# Patient Record
Sex: MALE | Race: WHITE | NOT HISPANIC OR LATINO | Employment: OTHER | URBAN - METROPOLITAN AREA
[De-identification: names, ages, dates, MRNs, and addresses within clinical notes are randomized per-mention and may not be internally consistent; named-entity substitution may affect disease eponyms.]

---

## 2018-01-04 ENCOUNTER — OFFICE VISIT CONVERTED (OUTPATIENT)
Dept: INTERNAL MEDICINE | Facility: CLINIC | Age: 83
End: 2018-01-04
Attending: INTERNAL MEDICINE

## 2018-01-04 ENCOUNTER — CONVERSION ENCOUNTER (OUTPATIENT)
Dept: INTERNAL MEDICINE | Facility: CLINIC | Age: 83
End: 2018-01-04

## 2018-01-25 ENCOUNTER — OFFICE VISIT CONVERTED (OUTPATIENT)
Dept: SURGERY | Facility: CLINIC | Age: 83
End: 2018-01-25
Attending: SURGERY

## 2018-01-26 ENCOUNTER — OFFICE VISIT CONVERTED (OUTPATIENT)
Dept: INTERNAL MEDICINE | Facility: CLINIC | Age: 83
End: 2018-01-26
Attending: PHYSICIAN ASSISTANT

## 2018-01-26 ENCOUNTER — CONVERSION ENCOUNTER (OUTPATIENT)
Dept: INTERNAL MEDICINE | Facility: CLINIC | Age: 83
End: 2018-01-26

## 2018-03-01 ENCOUNTER — CONVERSION ENCOUNTER (OUTPATIENT)
Dept: SURGERY | Facility: CLINIC | Age: 83
End: 2018-03-01

## 2018-03-01 ENCOUNTER — OFFICE VISIT CONVERTED (OUTPATIENT)
Dept: SURGERY | Facility: CLINIC | Age: 83
End: 2018-03-01
Attending: SURGERY

## 2018-03-05 ENCOUNTER — CONVERSION ENCOUNTER (OUTPATIENT)
Dept: INTERNAL MEDICINE | Facility: CLINIC | Age: 83
End: 2018-03-05

## 2018-03-05 ENCOUNTER — OFFICE VISIT CONVERTED (OUTPATIENT)
Dept: INTERNAL MEDICINE | Facility: CLINIC | Age: 83
End: 2018-03-05
Attending: INTERNAL MEDICINE

## 2018-03-22 ENCOUNTER — OFFICE VISIT CONVERTED (OUTPATIENT)
Dept: INTERNAL MEDICINE | Facility: CLINIC | Age: 83
End: 2018-03-22
Attending: INTERNAL MEDICINE

## 2018-03-22 ENCOUNTER — CONVERSION ENCOUNTER (OUTPATIENT)
Dept: INTERNAL MEDICINE | Facility: CLINIC | Age: 83
End: 2018-03-22

## 2018-03-27 ENCOUNTER — PATIENT OUTREACH - CONVERTED (OUTPATIENT)
Dept: INTERNAL MEDICINE | Facility: CLINIC | Age: 83
End: 2018-03-27
Attending: INTERNAL MEDICINE

## 2018-04-05 ENCOUNTER — CONVERSION ENCOUNTER (OUTPATIENT)
Dept: INTERNAL MEDICINE | Facility: CLINIC | Age: 83
End: 2018-04-05

## 2018-04-05 ENCOUNTER — OFFICE VISIT CONVERTED (OUTPATIENT)
Dept: INTERNAL MEDICINE | Facility: CLINIC | Age: 83
End: 2018-04-05
Attending: INTERNAL MEDICINE

## 2018-06-06 ENCOUNTER — OFFICE VISIT CONVERTED (OUTPATIENT)
Dept: INTERNAL MEDICINE | Facility: CLINIC | Age: 83
End: 2018-06-06
Attending: INTERNAL MEDICINE

## 2018-06-06 ENCOUNTER — CONVERSION ENCOUNTER (OUTPATIENT)
Dept: INTERNAL MEDICINE | Facility: CLINIC | Age: 83
End: 2018-06-06

## 2018-07-09 ENCOUNTER — OFFICE VISIT CONVERTED (OUTPATIENT)
Dept: ORTHOPEDIC SURGERY | Facility: CLINIC | Age: 83
End: 2018-07-09
Attending: ORTHOPAEDIC SURGERY

## 2018-08-02 ENCOUNTER — OFFICE VISIT CONVERTED (OUTPATIENT)
Dept: NEUROLOGY | Facility: CLINIC | Age: 83
End: 2018-08-02
Attending: PSYCHIATRY & NEUROLOGY

## 2018-08-02 ENCOUNTER — CONVERSION ENCOUNTER (OUTPATIENT)
Dept: NEUROLOGY | Facility: CLINIC | Age: 83
End: 2018-08-02

## 2018-08-02 ENCOUNTER — OFFICE VISIT CONVERTED (OUTPATIENT)
Dept: INTERNAL MEDICINE | Facility: CLINIC | Age: 83
End: 2018-08-02
Attending: INTERNAL MEDICINE

## 2018-08-27 ENCOUNTER — OFFICE VISIT CONVERTED (OUTPATIENT)
Dept: INTERNAL MEDICINE | Facility: CLINIC | Age: 83
End: 2018-08-27
Attending: INTERNAL MEDICINE

## 2018-11-02 ENCOUNTER — OFFICE VISIT CONVERTED (OUTPATIENT)
Dept: INTERNAL MEDICINE | Facility: CLINIC | Age: 83
End: 2018-11-02
Attending: INTERNAL MEDICINE

## 2018-11-27 ENCOUNTER — PATIENT OUTREACH - CONVERTED (OUTPATIENT)
Dept: INTERNAL MEDICINE | Facility: CLINIC | Age: 83
End: 2018-11-27
Attending: INTERNAL MEDICINE

## 2018-12-28 ENCOUNTER — PATIENT OUTREACH - CONVERTED (OUTPATIENT)
Dept: INTERNAL MEDICINE | Facility: CLINIC | Age: 83
End: 2018-12-28
Attending: INTERNAL MEDICINE

## 2018-12-31 ENCOUNTER — OFFICE VISIT CONVERTED (OUTPATIENT)
Dept: INTERNAL MEDICINE | Facility: CLINIC | Age: 83
End: 2018-12-31
Attending: INTERNAL MEDICINE

## 2019-01-24 ENCOUNTER — HOSPITAL ENCOUNTER (OUTPATIENT)
Dept: URGENT CARE | Facility: CLINIC | Age: 84
Discharge: HOME | End: 2019-01-24
Attending: PHYSICIAN ASSISTANT

## 2019-02-11 ENCOUNTER — OFFICE VISIT CONVERTED (OUTPATIENT)
Dept: NEUROLOGY | Facility: CLINIC | Age: 84
End: 2019-02-11
Attending: PSYCHIATRY & NEUROLOGY

## 2019-03-08 ENCOUNTER — HOSPITAL ENCOUNTER (OUTPATIENT)
Dept: OTHER | Facility: HOSPITAL | Age: 84
Discharge: HOME | End: 2019-03-08
Attending: INTERNAL MEDICINE

## 2019-03-08 LAB
ANION GAP SERPL CALC-SCNC: 17 MMOL/L (ref 8–19)
BUN SERPL-MCNC: 23 MG/DL (ref 5–25)
BUN/CREAT SERPL: 18 {RATIO} (ref 6–20)
CALCIUM SERPL-MCNC: 9.2 MG/DL (ref 8.7–10.4)
CHLORIDE SERPL-SCNC: 107 MMOL/L (ref 99–111)
CONV CO2: 25 MMOL/L (ref 22–32)
CREAT UR-MCNC: 1.3 MG/DL (ref 0.7–1.2)
GFR SERPLBLD BASED ON 1.73 SQ M-ARVRAT: 50 ML/MIN/{1.73_M2}
GLUCOSE SERPL-MCNC: 99 MG/DL (ref 70–99)
OSMOLALITY SERPL CALC.SUM OF ELEC: 302 MOSM/KG (ref 273–304)
POTASSIUM SERPL-SCNC: 5.1 MMOL/L (ref 3.5–5.3)
SODIUM SERPL-SCNC: 144 MMOL/L (ref 135–147)

## 2019-03-30 ENCOUNTER — HOSPITAL ENCOUNTER (OUTPATIENT)
Dept: URGENT CARE | Facility: CLINIC | Age: 84
Discharge: HOME | End: 2019-03-30
Attending: EMERGENCY MEDICINE

## 2019-04-05 ENCOUNTER — OFFICE VISIT CONVERTED (OUTPATIENT)
Dept: INTERNAL MEDICINE | Facility: CLINIC | Age: 84
End: 2019-04-05
Attending: INTERNAL MEDICINE

## 2019-04-30 ENCOUNTER — PATIENT OUTREACH - CONVERTED (OUTPATIENT)
Dept: INTERNAL MEDICINE | Facility: CLINIC | Age: 84
End: 2019-04-30
Attending: INTERNAL MEDICINE

## 2019-05-10 ENCOUNTER — HOSPITAL ENCOUNTER (OUTPATIENT)
Dept: URGENT CARE | Facility: CLINIC | Age: 84
Discharge: HOME | End: 2019-05-10

## 2019-05-30 ENCOUNTER — HOSPITAL ENCOUNTER (OUTPATIENT)
Dept: OTHER | Facility: HOSPITAL | Age: 84
Discharge: HOME | End: 2019-05-30
Attending: INTERNAL MEDICINE

## 2019-05-30 LAB
CREAT UR-MCNC: 1.58 MG/DL (ref 0.7–1.2)
URATE SERPL-MCNC: 6.2 MG/DL (ref 3.5–8.5)

## 2019-06-28 ENCOUNTER — PATIENT OUTREACH - CONVERTED (OUTPATIENT)
Dept: INTERNAL MEDICINE | Facility: CLINIC | Age: 84
End: 2019-06-28
Attending: INTERNAL MEDICINE

## 2019-07-09 ENCOUNTER — CONVERSION ENCOUNTER (OUTPATIENT)
Dept: NEUROLOGY | Facility: CLINIC | Age: 84
End: 2019-07-09

## 2019-07-09 ENCOUNTER — OFFICE VISIT CONVERTED (OUTPATIENT)
Dept: NEUROLOGY | Facility: CLINIC | Age: 84
End: 2019-07-09
Attending: PSYCHIATRY & NEUROLOGY

## 2019-08-25 ENCOUNTER — HOSPITAL ENCOUNTER (OUTPATIENT)
Dept: URGENT CARE | Facility: CLINIC | Age: 84
Discharge: HOME | End: 2019-08-25
Attending: EMERGENCY MEDICINE

## 2019-08-30 ENCOUNTER — PATIENT OUTREACH - CONVERTED (OUTPATIENT)
Dept: INTERNAL MEDICINE | Facility: CLINIC | Age: 84
End: 2019-08-30
Attending: INTERNAL MEDICINE

## 2019-09-21 ENCOUNTER — HOSPITAL ENCOUNTER (OUTPATIENT)
Dept: URGENT CARE | Facility: CLINIC | Age: 84
Discharge: HOME | End: 2019-09-21

## 2019-09-30 ENCOUNTER — PATIENT OUTREACH - CONVERTED (OUTPATIENT)
Dept: INTERNAL MEDICINE | Facility: CLINIC | Age: 84
End: 2019-09-30
Attending: INTERNAL MEDICINE

## 2019-10-02 ENCOUNTER — OFFICE VISIT CONVERTED (OUTPATIENT)
Dept: INTERNAL MEDICINE | Facility: CLINIC | Age: 84
End: 2019-10-02
Attending: INTERNAL MEDICINE

## 2019-10-09 ENCOUNTER — HOSPITAL ENCOUNTER (OUTPATIENT)
Dept: MRI IMAGING | Facility: HOSPITAL | Age: 84
Discharge: HOME | End: 2019-10-09
Attending: INTERNAL MEDICINE

## 2019-10-25 ENCOUNTER — HOSPITAL ENCOUNTER (OUTPATIENT)
Dept: URGENT CARE | Facility: CLINIC | Age: 84
Discharge: HOME | End: 2019-10-25

## 2019-10-31 ENCOUNTER — PATIENT OUTREACH - CONVERTED (OUTPATIENT)
Dept: INTERNAL MEDICINE | Facility: CLINIC | Age: 84
End: 2019-10-31
Attending: INTERNAL MEDICINE

## 2019-11-04 ENCOUNTER — OFFICE VISIT CONVERTED (OUTPATIENT)
Dept: ORTHOPEDIC SURGERY | Facility: CLINIC | Age: 84
End: 2019-11-04
Attending: ORTHOPAEDIC SURGERY

## 2019-11-07 ENCOUNTER — OFFICE VISIT CONVERTED (OUTPATIENT)
Dept: INTERNAL MEDICINE | Facility: CLINIC | Age: 84
End: 2019-11-07
Attending: NURSE PRACTITIONER

## 2019-11-07 ENCOUNTER — HOSPITAL ENCOUNTER (OUTPATIENT)
Dept: OTHER | Facility: HOSPITAL | Age: 84
Discharge: HOME | End: 2019-11-07
Attending: NURSE PRACTITIONER

## 2019-11-07 LAB
ALBUMIN SERPL-MCNC: 4.3 G/DL (ref 3.5–5)
ALBUMIN/GLOB SERPL: 1.7 {RATIO} (ref 1.4–2.6)
ALP SERPL-CCNC: 68 U/L (ref 56–155)
ALT SERPL-CCNC: 19 U/L (ref 10–40)
ANION GAP SERPL CALC-SCNC: 18 MMOL/L (ref 8–19)
AST SERPL-CCNC: 17 U/L (ref 15–50)
BASOPHILS # BLD AUTO: 0.11 10*3/UL (ref 0–0.2)
BASOPHILS NFR BLD AUTO: 1.3 % (ref 0–3)
BILIRUB SERPL-MCNC: 0.3 MG/DL (ref 0.2–1.3)
BUN SERPL-MCNC: 36 MG/DL (ref 5–25)
BUN/CREAT SERPL: 26 {RATIO} (ref 6–20)
CALCIUM SERPL-MCNC: 10 MG/DL (ref 8.7–10.4)
CHLORIDE SERPL-SCNC: 102 MMOL/L (ref 99–111)
CONV ABS IMM GRAN: 0.07 10*3/UL (ref 0–0.2)
CONV CO2: 27 MMOL/L (ref 22–32)
CONV IMMATURE GRAN: 0.8 % (ref 0–1.8)
CONV TOTAL PROTEIN: 6.8 G/DL (ref 6.3–8.2)
CREAT UR-MCNC: 1.4 MG/DL (ref 0.7–1.2)
DEPRECATED RDW RBC AUTO: 49.7 FL (ref 35.1–43.9)
EOSINOPHIL # BLD AUTO: 0.13 10*3/UL (ref 0–0.7)
EOSINOPHIL # BLD AUTO: 1.5 % (ref 0–7)
ERYTHROCYTE [DISTWIDTH] IN BLOOD BY AUTOMATED COUNT: 13.4 % (ref 11.6–14.4)
FOLATE SERPL-MCNC: 17.9 NG/ML (ref 4.8–20)
GFR SERPLBLD BASED ON 1.73 SQ M-ARVRAT: 46 ML/MIN/{1.73_M2}
GLOBULIN UR ELPH-MCNC: 2.5 G/DL (ref 2–3.5)
GLUCOSE SERPL-MCNC: 51 MG/DL (ref 70–99)
HCT VFR BLD AUTO: 48.9 % (ref 42–52)
HGB BLD-MCNC: 15.5 G/DL (ref 14–18)
LYMPHOCYTES # BLD AUTO: 2.22 10*3/UL (ref 1–5)
LYMPHOCYTES NFR BLD AUTO: 25.8 % (ref 20–45)
MCH RBC QN AUTO: 31.8 PG (ref 27–31)
MCHC RBC AUTO-ENTMCNC: 31.7 G/DL (ref 33–37)
MCV RBC AUTO: 100.4 FL (ref 80–96)
MONOCYTES # BLD AUTO: 0.96 10*3/UL (ref 0.2–1.2)
MONOCYTES NFR BLD AUTO: 11.1 % (ref 3–10)
NEUTROPHILS # BLD AUTO: 5.13 10*3/UL (ref 2–8)
NEUTROPHILS NFR BLD AUTO: 59.5 % (ref 30–85)
NRBC CBCN: 0 % (ref 0–0.7)
OSMOLALITY SERPL CALC.SUM OF ELEC: 298 MOSM/KG (ref 273–304)
PLATELET # BLD AUTO: 164 10*3/UL (ref 130–400)
PMV BLD AUTO: 11.9 FL (ref 9.4–12.4)
POTASSIUM SERPL-SCNC: 5.6 MMOL/L (ref 3.5–5.3)
RBC # BLD AUTO: 4.87 10*6/UL (ref 4.7–6.1)
SODIUM SERPL-SCNC: 141 MMOL/L (ref 135–147)
T4 FREE SERPL-MCNC: 1 NG/DL (ref 0.9–1.8)
TSH SERPL-ACNC: 1.62 M[IU]/L (ref 0.27–4.2)
VIT B12 SERPL-MCNC: 600 PG/ML (ref 211–911)
WBC # BLD AUTO: 8.62 10*3/UL (ref 4.8–10.8)

## 2019-11-15 ENCOUNTER — HOSPITAL ENCOUNTER (OUTPATIENT)
Dept: URGENT CARE | Facility: CLINIC | Age: 84
Discharge: HOME | End: 2019-11-15

## 2019-11-21 ENCOUNTER — CONVERSION ENCOUNTER (OUTPATIENT)
Dept: INTERNAL MEDICINE | Facility: CLINIC | Age: 84
End: 2019-11-21

## 2019-11-21 ENCOUNTER — OFFICE VISIT CONVERTED (OUTPATIENT)
Dept: INTERNAL MEDICINE | Facility: CLINIC | Age: 84
End: 2019-11-21
Attending: INTERNAL MEDICINE

## 2019-11-27 ENCOUNTER — PATIENT OUTREACH - CONVERTED (OUTPATIENT)
Dept: INTERNAL MEDICINE | Facility: CLINIC | Age: 84
End: 2019-11-27
Attending: INTERNAL MEDICINE

## 2019-11-29 ENCOUNTER — HOSPITAL ENCOUNTER (OUTPATIENT)
Dept: OTHER | Facility: HOSPITAL | Age: 84
Discharge: HOME | End: 2019-11-29
Attending: INTERNAL MEDICINE

## 2019-11-29 LAB
CREAT UR-MCNC: 1.46 MG/DL (ref 0.7–1.2)
URATE SERPL-MCNC: 5.5 MG/DL (ref 3.5–8.5)

## 2019-12-31 ENCOUNTER — PATIENT OUTREACH - CONVERTED (OUTPATIENT)
Dept: INTERNAL MEDICINE | Facility: CLINIC | Age: 84
End: 2019-12-31
Attending: INTERNAL MEDICINE

## 2020-01-13 ENCOUNTER — OFFICE VISIT CONVERTED (OUTPATIENT)
Dept: UROLOGY | Facility: CLINIC | Age: 85
End: 2020-01-13
Attending: UROLOGY

## 2020-01-13 ENCOUNTER — CONVERSION ENCOUNTER (OUTPATIENT)
Dept: SURGERY | Facility: CLINIC | Age: 85
End: 2020-01-13

## 2020-01-23 ENCOUNTER — HOSPITAL ENCOUNTER (OUTPATIENT)
Dept: CT IMAGING | Facility: HOSPITAL | Age: 85
Discharge: HOME | End: 2020-01-23
Attending: UROLOGY

## 2020-01-30 ENCOUNTER — PATIENT OUTREACH - CONVERTED (OUTPATIENT)
Dept: INTERNAL MEDICINE | Facility: CLINIC | Age: 85
End: 2020-01-30
Attending: INTERNAL MEDICINE

## 2020-02-04 ENCOUNTER — HOSPITAL ENCOUNTER (OUTPATIENT)
Dept: URGENT CARE | Facility: CLINIC | Age: 85
Discharge: HOME | End: 2020-02-04
Attending: EMERGENCY MEDICINE

## 2020-02-06 LAB — BACTERIA SPEC AEROBE CULT: NORMAL

## 2020-02-14 ENCOUNTER — OFFICE VISIT CONVERTED (OUTPATIENT)
Dept: NEUROLOGY | Facility: CLINIC | Age: 85
End: 2020-02-14
Attending: PSYCHIATRY & NEUROLOGY

## 2020-02-17 ENCOUNTER — OFFICE VISIT CONVERTED (OUTPATIENT)
Dept: UROLOGY | Facility: CLINIC | Age: 85
End: 2020-02-17
Attending: UROLOGY

## 2020-02-21 ENCOUNTER — OFFICE VISIT CONVERTED (OUTPATIENT)
Dept: INTERNAL MEDICINE | Facility: CLINIC | Age: 85
End: 2020-02-21
Attending: INTERNAL MEDICINE

## 2020-02-24 ENCOUNTER — OFFICE VISIT CONVERTED (OUTPATIENT)
Dept: ORTHOPEDIC SURGERY | Facility: CLINIC | Age: 85
End: 2020-02-24
Attending: ORTHOPAEDIC SURGERY

## 2020-02-27 ENCOUNTER — PATIENT OUTREACH - CONVERTED (OUTPATIENT)
Dept: INTERNAL MEDICINE | Facility: CLINIC | Age: 85
End: 2020-02-27
Attending: INTERNAL MEDICINE

## 2020-03-06 ENCOUNTER — OFFICE VISIT CONVERTED (OUTPATIENT)
Dept: SURGERY | Facility: CLINIC | Age: 85
End: 2020-03-06
Attending: SURGERY

## 2020-03-26 ENCOUNTER — PATIENT OUTREACH - CONVERTED (OUTPATIENT)
Dept: INTERNAL MEDICINE | Facility: CLINIC | Age: 85
End: 2020-03-26
Attending: INTERNAL MEDICINE

## 2020-04-08 ENCOUNTER — TELEPHONE CONVERTED (OUTPATIENT)
Dept: NEUROLOGY | Facility: CLINIC | Age: 85
End: 2020-04-08
Attending: PSYCHIATRY & NEUROLOGY

## 2020-04-20 ENCOUNTER — OFFICE VISIT CONVERTED (OUTPATIENT)
Dept: ORTHOPEDIC SURGERY | Facility: CLINIC | Age: 85
End: 2020-04-20
Attending: PHYSICIAN ASSISTANT

## 2020-04-20 ENCOUNTER — CONVERSION ENCOUNTER (OUTPATIENT)
Dept: ORTHOPEDIC SURGERY | Facility: CLINIC | Age: 85
End: 2020-04-20

## 2020-05-01 ENCOUNTER — HOSPITAL ENCOUNTER (OUTPATIENT)
Dept: LAB | Facility: HOSPITAL | Age: 85
Discharge: HOME | End: 2020-05-01
Attending: INTERNAL MEDICINE

## 2020-05-01 LAB
CREAT UR-MCNC: 2.09 MG/DL (ref 0.7–1.2)
URATE SERPL-MCNC: 5.9 MG/DL (ref 3.5–8.5)

## 2020-05-22 ENCOUNTER — TELEPHONE CONVERTED (OUTPATIENT)
Dept: INTERNAL MEDICINE | Facility: CLINIC | Age: 85
End: 2020-05-22
Attending: INTERNAL MEDICINE

## 2020-06-30 ENCOUNTER — PATIENT OUTREACH - CONVERTED (OUTPATIENT)
Dept: INTERNAL MEDICINE | Facility: CLINIC | Age: 85
End: 2020-06-30
Attending: INTERNAL MEDICINE

## 2020-07-27 ENCOUNTER — OFFICE VISIT CONVERTED (OUTPATIENT)
Dept: PLASTIC SURGERY | Facility: CLINIC | Age: 85
End: 2020-07-27
Attending: PLASTIC SURGERY

## 2020-07-29 ENCOUNTER — PATIENT OUTREACH - CONVERTED (OUTPATIENT)
Dept: INTERNAL MEDICINE | Facility: CLINIC | Age: 85
End: 2020-07-29
Attending: INTERNAL MEDICINE

## 2020-08-26 ENCOUNTER — PATIENT OUTREACH - CONVERTED (OUTPATIENT)
Dept: INTERNAL MEDICINE | Facility: CLINIC | Age: 85
End: 2020-08-26
Attending: INTERNAL MEDICINE

## 2020-09-09 ENCOUNTER — OFFICE VISIT CONVERTED (OUTPATIENT)
Dept: SURGERY | Facility: CLINIC | Age: 85
End: 2020-09-09
Attending: SURGERY

## 2020-09-11 ENCOUNTER — HOSPITAL ENCOUNTER (OUTPATIENT)
Dept: CT IMAGING | Facility: HOSPITAL | Age: 85
Discharge: HOME | End: 2020-09-11
Attending: SURGERY

## 2020-09-11 LAB
CREAT BLD-MCNC: 1.9 MG/DL (ref 0.6–1.4)
GFR SERPLBLD BASED ON 1.73 SQ M-ARVRAT: 31 ML/MIN/{1.73_M2}

## 2020-09-17 ENCOUNTER — OFFICE VISIT CONVERTED (OUTPATIENT)
Dept: SURGERY | Facility: CLINIC | Age: 85
End: 2020-09-17
Attending: SURGERY

## 2020-09-24 ENCOUNTER — HOSPITAL ENCOUNTER (OUTPATIENT)
Dept: URGENT CARE | Facility: CLINIC | Age: 85
Discharge: HOME | End: 2020-09-24
Attending: FAMILY MEDICINE

## 2020-09-27 LAB — SARS-COV-2 RNA SPEC QL NAA+PROBE: NOT DETECTED

## 2020-09-30 ENCOUNTER — PATIENT OUTREACH - CONVERTED (OUTPATIENT)
Dept: INTERNAL MEDICINE | Facility: CLINIC | Age: 85
End: 2020-09-30
Attending: INTERNAL MEDICINE

## 2020-10-29 ENCOUNTER — PATIENT OUTREACH - CONVERTED (OUTPATIENT)
Dept: INTERNAL MEDICINE | Facility: CLINIC | Age: 85
End: 2020-10-29
Attending: INTERNAL MEDICINE

## 2020-11-16 ENCOUNTER — HOSPITAL ENCOUNTER (OUTPATIENT)
Dept: PREADMISSION TESTING | Facility: HOSPITAL | Age: 85
Discharge: HOME | End: 2020-11-16
Attending: INTERNAL MEDICINE

## 2020-11-19 LAB — SARS-COV-2 RNA SPEC QL NAA+PROBE: NOT DETECTED

## 2020-11-20 ENCOUNTER — HOSPITAL ENCOUNTER (OUTPATIENT)
Dept: GENERAL RADIOLOGY | Facility: HOSPITAL | Age: 85
Discharge: HOME | End: 2020-11-20
Attending: INTERNAL MEDICINE

## 2020-11-30 ENCOUNTER — PATIENT OUTREACH - CONVERTED (OUTPATIENT)
Dept: INTERNAL MEDICINE | Facility: CLINIC | Age: 85
End: 2020-11-30
Attending: INTERNAL MEDICINE

## 2020-12-02 ENCOUNTER — HOSPITAL ENCOUNTER (OUTPATIENT)
Dept: OTHER | Facility: HOSPITAL | Age: 85
Discharge: HOME | End: 2020-12-02
Attending: INTERNAL MEDICINE

## 2020-12-02 ENCOUNTER — OFFICE VISIT CONVERTED (OUTPATIENT)
Dept: INTERNAL MEDICINE | Facility: CLINIC | Age: 85
End: 2020-12-02
Attending: INTERNAL MEDICINE

## 2020-12-02 LAB
BASOPHILS # BLD AUTO: 0.1 10*3/UL (ref 0–0.2)
BASOPHILS NFR BLD AUTO: 1.6 % (ref 0–3)
CONV ABS IMM GRAN: 0.02 10*3/UL (ref 0–0.2)
CONV IMMATURE GRAN: 0.3 % (ref 0–1.8)
DEPRECATED RDW RBC AUTO: 52.7 FL (ref 35.1–43.9)
EOSINOPHIL # BLD AUTO: 0.21 10*3/UL (ref 0–0.7)
EOSINOPHIL # BLD AUTO: 3.3 % (ref 0–7)
ERYTHROCYTE [DISTWIDTH] IN BLOOD BY AUTOMATED COUNT: 14.6 % (ref 11.6–14.4)
HCT VFR BLD AUTO: 43.5 % (ref 42–52)
HGB BLD-MCNC: 13.9 G/DL (ref 14–18)
LYMPHOCYTES # BLD AUTO: 2.05 10*3/UL (ref 1–5)
LYMPHOCYTES NFR BLD AUTO: 32.3 % (ref 20–45)
MCH RBC QN AUTO: 31.2 PG (ref 27–31)
MCHC RBC AUTO-ENTMCNC: 32 G/DL (ref 33–37)
MCV RBC AUTO: 97.8 FL (ref 80–96)
MONOCYTES # BLD AUTO: 0.4 10*3/UL (ref 0.2–1.2)
MONOCYTES NFR BLD AUTO: 6.3 % (ref 3–10)
NEUTROPHILS # BLD AUTO: 3.57 10*3/UL (ref 2–8)
NEUTROPHILS NFR BLD AUTO: 56.2 % (ref 30–85)
NRBC CBCN: 0 % (ref 0–0.7)
PLATELET # BLD AUTO: 153 10*3/UL (ref 130–400)
PMV BLD AUTO: 10.9 FL (ref 9.4–12.4)
PSA SERPL-MCNC: 2.08 NG/ML (ref 0–4)
RBC # BLD AUTO: 4.45 10*6/UL (ref 4.7–6.1)
WBC # BLD AUTO: 6.35 10*3/UL (ref 4.8–10.8)

## 2020-12-03 LAB
ALBUMIN SERPL-MCNC: 3.8 G/DL (ref 3.5–5)
ALBUMIN/GLOB SERPL: 1.7 {RATIO} (ref 1.4–2.6)
ALP SERPL-CCNC: 70 U/L (ref 56–155)
ALT SERPL-CCNC: 10 U/L (ref 10–40)
ANION GAP SERPL CALC-SCNC: 20 MMOL/L (ref 8–19)
AST SERPL-CCNC: 15 U/L (ref 15–50)
BILIRUB SERPL-MCNC: 0.28 MG/DL (ref 0.2–1.3)
BUN SERPL-MCNC: 27 MG/DL (ref 5–25)
BUN/CREAT SERPL: 19 {RATIO} (ref 6–20)
CALCIUM SERPL-MCNC: 9.4 MG/DL (ref 8.7–10.4)
CHLORIDE SERPL-SCNC: 110 MMOL/L (ref 99–111)
CHOLEST SERPL-MCNC: 190 MG/DL (ref 107–200)
CHOLEST/HDLC SERPL: 4 {RATIO} (ref 3–6)
CONV CO2: 21 MMOL/L (ref 22–32)
CONV CREATININE URINE, RANDOM: 49.1 MG/DL (ref 10–300)
CONV MICROALBUM.,U,RANDOM: <12 MG/L (ref 0–20)
CONV TOTAL PROTEIN: 6.1 G/DL (ref 6.3–8.2)
CREAT UR-MCNC: 1.39 MG/DL (ref 0.7–1.2)
EST. AVERAGE GLUCOSE BLD GHB EST-MCNC: 114 MG/DL
GFR SERPLBLD BASED ON 1.73 SQ M-ARVRAT: 46 ML/MIN/{1.73_M2}
GLOBULIN UR ELPH-MCNC: 2.3 G/DL (ref 2–3.5)
GLUCOSE SERPL-MCNC: 91 MG/DL (ref 70–99)
HBA1C MFR BLD: 5.6 % (ref 3.5–5.7)
HDLC SERPL-MCNC: 48 MG/DL (ref 40–60)
LDLC SERPL CALC-MCNC: 127 MG/DL (ref 70–100)
MICROALBUMIN/CREAT UR: 24.4 MG/G{CRE} (ref 0–25)
OSMOLALITY SERPL CALC.SUM OF ELEC: 309 MOSM/KG (ref 273–304)
POTASSIUM SERPL-SCNC: 4.3 MMOL/L (ref 3.5–5.3)
SODIUM SERPL-SCNC: 147 MMOL/L (ref 135–147)
TRIGL SERPL-MCNC: 73 MG/DL (ref 40–150)
VLDLC SERPL-MCNC: 15 MG/DL (ref 5–37)

## 2020-12-29 ENCOUNTER — OFFICE VISIT CONVERTED (OUTPATIENT)
Dept: UROLOGY | Facility: CLINIC | Age: 85
End: 2020-12-29
Attending: UROLOGY

## 2020-12-29 ENCOUNTER — CONVERSION ENCOUNTER (OUTPATIENT)
Dept: SURGERY | Facility: CLINIC | Age: 85
End: 2020-12-29

## 2020-12-30 ENCOUNTER — PATIENT OUTREACH - CONVERTED (OUTPATIENT)
Dept: INTERNAL MEDICINE | Facility: CLINIC | Age: 85
End: 2020-12-30
Attending: INTERNAL MEDICINE

## 2021-01-28 ENCOUNTER — PATIENT OUTREACH - CONVERTED (OUTPATIENT)
Dept: INTERNAL MEDICINE | Facility: CLINIC | Age: 86
End: 2021-01-28
Attending: INTERNAL MEDICINE

## 2021-03-17 ENCOUNTER — CONVERSION ENCOUNTER (OUTPATIENT)
Dept: INTERNAL MEDICINE | Facility: CLINIC | Age: 86
End: 2021-03-17

## 2021-03-17 ENCOUNTER — HOSPITAL ENCOUNTER (OUTPATIENT)
Dept: OTHER | Facility: HOSPITAL | Age: 86
Discharge: HOME | End: 2021-03-17
Attending: PHYSICIAN ASSISTANT

## 2021-03-17 ENCOUNTER — OFFICE VISIT CONVERTED (OUTPATIENT)
Dept: INTERNAL MEDICINE | Facility: CLINIC | Age: 86
End: 2021-03-17
Attending: PHYSICIAN ASSISTANT

## 2021-03-17 LAB
ALBUMIN SERPL-MCNC: 4.1 G/DL (ref 3.5–5)
ALBUMIN/GLOB SERPL: 1.6 {RATIO} (ref 1.4–2.6)
ALP SERPL-CCNC: 65 U/L (ref 56–155)
ALT SERPL-CCNC: 8 U/L (ref 10–40)
ANION GAP SERPL CALC-SCNC: 16 MMOL/L (ref 8–19)
AST SERPL-CCNC: 13 U/L (ref 15–50)
BASOPHILS # BLD AUTO: 0.1 10*3/UL (ref 0–0.2)
BASOPHILS NFR BLD AUTO: 1.4 % (ref 0–3)
BILIRUB SERPL-MCNC: 0.38 MG/DL (ref 0.2–1.3)
BUN SERPL-MCNC: 39 MG/DL (ref 5–25)
BUN/CREAT SERPL: 24 {RATIO} (ref 6–20)
CALCIUM SERPL-MCNC: 9.3 MG/DL (ref 8.7–10.4)
CHLORIDE SERPL-SCNC: 109 MMOL/L (ref 99–111)
CHOLEST SERPL-MCNC: 236 MG/DL (ref 107–200)
CHOLEST/HDLC SERPL: 4.5 {RATIO} (ref 3–6)
CONV ABS IMM GRAN: 0.03 10*3/UL (ref 0–0.2)
CONV CO2: 27 MMOL/L (ref 22–32)
CONV IMMATURE GRAN: 0.4 % (ref 0–1.8)
CONV TOTAL PROTEIN: 6.6 G/DL (ref 6.3–8.2)
CREAT UR-MCNC: 1.6 MG/DL (ref 0.7–1.2)
DEPRECATED RDW RBC AUTO: 49.1 FL (ref 35.1–43.9)
EOSINOPHIL # BLD AUTO: 0.25 10*3/UL (ref 0–0.7)
EOSINOPHIL # BLD AUTO: 3.6 % (ref 0–7)
ERYTHROCYTE [DISTWIDTH] IN BLOOD BY AUTOMATED COUNT: 13.3 % (ref 11.6–14.4)
GFR SERPLBLD BASED ON 1.73 SQ M-ARVRAT: 38 ML/MIN/{1.73_M2}
GLOBULIN UR ELPH-MCNC: 2.5 G/DL (ref 2–3.5)
GLUCOSE SERPL-MCNC: 95 MG/DL (ref 70–99)
HCT VFR BLD AUTO: 48.4 % (ref 42–52)
HDLC SERPL-MCNC: 52 MG/DL (ref 40–60)
HGB BLD-MCNC: 15.1 G/DL (ref 14–18)
LDLC SERPL CALC-MCNC: 170 MG/DL (ref 70–100)
LYMPHOCYTES # BLD AUTO: 2.19 10*3/UL (ref 1–5)
LYMPHOCYTES NFR BLD AUTO: 31.5 % (ref 20–45)
MCH RBC QN AUTO: 31 PG (ref 27–31)
MCHC RBC AUTO-ENTMCNC: 31.2 G/DL (ref 33–37)
MCV RBC AUTO: 99.4 FL (ref 80–96)
MONOCYTES # BLD AUTO: 0.46 10*3/UL (ref 0.2–1.2)
MONOCYTES NFR BLD AUTO: 6.6 % (ref 3–10)
NEUTROPHILS # BLD AUTO: 3.93 10*3/UL (ref 2–8)
NEUTROPHILS NFR BLD AUTO: 56.5 % (ref 30–85)
NRBC CBCN: 0 % (ref 0–0.7)
OSMOLALITY SERPL CALC.SUM OF ELEC: 313 MOSM/KG (ref 273–304)
PLATELET # BLD AUTO: 144 10*3/UL (ref 130–400)
PMV BLD AUTO: 11 FL (ref 9.4–12.4)
POTASSIUM SERPL-SCNC: 5.4 MMOL/L (ref 3.5–5.3)
RBC # BLD AUTO: 4.87 10*6/UL (ref 4.7–6.1)
SODIUM SERPL-SCNC: 147 MMOL/L (ref 135–147)
TRIGL SERPL-MCNC: 70 MG/DL (ref 40–150)
TSH SERPL-ACNC: 2.01 M[IU]/L (ref 0.27–4.2)
VLDLC SERPL-MCNC: 14 MG/DL (ref 5–37)
WBC # BLD AUTO: 6.96 10*3/UL (ref 4.8–10.8)

## 2021-03-18 LAB
MAGNESIUM SERPL-MCNC: 2.08 MG/DL (ref 1.6–2.3)
TROPONIN T SERPL-MCNC: <0.01 NG/ML (ref 0–0.1)

## 2021-03-19 ENCOUNTER — OFFICE VISIT CONVERTED (OUTPATIENT)
Dept: INTERNAL MEDICINE | Facility: CLINIC | Age: 86
End: 2021-03-19
Attending: PHYSICIAN ASSISTANT

## 2021-05-10 NOTE — H&P
"   History and Physical      Patient Name: Reddy Castellano   Patient ID: [id number]   Sex: Male   YOB: 1935    Primary Care Provider: Agustina Gutierrez MD   Referring Provider: Christopher Lozano MD    Visit Date: July 27, 2020    Provider: Yelena Ma MD   Location: [facility]   Location Address: [address] [place] [address]  [id number]   Location Phone: [phone]          Chief Complaint  · here to discuss pannculectomy      History Of Present Illness  Reddy Castellano is a 85 year old /White male who presents to the office today as a consult from Christopher Lozano MD. 2000 had hernia operation and lost testicle. ED and got implant. Then the implant ruptured. Had implant removed and now has a \"1 inch penis\". Feels like penis is buried inward. This makes hygiene hard. Lower abd hangs slightly.       Past Medical History  Allergies; Anxiety; Arthritis; Asthma; BPH (benign prostatic hyperplasia); Carpal tunnel syndrome; Chronic Obstructive Pulmonary Disease; Colon Polyps; Congestive heart failure; COPD (chronic obstructive pulmonary disease); Dementia; Depression; Diabetes; Diabetes mellitus type 2, noninsulin dependent; Diverticulitis; Erectile Dysfunction, Organic; Essential hypertension; Excess skin; Fatigue; GERD (gastroesophageal reflux disease); Gout; Heart attack; Heart Disease; High cholesterol; Hyperlipidemia; Hypertension; Hypertension, Benign Essential; Hypogonadism in male; Insomnia; Leg swelling; Memory change; Myocardial Infarction; Osteoarthritis; Parkinson's Disease; Prostate Disorder; Rectal Bleeding; Renal cyst; Seasonal allergies; Sleep Apnea; Stroke; TIA (transient ischemic attack); Tremor; Vitamin D deficiency         Past Surgical History  Appendectomy; Cardiac Catherization; Carpal Tunnel Release; Cataract surgery; Circumcision; Colonoscopy; Cystoscopy; EGD; Eye Implant; heart surgery; Hernia; Knee replacement, left; Knee replacement, " right; Lap Band Surgery; Metal implants; orchiectomy; Penile implant; Surgical removal of toenail and nail matrix of both feet; Toe Surgery         Medication List  Allergy (chlorpheniramine) 4 mg oral tablet; allopurinol 300 mg oral tablet; amlodipine 10 mg oral tablet; Aspir-81 81 mg oral tablet,delayed release (DR/EC); atorvastatin 20 mg oral tablet; azelastine 137 mcg (0.1 %) nasal aerosol,spray; Blink Tears 0.25 % ophthalmic (eye) drops; brompheniramine-pseudoeph-DM 2-30-10 mg/5 mL oral syrup; Brovana 15 mcg/2 mL inhalation solution for nebulization; Celexa 20 mg oral tablet; cyanocobalamin (vitamin B-12) 1,000 mcg/mL injection solution; EpiPen 0.3 mg/0.3 mL injection auto-injector; Flonase Allergy Relief 50 mcg/actuation nasal spray,suspension; fluticasone 50 mcg/actuation nasal spray,suspension; furosemide 20 mg oral tablet; ipratropium-albuterol 0.5 mg-3 mg(2.5 mg base)/3 mL inhalation solution for nebulization; lisinopril 5 mg oral tablet; melatonin 3 mg oral tablet; metoprolol succinate 25 mg oral tablet extended release 24 hr; montelukast 10 mg oral tablet; Namenda XR 28 mg oral capsule,sprinkle,ER 24hr; pantoprazole 40 mg oral tablet,delayed release (DR/EC); potassium chloride 10 mEq oral capsule, extended release; silver sulfadiazine 1 % topical cream; Singulair 10 mg oral tablet; Spiriva Respimat 1.25 mcg/actuation inhalation mist; theophylline 200 mg oral tablet extended release 12 hr; Ventolin HFA 90 mcg/actuation inhalation HFA aerosol inhaler; Vitamin D2 1,250 mcg (50,000 unit) oral capsule; vitamin E 400 unit oral capsule; Zyrtec 10 mg oral tablet         Allergy List  NO KNOWN DRUG ALLERGIES       Allergies Reconciled  Family Medical History  Anus, Neoplasm; Colon Neoplasm, Malignant; Diabetes, unspecified type; Heart Attack (MI); Prostate cancer; Family history of diabetes mellitus         Social History  Alcohol (Current some day); lives alone; lives with children; Recreational Drug Use  (Never); Retired; Tobacco (Never); ;          Immunizations  Name Date Admin   Influenza    Influenza          Review of Systems  · Cardiovascular  o Denies  o : chest pain, lower extremity edema, Heart Attack, Abnormal EKG  · Respiratory  o Denies  o : shortness of breath, wheezing, cough  · Neurologic  o Denies  o : muscular weakness, incoordination, tingling or numbness, headaches  · Psychiatric  o Denies  o : anxiety, depression, difficulty sleeping      Vitals  Date Time BP Position Site L\R Cuff Size HR RR TEMP (F) WT  HT  BMI kg/m2 BSA m2 O2 Sat HC       07/27/2020 10:58 /68 Sitting    61 - R  97.5     96 %          Physical Examination  · Constitutional  o Appearance  o : well developed, well-nourished, Alert and oriented X3  · Eyes  o Sclerae  o : sclerae white  · Respiratory  o Respiratory Effort  o : breathing unlabored   · Cardiovascular  o Heart  o : regular rate  · Gastrointestinal  o Abdominal Examination  o : mild excessive skin of panniculus and moderate of pubic mons area, penis buried in mons  o Hernias  o : no hernias present  · Skin and Subcutaneous Tissue  o General Inspection  o : no lesions present, no areas of discoloration, skin turgor normal, texture normal  · Psychiatric  o Judgement and Insight  o : judgment and insight intact  o Mood and Affect  o : mood normal, affect appropriate              Assessment  · Penis disorder     607.9/N48.9    Problems Reconciled  Plan  · Orders  o Plastics reconstructive visit ([facility]) - - 07/27/2020  · Medications  o Medications have been Reconciled  o Transition of Care or Provider Policy  · Instructions  o The patient has buried penis and will refer to  for consult for possible reconstruction.   o Electronically Identified Patient Education Materials Provided Electronically            Electronically Signed by: Yelena Ma MD -Author on July 27, 2020 01:37:00 PM

## 2021-05-10 NOTE — OUTREACH NOTE
Quick Note      Patient Name: Reddy Castellano   Patient ID: [id number]   Sex: Male   YOB: 1935    Primary Care Provider: Agustina Gutierrez MD   Referring Provider: Christopher Lozano MD    Visit Date: December 30, 2020    Provider: Agustina Gutierrez MD   Location: [facility]   Location Address: [address] [place] [address]  [id number]   Location Phone: [phone]          History Of Present Illness  CCM Comprehensive Care Plan    This Chronic Medical Management Care Plan for Reddy Castellano, 85 year old /White male, has been monitored and managed for the month of December. A cumulative time of 49 minutes was spent on this patient record, including phone call with patient and chart review.   Regarding the patient's diagnoses Allergies, Anxiety, Arthritis, BPH (benign prostatic hyperplasia), Congestive heart failure, COPD (chronic obstructive pulmonary disease), Depression, Erectile Dysfunction, Organic, Essential hypertension, Excess skin, Fatigue, GERD (gastroesophageal reflux disease), Gout, Hypertension, Hypogonadism in male, Insomnia, Memory change, Osteoarthritis, Seasonal allergies, TIA (transient ischemic attack), Tremor, and Vitamin D deficiency, the following items were adressed: medical records, medications, and referrals to community service providers and any changes can be found within the plan section of the note. A detailed listing of time spent for chronic care management has been scanned into the patient's electronic record. Current medications include: Advair -21 mcg/actuation inhalation HFA aerosol inhaler, allopurinol 300 mg oral tablet, amlodipine 10 mg oral tablet, Aspir-81 81 mg oral tablet,delayed release (DR/EC), atorvastatin 20 mg oral tablet, Blink Tears 0.25 % ophthalmic (eye) drops, brompheniramine-pseudoeph-DM 2-30-10 mg/5 mL oral syrup, Brovana 15 mcg/2 mL inhalation solution for nebulization, cefuroxime axetil 500 mg oral  tablet, Celexa 20 mg oral tablet, cyanocobalamin (vitamin B-12) 1,000 mcg oral tablet, EpiPen 0.3 mg/0.3 mL injection auto-injector, Flonase Allergy Relief 50 mcg/actuation nasal spray,suspension, furosemide 20 mg oral tablet, ipratropium-albuterol 0.5 mg-3 mg(2.5 mg base)/3 mL inhalation solution for nebulization, lisinopril 5 mg oral tablet, melatonin 3 mg oral tablet, metoprolol succinate 25 mg oral tablet extended release 24 hr, Namenda 10 mg oral tablet, pantoprazole 40 mg oral tablet,delayed release (DR/EC), potassium chloride 10 mEq oral capsule, extended release, silver sulfadiazine 1 % topical cream, Singulair 10 mg oral tablet, Spiriva Respimat 1.25 mcg/actuation inhalation mist, theophylline 200 mg oral tablet extended release 12 hr, Ventolin HFA 90 mcg/actuation inhalation HFA aerosol inhaler, Vitamin B-12 500 mcg oral tablet, Vitamin D2 1,250 mcg (50,000 unit) oral capsule, vitamin E 400 unit oral capsule, and Zyrtec 10 mg oral tablet and the patient is reported to be compliant with medication protocol. Medications are reported to be effective. Regarding these diagnoses, referrals were made to the following provider/s Christopher Lozano MD.   The patient was monitored remotely for ER, activity, pain for a period of 49 minutes.   This patient's physical needs include: physical healthcare and physician referral. pt referred back to [facility] after going to ER for hydrocele.   Patient's mental support needs are currently being met.   The patient's cognitive support needs include: continued support. pt forgetful.   The patient's psychosocial support needs include:, continued support. pt has anxiety and depression, on medications, sees counselor.   This patient's functional needs are N/A.   This patient's environmental needs are N/A.           Assessment  · Chronic Care Management (CCM)     V68.89/Z02.89  · Essential hypertension     401.9/I10  · Fatigue     780.79/R53.83  · Memory  change     780.93/R41.3  Currently, the patient is taking Namenda. I will pursue a mocha at his follow-up.  · TIA (transient ischemic attack)     435.9/G45.9  Reportedly, the patient has a history of TIA. This can be discussed further at his follow-up. I have asked that he continue aspirin.  · Tremor     781.0/R25.1  The patient was a 3 month history of a high-frequency, low amplitude tremor of both hands that worsens somewhat with activity. His tremor does not interfere with his activities of daily living. He has no evidence of bradykinesia on exam. Thus, Parkinson's seems unlikely at this point. Potential etiologies would include essential tremor and an exaggerated physiologic tremor. He does note that he started theophylline approximately 3 months ago which coincides with his onset of tremor. I question if this tremor could be related to this medication. For now, I will defer changes. I will check labs. I will request his records be sent for my review.  · Congestive heart failure     428.0/I50.9  · Arthritis     716.90/M19.90  · Erectile Dysfunction, Organic     607.84  · BPH (benign prostatic hyperplasia)     600.00/N40.0  · Gout     274.9/M10.9  · Osteoarthritis     715.90/M19.90  · GERD (gastroesophageal reflux disease)     530.81/K21.9  · Insomnia     780.52/G47.00  · Vitamin D deficiency     268.9/E55.9  · Depression     311/F32.9  · Anxiety     300.02/F41.1  · Excess skin     701.9/L98.7  · COPD (chronic obstructive pulmonary disease)     496/J44.9  · Hydrocele     603.9/N43.3      Plan  · Orders  o CCM:Each additional 20 mins () - V68.89/Z02.89, 603.9/N43.3, 401.9/I10, 780.79/R53.83 - 12/02/2020  · Medications  o Medications have been Reconciled  o Transition of Care or Provider Policy  · Instructions  o Patient's Health Care Goals: no testicle pain  o Provider's Health Care Goals: compliance with meds  o Patient was provided an electronic copy of care plan  o CCM services were explained and offered  and patient has accepted these services.  o Patient has given their written consent to recieve CCM services and understands that this includes the authorization of electronic communication of medical information with other treating providers.  o Patient understands that they may stop CCM services at any time and these changes will be effective at the end of the calendar month and will effectively revocate the agreement of CCM services.  o Patient understands that only one practioner can furnish and be paid for CCM services during one calendar month. Patient also understands that there may be co-payment or deductible fees in association with CCM services.  o Patient will continue with at least monthly follow-up calls with the Nurse Navigator.  · Associate Tasks  o Task ID [id number] CCM: CCM DEC 2020            Electronically Signed by: Roxi Woodson RN -Author on December 30, 2020 02:30:10 PM

## 2021-05-10 NOTE — OUTREACH NOTE
Quick Note      Patient Name: Reddy Castellano   Patient ID: [id number]   Sex: Male   YOB: 1935    Primary Care Provider: Agustina Gutierrez MD   Referring Provider: Christopher Lozano MD    Visit Date: September 30, 2020    Provider: Agustina Gutierrez MD   Location: [facility]   Location Address: [address] [place] [address]  [id number]   Location Phone: [phone]          History Of Present Illness  CCM Comprehensive Care Plan    This Chronic Medical Management Care Plan for Reddy Castellano, 85 year old /White male, has been monitored and managed for the month of September. A cumulative time of 200 minutes was spent on this patient record, including phone call with patient, phone call with other providers, and chart review.   Regarding the patient's diagnoses Allergies, Anxiety, Arthritis, BPH (benign prostatic hyperplasia), Congestive heart failure, COPD (chronic obstructive pulmonary disease), Depression, Erectile Dysfunction, Organic, Essential hypertension, Excess skin, Fatigue, GERD (gastroesophageal reflux disease), Gout, Hypertension, Hypogonadism in male, Insomnia, Memory change, Osteoarthritis, Seasonal allergies, TIA (transient ischemic attack), Tremor, and Vitamin D deficiency, the following items were adressed: medical records, medications, and referrals to community service providers and any changes can be found within the plan section of the note. A detailed listing of time spent for chronic care management has been scanned into the patient's electronic record. Current medications include: Advair -21 mcg/actuation inhalation HFA aerosol inhaler, allopurinol 300 mg oral tablet, amlodipine 10 mg oral tablet, Aspir-81 81 mg oral tablet,delayed release (DR/EC), atorvastatin 20 mg oral tablet, Blink Tears 0.25 % ophthalmic (eye) drops, brompheniramine-pseudoeph-DM 2-30-10 mg/5 mL oral syrup, Brovana 15 mcg/2 mL inhalation solution for  nebulization, Celexa 20 mg oral tablet, cyanocobalamin (vitamin B-12) 1,000 mcg oral tablet, EpiPen 0.3 mg/0.3 mL injection auto-injector, Flonase Allergy Relief 50 mcg/actuation nasal spray,suspension, furosemide 20 mg oral tablet, ipratropium-albuterol 0.5 mg-3 mg(2.5 mg base)/3 mL inhalation solution for nebulization, lisinopril 5 mg oral tablet, melatonin 3 mg oral tablet, metoprolol succinate 25 mg oral tablet extended release 24 hr, Namenda XR 28 mg oral capsule,sprinkle,ER 24hr, pantoprazole 40 mg oral tablet,delayed release (DR/EC), potassium chloride 10 mEq oral capsule, extended release, silver sulfadiazine 1 % topical cream, Singulair 10 mg oral tablet, Spiriva Respimat 1.25 mcg/actuation inhalation mist, theophylline 200 mg oral tablet extended release 12 hr, Ventolin HFA 90 mcg/actuation inhalation HFA aerosol inhaler, Vitamin D2 1,250 mcg (50,000 unit) oral capsule, vitamin E 400 unit oral capsule, and Zyrtec 10 mg oral tablet and the patient is reported to be compliant with medication protocol. Medications are reported to be effective.   The patient was monitored remotely for activity level, sneezing, appts, medication reconciliation for a period of 200 minutes.   This patient's physical needs include: physical healthcare and physician referral. pt referred to urology for possible hernia and testicle in hernia.   Patient's mental support needs are currently being met.   The patient's cognitive support needs include: continued support. pt very forgetful.   The patient's psychosocial support needs are N/A,   This patient's functional needs include: physical healthcare. referred to urology.   This patient's environmental needs are N/A.           Assessment  · Chronic Care Management (CCM)     V68.89/Z02.89  · Essential hypertension     401.9/I10  · Fatigue     780.79/R53.83  · Memory change     780.93/R41.3  Currently, the patient is taking Namenda. I will pursue a mocha at his follow-up.  · TIA  (transient ischemic attack)     435.9/G45.9  Reportedly, the patient has a history of TIA. This can be discussed further at his follow-up. I have asked that he continue aspirin.  · Tremor     781.0/R25.1  The patient was a 3 month history of a high-frequency, low amplitude tremor of both hands that worsens somewhat with activity. His tremor does not interfere with his activities of daily living. He has no evidence of bradykinesia on exam. Thus, Parkinson's seems unlikely at this point. Potential etiologies would include essential tremor and an exaggerated physiologic tremor. He does note that he started theophylline approximately 3 months ago which coincides with his onset of tremor. I question if this tremor could be related to this medication. For now, I will defer changes. I will check labs. I will request his records be sent for my review.  · Congestive heart failure     428.0/I50.9  · Arthritis     716.90/M19.90  · Erectile Dysfunction, Organic     607.84  · Hypertension     401.9/I10  · Seasonal allergies     477.9/J30.2  · BPH (benign prostatic hyperplasia)     600.00/N40.0  · Gout     274.9/M10.9  · Osteoarthritis     715.90/M19.90  · GERD (gastroesophageal reflux disease)     530.81/K21.9  · Insomnia     780.52/G47.00  · Vitamin D deficiency     268.9/E55.9  · Depression     311/F32.9  · Anxiety     300.02/F41.1  · Excess skin     701.9/L98.7  · Allergies     461.8  · COPD (chronic obstructive pulmonary disease)     496/J44.9      Plan  · Orders  o 2 - Complex chronic care management services, with the following req (80306) - V68.89/Z02.89, 780.79/R53.83, 780.93/R41.3, 607.84 - 09/01/2020  o 2 - CCM:Each additional 20 mins () - V68.89/Z02.89, 435.9/G45.9, 477.9/J30.2, 300.02/F41.1 - 09/01/2020  · Medications  o Medications have been Reconciled  o Transition of Care or Provider Policy  · Instructions  o Patient's Health Care Goals: see plasctic surgeon before Nov  o Provider's Health Care Goals:  medication compliance  o Patient was provided an electronic copy of care plan  o CCM services were explained and offered and patient has accepted these services.  o Patient has given their written consent to recieve CCM services and understands that this includes the authorization of electronic communication of medical information with other treating providers.  o Patient understands that they may stop CCM services at any time and these changes will be effective at the end of the calendar month and will effectively revocate the agreement of CCM services.  o Patient understands that only one practioner can furnish and be paid for CCM services during one calendar month. Patient also understands that there may be co-payment or deductible fees in association with CCM services.  o Patient will continue with at least monthly follow-up calls with the Nurse Navigator.  · Associate Tasks  o Task ID [id number] CCM: CCM Sept 2020 NEED NOTE            Electronically Signed by: Roxi Woodson RN -Author on September 30, 2020 11:24:44 AM

## 2021-05-10 NOTE — OUTREACH NOTE
Quick Note      Patient Name: Reddy Castellano   Patient ID: [id number]   Sex: Male   YOB: 1935    Primary Care Provider: Agustina Gutierrez MD   Referring Provider: Christopher Lozano MD    Visit Date: July 29, 2020    Provider: Agustina Gutierrez MD   Location: [facility]   Location Address: [address] [place] [address], [address]  [place], [place]  [id number]   Location Phone: [phone]          History Of Present Illness     [facility]     TaskID: [id number]    Task Subject: [facility] July 2020    Task Comments:    Date: Jul 24 2020  1:34PM     Creator: dinesh  Pt called to tell me he picked up his Celexa, it is 20 mg PO daily. Pt said he takes  Melatonin 3 mg po at night for sleep. On his list, it says he takes 6mg. i asked if he looked over the list i sent him in depth because he did not tell me this yesterday when we discussed his med list. He said he has lost the med list and asked for another one. He knows i worked very hard to write, in detail, next to each med what it was for. I told him he needed to organize his messy desk (he called it [employer]) and look thouroughly for it before i send him another one. he agreed. he said he takes his sleeping med around 0000 and wonders why he is sleepy early in the AM. I explained that most meds for sleep tell you to plan on getting 8+ hours of sleep after taking the med. If not, you might feel lethargic if you dont set aside enough time. He verbalized understanding. He needs to talk to Gonzalez about his CPAP and a sleep study. I gave him the office number & told him to call me once he talked to his office. He agreed. 12 min    Date: Jul 23 2020 11:18AM     Creator: dinesh  pt called back to tell me [facility] provider stopped the Effexor and started him on Celexa, but not sure what dose. When he gets it from pharmacy, he will let me know. 2 min    Date: Jul 23 2020 11:07AM     Creator: dinesh Gutierrez replied to task regarding Lasix. She wants him to continue  "holding Lasix due to elevated creatinine and pt not c/o swelling or SOA. If this changes, he is to let us know. I called pt with this info. He agreed. He said that Ruthie from [facility] changed his dose of EFfexor, but doesn't know what to. He has not picked up this new dose because pharmacy has not notified him that it is ready yet. He los tthe detailed list of meds i sent him. He is looking for it now. He is to call me when he picks up new dose of Effexor so i can update med list. 11 min    Date: Jul 22 2020  2:14PM     Creator: dinesh  pt called and lmtrc. I called back. He wants to know if Matt wants pt to go back on Lasix. He has been off for over 3 weeks. No CP, SOA, leg swelling. BPs 110/69  114/67. He said Ruthie from [facility] might adjust come meds ? He is down 60 lb. He did receive the med list i sent him and we reviewed that extensively. I will call pt back once Matt replies to task i sent regarding Lasix. 22 min    Date: Jul 15 2020 10:30AM     Creator: dinesh  pt called and lmtrc regarding the list of meds i mailed. He wanted to know where it was becuase he had not received it yet. I called back, no answer, but lmtrc. I told him that when we mail things it does not go to the mail directly- it goes to [facility] then the mail. and i sent it Friday afternoon so it might be this Friday before he gets it. 3 min    Date: Jul 10 2020  1:04PM     Creator: dinesh  I printed off med list and wrote out next to each med what it was for. Pt had two Namendas on list, so i deleted one. Also, he is on multiple allergy meds but i remember him and Lauro Gutierrez telling me that it was okay. i mailed list and info to pt. I called pt and told him i had mailed it out. He agreed to call when he got it. 15 min    Date: Jul 10 2020 12:47PM     Creator: dinesh  cont... 100% disability due to pain. He now has a new donut cushion to sit on. He is not on any \"meds for his shaking.\" After call, he called again to remind him of Francesca " "appt. 28 min    Date: Jul 10 2020 12:46PM     Creator: dinesh  Pt called with multiple issues to address. He brought up again his ER visit. He asked if he was supposed to be off his Lasix. I reminded him that since we did not have any labs on him to indicate his kidney function or not, Dr Gutierrez had last ordered him to continue to hold the Lasix. Er note will be scanned in from last month. Pt does not see PCP anytime soon. However, [facility]. He c/o tailbone pain still. No recent back/lumbar imaging done. Last in 2017. He is not on any meds for pain or inflammation. He agreed to discuss this with [facility] provider since that is who is managing that. He sees Dr Ma on 7/27. I reminded him of thios appt date and time. He also asked for contact info and it was provided. He c/o depression, no suicidal thoughts. He agreed to call [facility] for appt after call. He stated he had their contact info. His BP is stable now at 128/72. [facility] and explaining what each are for. I agreed to work on that next week. He wants to get    Date: Jul 2 2020  3:56PM     Creator: dinesh  pt called to update me on BP. He said it was 95/53, then he went outside to talk to his neighbor. During this he got dizzy. He came back inside and called me. I asked if it was hot outside, he said it was extremely hot. I asked if he has since sat down, cooled off, rested, and drank something cold- he said yes. He admitted that his dizziness is gone. I told him there was a chance that the heat could have caused this. He verbalized understanding. He agreed to recheck his BP- it is now 92/56 HR 60. No dizziness. We have not got back the labs from [facility]. i advised him that the top BP number was low but still \"normal\". he agreed to continue monitoring it and go to urgent care if it gets below 90. 12 min    Date: Jul 2 2020 10:24AM     Creator: dinesh  I consulted Dr Gutierrez about bp and Lasix. She " "agreed to continue holding pt's med until we got lab results and/or pt sees swelling in legs or body. If this occurs, he is to resume med. I called and told mt this. He verbalized understanding. 5 min    Date: Jul 2 2020  9:56AM     Creator: dinesh  I called pt back and he stated his BP was 117/72, no SOA, CP, leg swelling. No issues urinating. No dizziness. He has not taken Lasix since 6/28. He is waiting for doctor's orders on this med. Pt is agreeable to get labs redrawn if we need to since we cannot get [facility]hold of [facility]. After call, staff from [facility] called and agreed to get my request \"back to the nurses.\" I explained the urgency of these labs. She agreed to call me if they would not be able to send them today. 12 min    Date: Jul 2 2020  8:50AM     Creator: dinesh  cont... and sent him a donut cusion to sit on. He couldnt \"blow it up\" so they are sending him a new one. i called [facility] and lmtrc with details on situation. 25 min    Date: Jul 2 2020  8:50AM     Creator: dinesh  Pt called and lmtrc yesterday regarding ER and low BP. i called him back today and he stated he went to [facility] ER on Saturday (i never got that notification) for low BP. he said he was dizzy and when he checked it at home his BP was 90s/50s. I looked at ER record and it noted dehydration and acute kidney injury as well. pt was supposed to hold his lasix for two days and repeat BUN and creatinine with PCP on Tuesday. Pt did not see pcp according to notes. He said [facility] and saw Cecilia Sutton MD. She checked labs and noted his kidney function was not where it needed to be. pt cannot recall lab results specifics and did not ask or forgot what he was supposed to do regarding his Lasix. His BP last night was 95/53 HR 70. I agreed to try to reach [facility] and get lab results and guidance on Lasix, or have Dr Gutierrez make a deicision. He agreed to check his BP this AM and let me know what it was. He noted that in the recent past his " "tailbone had been \"on fire.\" A [facility] doctor told him it was arthritis                   Electronically Signed by: Roxi Woodson RN -Author on July 29, 2020 12:49:47 PM  "

## 2021-05-10 NOTE — OUTREACH NOTE
Quick Note      Patient Name: Reddy Castellano   Patient ID: [id number]   Sex: Male   YOB: 1935    Primary Care Provider: Agustina Gutierrez MD   Referring Provider: Christopher Lozano MD    Visit Date: September 30, 2020    Provider: Agustina Gutierrez MD   [facility]   Location Address: [address] [place] [address], [address]  [place], [place]  [id number]   Location Phone: [phone]          History Of Present Illness     [facility]     TaskID: [id number]    Task Subject: [facility] Sept 2020 NEED NOTE    Task Comments:    Date: Sep 30 2020 11:20AM     Creator: dinesh  Form for NAmenda placed in Dr Gutierrez' sign folder for when she returns. 1 min    Date: Sep 28 2020  1:34PM     Creator: dinesh  Pt called and told me that his COVID19 test was neg. I had already looked in chart and seen this. He said he found an rx for Namenda, #90 day supply. I had him verify the date and dose on it, it was correct. I instructed him to take this when his current bottle was empty while i worked on this medical necessity form. PCP is out all week so i will get it to her next week. Pt verified his dose of Lisinopril. I told him he has another refill at [facility] waiting. He verbalized understanding. We reviewed a few other meds. I added the abx he is on from Urgent Care. He said his leg looks okay from where he dropped a tank on it. No complaints. Instructed to take all his abx and call if leg does not heal or it worsens. No other concerns. He agreed to call  Plastics for possible canccelations; however, he has told me this many times and still hasn't called. 20 min    Date: Sep 25 2020  3:19PM     Creator: dinesh  Pt called and stated he was not able to  his Namenda from [facility] when he called to inquire about it. He said he did not understand why. He asked if i can call. I called and spoke to staff who said  now requires a PA, medical necessity form, and [facility] no longer has this dose. I will need to send  "rx else where, wait for PA to be sent and filled, and go online and find the medical necessity form for this med on  website. I was told to gokogle \" online formulary\" and fill out info. I called pt and updated him on this. [facility] [place] Rx sent in. I tried to find the pdf form for the med online, had issues, so called the 1-800 number. Spoke to rep who walked me through finding form. Form prtined off and filled out. Needs PCP signature. I will have Dr Gutierrez finish on Monday. 33 min    Date: Sep 24 2020  2:26PM     Creator: dinesh  cont... Nov 2020. No other concerns voiced. He agreed to call me when COVID results returned. 18 min    Date: Sep 24 2020  1:24PM     Creator: dinesh  Pt called stating he woke up feeling bad, thinks the neida that cut the grass yesterday \"infected\" him. He said he shook the neida's hand yesterday and now this morning pt has been sneezing and \"not feeling well.\" He said the neida that mowed his yard was not sick but was sweating a lot while mowing the grass. For this reason, he thinks the neida got him sick. Thinks he has the flu or COVID19. He went to [facility] in [place] today and got tested for COVID19. Test to return in 3-4 days. I advised him to quarantine at home until he got the results, he agreed. He said the doctor also sent him in an antibiotic (unsure which one), unsure why. After more discussion, pt stated he dropped a gas tank on his left lower leg and it \"raked my leg\" and took some skin off. Maybe doctor gave him an abx for this as a preventative measure. Pt going to get medicine now. He asked for Namenda refill, i sent it in. He said he missed his Dr Lozano appt today because he was ill. He thinks he will not remake it, just see [facility] in    Date: Sep 14 2020  3:44PM     Creator: dinesh  cont... BMs. Task to PCP updated and completed. 18 min    Date: Sep 14 2020  3:44PM     Creator: dinesh Patton called AND lmtrc. I " "called him back. He stated the CT he had showed a hernia. I pulled up report and it did confirm a right fat containing hernia, small in size. HE will see Marco on 9/17 for f/u appt to make plan of care. He is glad he has confirmation of what the issue is. Pt had  aBM Friday/Saturday, lots of diarrhea, it \"Was nasty\". he told me he took double the amount of laxative and he \"over did it.\" I told him that Dr Gutierrez wanted him to take Miralax, one cap full of powder daily until BMs are regular, then 1/2 cap every other day once his bowels are moving more regular. He agreed. HE asked if we talked about his sleepiness in the home. I told him we did. He did not remember this conversation. I repeated what i told him last week, emphasizing hydration because he told me he had not been hydrating well. He said his son is about to have his right shoulder replaced, won't be with him for 2 weeks but if he needs him he will come back ASAP. Pt agreed to call me at the end of the week to update me on    Date: Sep 10 2020  3:42PM     Creator: dinesh  pt called my other office and lmtrc. I called him back. He stated he and his son had found the powder laxative- polyethylene glycol. He has already taken the other one he found before, so i advised him not to take this one in addition to it. He agreed. Denies any abd swelling or distention, just feels \"full.\" 4 min    Date: Sep 10 2020  3:07PM     Creator: dinesh  cont... unable to pull his hx of meds up in [place] due to computer glitch, but told pt that if he was told he could take the laxative and had before, than he could take a dose while i sent a task to Dr Gutierrez to see what she recommended. Task sent to PCP after call. No other concerns. 35 min    Date: Sep 10 2020  3:06PM     Creator: dinesh alfredo called and trc. I called back. He stated his appt with Dr Lara yesterday went ok. Per note, no hernias were found. In order to rule in/out hernia, pt will go for CT " "tomorrow at 1340. He is having right groin, mild abd, and left testicular pain. He has a hydrocele that was mentioned but not plan addressed during his Marco visit. Depending on CT tomorrow, i might arrange Lozano appt. Pt c/o \"having a problem\"- he has been increasingly tired during the day but only \"when in my home.\" I suggested increasing activity, increasing time outside, opening windows/blinds, and changing activity pattern for stimulation. He verbalized understanding. No change in meds that would have contributed. He sleeps 7-8 hours a night, wakes at 0900. He c/o being constipated, no BM in 3 days, usually goes at least once a day. No change in water intake, diet, or activity the last few days. I encouraged wter intake and activity. He said he has bisocodyl 5mg tabs he could take. He asked about a laxative powder. I am    Date: Sep  4 2020 11:31AM     Creator: dinesh  pt called my IMP line and Raul transferred him to me. He wanted to know if his Lozano appt was 9/9. I told him no, it was Dr Lara for his hernia consult. I had to explain the purpose of appt and give details of when and where. He said he was going to [facility] later. Asked, again, if i had sent in the Vit B for him, i said yes. 5 min    Date: Sep  2 2020  3:26PM     Creator: dinesh  STaff from podiatry called and stated to just send whatever chart notes i had and she would try to get it covered by insurance. It might not be possible because his diabetes is controlled without meds or complications. I gathered needed notes and faxed this and order to their office. 9 min    Date: Sep  2 2020  3:12PM     Creator: dinesh  I received order form from [facility] regarding shoe order. I filled out info needed, had Dr Gutierrez sign too, then called pt to review some hx questions. He asked if i sent in his Vit D, i told him we talked about this earlier and i sent it in 2+ weeks ago. He agreed to call [facility] and confirm it was there. In the " "fax from podiatry, they asked for last 6 months of office notes showing where we are treating pt for diabetes. However, after an in depth chart review, no recent notes talk about diabetes. Pt hasn't had an A1C checked by us since 11/2018 and it was 5.6 which shows good control. Pt not on any diabetic meds. I called podiatry back and lmtrc to discuss this. Form scanned into chart and i will fax as soon as podiatry replies to  my message. 18 min    Date: Sep  2 2020 10:37AM     Creator: dinesh  cont... appt. I called and requested diabetic show order form. Staff agreed to fax now. She said they also need last office note that mentions we are treating his diabetes. 16 min    Date: Sep  2 2020 10:36AM     Creator: dinesh  pt went to ER and was dx with right inguinal hernia and chronic hydrocele. No surgical interventions. He was given pain meds and sent home. Note not signed, unable to scan into chart at this time. Pt called this AM with update. He said he has to  his pain meds at [facility] and will do that today. He was told to f/u with Marco in 3 days. I offered to call his office and arrange appt. The only other appt pt is aware of is a phone appt with \"Katrin\" on 9/10 1100. She manages his psych. Matt appt 12/12. I called Dr Lara's office. Every provider is out of office or busy until 9/9 1230. I booked appt. I called pt back with this info. He agreed. He asked about Vit D3 being sent in. I told him i sent it almost two weeks ago to [facility]. He agreed to go get it. He asked about his diabetic shoe order form. I told him i did not have one on file but agreed to request it for Matt to fill out from [facility] in [facility]. Pt was instructed to call me or go to ER if s/s worsened before his [place]    Date: Sep  1 2020  2:43PM     Creator: dinesh  pt called and told me that he coughed really hard and thinks he ripped something and has a hernia. He now thinks his testicle is lodged in his " possible hernia. He had not reached out to urology (Dr Lozano) yet. I gave him his number and instructed him to do so ASAP. He called and then called me back and said he left a message with Urology. He was not told to go to ER or anything. I called again and left message with nurse asking if he needed to go to ER. Nurse called back 10 min later stating pt needed to go to ER because Blake was not in office. I called pt and told him this. He said his general lower abd was herniated as well. 26 min    Date: Sep  1 2020  1:38PM     Creator: dinesh  pt came into office at [facility] asking for me. He wanted to talk about a urological issue. I called x2, no answer, lmtrc. 2 min                   Electronically Signed by: Roxi Woodson RN -Author on September 30, 2020 11:21:45 AM

## 2021-05-10 NOTE — OUTREACH NOTE
Quick Note      Patient Name: Reddy Castellano   Patient ID: [id number]   Sex: Male   YOB: 1935    Primary Care Provider: Agustina Gutierrez MD   Referring Provider: Christopher Lozano MD    Visit Date: November 30, 2020    Provider: Agustina Gutierrez MD   Location: [facility]   Location Address: [address] [place] [address]  [id number]   Location Phone: [phone]          History Of Present Illness  CCM Comprehensive Care Plan    This Chronic Medical Management Care Plan for Reddy Castellano, 85 year old /White male, has been monitored and managed for the month of November. A cumulative time of 74 minutes was spent on this patient record, including phone call with patient, phone call with other providers, and chart review.   Regarding the patient's diagnoses Allergies, Anxiety, Arthritis, BPH (benign prostatic hyperplasia), Congestive heart failure, COPD (chronic obstructive pulmonary disease), Depression, Erectile Dysfunction, Organic, Essential hypertension, Excess skin, Fatigue, GERD (gastroesophageal reflux disease), Gout, Hypertension, Hypogonadism in male, Insomnia, Memory change, Osteoarthritis, Seasonal allergies, TIA (transient ischemic attack), Tremor, and Vitamin D deficiency, the following items were adressed: medical records and medications and any changes can be found within the plan section of the note. A detailed listing of time spent for chronic care management has been scanned into the patient's electronic record. Current medications include: Advair -21 mcg/actuation inhalation HFA aerosol inhaler, allopurinol 300 mg oral tablet, amlodipine 10 mg oral tablet, Aspir-81 81 mg oral tablet,delayed release (DR/EC), atorvastatin 20 mg oral tablet, Blink Tears 0.25 % ophthalmic (eye) drops, brompheniramine-pseudoeph-DM 2-30-10 mg/5 mL oral syrup, Brovana 15 mcg/2 mL inhalation solution for nebulization, cefuroxime axetil 500 mg oral tablet,  Celexa 20 mg oral tablet, cyanocobalamin (vitamin B-12) 1,000 mcg oral tablet, EpiPen 0.3 mg/0.3 mL injection auto-injector, Flonase Allergy Relief 50 mcg/actuation nasal spray,suspension, furosemide 20 mg oral tablet, ipratropium-albuterol 0.5 mg-3 mg(2.5 mg base)/3 mL inhalation solution for nebulization, lisinopril 5 mg oral tablet, melatonin 3 mg oral tablet, metoprolol succinate 25 mg oral tablet extended release 24 hr, Namenda 10 mg oral tablet, pantoprazole 40 mg oral tablet,delayed release (DR/EC), potassium chloride 10 mEq oral capsule, extended release, silver sulfadiazine 1 % topical cream, Singulair 10 mg oral tablet, Spiriva Respimat 1.25 mcg/actuation inhalation mist, theophylline 200 mg oral tablet extended release 12 hr, Ventolin HFA 90 mcg/actuation inhalation HFA aerosol inhaler, Vitamin B-12 500 mcg oral tablet, Vitamin D2 1,250 mcg (50,000 unit) oral capsule, vitamin E 400 unit oral capsule, and Zyrtec 10 mg oral tablet and the patient is reported to be compliant with medication protocol. Medications are reported to be effective.   The patient was monitored remotely for activity level for a period of 74 minutes.   This patient's physical needs include: physical healthcare. pt scheduled for swallow study later this month, having difficulty getting things down.   Patient's mental support needs are currently being met.   The patient's cognitive support needs include: medication and continued support. pt forgetful.   The patient's psychosocial support needs include:, continued support. pt has hx of anxiety and depression, monitored by PCP, sees someone at [facility] for meds.   This patient's functional needs include: physical healthcare. pt being seen by podiatry for dropping gas tank on foot in Sept 2020.   This patient's environmental needs are N/A.           Assessment  · Chronic Care Management (CCM)     V68.89/Z02.89  · Essential hypertension     401.9/I10  · Fatigue     780.79/R53.83  · Memory  change     780.93/R41.3  Currently, the patient is taking Namenda. I will pursue a mocha at his follow-up.  · TIA (transient ischemic attack)     435.9/G45.9  Reportedly, the patient has a history of TIA. This can be discussed further at his follow-up. I have asked that he continue aspirin.  · Tremor     781.0/R25.1  The patient was a 3 month history of a high-frequency, low amplitude tremor of both hands that worsens somewhat with activity. His tremor does not interfere with his activities of daily living. He has no evidence of bradykinesia on exam. Thus, Parkinson's seems unlikely at this point. Potential etiologies would include essential tremor and an exaggerated physiologic tremor. He does note that he started theophylline approximately 3 months ago which coincides with his onset of tremor. I question if this tremor could be related to this medication. For now, I will defer changes. I will check labs. I will request his records be sent for my review.  · Congestive heart failure     428.0/I50.9  · Arthritis     716.90/M19.90  · Erectile Dysfunction, Organic     607.84  · Hypertension     401.9/I10  · Seasonal allergies     477.9/J30.2  · BPH (benign prostatic hyperplasia)     600.00/N40.0  · Gout     274.9/M10.9  · Osteoarthritis     715.90/M19.90  · GERD (gastroesophageal reflux disease)     530.81/K21.9  · Vitamin D deficiency     268.9/E55.9  · Depression     311/F32.9  · Anxiety     300.02/F41.1  · Excess skin     701.9/L98.7  · Allergies     461.8  · COPD (chronic obstructive pulmonary disease)     496/J44.9  · Testicular pain     608.9/N50.819      Plan  · Orders  o 2 - CCM:Each additional 20 mins () - V68.89/Z02.89, 401.9/I10, 780.93/R41.3, 608.9/N50.819 - 11/05/2020  · Medications  o Medications have been Reconciled  o Transition of Care or Provider Policy  · Instructions  o Patient's Health Care Goals: no testicular issues  o Provider's Health Care Goals: med compliance, maintain health  status  o Patient was provided an electronic copy of care plan  o CCM services were explained and offered and patient has accepted these services.  o Patient has given their written consent to recieve CCM services and understands that this includes the authorization of electronic communication of medical information with other treating providers.  o Patient understands that they may stop CCM services at any time and these changes will be effective at the end of the calendar month and will effectively revocate the agreement of CCM services.  o Patient understands that only one practioner can furnish and be paid for CCM services during one calendar month. Patient also understands that there may be co-payment or deductible fees in association with CCM services.  o Patient will continue with at least monthly follow-up calls with the Nurse Navigator.  · Associate Tasks  o Task ID [id number] CCM: CCM NOV 2020            Electronically Signed by: Roxi Woodson RN -Author on November 30, 2020 02:00:50 PM

## 2021-05-10 NOTE — OUTREACH NOTE
"   Quick Note      Patient Name: Reddy Castellano   Patient ID: [id number]   Sex: Male   YOB: 1935    Primary Care Provider: Agustina Gutierrez MD   Referring Provider: Christopher Lozano MD    Visit Date: November 30, 2020    Provider: Agustina Gutierrez MD   Location: [facility]   Location Address: [address] [place] [address], [address]  [place], [place]  [id number]   Location Phone: [phone]          History Of Present Illness     [facility]     TaskID: [id number]    Task Subject: [facility] NOV 2020    Task Comments:    Date: Nov 25 2020 11:08AM     Creator: dinesh  Pt called back and lmtrc. I called him. I gave him swallowing instructions. He verbalized understanding. He needed requested refills on allopurinol, pantoprazole, zyrtec. All sent in to [facility]. He goes on to say while he was looking at a new car this week, h ehad to climb up high into it and \"herniated his testicle.\" I asked for more detail. He Said his testicle hurts and feels like he pulled something. It hurts when he wipes after using the bathroom. I explained that pulling/straining a muscle and a herniated testicle are very different. He noted a nodule on the \"right side.\" I expressed concern for something being stuck in a hernia. I explained that that needed emergent attention via [facility] or ER. He agreed and said he would go after he went to the bank. I told him to go ASAP. He will call me after urgent care/ER visit. 10 min    Date: Nov 25 2020 10:01AM     Creator: dinesh  Pts swallow study came back showing full function, no acute issues, with many more instructions on safe eating and swallowing (see task.) I called pt to go over this, no answer, lmtrc. 3 min    Date: Nov 13 2020  2:22PM     Creator: dinesh  Pt called and stated he had a big problem- his tremors are increasing again and he is spilling his meds and not able to scoop his laxative.He wants neuro appt asap. I called staff who offered appt next week but it was after the COVID " PT denies all s/s  Pt only reports nasal congestion  NO fever at this time  Droplet precautions are maintained  PT is scant and guarded  "test. Pts are recommended to stay home after their tests and until after their procedure. It is possible that pt could do telehealth, but provider might want to see pt in person. STaff asked i send task regarding this. Task typed and sent.13 min    Date: Nov 11 2020  3:36PM     Creator: dinesh  cont... the procedure and he had his card. I urged him to call ASAP if he was concerned. He agreed to do so now and call me back with his appt. 16 min    Date: Nov 11 2020  3:35PM     Creator: dinesh  Pt called and [facility] x2. He said he got his covid test back and it was neg but wanted to know if he still needed one next week for pre-op. I called [facility] scheduling, knowing pt will need another one, and asked what the maximum days out a covid test could be- it is 5 days. I called pt back with this info. He verbalized understanding. I took a few times explaining it to pt, the reason behind it, but he said it made sense. He was not even sure where he was supposed to go for the pre-op COVID test. I gave him the address. He said he had a BM and asked about taking the laxative. Again, i told him that he needed to take it BID until he returned to his normal bowel regimen, then take once a day, then taper off every few days. I explained that a few more times to him and he said he wrote it down. He said he feels like he is a skeleton since losing so much weight. Said he can feel his sternum/collar bone. He mentioned his lap band band being touchable and \"pressing from the inside out.\" He said Dr Guadalupe in [place] did    Date: Nov 11 2020  9:13AM     Creator: dinesh  pt called and trc yesterday evening. i called back today. He was happy to report he had a BM. I told him this was great but to continue the laxative until his normal BM regimen (whether that is daily BMs or every other day) had returned. Then he is to taper off, taking it once a day then every other. He agreed. He did not hear back from [facility] back. He said he " "has tried calling but never can get through. I gave him the phone number i found online for it. He will call now. 4 min    Date: Nov 10 2020  1:18PM     Creator: dinesh  Pt called and [facility] regarding sick s/s and [facility]. I called him back. He stated he had been sneezing, coughing, runny nose, and abd pain for a few days. No fever, no soa, no CP, no diarrhea or vomiting. He had a COVID test done on 11-6, waiting on results. He is supposed to have a COVID test for swallow study next week. He still is not having regular bowel movements. I asked if he was taking the laxative BID like Dr Gutierrez told him, and i told him again last month, and he said no. He said he did not remember that. I told him that he is/was supposed to take it BID until he has regular BMs and then taper off of it. He agreed. I educated him on staying home and away from people until his COVID results were back. He agreed. 7 min    Date: Nov 5 2020  3:45PM     Creator: dinesh  AMEND: pt also said he is seeing Dr Alexander at [facility] tomorrow because he dropped the gas tank on his foot about 2 months ago and it has been \"red\" since.    Date: Nov 5 2020  3:44PM     Creator: dinesh  cont... pt back and explained that since there is no concern the pt has about his testicle, the provider did not see the need for the appt. The pt then changed his mind and agreed. I instructed him to call me or Lozano or seek urgent care if any new issues arised. He agreed. HE has COVID test on 11-16 and swallow study on 11-20 at [facility]. Sees Abhinav (phone appt) this month. 20 min    Date: Nov 5 2020  3:42PM     Creator: dinesh  Pt called and [facility]. I called him back. He stated he missed his [employer] because he could not locate the office. I asked if he called them for help getting there and he said his phone was not working. Since then he has had to go to the phone store and have it \"stripped\" and reset. He missed his plastics appt and did not " "reschedule. He does not wish to since his penile problem has appeared to go away. He said \"I have a penis again\" but wanted to f/u with Marco or Blake. When i asked why, he said it was to look at his left testicle. He said he wanted it looked at after his fall. AFter further discussion, he fell 2+ months ago. did not hit/fall on testicle, there is no painr/redness/swelling/injury/concern with testicle, he simply wants it looked at just to have it looked at. I offered to call Dr Lozano and make appt. When i talked to scheduling at his office, they denied me an appt because there was nothing \"acutely\" wrong or to f/u on. I suggested maybe pt call and talk to her, she agreed. I called                   Electronically Signed by: Roxi Woodson RN -Author on November 30, 2020 01:59:22 PM  "

## 2021-05-10 NOTE — OUTREACH NOTE
Quick Note      Patient Name: Reddy Castellano   Patient ID: [id number]   Sex: Male   YOB: 1935    Primary Care Provider: Agustina Gutierrez MD   Referring Provider: Christopher Lozano MD    Visit Date: January 28, 2021    Provider: Agustina Gutierrez MD   Location: [facility]   Location Address: [address] [place] [address]  [id number]   Location Phone: [phone]          History Of Present Illness  CCM Comprehensive Care Plan    This Chronic Medical Management Care Plan for Reddy Castellano, 85 year old /White male, has been monitored and managed for the month of January. A cumulative time of 71 minutes was spent on this patient record, including phone call with patient and chart review.   Regarding the patient's diagnoses Allergies, Anxiety, Arthritis, BPH (benign prostatic hyperplasia), Congestive heart failure, COPD (chronic obstructive pulmonary disease), Depression, Erectile Dysfunction, Organic, Essential hypertension, Excess skin, Fatigue, GERD (gastroesophageal reflux disease), Gout, Hypertension, Hypogonadism in male, Insomnia, Memory change, Osteoarthritis, Seasonal allergies, TIA (transient ischemic attack), Tremor, and Vitamin D deficiency, the following items were adressed: medical records, medications, and referrals to community service providers and any changes can be found within the plan section of the note. A detailed listing of time spent for chronic care management has been scanned into the patient's electronic record. Current medications include: Advair -21 mcg/actuation inhalation HFA aerosol inhaler, allopurinol 300 mg oral tablet, amlodipine 10 mg oral tablet, Aspir-81 81 mg oral tablet,delayed release (DR/EC), atorvastatin 20 mg oral tablet, Blink Tears 0.25 % ophthalmic (eye) drops, brompheniramine-pseudoeph-DM 2-30-10 mg/5 mL oral syrup, Brovana 15 mcg/2 mL inhalation solution for nebulization, Celexa 20 mg oral tablet,  cyanocobalamin (vitamin B-12) 1,000 mcg oral tablet, EpiPen 0.3 mg/0.3 mL injection auto-injector, Flonase Allergy Relief 50 mcg/actuation nasal spray,suspension, furosemide 20 mg oral tablet, ipratropium-albuterol 0.5 mg-3 mg(2.5 mg base)/3 mL inhalation solution for nebulization, lisinopril 5 mg oral tablet, melatonin 3 mg oral tablet, metoprolol succinate 25 mg oral tablet extended release 24 hr, Miralax 17 gram/dose oral powder, Namenda 10 mg oral tablet, pantoprazole 40 mg oral tablet,delayed release (DR/EC), potassium chloride 10 mEq oral capsule, extended release, silver sulfadiazine 1 % topical cream, Singulair 10 mg oral tablet, Spiriva Respimat 1.25 mcg/actuation inhalation mist, theophylline 200 mg oral tablet extended release 12 hr, Ventolin HFA 90 mcg/actuation inhalation HFA aerosol inhaler, Vitamin D2 1,250 mcg (50,000 unit) oral capsule, vitamin E 400 unit oral capsule, and Zyrtec 10 mg oral tablet and the patient is reported to be compliant with medication protocol. Medications are reported to be effective. Regarding these diagnoses, referrals were made to the following provider/s [facility]   The patient was monitored remotely for activity, dementia, med compliance for a period of 71 minutes.   This patient's physical needs include: medication education, physical healthcare, and physician referral. pt referred to [facility] neuro by Dr Borja for tremors. Pt needs a new list of meds he is on and what they are for.   Patient's mental support needs are currently being met.   The patient's cognitive support needs include: medication and continued support. pt on Namenda.   The patient's psychosocial support needs include:, continued support. pt has history of anxiety and depression, goes to [employer].   This patient's functional needs include: physician referral. referred to [facility].   This patient's environmental needs are N/A.           Assessment  · Chronic Care Management  (CCM)     V68.89/Z02.89  · Essential hypertension     401.9/I10  · Fatigue     780.79/R53.83  · Memory change     780.93/R41.3  Currently, the patient is taking Namenda. I will pursue a mocha at his follow-up.  · TIA (transient ischemic attack)     435.9/G45.9  Reportedly, the patient has a history of TIA. This can be discussed further at his follow-up. I have asked that he continue aspirin.  · Tremor     781.0/R25.1  The patient was a 3 month history of a high-frequency, low amplitude tremor of both hands that worsens somewhat with activity. His tremor does not interfere with his activities of daily living. He has no evidence of bradykinesia on exam. Thus, Parkinson's seems unlikely at this point. Potential etiologies would include essential tremor and an exaggerated physiologic tremor. He does note that he started theophylline approximately 3 months ago which coincides with his onset of tremor. I question if this tremor could be related to this medication. For now, I will defer changes. I will check labs. I will request his records be sent for my review.  · Congestive heart failure     428.0/I50.9  · Arthritis     716.90/M19.90  · Erectile Dysfunction, Organic     607.84  · Hypertension     401.9/I10  · Seasonal allergies     477.9/J30.2  · BPH (benign prostatic hyperplasia)     600.00/N40.0  · Gout     274.9/M10.9  · Osteoarthritis     715.90/M19.90  · GERD (gastroesophageal reflux disease)     530.81/K21.9  · Insomnia     780.52/G47.00  · Vitamin D deficiency     268.9/E55.9  · Depression     311/F32.9  · Anxiety     300.02/F41.1  · Excess skin     701.9/L98.7  · Allergies     461.8  · COPD (chronic obstructive pulmonary disease)     496/J44.9      Plan  · Orders  o 2 - CCM:Each additional 20 mins () - V68.89/Z02.89, 401.9/I10, 780.79/R53.83, 435.9/G45.9 - 01/06/2021  · Medications  o Medications have been Reconciled  o Transition of Care or Provider Policy  · Instructions  o Patient's Health Care Goals:  get new med list with detailed indications, see [facility]  o Provider's Health Care Goals: medication compliance  o Patient was provided an electronic copy of care plan  o CCM services were explained and offered and patient has accepted these services.  o Patient has given their written consent to recieve CCM services and understands that this includes the authorization of electronic communication of medical information with other treating providers.  o Patient understands that they may stop CCM services at any time and these changes will be effective at the end of the calendar month and will effectively revocate the agreement of CCM services.  o Patient understands that only one practioner can furnish and be paid for CCM services during one calendar month. Patient also understands that there may be co-payment or deductible fees in association with CCM services.  o Patient will continue with at least monthly follow-up calls with the Nurse Navigator.  · Associate Tasks  o Task ID [id number] CCM: CCM Jan 2021            Electronically Signed by: Roxi Woodson RN -Author on January 28, 2021 01:57:26 PM

## 2021-05-10 NOTE — H&P
History and Physical      Patient Name: Reddy Castellano   Patient ID: [id number]   Sex: Male   YOB: 1935    Primary Care Provider: Agustina Gutierrez MD   Referring Provider: Christopher Lozano MD    Visit Date: September 9, 2020    Provider: Marco Lara MD   Location: [facility]   Location Address: [address] [place] [address]  [id number]   Location Phone: [phone]          Chief Complaint  · Hernia Consult      History Of Present Illness  Reddy Castellano is a 85 year old /White male who presents today for evaluation of an inguinal hernia. The patient reports he has been having some pain in his groin. He has had multiple urologic procedures, including a right orchiectomy secondary to a complication from a hernia repair. He has had a penile implant placed and removed. He went to the ER with complaints of groin pain. He had an ultrasound in the ER, which showed a large hydrocele and an absent right testicle. There was no evidence of a hernia on the ultrasound but he was referred here for a hernia. The patient reports he has pain in the groin, especially on the right. He doesn't report any obstruction-type symptoms.       Past Medical History  Allergies; Anxiety; Arthritis; Asthma; BPH (benign prostatic hyperplasia); Carpal tunnel syndrome; Chronic Obstructive Pulmonary Disease; Colon Polyps; Congestive heart failure; COPD (chronic obstructive pulmonary disease); Dementia; Depression; Diabetes; Diabetes mellitus type 2, noninsulin dependent; Diverticulitis; Erectile Dysfunction, Organic; Essential hypertension; Excess skin; Fatigue; GERD (gastroesophageal reflux disease); Gout; Heart attack; Heart Disease; High cholesterol; Hyperlipidemia; Hypertension; Hypertension, Benign Essential; Hypogonadism in male; Insomnia; Leg swelling; Memory change; Myocardial Infarction; Osteoarthritis; Parkinson's Disease; Prostate Disorder; Rectal Bleeding; Renal cyst; Seasonal  allergies; Sleep apnea; Stroke; TIA (transient ischemic attack); Tremor; Vitamin D deficiency         Past Surgical History  Appendectomy; Cardiac Catherization; Carpal Tunnel Release; Cataract surgery; Circumcision; Colonoscopy; Cystoscopy; EGD; Eye Implant; heart surgery; Hernia; Knee replacement, left; Knee replacement, right; Lap Band Surgery; Metal implants; orchiectomy; Penile implant; Surgical removal of toenail and nail matrix of both feet; Toe Surgery         Medication List  Advair -21 mcg/actuation inhalation HFA aerosol inhaler; allopurinol 300 mg oral tablet; amlodipine 10 mg oral tablet; Aspir-81 81 mg oral tablet,delayed release (DR/EC); atorvastatin 20 mg oral tablet; Blink Tears 0.25 % ophthalmic (eye) drops; brompheniramine-pseudoeph-DM 2-30-10 mg/5 mL oral syrup; Brovana 15 mcg/2 mL inhalation solution for nebulization; Celexa 20 mg oral tablet; cyanocobalamin (vitamin B-12) 1,000 mcg oral tablet; EpiPen 0.3 mg/0.3 mL injection auto-injector; Flonase Allergy Relief 50 mcg/actuation nasal spray,suspension; furosemide 20 mg oral tablet; ipratropium-albuterol 0.5 mg-3 mg(2.5 mg base)/3 mL inhalation solution for nebulization; lisinopril 5 mg oral tablet; melatonin 3 mg oral tablet; metoprolol succinate 25 mg oral tablet extended release 24 hr; Namenda XR 28 mg oral capsule,sprinkle,ER 24hr; pantoprazole 40 mg oral tablet,delayed release (DR/EC); potassium chloride 10 mEq oral capsule, extended release; silver sulfadiazine 1 % topical cream; Singulair 10 mg oral tablet; Spiriva Respimat 1.25 mcg/actuation inhalation mist; theophylline 200 mg oral tablet extended release 12 hr; Ventolin HFA 90 mcg/actuation inhalation HFA aerosol inhaler; Vitamin D2 1,250 mcg (50,000 unit) oral capsule; vitamin E 400 unit oral capsule; Zyrtec 10 mg oral tablet         Allergy List  NO KNOWN DRUG ALLERGIES         Family Medical History  Anus, Neoplasm; Colon Neoplasm, Malignant; Diabetes, unspecified type; Heart  "Attack (MI); Prostate cancer; Family history of diabetes mellitus         Social History  Alcohol (Current some day); lives alone; lives with children; Recreational Drug Use (Never); Retired; Tobacco (Never); ;          Immunizations  Name Date Admin   Influenza    Influenza          Review of Systems  · Gastrointestinal  o Denies  o : nausea, vomiting, diarrhea, constipation      Vitals  Date Time BP Position Site L\R Cuff Size HR RR TEMP (F) WT  HT  BMI kg/m2 BSA m2 O2 Sat HC       09/09/2020 12:59 PM       16  204lbs 16oz 5'  8\" 31.17 2.11           Physical Examination  · Constitutional  o Appearance  o : well developed, well-nourished, alert and in no acute distress  · Head and Face  o Head  o :   § Inspection  § : no deformities or lesions  · Eyes  o Conjunctivae  o : clear  o Sclerae  o : clear  · Neck  o Inspection/Palpation  o : normal appearance, no masses or tenderness, trachea midline  · Respiratory  o Respiratory Effort  o : breathing unlabored  o Inspection of Chest  o : normal appearance, no retractions  · Cardiovascular  o Heart  o : regular rate and rhythm  · Gastrointestinal  o Abdominal Examination  o : abdomen is soft  · Lymphatic  o Neck  o : no lymphadenopathy present  o Axilla  o : no lymphadenopathy present  o Groin  o : I don't clearly feel a hernia on either the right or leg although it is difficult to evaluate secondary to his body habitus. I did not feel any evidence of a hernia with Valsalva.   · Skin and Subcutaneous Tissue  o General Inspection  o : no rashes present, no lesions present, no areas of discoloration  · Neurologic  o Cranial Nerves  o : grossly intact  o Sensation  o : grossly intact  o Gait and Station  o :   § Gait Screening  § : normal gait, able to stand without diffculty  o Cerebellar Function  o : no obvious abnormalities  · Psychiatric  o Judgement and Insight  o : judgment and insight intact  o Mood and Affect  o : mood normal, affect " appropriate          Assessment  · Groin pain     789.09/R10.30  · Hydrocele     603.9/N43.3       Patient with groin pain     Problems Reconciled  Plan  · Orders  o CT Pelvis with IV Contrast [facility]; suggest Oral Prep (48197) - 789.09/R10.30, 603.9/N43.3 - 09/11/2020   NO ORAL PREP  · Medications  o Medications have been Reconciled  o Transition of Care or Provider Policy  · Instructions  o Electronically Identified Patient Education Materials Provided Electronically     I don't see any physical exam evidence of a hernia. The ultrasound didn't show any evidence of a hernia so I am not sure why he was referred for a hernia. However, to rule a hernia or rule out another source of groin pain, I feel we should proceed with a pelvic CT with contrast. I discussed the above with the patient. He voiced understanding and agreed to proceed with the above plan.             Electronically Signed by: Lisa Huggins-, -Author on September 10, 2020 09:01:26 AM  Electronically Co-signed by: Boby Giang MD -Reviewer on September 10, 2020 01:34:25 PM

## 2021-05-10 NOTE — OUTREACH NOTE
Quick Note      Patient Name: Reddy Castellano   Patient ID: [id number]   Sex: Male   YOB: 1935    Primary Care Provider: Agustina Gutierrez MD   Referring Provider: Christopher Lozano MD    Visit Date: January 28, 2021    Provider: Agustina Gutierrez MD   [facility]   Location Address: [address] [place] [address], [address]  [place], [place]  [id number]   Location Phone: [phone]          History Of Present Illness     [facility]     TaskID: [id number]    Task Subject: [facility] Jan 2021    Task Comments:    Date: Jan 14 2021  1:02PM     Creator: dinesh carson.. with their descriptions in mail. He will sort through his meds and call me once he looks over them. PCP udpated on back and bottom issues. 37 min    Date: Jan 14 2021  1:02PM     Creator: dinesh carson... and i told him he does not based on the s/s he told me. If he should have increasing back pain with ADLs, position changes, or anything other than the dentist chair, he is to call me. He agreed. He c/o pain in bottom when sitting for long periods of time. I told him that i remember talking to Dr Gutierrez about this and he now has a donut cushion/pillow. He was dx with arthritis at last imaging done for this issue. I asked if he spends extended amount of time on bottom, he said yes. I educated on position changes, shifting weight, and being more active on his feet. He agreed to do so. He said he was [facility] but [facility]. Now he wants it. I told him that [facility] was giving them out but is over 2 weeks booked out so he might get in sooner with [facility]. He agreed to call them. He said that he has been having normal BMs with Miralax. He has been taking it BID. I instructed him to cut back to daily then every other day since he is having normal BMs. He agreed. He did get the list of meds    Date: Jan 14 2021 12:58PM     Creator: dinesh  Pt called and lmtrc. I called him back. He stated he thought he had a [facility] neuro appt today  but was told he did not. I told him that he actually made the appt and told me about it over a week ago. He is very confused on appt date and time. I offered to call myself. I called neuro who said pt had an appt but moved it to 1/22 at 3:30, get there by 3 at the [facility]. I called and told pt. He said he knew that. He said he had that appt date and time on his calendar. I gave him the address and told him to get there well before 3 so he can plan to park and make sure he is in the right spot. He agreed. He c/o of back pain only when he is in his dentist's chair, 1-2x a week. He said it feels like someone is shooting his in the spine. I asked if he had this pain in bed at home, in chairs, recliners, couches, in the car, etc. He said no. Only the few times he is in the dentist's chair. I educated him that it is likely from the unsupportive nature of the chair. He asked if he needed a work up for back pain    Date: Jan 6 2021  9:59AM     Creator: dinesh  Med list printed and detailed indications written next to each med. Pt to review list and call me back with any inconsistancies. Will mail out today. 11 min    Date: Jan 6 2021  9:59AM     Creator: dinesh  1/4/2021: Pt called and asked about a referral we had possibly entered for a Dr Forrester. HE is not sure who made this appt or why. He was told it is on 1-13 but he has a dental appt that day and cannot make it. After much research and chart review, and searching online, i found out that this provider is a [facility] Neuro doctor. I called and asked staff why he was being seen there and who referred him. Dr Borja referred on 12/1/2020 for tremors. I asked that it be canceled due to schedule issue and told staff that pt would call back to remake appt. They agreed. I called and explained this to pt. He agreed to call them at [phone]. He said [facility] Pt called neuro and called me back saying appt now on 1/14/2021 at  3pm. Pt also asked for me to redo the list of meds and indications i did a few months ago but he had sicne lost. I told him i would work on it in the next few days and mail it this week. 23 min                   Electronically Signed by: Roxi Woodson RN -Author on January 28, 2021 01:55:44 PM
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
## 2021-05-10 NOTE — OUTREACH NOTE
Quick Note      Patient Name: Reddy Castellano   Patient ID: [id number]   Sex: Male   YOB: 1935    Primary Care Provider: Agustina Gutierrez MD   Referring Provider: Christopher Lozano MD    Visit Date: July 29, 2020    Provider: Agustina Gutierrez MD   Location: [facility]   Location Address: [address] [place] [address]  [id number]   Location Phone: [phone]          History Of Present Illness  CCM Comprehensive Care Plan    This Chronic Medical Management Care Plan for Reddy Castellano, 85 year old /White male, has been monitored and managed for the month of July. A cumulative time of 145 minutes was spent on this patient record, including phone call with patient, phone call with other providers, and chart review.   Regarding the patient's diagnoses Anxiety, Arthritis, BPH (benign prostatic hyperplasia), Congestive heart failure, COPD (chronic obstructive pulmonary disease), Depression, Erectile Dysfunction, Organic, Essential hypertension, Fatigue, GERD (gastroesophageal reflux disease), Gout, Hypertension, Hypogonadism in male, Insomnia, Memory change, Osteoarthritis, Seasonal allergies, TIA (transient ischemic attack), Tremor, and Vitamin D deficiency, the following items were adressed: medical records, medications, changes to medical care, and transitions to medical care and any changes can be found within the plan section of the note. A detailed listing of time spent for chronic care management has been scanned into the patient's electronic record. Current medications include: Allergy (chlorpheniramine) 4 mg oral tablet, allopurinol 300 mg oral tablet, amlodipine 10 mg oral tablet, Aspir-81 81 mg oral tablet,delayed release (DR/EC), atorvastatin 20 mg oral tablet, azelastine 137 mcg (0.1 %) nasal aerosol,spray, brompheniramine-pseudoeph-DM 2-30-10 mg/5 mL oral syrup, Brovana 15 mcg/2 mL inhalation solution for nebulization, cyanocobalamin (vitamin B-12)  1,000 mcg/mL injection solution, EpiPen 0.3 mg/0.3 mL injection auto-injector, Flonase Allergy Relief 50 mcg/actuation nasal spray,suspension, fluticasone 50 mcg/actuation nasal spray,suspension, furosemide 20 mg oral tablet, ipratropium-albuterol 0.5 mg-3 mg(2.5 mg base)/3 mL inhalation solution for nebulization, lisinopril 5 mg oral tablet, melatonin 3 mg oral tablet, memantine 28 mg oral capsule,sprinkle,ER 24hr, metoprolol succinate 25 mg oral tablet extended release 24 hr, montelukast 10 mg oral tablet, Namenda XR 28 mg oral capsule,sprinkle,ER 24hr, pantoprazole 40 mg oral tablet,delayed release (DR/EC), potassium chloride 10 mEq oral capsule, extended release, Refresh Tears 0.5 % ophthalmic (eye) drops, silver sulfadiazine 1 % topical cream, Singulair 10 mg oral tablet, Spiriva Respimat 1.25 mcg/actuation inhalation mist, theophylline 200 mg oral tablet extended release 12 hr, venlafaxine 225 mg oral tablet extended release 24hr, Ventolin HFA 90 mcg/actuation inhalation HFA aerosol inhaler, Vitamin D2 1,250 mcg (50,000 unit) oral capsule, vitamin E 400 unit oral capsule, and Zyrtec 10 mg oral tablet and the patient is reported to be compliant with medication protocol. Medications are reported to be effective.   The patient was monitored remotely for blood pressure and activity level for a period of 145 minutes.   This patient's physical needs include: physical healthcare. pt was in ER last month for low BP, needs med management and adjustment.   Patient's mental support needs are currently being met.   The patient's cognitive support needs include: continued support. pt very forgetful, needs frequent reminders about meds and appts.   The patient's psychosocial support needs are N/A,   This patient's functional needs are N/A.   This patient's environmental needs are N/A.           Assessment  · Chronic Care Management (CCM)     V68.89/Z02.89  · Memory change     780.93/R41.3  Currently, the patient is taking  Namenda. I will pursue a mocha at his follow-up.  · TIA (transient ischemic attack)     435.9/G45.9  Reportedly, the patient has a history of TIA. This can be discussed further at his follow-up. I have asked that he continue aspirin.  · Tremor     781.0/R25.1  The patient was a 3 month history of a high-frequency, low amplitude tremor of both hands that worsens somewhat with activity. His tremor does not interfere with his activities of daily living. He has no evidence of bradykinesia on exam. Thus, Parkinson's seems unlikely at this point. Potential etiologies would include essential tremor and an exaggerated physiologic tremor. He does note that he started theophylline approximately 3 months ago which coincides with his onset of tremor. I question if this tremor could be related to this medication. For now, I will defer changes. I will check labs. I will request his records be sent for my review.  · Congestive heart failure     428.0/I50.9  · Arthritis     716.90/M19.90  · Depression     311/F32.9  · Anxiety     300.02/F41.1  · COPD (chronic obstructive pulmonary disease)     496/J44.9  · Hypotension     458.9/I95.9  · Acute kidney injury     584.9/N17.9  · Dehydration     276.51/E86.0      Plan  · Orders  o Complex chronic care management services, with the following req (02658) - V68.89/Z02.89, 780.93/R41.3, 435.9/G45.9, 428.0/I50.9 - 07/02/2020  o Complex chronic care management services, with the following req (47580) (05415) - V68.89/Z02.89, 780.93/R41.3, 435.9/G45.9, 428.0/I50.9 - 07/02/2020  o CCM:Each additional 20 mins () - V68.89/Z02.89, 435.9/G45.9, 781.0/R25.1, 428.0/I50.9 - 07/02/2020  · Instructions  o Patient's Health Care Goals: BP wnl  o Provider's Health Care Goals: no dizziness  o Patient was provided an electronic copy of care plan  o CCM services were explained and offered and patient has accepted these services.  o Patient has given their written consent to recieve CCM services and  understands that this includes the authorization of electronic communication of medical information with other treating providers.  o Patient understands that they may stop CCM services at any time and these changes will be effective at the end of the calendar month and will effectively revocate the agreement of CCM services.  o Patient understands that only one practioner can furnish and be paid for CCM services during one calendar month. Patient also understands that there may be co-payment or deductible fees in association with CCM services.  o Patient will continue with at least monthly follow-up calls with the Nurse Navigator.  · Associate Tasks  o Task ID [id number] CCM: CCM July 2020            Electronically Signed by: Roxi Woodson RN -Author on July 29, 2020 12:51:52 PM

## 2021-05-10 NOTE — OUTREACH NOTE
Quick Note      Patient Name: Reddy Castellano   Patient ID: [id number]   Sex: Male   YOB: 1935    Primary Care Provider: Agustina Gutierrez MD   Referring Provider: Christopher Lozano MD    Visit Date: June 30, 2020    Provider: Agustina Gutierrez MD   Location: [facility]   Location Address: [address] [place] [address]  [id number]   Location Phone: [phone]          History Of Present Illness  CCM Comprehensive Care Plan    This Chronic Medical Management Care Plan for Reddy Castellano, 85 year old /White male, has been monitored and managed for the month of July. A cumulative time of 60 minutes was spent on this patient record, including phone call with patient, phone call with other providers, electronic communication with other providers, and chart review.   Regarding the patient's diagnoses Anxiety, Arthritis, BPH (benign prostatic hyperplasia), Congestive heart failure, COPD (chronic obstructive pulmonary disease), Depression, Erectile Dysfunction, Organic, Essential hypertension, Fatigue, GERD (gastroesophageal reflux disease), Gout, Hypertension, Hypogonadism in male, Insomnia, Memory change, Osteoarthritis, Seasonal allergies, TIA (transient ischemic attack), Tremor, and Vitamin D deficiency, the following items were adressed: medical records, medications, and referrals to community service providers and any changes can be found within the plan section of the note. A detailed listing of time spent for chronic care management has been scanned into the patient's electronic record. Current medications include: Allergy (chlorpheniramine) 4 mg oral tablet, allopurinol 300 mg oral tablet, amlodipine 10 mg oral tablet, Aspir-81 81 mg oral tablet,delayed release (DR/EC), atorvastatin 20 mg oral tablet, azelastine 137 mcg (0.1 %) nasal aerosol,spray, brompheniramine-pseudoeph-DM 2-30-10 mg/5 mL oral syrup, Brovana 15 mcg/2 mL inhalation solution for  "nebulization, cyanocobalamin (vitamin B-12) 1,000 mcg/mL injection solution, EpiPen 0.3 mg/0.3 mL injection auto-injector, Flonase Allergy Relief 50 mcg/actuation nasal spray,suspension, fluticasone 50 mcg/actuation nasal spray,suspension, furosemide 20 mg oral tablet, ipratropium-albuterol 0.5 mg-3 mg(2.5 mg base)/3 mL inhalation solution for nebulization, lisinopril 5 mg oral tablet, melatonin 3 mg oral tablet, memantine 28 mg oral capsule,sprinkle,ER 24hr, metoprolol succinate 25 mg oral tablet extended release 24 hr, montelukast 10 mg oral tablet, Namenda XR 28 mg oral capsule,sprinkle,ER 24hr, pantoprazole 40 mg oral tablet,delayed release (DR/EC), potassium chloride 10 mEq oral capsule, extended release, Refresh Tears 0.5 % ophthalmic (eye) drops, silver sulfadiazine 1 % topical cream, Singulair 10 mg oral tablet, Spiriva Respimat 1.25 mcg/actuation inhalation mist, theophylline 200 mg oral tablet extended release 12 hr, venlafaxine 225 mg oral tablet extended release 24hr, Ventolin HFA 90 mcg/actuation inhalation HFA aerosol inhaler, Vitamin D2 1,250 mcg (50,000 unit) oral capsule, vitamin E 400 unit oral capsule, and Zyrtec 10 mg oral tablet and the patient is reported to be compliant with medication protocol. Medications are reported to be effective. Regarding these diagnoses, referrals were made to the following provider/s Christopher Lozano MD.   The patient was monitored remotely for weight and activity level for a period of 30 minutes.   This patient's physical needs are currently being met.   Patient's mental support needs are currently being met.   The patient's cognitive support needs are currently being met.   The patient's psychosocial support needs are N/A,   This patient's functional needs include: pt states that he hasa \"one inch penis\" and it is causing functional, physical, and emotional issues. referring to plastic surgery   This patient's environmental needs include: n/a "           Assessment  · Chronic Care Management (CCM)     V68.89/Z02.89  · Essential hypertension     401.9/I10  · TIA (transient ischemic attack)     435.9/G45.9  Reportedly, the patient has a history of TIA. This can be discussed further at his follow-up. I have asked that he continue aspirin.  · Congestive heart failure     428.0/I50.9  · COPD (chronic obstructive pulmonary disease)     496/J44.9      Plan  · Orders  o 2 - CCM:Each additional 20 mins () - V68.89/Z02.89, 401.9/I10, 435.9/G45.9, 428.0/I50.9 - 06/04/2020  · Instructions  o Patient's Health Care Goals: see general surgery  o Provider's Health Care Goals: medication compliance  o Patient was provided an electronic copy of care plan  o CCM services were explained and offered and patient has accepted these services.  o Patient has given their written consent to recieve CCM services and understands that this includes the authorization of electronic communication of medical information with other treating providers.  o Patient understands that they may stop CCM services at any time and these changes will be effective at the end of the calendar month and will effectively revocate the agreement of CCM services.  o Patient understands that only one practioner can furnish and be paid for CCM services during one calendar month. Patient also understands that there may be co-payment or deductible fees in association with CCM services.  o Patient will continue with at least monthly follow-up calls with the Nurse Navigator.  · Associate Tasks  o Task ID [id number] CCM: CCM June 2020            Electronically Signed by: Roxi Woodson RN -Author on June 30, 2020 11:20:43 AM

## 2021-05-10 NOTE — OUTREACH NOTE
"   Quick Note      Patient Name: Reddy Castellano   Patient ID: [id number]   Sex: Male   YOB: 1935    Primary Care Provider: Agustina Gutierrez MD   Referring Provider: Christopher Lozano MD    Visit Date: December 30, 2020    Provider: Agustina Gutierrez MD   Location: [facility]   Location Address: [address] [place] [address], [address]  [place], [place]  [id number]   Location Phone: [phone]          History Of Present Illness     [facility]     TaskID: [id number]    Task Subject: [facility] DEC 2020    Task Comments:    Date: Dec 28 2020  1:51PM     Creator: dinesh  Pt called and lmtrc over holiday weekend. I called him back. He needed refill on Miralax. Matt has ordered it a few months ago but i never filled it because pt denied needing it, as he had it OTC. So i added it and ordered it per Dr Gutierrez's order. He asked about other meds, all which had refills. I advised him to call pharmacy and ask for them to be filled. He c/o dry cough x1 week. No sick contacts, no fever, n/v/d, ST, aches, SOA or CP. He thinks it's the weather. His allergist gives him Bromfed. I suggested he ask him for refill but if cough persists this week or other s.s occur, to call for possible appt. He agreed. He said his left heel hurts a little. He denied any blister or injury or redness or swelling. I advised him to \"float\" his heels on pillows to relieve pressure. If this has not improved in a few days, he agreed to call back. I also suggested pt increase water intake to help with BMs. Pt having BMs almost daily but said it isn't easy. He has a stool softener he might try. 27 min    Date: Dec 10 2020 10:34AM     Creator: dinesh  I called pt to discuss labs. I told them the they were all normal. No concerns. No questions. [facility] [place] [facility] and are [facility] it out on [place] and offered it to him. He wanted to know Dr Gutierrez' opinion. I consulted her, she said it is no new we do not know too much " about it. She is also surprised the [facility] claims to [facility] it so soon. She said from the limited date, it seems safe, but the data is limited. I told this to pt. I told pt that he is elderly and has many chronic issues, so he is high tisk for the virus. But there is such limited data it is up to him. He will likely get it. He will keep me posted. No med refills needed at this time. 12 min    Date: Dec  9 2020  3:29PM     Creator: dinesh  pt had many labs return, i reviewed them, all wnl. I called pt to review, no answer lmtrc. 2 min    Date: Dec  2 2020  9:39AM     Creator: dinesh  Pt called and lmtrc. I called him back. He said he went to the ER on 11-25. I am aware, i am the one who told him to go. While on phone, i pulled up ER report ([facility]). Pt dx with Hydrocele and given Keflex. I will update med list. ER report scanned in chart. Pt will see Matt today, offered telehealth. He declined. ER report stated pt should see Dr Abreu. Pt had not made appt yet. I gave him phone number and he agreed to call and make appt. 8 min                   Electronically Signed by: Roxi Woodson RN -Author on December 30, 2020 02:29:20 PM

## 2021-05-10 NOTE — OUTREACH NOTE
Quick Note      Patient Name: Reddy Castellano   Patient ID: [id number]   Sex: Male   YOB: 1935    Primary Care Provider: Agustina Gutierrez MD   Referring Provider: Christopher Lozano MD    Visit Date: October 29, 2020    Provider: Agustina Gutierrez MD   Location: [facility]   Location Address: [address] [place] [address]  [id number]   Location Phone: [phone]          History Of Present Illness  CCM Comprehensive Care Plan    This Chronic Medical Management Care Plan for Reddy Castellano, 85 year old /White male, has been monitored and managed for the month of October. A cumulative time of 76 minutes was spent on this patient record, including phone call with patient and chart review.   Regarding the patient's diagnoses Allergies, Anxiety, Arthritis, BPH (benign prostatic hyperplasia), Congestive heart failure, COPD (chronic obstructive pulmonary disease), Depression, Erectile Dysfunction, Organic, Essential hypertension, Excess skin, Fatigue, GERD (gastroesophageal reflux disease), Gout, Hypertension, Hypogonadism in male, Insomnia, Memory change, Osteoarthritis, Seasonal allergies, TIA (transient ischemic attack), Tremor, and Vitamin D deficiency, the following items were adressed: medical records and medications and any changes can be found within the plan section of the note. A detailed listing of time spent for chronic care management has been scanned into the patient's electronic record. Current medications include: Advair -21 mcg/actuation inhalation HFA aerosol inhaler, allopurinol 300 mg oral tablet, amlodipine 10 mg oral tablet, Aspir-81 81 mg oral tablet,delayed release (DR/EC), atorvastatin 20 mg oral tablet, Blink Tears 0.25 % ophthalmic (eye) drops, brompheniramine-pseudoeph-DM 2-30-10 mg/5 mL oral syrup, Brovana 15 mcg/2 mL inhalation solution for nebulization, cefuroxime axetil 500 mg oral tablet, Celexa 20 mg oral tablet,  cyanocobalamin (vitamin B-12) 1,000 mcg oral tablet, EpiPen 0.3 mg/0.3 mL injection auto-injector, Flonase Allergy Relief 50 mcg/actuation nasal spray,suspension, furosemide 20 mg oral tablet, ipratropium-albuterol 0.5 mg-3 mg(2.5 mg base)/3 mL inhalation solution for nebulization, lisinopril 5 mg oral tablet, melatonin 3 mg oral tablet, metoprolol succinate 25 mg oral tablet extended release 24 hr, Namenda XR 28 mg oral capsule,sprinkle,ER 24hr, pantoprazole 40 mg oral tablet,delayed release (DR/EC), potassium chloride 10 mEq oral capsule, extended release, silver sulfadiazine 1 % topical cream, Singulair 10 mg oral tablet, Spiriva Respimat 1.25 mcg/actuation inhalation mist, theophylline 200 mg oral tablet extended release 12 hr, Ventolin HFA 90 mcg/actuation inhalation HFA aerosol inhaler, Vitamin B-12 500 mcg oral tablet, Vitamin D2 1,250 mcg (50,000 unit) oral capsule, vitamin E 400 unit oral capsule, and Zyrtec 10 mg oral tablet and the patient is reported to be compliant with medication protocol. Medications are reported to be effective.   The patient was monitored remotely for activity level, CPAP equiptment for a period of 76 minutes.   This patient's physical needs include: physical healthcare and physician referral. pt has a [facility] [employer] referral in Nov for his penis complaint.   Patient's mental support needs are currently being met.   The patient's cognitive support needs include: medication and continued support. pt on Namenda, needs frequent reminders.   The patient's psychosocial support needs include:, continued support. pt has depression and anxiety, sees psych.   This patient's functional needs include: DME supplies. new CPAP supplies.   This patient's environmental needs are N/A.           Assessment  · Chronic Care Management (CCM)     V68.89/Z02.89  · Essential hypertension     401.9/I10  · Fatigue     780.79/R53.83  · Memory change     780.93/R41.3  Currently, the patient is taking Namenda. I  will pursue a mocha at his follow-up.  · TIA (transient ischemic attack)     435.9/G45.9  Reportedly, the patient has a history of TIA. This can be discussed further at his follow-up. I have asked that he continue aspirin.  · Tremor     781.0/R25.1  The patient was a 3 month history of a high-frequency, low amplitude tremor of both hands that worsens somewhat with activity. His tremor does not interfere with his activities of daily living. He has no evidence of bradykinesia on exam. Thus, Parkinson's seems unlikely at this point. Potential etiologies would include essential tremor and an exaggerated physiologic tremor. He does note that he started theophylline approximately 3 months ago which coincides with his onset of tremor. I question if this tremor could be related to this medication. For now, I will defer changes. I will check labs. I will request his records be sent for my review.  · Congestive heart failure     428.0/I50.9  · Arthritis     716.90/M19.90  · Erectile Dysfunction, Organic     607.84  · Hypertension     401.9/I10  · Seasonal allergies     477.9/J30.2  · BPH (benign prostatic hyperplasia)     600.00/N40.0  · Gout     274.9/M10.9  · Osteoarthritis     715.90/M19.90  · GERD (gastroesophageal reflux disease)     530.81/K21.9  · Vitamin D deficiency     268.9/E55.9  · Depression     311/F32.9  · Anxiety     300.02/F41.1  · Excess skin     701.9/L98.7  · COPD (chronic obstructive pulmonary disease)     496/J44.9      Plan  · Orders  o 2 - CCM:Each additional 20 mins () - V68.89/Z02.89, 401.9/I10, 435.9/G45.9, 780.93/R41.3 - 10/01/2020  · Medications  o Medications have been Reconciled  o Transition of Care or Provider Policy  · Instructions  o Patient's Health Care Goals: keep plastics appt in Nov.  o Provider's Health Care Goals: medication compliance  o Patient was provided an electronic copy of care plan  o CCM services were explained and offered and patient has accepted these  services.  o Patient has given their written consent to recieve CCM services and understands that this includes the authorization of electronic communication of medical information with other treating providers.  o Patient understands that they may stop CCM services at any time and these changes will be effective at the end of the calendar month and will effectively revocate the agreement of CCM services.  o Patient understands that only one practioner can furnish and be paid for CCM services during one calendar month. Patient also understands that there may be co-payment or deductible fees in association with CCM services.  o Patient will continue with at least monthly follow-up calls with the Nurse Navigator.  · Associate Tasks  o Task ID [id number] CCM: CCM Oct 2020            Electronically Signed by: Roxi Woodson RN -Author on October 29, 2020 11:09:31 AM

## 2021-05-10 NOTE — OUTREACH NOTE
"   Quick Note      Patient Name: Reddy Castellano   Patient ID: [id number]   Sex: Male   YOB: 1935    Primary Care Provider: Agustina Gutierrez MD   Referring Provider: Christopher Lozano MD    Visit Date: June 30, 2020    Provider: Agustina Gutierrez MD   Location: [facility]   Location Address: [address] [place] [address], [address]  [place], [place]  [id number]   Location Phone: [phone]          History Of Present Illness     [facility]     TaskID: [id number]    Task Subject: [facility] June 2020    Task Comments:    Date: Jun 4 2020  2:23PM     Creator: dinesh  cont... \"on fire.\" he does not recall a single fall or incident that caused the pain. he is [facility] a [facility]. he had an xray of the area done and is reviewing those results with that provider tomorrow. pt's son still living with him. he is enjoying this. home health no longer seeing pt but pt does not feel like he needs it at this time. i called Dr Ma office in [facility] and arranged a consult for 7/27 at 1000. i called pt with this info. he was appreciative. i told him [facility] would also request [facility]. he had no other acute complaints. i will update Dr Gutierrez on pt. i looked up and found [facility] number for [facility] [place] [facility] records. request faxed to [phone]. 60 min    Date: Jun 4 2020  2:19PM     Creator: dinesh  cont... want to seek general surgery. he verbalized understanding and agreed to think about it. he is very anxious, still thinking there is a FB in his penis/body from his penile removal surgery. however, after looking through Dr Lozano and Dr Rob notes from Feb and March 2020, there is no fb in pts body, it was confirmed by CT scan. i told this to pt and he was releived. he said the pannulum/excess weight/fat from his abdomen is very bothersome to him. it affects him physically, mentally, sexually. he wants to think about talking to a plastic surgeon. he worries he " "cannot afford the procedure if he warrants one. i told him he could discuss his options with a surgeon and talk about a payment plan or other options. he was open to that. i will arrange. he is hoping that if he removes this excess fat, that he will have more penis. he c/o having a \"one inch penis\" following [facility] [place]. i will request those operative records. pt also c/o his tailbone being    Date: Jun 4 2020  2:14PM     Creator: dinesh  prior to calling pt for his monthly [facility] call,i did a [facility] chart review.  pt saw Dr Gutierrez last. no major acute concers, will f/u with her in Dec 2020. pt saw ortho in April, he got a left shoulder injection for pain. he is to f/u as needed. no f/u appt made yet. he saw neuro in April too- was stopped on topiramate because it wasnt helping, per pt. the tremors pt are having are livable. i called pt who was glad to hear from me. he agreed that he did stop the  med for his tremors so i removed it from his med list. he notes that he is still having some hernia issues and urological issues. he said he has intermittent discomfort from his two small hernias. per Dr Yin's note in March, because of pt's age and because he was asymptomatic, hernia repair was not a good choice. however, now taht pt is c/o issues, it might be a good idea to talk to general surgery. after much conversation, i told pt it was up to him if we wanted to see [facility] again. if the hernias were not affecting his ADLs then he might not                   Electronically Signed by: Roxi Woodson RN -Author on June 30, 2020 11:19:34 AM  "

## 2021-05-10 NOTE — OUTREACH NOTE
Quick Note      Patient Name: Reddy Castellano   Patient ID: [id number]   Sex: Male   YOB: 1935    Primary Care Provider: Agustina Gutierrez MD   Referring Provider: Christopher Lozano MD    Visit Date: August 26, 2020    Provider: Agustina Gutierrez MD   Location: [facility]   Location Address: [address] [place] [address]  [id number]   Location Phone: [phone]          History Of Present Illness  CCM Comprehensive Care Plan    This Chronic Medical Management Care Plan for Reddy Castellano, 85 year old /White male, has been monitored and managed for the month of August. A cumulative time of 54 minutes was spent on this patient record, including face to face with provider, phone call with patient, electronic communication with primary care provider, and chart review.   Regarding the patient's diagnoses Allergies, Anxiety, Arthritis, BPH (benign prostatic hyperplasia), Congestive heart failure, COPD (chronic obstructive pulmonary disease), Depression, Erectile Dysfunction, Organic, Essential hypertension, Excess skin, Fatigue, GERD (gastroesophageal reflux disease), Gout, Hypertension, Hypogonadism in male, Insomnia, Memory change, Osteoarthritis, Seasonal allergies, TIA (transient ischemic attack), Tremor, and Vitamin D deficiency, the following items were adressed: medical records and medications and any changes can be found within the plan section of the note. A detailed listing of time spent for chronic care management has been scanned into the patient's electronic record. Current medications include: Advair -21 mcg/actuation inhalation HFA aerosol inhaler, allopurinol 300 mg oral tablet, amlodipine 10 mg oral tablet, Aspir-81 81 mg oral tablet,delayed release (DR/EC), atorvastatin 20 mg oral tablet, Blink Tears 0.25 % ophthalmic (eye) drops, brompheniramine-pseudoeph-DM 2-30-10 mg/5 mL oral syrup, Brovana 15 mcg/2 mL inhalation solution for  nebulization, Celexa 20 mg oral tablet, cyanocobalamin (vitamin B-12) 1,000 mcg/mL injection solution, EpiPen 0.3 mg/0.3 mL injection auto-injector, Flonase Allergy Relief 50 mcg/actuation nasal spray,suspension, furosemide 20 mg oral tablet, ipratropium-albuterol 0.5 mg-3 mg(2.5 mg base)/3 mL inhalation solution for nebulization, lisinopril 5 mg oral tablet, melatonin 3 mg oral tablet, metoprolol succinate 25 mg oral tablet extended release 24 hr, Namenda XR 28 mg oral capsule,sprinkle,ER 24hr, pantoprazole 40 mg oral tablet,delayed release (DR/EC), potassium chloride 10 mEq oral capsule, extended release, silver sulfadiazine 1 % topical cream, Singulair 10 mg oral tablet, Spiriva Respimat 1.25 mcg/actuation inhalation mist, theophylline 200 mg oral tablet extended release 12 hr, Ventolin HFA 90 mcg/actuation inhalation HFA aerosol inhaler, Vitamin D2 1,250 mcg (50,000 unit) oral capsule, vitamin E 400 unit oral capsule, and Zyrtec 10 mg oral tablet and the patient is reported to be compliant with medication protocol. Medications are reported to be effective.   The patient was monitored remotely for activity, dementia, medication reconciliation for a period of 54 minutes.   This patient's physical needs include: physical healthcare and physician referral. pt needs frequent reminders due to forgetfulness, referred by Francesca to [facility].   Patient's mental support needs are currently being met.   The patient's cognitive support needs include: continued support. pt on Namenda.   The patient's psychosocial support needs are N/A,   This patient's functional needs include: physician referral. pt referred to [facility].   This patient's environmental needs are N/A.           Assessment  · Chronic Care Management (CCM)     V68.89/Z02.89  · Essential hypertension     401.9/I10  · Fatigue     780.79/R53.83  · Memory change     780.93/R41.3  Currently, the patient is taking Namenda. I will pursue a mocha at his  follow-up.  · TIA (transient ischemic attack)     435.9/G45.9  Reportedly, the patient has a history of TIA. This can be discussed further at his follow-up. I have asked that he continue aspirin.  · Tremor     781.0/R25.1  The patient was a 3 month history of a high-frequency, low amplitude tremor of both hands that worsens somewhat with activity. His tremor does not interfere with his activities of daily living. He has no evidence of bradykinesia on exam. Thus, Parkinson's seems unlikely at this point. Potential etiologies would include essential tremor and an exaggerated physiologic tremor. He does note that he started theophylline approximately 3 months ago which coincides with his onset of tremor. I question if this tremor could be related to this medication. For now, I will defer changes. I will check labs. I will request his records be sent for my review.  · Congestive heart failure     428.0/I50.9  · Arthritis     716.90/M19.90  · Erectile Dysfunction, Organic     607.84  · Seasonal allergies     477.9/J30.2  · BPH (benign prostatic hyperplasia)     600.00/N40.0  · Vitamin D deficiency     268.9/E55.9  · Depression     311/F32.9  · Anxiety     300.02/F41.1  · Excess skin     701.9/L98.7  · COPD (chronic obstructive pulmonary disease)     496/J44.9      Plan  · Orders  o CCM:Each additional 20 mins () - - 08/17/2020  · Medications  o Medications have been Reconciled  o Transition of Care or Provider Policy  · Instructions  o Patient's Health Care Goals: switch to PO B12  o Provider's Health Care Goals: medication compliance  o Patient was provided an electronic copy of care plan  o CCM services were explained and offered and patient has accepted these services.  o Patient has given their written consent to recieve CCM services and understands that this includes the authorization of electronic communication of medical information with other treating providers.  o Patient understands that they may stop CCM  services at any time and these changes will be effective at the end of the calendar month and will effectively revocate the agreement of CCM services.  o Patient understands that only one practioner can furnish and be paid for CCM services during one calendar month. Patient also understands that there may be co-payment or deductible fees in association with CCM services.  o Patient will continue with at least monthly follow-up calls with the Nurse Navigator.  · Associate Tasks  o Task ID [id number] CCM: [facility] August 2020            Electronically Signed by: Roxi Woodson RN -Author on August 26, 2020 12:42:58 PM

## 2021-05-10 NOTE — OUTREACH NOTE
"   Quick Note      Patient Name: Reddy Castellano   Patient ID: [id number]   Sex: Male   YOB: 1935    Primary Care Provider: Agustina Gutierrez MD   Referring Provider: Christopher Lozano MD    Visit Date: August 26, 2020    Provider: Agustina Gutierrez MD   Location: [facility]   Location Address: [address] [place] [address], [address]  [place], [place]  [id number]   Location Phone: [phone]          History Of Present Illness     [facility]     TaskID: [id number]    Task Subject: CCM August 2020    Task Comments:    Date: Aug 17 2020  3:58PM     Creator: dinesh Gutierrez replied to task, saying it was ok to switch pt to 1000mcg PO vit b12 daily. i sent this in to [facility] and called pt with this info. Task completed. 4 min    Date: Aug 17 2020 12:42PM     Creator: dinesh  cont... reconciled med list. He said he only had #6 Namenda left. But, according to med hx, we went him in #90 day supply a month ago. He should not be out. He noted that he does not use a med planner so has no idea where they would be. I agreed to call [facility] pharmacy and ask and make sure they gave him 3 month's worth. I called and spoke to pharmacist who said he was given #90, but in 3 different bottels- 30 in each. I called pt back with this info. By the end of the call, he was able to locate the missing pills. He wants to know if he can switch to B12 PO instead of monthly injections. I reviewed chart, pt has no had injection since 12/2019 and labs since 11/2019. I sent task to Dr Gutierrez regarding this. 50 min    Date: Aug 17 2020 12:40PM     Creator: dinesh  pt called and wanted to discuss his plasic surgery consult. He noted that when he saw Dr Ma, the visit went well, her staff was very hands-on. He made note of a \"interesting situation\" regarding the operative notes from his penile surgery. I was confused, however. Because he went on to say he does not have the records from [place] and Dr House ([place] He asked if i could request the " penile surgery records from [place]. I told him that since i do now know the dates, or details from the surgery, and since our office does not need them, pt just wants them, that he is better off requesting them himself. He agreed to do so. He said he had their number and would call. He got referred to [facility] by Dr Ma. He will see them in Nov 2020. He wants to get in sooner. I advised he call weekly to see if there are any cancelations. He agreed to do so. I updated his med list. He is on [facility] from Dr Roth. He saw him 8/6. I will request those records if not in chart. We reviewed the med list i sent him last month and                   Electronically Signed by: Roxi Woodson RN -Author on August 26, 2020 12:41:42 PM

## 2021-05-12 NOTE — PROGRESS NOTES
Quick Note      Patient Name: Reddy Castellano   Patient ID: [id number]   Sex: Male   YOB: 1935    Primary Care Provider: Agustina Gutierrez MD   Referring Provider: Christopher Lozano MD    Visit Date: April 8, 2020    Provider: Arnav Borja MD   Location: [facility]   Location Address: [address] [place] [address]  [id number]   Location Phone: [phone]          History Of Present Illness  TELEHEALTH VISIT  Chief Complaint: F/u for essential tremor.   Rdedy Castellano is a 84 year old /White male who is presenting for evaluation via telehealth visit. Verbal consent obtained before beginning visit.   Provider spent HOW MIN minutes with the patient during telehealth visit.   The following staff were present during this visit: Dr. Borja   Past Medical History/Overview of Patient Symptoms     84-year-old man telehealth visit for tremor.  He states that he stopped taking the topiramate since had adverse effects and it was not that effective for him.  He thinks that the tremor is livable at this time.  He states that he thinks that he can live with the tremor at this time and is can call us in the future should he want another type of medication.           Assessment  · Essential tremor     333.1/G25.0  He is to call our office in the future should he want medications for his tremor. I might try him another medication such as gabapentin.    5 minutes was spent for this visit      Plan  · Orders  o Physican Telephone evaluation, 5-10 min ([id number]) - 333.1/G25.0 - 04/08/2020  · Medications  o Medications have been Reconciled  o Transition of Care or Provider Policy  · Instructions  o Plan Of Care:             Electronically Signed by: Arnav Borja MD -Author on April 8, 2020 01:20:07 PM

## 2021-05-12 NOTE — PROGRESS NOTES
Progress Note      Patient Name: Reddy Castellano   Patient ID: [id number]   Sex: Male   YOB: 1935    Primary Care Provider: Agustina Gutierrez MD   Referring Provider: Christopher Lozano MD    Visit Date: April 20, 2020    Provider: Patricia Caal PA-C   Location: [facility]   Location Address: [address] [place] [address]  [id number]   Location Phone: [phone]          Chief Complaint  · left Shoulder pain  · Right shoulder pain      History Of Present Illness  Reddy Castellano is a 84 year old /White male who presents today to [facility].      He is here for left shoulder pain. He states injection helped in the past. He also complains of right scapular pain.       Past Medical History  Anxiety; Arthritis; Asthma; BPH (benign prostatic hyperplasia); Carpal tunnel syndrome; Chronic Obstructive Pulmonary Disease; Colon Polyps; Congestive heart failure; COPD (chronic obstructive pulmonary disease); Dementia; Depression; Diabetes; Diabetes mellitus type 2, noninsulin dependent; Diverticulitis; Erectile Dysfunction, Organic; Essential hypertension; Fatigue; GERD (gastroesophageal reflux disease); Gout; Heart attack; Heart Disease; High cholesterol; Hyperlipidemia; Hypertension; Hypertension, Benign Essential; Hypogonadism in male; Insomnia; Leg swelling; Memory change; Myocardial Infarction; Osteoarthritis; Parkinson's Disease; Prostate Disorder; Rectal Bleeding; Renal cyst; Seasonal allergies; Sleep apnea; Stroke; TIA (transient ischemic attack); Tremor; Vitamin D deficiency         Past Surgical History  Appendectomy; Cardiac Catherization; Carpal Tunnel Release; Cataract surgery; Circumcision; Colonoscopy; Cystoscopy; EGD; Eye Implant; heart surgery; Hernia; Knee replacement, left; Knee replacement, right; Lap Band Surgery; Metal implants; orchiectomy; Penile implant; Surgical removal of toenail and nail matrix of both feet; Toe Surgery         Medication List  Allergy  (chlorpheniramine) 4 mg oral tablet; allopurinol 300 mg oral tablet; amlodipine 10 mg oral tablet; Aspir-81 81 mg oral tablet,delayed release (DR/EC); atorvastatin 20 mg oral tablet; azelastine 137 mcg (0.1 %) nasal aerosol,spray; brompheniramine-pseudoeph-DM 2-30-10 mg/5 mL oral syrup; Brovana 15 mcg/2 mL inhalation solution for nebulization; cyanocobalamin (vitamin B-12) 1,000 mcg/mL injection solution; EpiPen 0.3 mg/0.3 mL injection auto-injector; Flonase Allergy Relief 50 mcg/actuation nasal spray,suspension; fluticasone 50 mcg/actuation nasal spray,suspension; furosemide 20 mg oral tablet; ipratropium-albuterol 0.5 mg-3 mg(2.5 mg base)/3 mL inhalation solution for nebulization; lisinopril 5 mg oral tablet; melatonin 3 mg oral tablet; memantine 28 mg oral capsule,sprinkle,ER 24hr; metoprolol succinate 25 mg oral tablet extended release 24 hr; montelukast 10 mg oral tablet; Namenda XR 28 mg oral capsule,sprinkle,ER 24hr; pantoprazole 40 mg oral tablet,delayed release (DR/EC); potassium chloride 10 mEq oral capsule, extended release; Refresh Tears 0.5 % ophthalmic (eye) drops; silver sulfadiazine 1 % topical cream; Singulair 10 mg oral tablet; Spiriva Respimat 1.25 mcg/actuation inhalation mist; theophylline 200 mg oral tablet extended release 12 hr; topiramate 25 mg oral tablet; venlafaxine 225 mg oral tablet extended release 24hr; Ventolin HFA 90 mcg/actuation inhalation HFA aerosol inhaler; Vitamin D2 50,000 unit oral capsule; vitamin E 400 unit oral capsule; Zyrtec 10 mg oral tablet         Allergy List  NO KNOWN DRUG ALLERGIES       Allergies Reconciled  Family Medical History  Anus, Neoplasm; Colon Neoplasm, Malignant; Diabetes, unspecified type; Heart Attack (MI); Prostate cancer; Family history of diabetes mellitus         Social History  Alcohol (Current some day); lives alone; lives with children; Recreational Drug Use (Never); Retired; Tobacco (Never); ;          Review of  "Systems  · Constitutional  o Denies  o : fever, chills, weight loss  · Cardiovascular  o Denies  o : chest pain, shortness of breath  · Gastrointestinal  o Denies  o : liver disease, heartburn, nausea, blood in stools  · Genitourinary  o Denies  o : painful urination, blood in urine  · Integument  o Denies  o : rash, itching  · Neurologic  o Denies  o : headache, weakness, loss of consciousness  · Musculoskeletal  o Admits  o : painful, swollen joints  · Psychiatric  o Denies  o : drug/alcohol addiction, anxiety, depression      Vitals  Date Time BP Position Site L\R Cuff Size HR RR TEMP (F) WT  HT  BMI kg/m2 BSA m2 O2 Sat        04/20/2020 11:00 AM      102 - R   218lbs 2oz 5'  8\" 33.17 2.18 90 %          Physical Examination  · Constitutional  o Appearance  o : well developed, well-nourished, no obvious deformities present  · Head and Face  o Head  o :   § Inspection  § : normocephalic  o Face  o :   § Inspection  § : no facial lesions  · Eyes  o Conjunctivae  o : conjunctivae normal  o Sclerae  o : sclerae white  · Ears, Nose, Mouth and Throat  o Ears  o :   § External Ears  § : appearance within normal limits  § Hearing  § : intact  o Nose  o :   § External Nose  § : appearance normal  · Neck  o Inspection/Palpation  o : normal appearance  o Range of Motion  o : full range of motion  · Respiratory  o Respiratory Effort  o : breathing unlabored  o Inspection of Chest  o : normal appearance  o Auscultation of Lungs  o : no audible wheezing or rales  · Cardiovascular  o Heart  o : regular rate  · Gastrointestinal  o Abdominal Examination  o : soft and non-tender  · Skin and Subcutaneous Tissue  o General Inspection  o : intact, no rashes  · Psychiatric  o General  o : Alert and oriented x3  o Judgement and Insight  o : judgment and insight intact  o Mood and Affect  o : mood normal, affect appropriate  · Right Shoulder  o Inspection  o : No obvious deformity. Tender over inferomedial border of scapula. Near full " ROM. Neurovascularly intact.   · Left Shoulder  o Inspection  o : No skin discoloration. Mild atrophy. Palpable tenderness over the AC joint. Full range of motion. Mild pain with empty can. Neurovascularly and sensation grossly intact.  · Injection Note/Aspiration Note  o Site  o : left shoulder  o Procedure  o : Procedure: After educating the patient, patient gave consent for procedure. After using Chloraprep, the joint space was injected. The patient tolerated the procedure well.  o Medication  o : 80 mg of DepoMedrol with 9cc of 1% Lidocaine          Assessment  · Primary osteoarthritis of left shoulder     715.11/M19.012  · Bilateral shoulder pain, unspecified chronicity       Pain in right shoulder     719.41/M25.511  Pain in left shoulder     719.41/M25.512  · Shoulder impingement syndrome, left     726.2/M75.42  · Trigger point of shoulder region, right     719.41/M25.511      Plan  · Orders  o Depo-Medrol injection 80mg () - 715.11/M19.012 - 04/20/2020   Lot [id number] Exp 05 2021 [employer] Administered by PATRICIA SIMMS PA-C  o Shoulder Intra-articular Injection without US Guidance [facility] (99093) - 715.11/M19.012 - 04/20/2020   Lot [id number] Exp 07 2021 [employer] Administered by PATRICIA SIMMS PA-C  · Medications  o Medications have been Reconciled  o Transition of Care or Provider Policy  · Instructions  o Reviewed the patient's Past Medical, Social, and Family history as well as the ROS at today's visit, no changes.  o Call or return if worsening symptoms.  o Left shoulder injection today. He may follow up in 3 months for injection if needed. He will continue topical pain relief for right trigger point. Follow up as needed.            Electronically Signed by: Patricia Simms PA-C -Author on April 20, 2020 11:26:32 AM  Electronically Co-signed by: Rommel Johnson MD -Reviewer on April 22, 2020 11:54:49 AM

## 2021-05-13 NOTE — PROGRESS NOTES
Progress Note      Patient Name: Reddy Castellano   Patient ID: [id number]   Sex: Male   YOB: 1935    Primary Care Provider: Agustina Gutierrez MD   Referring Provider: Christopher Lozano MD    Visit Date: December 2, 2020    Provider: Agustina Gutierrez MD   Location: [facility] [place] [facility]   Location Address: [address] [place] [address]  [id number]   Location Phone: [phone]          Chief Complaint  · follow up  · HTN  · HLD  · GERD  · DM2      History Of Present Illness  Reddy Castellano is a 85 year old /White male who presents for evaluation and treatment of:      Has been taking Miralax BID, now developed diarrhea.     Made an appointment with Dr. Lozano for hydrocele dx in ED.     Due for labs today               Past Medical History  Disease Name Date Onset Notes   Allergies --  --    Anxiety --  --    Arthritis --  --    Asthma --  --    BPH (benign prostatic hyperplasia) --  --    Carpal tunnel syndrome --  --    Chronic Obstructive Pulmonary Disease --  --    Colon Polyps --  --    Congestive heart failure --  --    COPD (chronic obstructive pulmonary disease) --  --    Dementia --  --    Depression --  --    Diabetes --  --    Diabetes mellitus type 2, noninsulin dependent --  --    Diverticulitis --  --    Erectile Dysfunction, Organic --  --    Essential hypertension 11/09/2019 --    Excess skin --  --    Fatigue 11/09/2019 --    GERD (gastroesophageal reflux disease) --  --    Gout --  --    Heart attack --  --    Heart Disease --  --    High cholesterol --  --    Hyperlipidemia --  --    Hypertension --  --    Hypertension, Benign Essential --  --    Hypogonadism in male --  --    Insomnia --  --    Leg swelling --  --    Memory change 08/02/2018 Currently, the patient is taking Namenda. I will pursue a mocha at his follow-up.   Myocardial Infarction --  --    Osteoarthritis --  --    Parkinson's Disease --  --    Prostate Disorder --  --    Rectal  Bleeding --  --    Renal cyst --  --    Seasonal allergies --  --    Sleep apnea --  --    Stroke --  --    TIA (transient ischemic attack) 08/02/2018 Reportedly, the patient has a history of TIA. This can be discussed further at his follow-up. I have asked that he continue aspirin.   Tremor 08/02/2018 The patient was a 3 month history of a high-frequency, low amplitude tremor of both hands that worsens somewhat with activity. His tremor does not interfere with his activities of daily living. He has no evidence of bradykinesia on exam. Thus, Parkinson's seems unlikely at this point. Potential etiologies would include essential tremor and an exaggerated physiologic tremor. He does note that he started theophylline approximately 3 months ago which coincides with his onset of tremor. I question if this tremor could be related to this medication. For now, I will defer changes. I will check labs. I will request his records be sent for my review.   Vitamin D deficiency --  --          Past Surgical History  Procedure Name Date Notes   Appendectomy 2015 --    Cardiac Catherization --  --    Carpal Tunnel Release --  --    Cataract surgery --  --    Circumcision 2000 --    Colonoscopy 2014 2017 --    Cystoscopy --  --    EGD 2010 2017 --    Eye Implant --  yes   heart surgery 2014 --    Hernia 2000 --    Knee replacement, left 2009 --    Knee replacement, right 2010 --    Lap Band Surgery --  --    Metal implants --  1 stent on left, 3 stents on right   orchiectomy 2010 right   Penile implant --  --    Surgical removal of toenail and nail matrix of both feet --  --    Toe Surgery --  --          Medication List  Name Date Started Instructions   Advair -21 mcg/actuation inhalation HFA aerosol inhaler  inhale 2 puffs by inhalation route 2 times per day in the morning and evening   allopurinol 300 mg oral tablet 11/25/2020 take 1/2 tablet (150 mg) by oral route once daily   amlodipine 10 mg oral tablet 12/02/2020 take 1  tablet (10 mg) by oral route once daily for 90 days   Aspir-81 81 mg oral tablet,delayed release (DR/EC)  take 1 tablet (81 mg) by oral route once daily   atorvastatin 20 mg oral tablet 12/02/2020 take 1 tablet (20 mg) by oral route once daily for 90 days   Blink Tears 0.25 % ophthalmic (eye) drops  instill 1 drop in affected eye As needed   brompheniramine-pseudoeph-DM 2-30-10 mg/5 mL oral syrup  take 10 milliliters by oral route every 6 hours   Brovana 15 mcg/2 mL inhalation solution for nebulization  inhale 2 milliliters (15 mcg) by inhalation route 2 times per day   Celexa 20 mg oral tablet  take 1 tablet (20 mg) by oral route once daily   cyanocobalamin (vitamin B-12) 1,000 mcg oral tablet 08/17/2020 take 1 tablet by oral route daily for 90 days   EpiPen 0.3 mg/0.3 mL injection auto-injector  inject 0.3 milliliter (0.3 mg) by intramuscular route once as needed for anaphylaxis   Flonase Allergy Relief 50 mcg/actuation nasal spray,suspension  spray 1 spray (50 mcg) in each nostril by intranasal route once daily   furosemide 20 mg oral tablet 12/02/2020 take 1 tablet (20 mg) by oral route once daily for 90 days   ipratropium-albuterol 0.5 mg-3 mg(2.5 mg base)/3 mL inhalation solution for nebulization  inhale 3 milliliters by nebulization route 4 times per day   Keflex 500 mg oral capsule  take 1 capsule (500 mg) by oral route 4 times per day for 10 days   lisinopril 5 mg oral tablet 12/02/2020 take 1 tablet (5 mg) by oral route once daily   melatonin 3 mg oral tablet  take 1 tablet by oral route daily   metoprolol succinate 25 mg oral tablet extended release 24 hr 12/02/2020 take 1 tablet (25 mg) by oral route once daily for 90 days   Namenda 10 mg oral tablet 10/29/2020 take 1 tablet (10 mg) by oral route 2 times per day for 90 days   pantoprazole 40 mg oral tablet,delayed release (DR/EC) 11/25/2020 take 1 tablet (40 mg) by oral route once daily for 90 days   potassium chloride 10 mEq oral capsule, extended  release 12/02/2020 take 2 capsules (20 meq) by oral route 2 times per day with food for 90 days   silver sulfadiazine 1 % topical cream  apply topically BID for 14 days   Singulair 10 mg oral tablet 12/02/2020 take 1 tablet (10 mg) by oral route once daily in the evening for 90 days   Spiriva Respimat 1.25 mcg/actuation inhalation mist 03/22/2018 inhale 2 puffs (2.5 mcg) by inhalation route once daily for 90 days   theophylline 200 mg oral tablet extended release 12 hr  take 1 tablet (200 mg) by oral route every day   Ventolin HFA 90 mcg/actuation inhalation HFA aerosol inhaler  inhale 1 - 2 puffs (90 - 180 mcg) by inhalation route every 6 hours as needed   Vitamin B-12 500 mcg oral tablet  take 1 tablet by oral route daily   Vitamin D2 1,250 mcg (50,000 unit) oral capsule 10/20/2020 take 1 capsule by oral route 2 times a week   vitamin E 400 unit oral capsule 07/29/2020 take 1 capsule by oral route daily for 90 days   Zyrtec 10 mg oral tablet 11/25/2020 take 1 tablet (10 mg) by oral route once daily for 90 days         Allergy List  Allergen Name Date Reaction Notes   NO KNOWN DRUG ALLERGIES --  --  --        Allergies Reconciled  Family Medical History  Disease Name Relative/Age Notes   Anus, Neoplasm  Grandfather - rectal and colon   Colon Neoplasm, Malignant  Grandfather - colon and rectal   Diabetes, unspecified type Brother/   Brother  Mother; Brother   Heart Attack (MI) Father/   --    Prostate cancer  --    Family history of diabetes mellitus Brother/  Mother/  Son/   Mother; Brother; Son  Mother; Brother         Social History  Finding Status Start/Stop Quantity Notes   Alcohol Current some day --/-- --  09/17/2020 - 04/08/2020 - 02/14/2020 - rarely drinks, has been drinking for less than 1 year  rarely drinks   lives alone --  --/-- --  --    lives with children --  --/-- --  --    Recreational Drug Use Never --/-- --  02/14/2020 - no   Retired --  --/-- --  --    Tobacco Never --/-- --  04/08/2020 -  "02/14/2020 - never smoker    --  --/-- --  --     --  --/-- --  --          Immunizations  NameDate Admin Mfg Trade Name Lot Number Route Inj VIS Given VIS Publication   [id number]/18/2019 NE Not Entered  NE NE     Comments: [employer]         Review of Systems  · Constitutional  o Denies  o : fever, fatigue, weight loss, weight gain  · Cardiovascular  o Denies  o : lower extremity edema, claudication, chest pressure, palpitations  · Respiratory  o Denies  o : shortness of breath, wheezing, frequent cough, hemoptysis, dyspnea on exertion  · Gastrointestinal  o Admits  o : diarrhea  o Denies  o : nausea, vomiting, constipation, abdominal pain      Vitals  Date Time BP Position Site L\R Cuff Size HR RR TEMP (F) WT  HT  BMI kg/m2 BSA m2 O2 Sat FR L/min FiO2 HC       09/09/2020 12:59 PM       16  204lbs 16oz 5'  8\" 31.17 2.11       09/17/2020 10:28 AM         206lbs 3oz 5'  8\" 31.35 2.12       12/02/2020 01:55 /56 Sitting    58 - R  97.5 197lbs 8oz 5'  8\" 30.03 2.07 98 %            Physical Examination  · Constitutional  o Appearance  o : no acute distress, well-nourished  · Head and Face  o Head  o :   § Inspection  § : atraumatic, normocephalic  · Eyes  o Eyes  o : extraocular movements intact, no scleral icterus, no conjunctival injection  · Ears, Nose, Mouth and Throat  o Ears  o :   § External Ears  § : normal  o Nose  o :   § Intranasal Exam  § : nares patent  o Oral Cavity  o :   § Oral Mucosa  § : moist mucous membranes  · Respiratory  o Respiratory Effort  o : breathing comfortably, symmetric chest rise  o Auscultation of Lungs  o : clear to asculatation bilaterally, no wheezes, rales, or rhonchii  · Cardiovascular  o Heart  o :   § Auscultation of Heart  § : regular rate and rhythm, no murmurs, rubs, or gallops  o Peripheral Vascular System  o :   § Extremities  § : no edema  · Neurologic  o Mental Status Examination  o :   § Orientation  § : grossly oriented to person, place and " time  o Gait and Station  o :   § Gait Screening  § : normal gait  · Psychiatric  o General  o : normal mood and affect          Assessment  · Essential hypertension     401.9/I10  · Insomnia     780.52/G47.00  · Depression     311/F32.9  · Anxiety     300.02/F41.1  · Screening for prostate cancer     V76.44/Z12.5  · Diabetes mellitus, type 2     250.00/E11.9  · Hyperlipidemia     272.4/E78.5    Problems Reconciled  Plan  · Orders  o PSA Ultrasensitive, ANNUAL SCREENING [facility] (18354, ) - V76.44/Z12.5 - 12/02/2020  o Diabetes 2 Panel (Urine Microalbumin, CMP, Lipid, A1c, ) [facility] (70101, 08731, 10575, 87011) - 250.00/E11.9 - 12/02/2020  o CBC with Auto Diff [facility] (65493) - 250.00/E11.9 - 12/02/2020  o ACO - Pt declines to or was not able to provide an Advance Care Plan or name a Surrogate Decision Maker (1124F) - - 12/03/2020  o ACO-39: Current medications updated and reviewed (, 1159F) - - 12/02/2020  · Medications  o amlodipine 10 mg oral tablet   SIG: take 1 tablet (10 mg) by oral route once daily for 90 days   DISP: (90) Tablet with 1 refills  Refilled on 12/02/2020     o atorvastatin 20 mg oral tablet   SIG: take 1 tablet (20 mg) by oral route once daily for 90 days   DISP: (90) Tablet with 1 refills  Refilled on 12/02/2020     o furosemide 20 mg oral tablet   SIG: take 1 tablet (20 mg) by oral route once daily for 90 days   DISP: (90) Tablet with 1 refills  Refilled on 12/02/2020     o lisinopril 5 mg oral tablet   SIG: take 1 tablet (5 mg) by oral route once daily   DISP: (90) Tablet with 1 refills  Refilled on 12/02/2020     o metoprolol succinate 25 mg oral tablet extended release 24 hr   SIG: take 1 tablet (25 mg) by oral route once daily for 90 days   DISP: (90) Tablet with 1 refills  Refilled on 12/02/2020     o potassium chloride 10 mEq oral capsule, extended release   SIG: take 2 capsules (20 meq) by oral route 2 times per day with food for 90 days   DISP: (360) Capsule with 1 refills  Refilled on  12/02/2020     o Singulair 10 mg oral tablet   SIG: take 1 tablet (10 mg) by oral route once daily in the evening for 90 days   DISP: (90) Tablet with 1 refills  Refilled on 12/02/2020     o Medications have been Reconciled  o Transition of Care or Provider Policy  · Instructions  o Advised that cheeses and other sources of dairy fats, animal fats, fast food, and the extras (candy, pastries, pies, doughnuts and cookies) all contain LDL raising nutrients. Advised to increase fruits, vegetables, whole grains, and to monitor portion sizes.   o Take all medications as prescribed/directed.  o Patient was educated/instructed on their diagnosis, treatment and medications prior to discharge from the clinic today.  o Call the office with any concerns or questions.            Electronically Signed by: Agustina Gutierrez MD -Author on December 3, 2020 10:36:30 AM

## 2021-05-13 NOTE — PROGRESS NOTES
"   Quick Note      Patient Name: Reddy Castellano   Patient ID: [id number]   Sex: Male   YOB: 1935    Primary Care Provider: Agustina Gutierrez MD   Referring Provider: Christopher Lozano MD    Visit Date: May 22, 2020    Provider: Agustina Gutierrez MD   Location: [facility]   Location Address: [address] [place] [address], [facility]  [place], [place]  [id number]   Location Phone: [phone]          History Of Present Illness  TELEHEALTH TELEPHONE VISIT  Chief Complaint: Follow up   Reddy Castellano is a 85 year old /White male who is presenting for evaluation via telehealth telephone visit. Verbal consent obtained before beginning visit.   Provider spent 10 minutes with the patient during telehealth visit.   The following staff were present during this visit: Amber Parikh MA; Agustina Gutierrez MD   Past Medical History/Overview of Patient Symptoms  Reddy Castellano is a 85 year old /White male who presents for evaluation and treatment of:      Got evaluated at [facility] and did not have COVID.     No complaints today    Organizing a celebration for his great granddaughter graduating from 6th grade today.       Vitals  Date Time BP Position Site L\R Cuff Size HR RR TEMP (F) WT  HT  BMI kg/m2 BSA m2 O2 Sat HC       05/22/2020 02:51 /78 Sitting    93 - R  97.5 131lbs 8oz 5'  8\" 19.99 1.69 96 %              Assessment  · Essential hypertension     401.9/I10  · Memory change     780.93/R41.3  Currently, the patient is taking Namenda. I will pursue a mocha at his follow-up.  · Depression     311/F32.9  · Anxiety     300.02/F41.1    Problems Reconciled  Plan  · Orders  o Physican Telephone evaluation, 5-10 min (65529) - 401.9/I10, 780.93/R41.3, 311/F32.9, 300.02/F41.1 - 05/22/2020  o ACO-39: Current medications updated and reviewed () - - 05/22/2020  · Medications  o Medications have been Reconciled  o Transition of Care or Provider Policy  · Instructions  o Plan Of Care:   o Chronic " conditions reviewed and taken into consideration for today's treatment plan.  o Patient was educated/instructed on their diagnosis, treatment and medications prior to discharge from the clinic today.  o Call the office with any concerns or questions.  · Disposition  o Follow up in 6 months            Electronically Signed by: Agustina Gutierrez MD -Author on May 22, 2020 05:00:48 PM

## 2021-05-13 NOTE — PROGRESS NOTES
Progress Note      Patient Name: Reddy Castellano   Patient ID: [id number]   Sex: Male   YOB: 1935    Primary Care Provider: Agustina Gutierrez MD   Referring Provider: Christopher Lozano MD    Visit Date: September 17, 2020    Provider: Marco Lara MD   Location: [facility] [place] [facility]   Location Address: [address] [place] [address]  [id number]   Location Phone: [phone]          Chief Complaint  · Follow Up Office Visit      History Of Present Illness  Reddy Castellano is a 85 year old /White male who presents today for a postoperative visit. He follows-up status post CT scan. He had originally presented with complaints of groin pain and feeling of some pressure, especially in his right groin. I did not feel a hernia on exam so we sent him for a CT scan. His CT scan was performed, which showed a periumbilical hernia but did not show any evidence of a groin hernia or evidence of a true hernia down in his groin.       Past Medical History  Allergies; Anxiety; Arthritis; Asthma; BPH (benign prostatic hyperplasia); Carpal tunnel syndrome; Chronic Obstructive Pulmonary Disease; Colon Polyps; Congestive heart failure; COPD (chronic obstructive pulmonary disease); Dementia; Depression; Diabetes; Diabetes mellitus type 2, noninsulin dependent; Diverticulitis; Erectile Dysfunction, Organic; Essential hypertension; Excess skin; Fatigue; GERD (gastroesophageal reflux disease); Gout; Heart attack; Heart Disease; High cholesterol; Hyperlipidemia; Hypertension; Hypertension, Benign Essential; Hypogonadism in male; Insomnia; Leg swelling; Memory change; Myocardial Infarction; Osteoarthritis; Parkinson's Disease; Prostate Disorder; Rectal Bleeding; Renal cyst; Seasonal allergies; Sleep apnea; Stroke; TIA (transient ischemic attack); Tremor; Vitamin D deficiency         Past Surgical History  Appendectomy; Cardiac Catherization; Carpal Tunnel Release; Cataract surgery; Circumcision;  Colonoscopy; Cystoscopy; EGD; Eye Implant; heart surgery; Hernia; Knee replacement, left; Knee replacement, right; Lap Band Surgery; Metal implants; orchiectomy; Penile implant; Surgical removal of toenail and nail matrix of both feet; Toe Surgery         Medication List  Advair -21 mcg/actuation inhalation HFA aerosol inhaler; allopurinol 300 mg oral tablet; amlodipine 10 mg oral tablet; Aspir-81 81 mg oral tablet,delayed release (DR/EC); atorvastatin 20 mg oral tablet; Blink Tears 0.25 % ophthalmic (eye) drops; brompheniramine-pseudoeph-DM 2-30-10 mg/5 mL oral syrup; Brovana 15 mcg/2 mL inhalation solution for nebulization; Celexa 20 mg oral tablet; cyanocobalamin (vitamin B-12) 1,000 mcg oral tablet; EpiPen 0.3 mg/0.3 mL injection auto-injector; Flonase Allergy Relief 50 mcg/actuation nasal spray,suspension; furosemide 20 mg oral tablet; ipratropium-albuterol 0.5 mg-3 mg(2.5 mg base)/3 mL inhalation solution for nebulization; lisinopril 5 mg oral tablet; melatonin 3 mg oral tablet; metoprolol succinate 25 mg oral tablet extended release 24 hr; Namenda XR 28 mg oral capsule,sprinkle,ER 24hr; pantoprazole 40 mg oral tablet,delayed release (DR/EC); potassium chloride 10 mEq oral capsule, extended release; silver sulfadiazine 1 % topical cream; Singulair 10 mg oral tablet; Spiriva Respimat 1.25 mcg/actuation inhalation mist; theophylline 200 mg oral tablet extended release 12 hr; Ventolin HFA 90 mcg/actuation inhalation HFA aerosol inhaler; Vitamin B-12 500 mcg oral tablet; Vitamin D2 1,250 mcg (50,000 unit) oral capsule; vitamin E 400 unit oral capsule; Zyrtec 10 mg oral tablet         Allergy List  NO KNOWN DRUG ALLERGIES       Allergies Reconciled  Family Medical History  Anus, Neoplasm; Colon Neoplasm, Malignant; Diabetes, unspecified type; Heart Attack (MI); Prostate cancer; Family history of diabetes mellitus         Social History  Alcohol (Current some day); lives alone; lives with children;  "Recreational Drug Use (Never); Retired; Tobacco (Never); ;          Immunizations  Name Date Admin   Influenza    Influenza          Review of Systems  · Cardiovascular  o Denies  o : chest pain on exertion, shortness of breath, lower extremity swelling  · Respiratory  o Denies  o : shortness of breath, coughing up blood  · Gastrointestinal  o Denies  o : chronic abdominal pain      Vitals  Date Time BP Position Site L\R Cuff Size HR RR TEMP (F) WT  HT  BMI kg/m2 BSA m2 O2 Sat        09/17/2020 10:28 AM         206lbs 3oz 5'  8\" 31.35 2.12           Physical Examination  · Constitutional  o Appearance  o : well developed, well-nourished, alert and in no acute distress  · Head and Face  o Head  o :   § Inspection  § : no deformities or lesions  · Eyes  o Conjunctivae  o : clear  o Sclerae  o : nonicteric  · Neck  o Inspection/Palpation  o : normal appearance, no masses or tenderness, trachea midline  · Respiratory  o Respiratory Effort  o : breathing unlabored  o Inspection of Chest  o : normal appearance, no retractions  · Cardiovascular  o Heart  o : regular rate and rhythm  · Gastrointestinal  o Abdominal Examination  o :   § Abdomen  § : abdomen is soft  · Lymphatic  o Neck  o : no lymphadenopathy present  o Axilla  o : no lymphadenopathy present  o Groin  o : no lymphadenopathy present  · Skin and Subcutaneous Tissue  o General Inspection  o : no rashes present, no lesions present, no areas of discoloration  · Neurologic  o Cranial Nerves  o : grossly intact  o Sensation  o : grossly intact  o Gait and Station  o :   § Gait Screening  § : normal gait, able to stand without diffculty  o Cerebellar Function  o : no obvious abnormalities  · Psychiatric  o Judgement and Insight  o : judgment and insight intact  o Mood and Affect  o : mood normal, affect appropriate          Assessment  · Follow up     V67.9/Z09       Patient with complaints of groin pain.       Plan  · Medications  o Medications have " been Reconciled  o Transition of Care or Provider Policy  · Instructions  o Electronically Identified Patient Education Materials Provided Electronically     I don't see any evidence of a true hernia on CT scan. The radiologist didn't dictate any evidence of a hernia either. I did offer to repair his umbilical hernia although he doesn't seem to be having any pain or issues with that and he wants to hold off on any intervention on the umbilical hernia. His only complaint now is that he is having some issues after a penile prosthesis removal, which unfortunately I can't help him with and we will refer him to urology for this. The patient reports he has seen Dr. Lozano in the past and would be happy to see him again. I discussed all of this with the patient. All questions were answered.  He voiced understanding and agreed to proceed with the above plan.             Electronically Signed by: Lisa Huggins-, -Author on September 18, 2020 09:24:26 AM  Electronically Co-signed by: Marco Lara MD -Reviewer on September 18, 2020 02:05:02 PM

## 2021-05-14 VITALS — BODY MASS INDEX: 31.07 KG/M2 | HEIGHT: 68 IN | WEIGHT: 205 LBS | RESPIRATION RATE: 16 BRPM

## 2021-05-14 VITALS
WEIGHT: 206 LBS | DIASTOLIC BLOOD PRESSURE: 70 MMHG | RESPIRATION RATE: 15 BRPM | HEIGHT: 68 IN | SYSTOLIC BLOOD PRESSURE: 138 MMHG | OXYGEN SATURATION: 96 % | HEART RATE: 59 BPM | BODY MASS INDEX: 31.22 KG/M2 | TEMPERATURE: 98.4 F | SYSTOLIC BLOOD PRESSURE: 124 MMHG | DIASTOLIC BLOOD PRESSURE: 82 MMHG

## 2021-05-14 VITALS
OXYGEN SATURATION: 97 % | DIASTOLIC BLOOD PRESSURE: 72 MMHG | TEMPERATURE: 99 F | SYSTOLIC BLOOD PRESSURE: 134 MMHG | RESPIRATION RATE: 15 BRPM | BODY MASS INDEX: 30.48 KG/M2 | WEIGHT: 201.12 LBS | HEIGHT: 68 IN | HEART RATE: 70 BPM

## 2021-05-14 VITALS — WEIGHT: 206.19 LBS | BODY MASS INDEX: 31.25 KG/M2 | HEIGHT: 68 IN

## 2021-05-14 VITALS
OXYGEN SATURATION: 98 % | TEMPERATURE: 97.5 F | HEIGHT: 68 IN | BODY MASS INDEX: 29.93 KG/M2 | HEART RATE: 58 BPM | SYSTOLIC BLOOD PRESSURE: 106 MMHG | DIASTOLIC BLOOD PRESSURE: 56 MMHG | WEIGHT: 197.5 LBS

## 2021-05-14 VITALS — HEIGHT: 68 IN | RESPIRATION RATE: 16 BRPM | BODY MASS INDEX: 29.25 KG/M2 | WEIGHT: 193 LBS

## 2021-05-14 NOTE — PROGRESS NOTES
Progress Note      Patient Name: Reddy Castellano   Patient ID: [id number]   Sex: Male   YOB: 1935    Primary Care Provider: Agustina Gutierrez MD   Referring Provider: Christopher Lozano MD    Visit Date: December 29, 2020    Provider: Christopher Lozano MD   Location: [facility]   Location Address: [address] [place] [address]  [id number]   Location Phone: [phone]          Chief Complaint  · pt is here for urological concerns      History Of Present Illness     84 yo WM with ED with scrotal pain.    did have some trauma to the scrotum getting in a vehicle about 1 month ago.  scrotum is tender and it makes contact with things.  This is staying about the same.  Nothing makes the pain better.  Touching things makes it worse.    Scrotum has not increased in size recently.    Voiding okay.  No prostate meds.   No changes. No GH.    pain across his pannus and left groin has resolved.    11/20 scrotal ultrasoundstatus post left orchiectomy, large right-sided hydrocele, normal right testicle.    Previous    1/23/20 CT abdomen/pelvis without - stable adrenal gland.  Consistent with benign lipid rich adenoma.    L 2.4 cm nodule. No  Nephrolithiasis.  Small stones in the gallbladder.  Small fat-containing umbilical hernia.  Large pannus with multiple loops of small bowel: Extending into the pannus.  No true hernia identified.  No pelvic or inguinal adenopathy.    Bothered that his penis.  Ever since penile implant has been out patient has been having some trouble standing dry around his penis he states.  Patient is circumcised    PVR     2019  000    11/19 creatinine 1.4, GFR 46    patient has lost 35 lbs - he is trying.    IPP placed several years ago.  He had erosion of some tubing and [facility] [place]    12/20 PSA 2.08       Past Medical History  Allergies; Anxiety; Arthritis; Asthma; BPH (benign prostatic hyperplasia); Carpal tunnel syndrome;  Chronic Obstructive Pulmonary Disease; Colon Polyps; Congestive heart failure; COPD (chronic obstructive pulmonary disease); Dementia; Depression; Diabetes; Diabetes mellitus type 2, noninsulin dependent; Diverticulitis; Erectile Dysfunction, Organic; Essential hypertension; Excess skin; Fatigue; GERD (gastroesophageal reflux disease); Gout; Heart attack; Heart Disease; High cholesterol; Hyperlipidemia; Hypertension; Hypertension, Benign Essential; Hypogonadism in male; Insomnia; Leg swelling; Memory change; Myocardial Infarction; Osteoarthritis; Parkinson's Disease; Prostate Disorder; Rectal Bleeding; Renal cyst; Seasonal allergies; Sleep apnea; Stroke; TIA (transient ischemic attack); Tremor; Vitamin D deficiency         Past Surgical History  Appendectomy; Cardiac Catherization; Carpal Tunnel Release; Cataract surgery; Circumcision; Colonoscopy; Cystoscopy; EGD; Eye Implant; heart surgery; Hernia; Knee replacement, left; Knee replacement, right; Lap Band Surgery; Metal implants; orchiectomy; Penile implant; Surgical removal of toenail and nail matrix of both feet; Toe Surgery         Medication List  Advair -21 mcg/actuation inhalation HFA aerosol inhaler; allopurinol 300 mg oral tablet; amlodipine 10 mg oral tablet; Aspir-81 81 mg oral tablet,delayed release (DR/EC); atorvastatin 20 mg oral tablet; Blink Tears 0.25 % ophthalmic (eye) drops; brompheniramine-pseudoeph-DM 2-30-10 mg/5 mL oral syrup; Brovana 15 mcg/2 mL inhalation solution for nebulization; Celexa 20 mg oral tablet; cyanocobalamin (vitamin B-12) 1,000 mcg oral tablet; EpiPen 0.3 mg/0.3 mL injection auto-injector; Flonase Allergy Relief 50 mcg/actuation nasal spray,suspension; furosemide 20 mg oral tablet; ipratropium-albuterol 0.5 mg-3 mg(2.5 mg base)/3 mL inhalation solution for nebulization; lisinopril 5 mg oral tablet; melatonin 3 mg oral tablet; metoprolol succinate 25 mg oral tablet extended release 24 hr; Miralax 17 gram/dose oral  "powder; Namenda 10 mg oral tablet; pantoprazole 40 mg oral tablet,delayed release (DR/EC); potassium chloride 10 mEq oral capsule, extended release; silver sulfadiazine 1 % topical cream; Singulair 10 mg oral tablet; Spiriva Respimat 1.25 mcg/actuation inhalation mist; theophylline 200 mg oral tablet extended release 12 hr; Ventolin HFA 90 mcg/actuation inhalation HFA aerosol inhaler; Vitamin D2 1,250 mcg (50,000 unit) oral capsule; vitamin E 400 unit oral capsule; Zyrtec 10 mg oral tablet         Allergy List  NO KNOWN DRUG ALLERGIES         Family Medical History  Anus, Neoplasm; Colon Neoplasm, Malignant; Diabetes, unspecified type; Heart Attack (MI); Prostate cancer; Family history of diabetes mellitus         Social History  Alcohol (Current some day); lives alone; lives with children; Recreational Drug Use (Never); Retired; Tobacco (Never);          Immunizations  Name Date Admin   Influenza 10/18/2019   Influenza 11/02/2018         Review of Systems  · Constitutional  o Denies  o : chills, fever  · Gastrointestinal  o Denies  o : nausea, vomiting      Vitals  Date Time BP Position Site L\R Cuff Size HR RR TEMP (F) WT  HT  BMI kg/m2 BSA m2 O2 Sat FR L/min FiO2 HC       12/29/2020 11:08 AM       16  193lbs 0oz 5'  8\" 29.35 2.05             Physical Examination  · Constitutional  o Appearance  o : Well-appearing, well-developed, in no acute distress  · Respiratory  o Respiratory Effort  o : Unlabored breathing  · Neurologic  o Mental Status Examination  o :   § Orientation  § : Grossly oriented to person, place and time, judgment and insight intact, normal mood and affect       Circumcised phallus.  Patient has left orchiectomy, right testicle is palpable but does have a hydrocele around this.  No signs of abnormality.           Results  · In-Office Procedures  o Lab procedure  § Automated dipstick urinalysis with microscopy (09235)   § Color Ur: Yellow   § Clarity Ur: Clear   § Glucose Ur Ql Strip: " Negative   § Bilirub Ur Ql Strip: Negative   § Ketones Ur Ql Strip: Negative   § Sp Gr Ur Qn: 1.025   § Hgb Ur Ql Strip: Negative   § pH Ur-LsCnc: 5.5   § Prot Ur Ql Strip: Negative   § Urobilinogen Ur Strip-mCnc: 0.2   § Nitrite Ur Ql Strip: Negative   § WBC Est Ur Ql Strip: Negative       Assessment  · Hernia of anterior abdominal wall     553.20/K43.9  · Scrotal pain     608.9/N50.82      Plan  · Medications  o Medications have been Reconciled  o Transition of Care or Provider Policy  · Instructions  o Electronically Identified Patient Education Materials Provided Electronically       Exam normal, patient has right hydrocele that has not changed in size.  I do not think this is causing his pain.  I did discuss he could start doing sitz bath and we discussed how to do this today.    Ultrasound reviewed today with patient.  Patient given reassurance.  Follow-up me as needed.             Electronically Signed by: Christopher Lozano MD -Author on December 29, 2020 11:26:53 AM

## 2021-05-14 NOTE — PROGRESS NOTES
Progress Note      Patient Name: Reddy Castellano   Patient ID: [id number]   Sex: Male   YOB: 1935    Primary Care Provider: Agustina Gutierrez MD   Referring Provider: Christopher Lozano MD    Visit Date: March 19, 2021    Provider: Aleja Mcneil PA-C   Location: [facility]   Location Address: [address] [place] [address], [address]  [place], [place]  [id number]   Location Phone: [phone]          Chief Complaint  · 2 day f/u       History Of Present Illness  Reddy Castellano is a 85 year old /White male who presents for evaluation and treatment of:      2 day f/u chest pain  He states cp resolved in office and never started again. Denies cp since last visit  He has not had episode of dizziness since last visit.  Denies ha, vision changes, weakness, confusion, slurred speech, palpitations       Past Medical History  Disease Name Date Onset Notes   Allergies --  --    Anxiety --  --    Arthritis --  --    Asthma --  --    BPH (benign prostatic hyperplasia) --  --    Carpal tunnel syndrome --  --    Chronic Obstructive Pulmonary Disease --  --    Colon Polyps --  --    Congestive heart failure --  --    COPD (chronic obstructive pulmonary disease) --  --    Dementia --  --    Depression --  --    Diabetes --  --    Diabetes mellitus type 2, noninsulin dependent --  --    Diverticulitis --  --    Erectile Dysfunction, Organic --  --    Essential hypertension 11/09/2019 --    Excess skin --  --    Fatigue 11/09/2019 --    GERD (gastroesophageal reflux disease) --  --    Gout --  --    Heart attack --  --    Heart Disease --  --    High cholesterol --  --    Hyperlipidemia --  --    Hypertension --  --    Hypertension, Benign Essential --  --    Hypogonadism in male --  --    Insomnia --  --    Leg swelling --  --    Memory change 08/02/2018 Currently, the patient is taking Namenda. I will pursue a mocha at his follow-up.   Myocardial Infarction --  --    Osteoarthritis --  --    Parkinson's  Disease --  --    Prostate Disorder --  --    Rectal Bleeding --  --    Renal cyst --  --    Seasonal allergies --  --    Sleep apnea --  --    Stroke --  --    TIA (transient ischemic attack) 08/02/2018 Reportedly, the patient has a history of TIA. This can be discussed further at his follow-up. I have asked that he continue aspirin.   Tremor 08/02/2018 The patient was a 3 month history of a high-frequency, low amplitude tremor of both hands that worsens somewhat with activity. His tremor does not interfere with his activities of daily living. He has no evidence of bradykinesia on exam. Thus, Parkinson's seems unlikely at this point. Potential etiologies would include essential tremor and an exaggerated physiologic tremor. He does note that he started theophylline approximately 3 months ago which coincides with his onset of tremor. I question if this tremor could be related to this medication. For now, I will defer changes. I will check labs. I will request his records be sent for my review.   Vitamin D deficiency --  --          Past Surgical History  Procedure Name Date Notes   Appendectomy 2015 --    Cardiac Catherization --  --    Carpal Tunnel Release --  --    Cataract surgery --  --    Circumcision 2000 --    Colonoscopy 2014 2017 --    Cystoscopy --  --    EGD 2010 2017 --    Eye Implant --  yes   heart surgery 2014 --    Hernia 2000 --    Knee replacement, left 2009 --    Knee replacement, right 2010 --    Lap Band Surgery --  --    Metal implants --  1 stent on left, 3 stents on right   orchiectomy 2010 right   Penile implant --  --    Surgical removal of toenail and nail matrix of both feet --  --    Toe Surgery --  --          Medication List  Name Date Started Instructions   Advair -21 mcg/actuation inhalation HFA aerosol inhaler  inhale 2 puffs by inhalation route 2 times per day in the morning and evening   allopurinol 300 mg oral tablet 11/25/2020 take 1/2 tablet (150 mg) by oral route once  daily   amlodipine 10 mg oral tablet 12/02/2020 take 1 tablet (10 mg) by oral route once daily for 90 days   Aspir-81 81 mg oral tablet,delayed release (DR/EC)  take 1 tablet (81 mg) by oral route once daily   atorvastatin 20 mg oral tablet 12/02/2020 take 1 tablet (20 mg) by oral route once daily for 90 days   Blink Tears 0.25 % ophthalmic (eye) drops  instill 1 drop in affected eye As needed   brompheniramine-pseudoeph-DM 2-30-10 mg/5 mL oral syrup  take 10 milliliters by oral route every 6 hours   Brovana 15 mcg/2 mL inhalation solution for nebulization  inhale 2 milliliters (15 mcg) by inhalation route 2 times per day   Celexa 20 mg oral tablet  take 1 tablet (20 mg) by oral route once daily   cyanocobalamin (vitamin B-12) 1,000 mcg oral tablet 08/17/2020 take 1 tablet by oral route daily for 90 days   EpiPen 0.3 mg/0.3 mL injection auto-injector  inject 0.3 milliliter (0.3 mg) by intramuscular route once as needed for anaphylaxis   Flonase Allergy Relief 50 mcg/actuation nasal spray,suspension  spray 1 spray (50 mcg) in each nostril by intranasal route once daily   furosemide 20 mg oral tablet 12/02/2020 take 1 tablet (20 mg) by oral route once daily for 90 days   ipratropium-albuterol 0.5 mg-3 mg(2.5 mg base)/3 mL inhalation solution for nebulization  inhale 3 milliliters by nebulization route 4 times per day   lisinopril 5 mg oral tablet 12/02/2020 take 1 tablet (5 mg) by oral route once daily   melatonin 1 mg oral tablet  take 1 tablet by oral route once a day (in the evening) as needed   metoprolol succinate 25 mg oral tablet extended release 24 hr 12/02/2020 take 1 tablet (25 mg) by oral route once daily for 90 days   Miralax 17 gram/dose oral powder 12/28/2020 take 17 gram mixed with 8 oz. water, juice, soda, coffee or tea by oral route once daily for 90 days   Namenda 10 mg oral tablet 10/29/2020 take 1 tablet (10 mg) by oral route 2 times per day for 90 days   pantoprazole 40 mg oral tablet,delayed  release (DR/EC) 11/25/2020 take 1 tablet (40 mg) by oral route once daily for 90 days   potassium chloride 10 mEq oral capsule, extended release 12/02/2020 take 2 capsules (20 meq) by oral route 2 times per day with food for 90 days   Singulair 10 mg oral tablet 12/02/2020 take 1 tablet (10 mg) by oral route once daily in the evening for 90 days   Spiriva Respimat 1.25 mcg/actuation inhalation mist 03/22/2018 inhale 2 puffs (2.5 mcg) by inhalation route once daily for 90 days   theophylline 200 mg oral tablet extended release 12 hr  take 1 tablet (200 mg) by oral route every day   Ventolin HFA 90 mcg/actuation inhalation HFA aerosol inhaler  inhale 1 - 2 puffs (90 - 180 mcg) by inhalation route every 6 hours as needed   Vitamin D2 1,250 mcg (50,000 unit) oral capsule 10/20/2020 take 1 capsule by oral route 2 times a week   vitamin E 400 unit oral capsule 07/29/2020 take 1 capsule by oral route daily for 90 days   Zyrtec 10 mg oral tablet 11/25/2020 take 1 tablet (10 mg) by oral route once daily for 90 days         Allergy List  Allergen Name Date Reaction Notes   NO KNOWN DRUG ALLERGIES --  --  --        Allergies Reconciled  Family Medical History  Disease Name Relative/Age Notes   Anus, Neoplasm  Grandfather - rectal and colon   Colon Neoplasm, Malignant  Grandfather - colon and rectal   Diabetes, unspecified type Brother/   Brother  Mother; Brother   Heart Attack (MI) Father/   --    Prostate cancer  --    Family history of diabetes mellitus Brother/  Mother/  Son/   Mother; Brother; Son  Mother; Brother         Social History  Finding Status Start/Stop Quantity Notes   Alcohol Current some day --/-- --  09/17/2020 - 04/08/2020 - 02/14/2020 - rarely drinks, has been drinking for less than 1 year  rarely drinks   lives alone --  --/-- --  --    lives with children --  --/-- --  --    Recreational Drug Use Never --/-- --  02/14/2020 - no   Retired --  --/-- --  --    Tobacco Never --/-- --  04/08/2020 -  "02/14/2020 - never smoker    --  --/-- --  --          Immunizations  NameDate Admin Mfg Trade Name Lot Number Route Inj VIS Given VIS Publication   [id number]/18/2019 NE Not Entered  NE NE     Comments: [employer]         Review of Systems  · Constitutional  o Denies  o : fever, fatigue, weight loss, weight gain  · Cardiovascular  o Denies  o : lower extremity edema, claudication, chest pressure, palpitations  · Respiratory  o Denies  o : shortness of breath, wheezing, frequent cough, hemoptysis, dyspnea on exertion  · Gastrointestinal  o Denies  o : nausea, vomiting, diarrhea, constipation, abdominal pain      Vitals  Date Time BP Position Site L\R Cuff Size HR RR TEMP (F) WT  HT  BMI kg/m2 BSA m2 O2 Sat FR L/min FiO2 HC       05/22/2020 02:51 /78 Sitting    93 - R  97.5 231lbs 8oz 5'  8\" 35.2 2.24 96 %      03/17/2021 11:28 /70 Sitting    59 - R 15 98.4 205lbs 16oz 5'  8\" 31.32 2.12 96 %      03/19/2021 10:29 /72 Sitting    70 - R 15 99 201lbs 2oz 5'  8\" 30.58 2.09 97 %            Physical Examination  · Constitutional  o Appearance  o : no acute distress, well-nourished  · Head and Face  o Head  o :   § Inspection  § : atraumatic, normocephalic  · Eyes  o Eyes  o : extraocular movements intact, no scleral icterus, no conjunctival injection  · Ears, Nose, Mouth and Throat  o Ears  o :   § External Ears  § : normal  o Nose  o :   § Intranasal Exam  § : nares patent  o Oral Cavity  o :   § Oral Mucosa  § : moist mucous membranes  · Respiratory  o Respiratory Effort  o : breathing comfortably, symmetric chest rise  o Auscultation of Lungs  o : clear to asculatation bilaterally, no wheezes, rales, or rhonchii  · Cardiovascular  o Heart  o :   § Auscultation of Heart  § : regular rate and rhythm, no murmurs, rubs, or gallops  o Peripheral Vascular System  o :   § Extremities  § : no edema  · Skin and Subcutaneous Tissue  o General Inspection  o : no lesions present, no areas of discoloration, " skin turgor normal  · Neurologic  o Mental Status Examination  o :   § Orientation  § : grossly oriented to person, place and time  o Gait and Station  o :   § Gait Screening  § : normal gait  · Psychiatric  o General  o : normal mood and affect              Assessment  · Essential hypertension     401.9/I10  · Congestive heart failure     428.0/I50.9  · Chest pain     786.50/R07.9  No further cp per pt. Reviewed labs, negative troponin. Discussed need to see cardiology and get updated echo and stress test due to cp and episode of dizziness. Pt agrees and understands with plan.  · Hyperlipidemia     272.4/E78.5  Discussed elevated chol, cont statin.  · Hyperkalemia     276.7/E87.5  K slightly high on labs, will repeat BMP today      Plan  · Orders  o BMP [facility] ([id number]) - 401.9/I10, 428.0/I50.9, 786.50/R07.9, 276.7/E87.5 - 03/20/2021  o ACO-39: Current medications updated and reviewed (, 1159F) - - 03/19/2021  · Medications  o silver sulfadiazine 1 % topical cream   SIG: apply topically BID for 14 days   DISP: (1) 20 gm tube with 0 refills  Discontinued on 03/19/2021     o Medications have been Reconciled  o Transition of Care or Provider Policy  · Instructions  o Advised that cheeses and other sources of dairy fats, animal fats, fast food, and the extras (candy, pastries, pies, doughnuts and cookies) all contain LDL raising nutrients. Advised to increase fruits, vegetables, whole grains, and to monitor portion sizes.   o Handouts were given to patient: chest pain precautions  o Patient was educated/instructed on their diagnosis, treatment and medications prior to discharge from the clinic today.  o Electronically Identified Patient Education Materials Provided Electronically  · Disposition  o Call or Return if symptoms worsen or persist.  o Follow up in 3 months  o Follow up with Dr. Gutierrez            Electronically Signed by: Aleja Mcneil PA-C -Author on March 20, 2021 08:58:36 AM

## 2021-05-14 NOTE — PROGRESS NOTES
Progress Note      Patient Name: Reddy Castellano   Patient ID: [id number]   Sex: Male   YOB: 1935    Primary Care Provider: Agustina Gutierrez MD   Referring Provider: Christopher Lozano MD    Visit Date: March 17, 2021    Provider: Aleja Mcneil PA-C   Location: [facility]   Location Address: [address] [place] [address], [address]  [place], [place]  [id number]   Location Phone: [phone]          Chief Complaint  · f/u   · HTN  · DM2  · HLD   · GERD       History Of Present Illness  Reddy Castellano is a 85 year old /White male who presents for evaluation and treatment of:      f/u     Has been taking meds for HTN,  HLD, GERD, DM2     He went to [facility] a few wks ago and almost passed out. He states he felt dizzy. Denies associated cp when sx started. He admits to palpitations when it occurred. Denies sob. He felt like he was shaking. Denies ringing in ears, nausea, vision loss, slurred speech, confusion.  He states he went into [facility].  He states he felt back to normal within the day. He did not check bp or bg when sx occurred.  denies wheezing, resp distress. He did not need inhaler.    He had an MI years ago and felt dizziness similar to this episode.  He does not drink any water.    Pt states he had CP when sitting on the exam table. It was on the right side of his chest. Denies radiation to jaw/shoulder, sweating, nausea/vomiting, sob. Denies current dizziness.   Pt states pain worse when lying flat on table to get EKG completed. Pain resolved completely upon sitting up after EKG.       Past Medical History  Disease Name Date Onset Notes   Allergies --  --    Anxiety --  --    Arthritis --  --    Asthma --  --    BPH (benign prostatic hyperplasia) --  --    Carpal tunnel syndrome --  --    Chronic Obstructive Pulmonary Disease --  --    Colon Polyps --  --    Congestive heart failure --  --    COPD (chronic obstructive pulmonary disease) --  --    Dementia --  --    Depression  --  --    Diabetes --  --    Diabetes mellitus type 2, noninsulin dependent --  --    Diverticulitis --  --    Erectile Dysfunction, Organic --  --    Essential hypertension 11/09/2019 --    Excess skin --  --    Fatigue 11/09/2019 --    GERD (gastroesophageal reflux disease) --  --    Gout --  --    Heart attack --  --    Heart Disease --  --    High cholesterol --  --    Hyperlipidemia --  --    Hypertension --  --    Hypertension, Benign Essential --  --    Hypogonadism in male --  --    Insomnia --  --    Leg swelling --  --    Memory change 08/02/2018 Currently, the patient is taking Namenda. I will pursue a mocha at his follow-up.   Myocardial Infarction --  --    Osteoarthritis --  --    Parkinson's Disease --  --    Prostate Disorder --  --    Rectal Bleeding --  --    Renal cyst --  --    Seasonal allergies --  --    Sleep apnea --  --    Stroke --  --    TIA (transient ischemic attack) 08/02/2018 Reportedly, the patient has a history of TIA. This can be discussed further at his follow-up. I have asked that he continue aspirin.   Tremor 08/02/2018 The patient was a 3 month history of a high-frequency, low amplitude tremor of both hands that worsens somewhat with activity. His tremor does not interfere with his activities of daily living. He has no evidence of bradykinesia on exam. Thus, Parkinson's seems unlikely at this point. Potential etiologies would include essential tremor and an exaggerated physiologic tremor. He does note that he started theophylline approximately 3 months ago which coincides with his onset of tremor. I question if this tremor could be related to this medication. For now, I will defer changes. I will check labs. I will request his records be sent for my review.   Vitamin D deficiency --  --          Past Surgical History  Procedure Name Date Notes   Appendectomy 2015 --    Cardiac Catherization --  --    Carpal Tunnel Release --  --    Cataract surgery --  --    Circumcision 2000 --     Colonoscopy 2014 2017 --    Cystoscopy --  --    EGD 2010 2017 --    Eye Implant --  yes   heart surgery 2014 --    Hernia 2000 --    Knee replacement, left 2009 --    Knee replacement, right 2010 --    Lap Band Surgery --  --    Metal implants --  1 stent on left, 3 stents on right   orchiectomy 2010 right   Penile implant --  --    Surgical removal of toenail and nail matrix of both feet --  --    Toe Surgery --  --          Medication List  Name Date Started Instructions   Advair -21 mcg/actuation inhalation HFA aerosol inhaler  inhale 2 puffs by inhalation route 2 times per day in the morning and evening   allopurinol 300 mg oral tablet 11/25/2020 take 1/2 tablet (150 mg) by oral route once daily   amlodipine 10 mg oral tablet 12/02/2020 take 1 tablet (10 mg) by oral route once daily for 90 days   Aspir-81 81 mg oral tablet,delayed release (DR/EC)  take 1 tablet (81 mg) by oral route once daily   atorvastatin 20 mg oral tablet 12/02/2020 take 1 tablet (20 mg) by oral route once daily for 90 days   Blink Tears 0.25 % ophthalmic (eye) drops  instill 1 drop in affected eye As needed   brompheniramine-pseudoeph-DM 2-30-10 mg/5 mL oral syrup  take 10 milliliters by oral route every 6 hours   Brovana 15 mcg/2 mL inhalation solution for nebulization  inhale 2 milliliters (15 mcg) by inhalation route 2 times per day   Celexa 20 mg oral tablet  take 1 tablet (20 mg) by oral route once daily   cyanocobalamin (vitamin B-12) 1,000 mcg oral tablet 08/17/2020 take 1 tablet by oral route daily for 90 days   EpiPen 0.3 mg/0.3 mL injection auto-injector  inject 0.3 milliliter (0.3 mg) by intramuscular route once as needed for anaphylaxis   Flonase Allergy Relief 50 mcg/actuation nasal spray,suspension  spray 1 spray (50 mcg) in each nostril by intranasal route once daily   furosemide 20 mg oral tablet 12/02/2020 take 1 tablet (20 mg) by oral route once daily for 90 days   ipratropium-albuterol 0.5 mg-3 mg(2.5 mg  base)/3 mL inhalation solution for nebulization  inhale 3 milliliters by nebulization route 4 times per day   lisinopril 5 mg oral tablet 12/02/2020 take 1 tablet (5 mg) by oral route once daily   melatonin 1 mg oral tablet  take 1 tablet by oral route once a day (in the evening) as needed   metoprolol succinate 25 mg oral tablet extended release 24 hr 12/02/2020 take 1 tablet (25 mg) by oral route once daily for 90 days   Miralax 17 gram/dose oral powder 12/28/2020 take 17 gram mixed with 8 oz. water, juice, soda, coffee or tea by oral route once daily for 90 days   Namenda 10 mg oral tablet 10/29/2020 take 1 tablet (10 mg) by oral route 2 times per day for 90 days   pantoprazole 40 mg oral tablet,delayed release (DR/EC) 11/25/2020 take 1 tablet (40 mg) by oral route once daily for 90 days   potassium chloride 10 mEq oral capsule, extended release 12/02/2020 take 2 capsules (20 meq) by oral route 2 times per day with food for 90 days   silver sulfadiazine 1 % topical cream  apply topically BID for 14 days   Singulair 10 mg oral tablet 12/02/2020 take 1 tablet (10 mg) by oral route once daily in the evening for 90 days   Spiriva Respimat 1.25 mcg/actuation inhalation mist 03/22/2018 inhale 2 puffs (2.5 mcg) by inhalation route once daily for 90 days   theophylline 200 mg oral tablet extended release 12 hr  take 1 tablet (200 mg) by oral route every day   Ventolin HFA 90 mcg/actuation inhalation HFA aerosol inhaler  inhale 1 - 2 puffs (90 - 180 mcg) by inhalation route every 6 hours as needed   Vitamin D2 1,250 mcg (50,000 unit) oral capsule 10/20/2020 take 1 capsule by oral route 2 times a week   vitamin E 400 unit oral capsule 07/29/2020 take 1 capsule by oral route daily for 90 days   Zyrtec 10 mg oral tablet 11/25/2020 take 1 tablet (10 mg) by oral route once daily for 90 days         Allergy List  Allergen Name Date Reaction Notes   NO KNOWN DRUG ALLERGIES --  --  --        Allergies Reconciled  Family Medical  "History  Disease Name Relative/Age Notes   Anus, Neoplasm  Grandfather - rectal and colon   Colon Neoplasm, Malignant  Grandfather - colon and rectal   Diabetes, unspecified type Brother/   Brother  Mother; Brother   Heart Attack (MI) Father/   --    Prostate cancer  --    Family history of diabetes mellitus Brother/  Mother/  Son/   Mother; Brother; Son  Mother; Brother         Social History  Finding Status Start/Stop Quantity Notes   Alcohol Current some day --/-- --  09/17/2020 - 04/08/2020 - 02/14/2020 - rarely drinks, has been drinking for less than 1 year  rarely drinks   lives alone --  --/-- --  --    lives with children --  --/-- --  --    Recreational Drug Use Never --/-- --  02/14/2020 - no   Retired --  --/-- --  --    Tobacco Never --/-- --  04/08/2020 - 02/14/2020 - never smoker    --  --/-- --  --          Immunizations  NameDate Admin Mfg Trade Name Lot Number Route Inj VIS Given VIS Publication   Uhlsvseoa10/18/2019 NE Not Entered  NE NE     Comments: [employer]         Vitals  Date Time BP Position Site L\R Cuff Size HR RR TEMP (F) WT  HT  BMI kg/m2 BSA m2 O2 Sat FR L/min FiO2 HC       03/17/2021 11:28 /70 Sitting    59 - R 15 98.4 205lbs 16oz 5'  8\" 31.32 2.12 96 %          03/17/2021 12:03 /80 Sitting       03/17/2021 12:03 /82 Standing                 Physical Examination  · Constitutional  o Appearance  o : no acute distress, well-nourished  · Head and Face  o Head  o :   § Inspection  § : atraumatic, normocephalic  · Eyes  o Eyes  o : extraocular movements intact, no scleral icterus, no conjunctival injection  · Ears, Nose, Mouth and Throat  o Ears  o :   § External Ears  § : normal  o Nose  o :   § Intranasal Exam  § : nares patent  o Oral Cavity  o :   § Oral Mucosa  § : moist mucous membranes  · Neck  o Thyroid  o : gland size normal, nontender, no nodules or masses present on palpation, symmetric  · Respiratory  o Respiratory Effort  o : breathing comfortably, " symmetric chest rise  o Auscultation of Lungs  o : clear to asculatation bilaterally, no wheezes, rales, or rhonchii  · Cardiovascular  o Heart  o :   § Auscultation of Heart  § : regular rate and rhythm, no murmurs, rubs, or gallops  o Peripheral Vascular System  o :   § Extremities  § : no edema  · Gastrointestinal  o Abdominal Examination  o :   § Abdomen  § : bowel sounds present, non-distended, non-tender  · Lymphatic  o Neck  o : no lymphadenopathy present  · Skin and Subcutaneous Tissue  o General Inspection  o : no lesions present, no areas of discoloration, skin turgor normal  · Neurologic  o Mental Status Examination  o :   § Orientation  § : grossly oriented to person, place and time  o Cranial Nerves  o : crainial nerves 2-12 grossly intact  o Gait and Station  o :   § Gait Screening  § : normal gait  · Psychiatric  o General  o : normal mood and affect              Assessment  · Chest pain     786.50/R07.9  Discussed pt with Dr. Clark who is in agreement with plan. Abnormal EKG with no previous to compare to, will send to hospital for stat labs and troponin. Discussed option to go to ER but pt declined. Discussed red flag cardiac sx and need to go to er if any occur- cp that moves to jaw/shoulder, dizziness, syncope, sob. Discussed need to re-est with cardiology and need for stress test and echo due to recent cp and dizziness.  · Diabetes mellitus, type 2     250.00/E11.9  · GERD (gastroesophageal reflux disease)     530.81/K21.9  · Congestive heart failure     428.0/I50.9  · Hypertension     401.9/I10  · Dizziness     780.4/R42  Neuro exam WNL. Slight orthostatic hypoTN, discussed need to increase po water intake but avoid fluid overload due to chf.      Plan  · Orders  o Stress Test (Treadmill Exercise) (71893) - 786.50/R07.9, 250.00/E11.9, 780.4/R42, 428.0/I50.9 - 03/18/2021  o Physical, Primary Care Panel (CBC, CMP, Lipid, TSH) [facility] (32964, 71081, 64539, 68794) - 530.81/K21.9, 250.00/E11.9, 780.4/R42,  428.0/I50.9 - 03/17/2021  o ACO-39: Current medications updated and reviewed (1159F, ) - - 03/17/2021  o Troponin measurement (54808) - 401.9/I10, 780.4/R42, 428.0/I50.9, 786.50/R07.9 - 03/17/2021  o Magnesium, serum (79008) - 250.00/E11.9, 780.4/R42, 786.50/R07.9 - 03/18/2021  o Echocardiogram - Complete [facility] (76851, 56089, 82942) - 250.00/E11.9, 780.4/R42, 428.0/I50.9, 786.50/R07.9 - 03/18/2021  o CARDIOLOGY CONSULTATION (CARDI) - 250.00/E11.9, 780.4/R42, 428.0/I50.9, 786.50/R07.9 - 03/18/2021  · Medications  o Medications have been Reconciled  o Transition of Care or Provider Policy  · Instructions  o Handouts were given to patient: chest pain  o Patient was educated/instructed on their diagnosis, treatment and medications prior to discharge from the clinic today.  o Electronically Identified Patient Education Materials Provided Electronically  · Disposition  o Call or Return if symptoms worsen or persist.  o Follow up in 2 days  o Labs to be printed  o Referral ordered  o EKG performed in clinic  o Follow up with Dr. Gutierrez            Electronically Signed by: Aleja Mcneil PA-C -Author on March 18, 2021 03:07:53 PM

## 2021-05-15 VITALS
WEIGHT: 245 LBS | BODY MASS INDEX: 37.13 KG/M2 | HEART RATE: 81 BPM | HEIGHT: 68 IN | SYSTOLIC BLOOD PRESSURE: 143 MMHG | DIASTOLIC BLOOD PRESSURE: 82 MMHG

## 2021-05-15 VITALS
TEMPERATURE: 97.5 F | DIASTOLIC BLOOD PRESSURE: 78 MMHG | SYSTOLIC BLOOD PRESSURE: 110 MMHG | HEIGHT: 68 IN | OXYGEN SATURATION: 96 % | HEART RATE: 93 BPM | WEIGHT: 231.5 LBS | BODY MASS INDEX: 35.09 KG/M2

## 2021-05-15 VITALS
HEART RATE: 67 BPM | WEIGHT: 227 LBS | DIASTOLIC BLOOD PRESSURE: 82 MMHG | HEIGHT: 68 IN | SYSTOLIC BLOOD PRESSURE: 119 MMHG | BODY MASS INDEX: 34.4 KG/M2

## 2021-05-15 VITALS — HEIGHT: 68 IN | OXYGEN SATURATION: 90 % | WEIGHT: 218.12 LBS | HEART RATE: 102 BPM | BODY MASS INDEX: 33.06 KG/M2

## 2021-05-15 VITALS
BODY MASS INDEX: 37.28 KG/M2 | HEART RATE: 63 BPM | WEIGHT: 246 LBS | DIASTOLIC BLOOD PRESSURE: 62 MMHG | HEIGHT: 68 IN | TEMPERATURE: 97.8 F | SYSTOLIC BLOOD PRESSURE: 118 MMHG | OXYGEN SATURATION: 93 %

## 2021-05-15 VITALS
OXYGEN SATURATION: 96 % | WEIGHT: 228.5 LBS | HEART RATE: 81 BPM | BODY MASS INDEX: 34.63 KG/M2 | HEIGHT: 68 IN | SYSTOLIC BLOOD PRESSURE: 112 MMHG | TEMPERATURE: 97.2 F | DIASTOLIC BLOOD PRESSURE: 46 MMHG

## 2021-05-15 VITALS — WEIGHT: 252.12 LBS | OXYGEN SATURATION: 97 % | HEIGHT: 68 IN | HEART RATE: 74 BPM | BODY MASS INDEX: 38.21 KG/M2

## 2021-05-15 VITALS
RESPIRATION RATE: 16 BRPM | HEIGHT: 68 IN | OXYGEN SATURATION: 96 % | BODY MASS INDEX: 36.83 KG/M2 | WEIGHT: 243 LBS | TEMPERATURE: 97.3 F | SYSTOLIC BLOOD PRESSURE: 102 MMHG | DIASTOLIC BLOOD PRESSURE: 60 MMHG | HEART RATE: 62 BPM

## 2021-05-15 VITALS
DIASTOLIC BLOOD PRESSURE: 60 MMHG | BODY MASS INDEX: 36.39 KG/M2 | TEMPERATURE: 97.7 F | SYSTOLIC BLOOD PRESSURE: 122 MMHG | OXYGEN SATURATION: 93 % | HEART RATE: 70 BPM | HEIGHT: 68 IN | WEIGHT: 240.12 LBS

## 2021-05-15 VITALS — WEIGHT: 238 LBS | HEART RATE: 78 BPM | OXYGEN SATURATION: 95 % | HEIGHT: 68 IN | BODY MASS INDEX: 36.07 KG/M2

## 2021-05-15 VITALS — WEIGHT: 233 LBS | RESPIRATION RATE: 19 BRPM | BODY MASS INDEX: 35.31 KG/M2 | HEIGHT: 68 IN

## 2021-05-15 VITALS — HEIGHT: 68 IN | WEIGHT: 229 LBS | RESPIRATION RATE: 17 BRPM | BODY MASS INDEX: 34.71 KG/M2

## 2021-05-15 VITALS
TEMPERATURE: 97.5 F | SYSTOLIC BLOOD PRESSURE: 105 MMHG | OXYGEN SATURATION: 96 % | DIASTOLIC BLOOD PRESSURE: 68 MMHG | HEART RATE: 61 BPM

## 2021-05-15 VITALS — RESPIRATION RATE: 14 BRPM | WEIGHT: 224 LBS | HEIGHT: 68 IN | BODY MASS INDEX: 33.95 KG/M2

## 2021-05-16 VITALS
HEIGHT: 68 IN | HEART RATE: 72 BPM | WEIGHT: 272 LBS | DIASTOLIC BLOOD PRESSURE: 74 MMHG | BODY MASS INDEX: 41.22 KG/M2 | SYSTOLIC BLOOD PRESSURE: 149 MMHG

## 2021-05-16 VITALS
DIASTOLIC BLOOD PRESSURE: 76 MMHG | HEIGHT: 68 IN | BODY MASS INDEX: 38.88 KG/M2 | SYSTOLIC BLOOD PRESSURE: 116 MMHG | HEART RATE: 57 BPM | WEIGHT: 256.56 LBS

## 2021-05-16 VITALS
SYSTOLIC BLOOD PRESSURE: 132 MMHG | OXYGEN SATURATION: 92 % | HEART RATE: 93 BPM | RESPIRATION RATE: 20 BRPM | WEIGHT: 272.06 LBS | TEMPERATURE: 97.9 F | DIASTOLIC BLOOD PRESSURE: 82 MMHG | HEIGHT: 68 IN | BODY MASS INDEX: 41.23 KG/M2

## 2021-05-16 VITALS
HEIGHT: 68 IN | DIASTOLIC BLOOD PRESSURE: 90 MMHG | OXYGEN SATURATION: 96 % | BODY MASS INDEX: 42.44 KG/M2 | SYSTOLIC BLOOD PRESSURE: 146 MMHG | WEIGHT: 280 LBS | HEART RATE: 65 BPM | TEMPERATURE: 96.2 F

## 2021-05-16 VITALS
TEMPERATURE: 97.8 F | HEIGHT: 68 IN | OXYGEN SATURATION: 95 % | RESPIRATION RATE: 16 BRPM | HEART RATE: 53 BPM | BODY MASS INDEX: 40.01 KG/M2 | SYSTOLIC BLOOD PRESSURE: 112 MMHG | WEIGHT: 264 LBS | DIASTOLIC BLOOD PRESSURE: 60 MMHG

## 2021-05-16 VITALS
SYSTOLIC BLOOD PRESSURE: 110 MMHG | WEIGHT: 262 LBS | HEIGHT: 68 IN | RESPIRATION RATE: 16 BRPM | HEART RATE: 76 BPM | BODY MASS INDEX: 39.71 KG/M2 | TEMPERATURE: 97.9 F | OXYGEN SATURATION: 97 % | DIASTOLIC BLOOD PRESSURE: 58 MMHG

## 2021-05-16 VITALS
TEMPERATURE: 98.3 F | DIASTOLIC BLOOD PRESSURE: 68 MMHG | OXYGEN SATURATION: 95 % | HEART RATE: 67 BPM | SYSTOLIC BLOOD PRESSURE: 138 MMHG | RESPIRATION RATE: 16 BRPM

## 2021-05-16 VITALS
BODY MASS INDEX: 42.91 KG/M2 | DIASTOLIC BLOOD PRESSURE: 78 MMHG | WEIGHT: 283.12 LBS | OXYGEN SATURATION: 93 % | HEIGHT: 68 IN | TEMPERATURE: 98.8 F | SYSTOLIC BLOOD PRESSURE: 132 MMHG | HEART RATE: 62 BPM

## 2021-05-16 VITALS — HEART RATE: 60 BPM | HEIGHT: 68 IN | WEIGHT: 280 LBS | BODY MASS INDEX: 42.44 KG/M2 | OXYGEN SATURATION: 95 %

## 2021-05-16 VITALS
TEMPERATURE: 97.6 F | BODY MASS INDEX: 42.44 KG/M2 | DIASTOLIC BLOOD PRESSURE: 80 MMHG | RESPIRATION RATE: 16 BRPM | HEIGHT: 68 IN | WEIGHT: 280 LBS | SYSTOLIC BLOOD PRESSURE: 132 MMHG | OXYGEN SATURATION: 95 % | HEART RATE: 60 BPM

## 2021-05-16 VITALS
SYSTOLIC BLOOD PRESSURE: 124 MMHG | WEIGHT: 276.5 LBS | OXYGEN SATURATION: 93 % | BODY MASS INDEX: 41.91 KG/M2 | DIASTOLIC BLOOD PRESSURE: 84 MMHG | HEIGHT: 68 IN | HEART RATE: 70 BPM | TEMPERATURE: 97.1 F

## 2021-05-16 VITALS
HEART RATE: 68 BPM | DIASTOLIC BLOOD PRESSURE: 62 MMHG | RESPIRATION RATE: 16 BRPM | HEIGHT: 68 IN | BODY MASS INDEX: 41.07 KG/M2 | OXYGEN SATURATION: 93 % | SYSTOLIC BLOOD PRESSURE: 128 MMHG | WEIGHT: 271 LBS

## 2021-05-16 VITALS — RESPIRATION RATE: 16 BRPM | HEIGHT: 68 IN | WEIGHT: 280 LBS | BODY MASS INDEX: 42.44 KG/M2

## 2021-05-16 VITALS
DIASTOLIC BLOOD PRESSURE: 78 MMHG | OXYGEN SATURATION: 95 % | WEIGHT: 274 LBS | HEIGHT: 68 IN | HEART RATE: 63 BPM | BODY MASS INDEX: 41.52 KG/M2 | TEMPERATURE: 98.9 F | SYSTOLIC BLOOD PRESSURE: 120 MMHG | RESPIRATION RATE: 16 BRPM

## 2021-05-16 VITALS — RESPIRATION RATE: 16 BRPM | WEIGHT: 280 LBS | BODY MASS INDEX: 42.44 KG/M2 | HEIGHT: 68 IN

## 2021-05-20 ENCOUNTER — OFFICE VISIT CONVERTED (OUTPATIENT)
Dept: SURGERY | Facility: CLINIC | Age: 86
End: 2021-05-20
Attending: SURGERY

## 2021-05-20 ENCOUNTER — CONVERSION ENCOUNTER (OUTPATIENT)
Dept: SURGERY | Facility: CLINIC | Age: 86
End: 2021-05-20

## 2021-05-22 ENCOUNTER — TRANSCRIBE ORDERS (OUTPATIENT)
Dept: CARDIOLOGY | Facility: CLINIC | Age: 86
End: 2021-05-22

## 2021-05-22 DIAGNOSIS — R94.31 ABNORMAL EKG: Primary | ICD-10-CM

## 2021-05-26 ENCOUNTER — PATIENT OUTREACH - CONVERTED (OUTPATIENT)
Dept: INTERNAL MEDICINE | Facility: CLINIC | Age: 86
End: 2021-05-26
Attending: INTERNAL MEDICINE

## 2021-05-27 ENCOUNTER — PATIENT OUTREACH - CONVERTED (OUTPATIENT)
Dept: INTERNAL MEDICINE | Facility: CLINIC | Age: 86
End: 2021-05-27
Attending: INTERNAL MEDICINE

## 2021-06-05 NOTE — PROGRESS NOTES
Progress Note      Patient Name: Reddy Castellano   Patient ID: [id number]   Sex: Male   YOB: 1935    Primary Care Provider: Agustina Gutierrez MD   Referring Provider: Christopher Lozano MD    Visit Date: May 20, 2021    Provider: Marco Lara MD   Location: [facility]   Location Address: [address] [place] [address]  [id number]   Location Phone: [phone]          Chief Complaint  · Abdominal Pain      History Of Present Illness  Reddy Castellano is a 86 year old /White male who presents to the office today as a consult from Christopher Lozano MD. He presents today for concerns about his groin. He was referred to me for groin pain. The patient report she is not really having pain as much as he is having retraction of his penis. He has a large abdominal pannus and he has had multiple urologic procedures. Secondary to these procedures, he is having some scarring and the abdominal pannus is causing the scarring of his penis to retract. I have seen Mr. Castellano previously. We got some imaging for concerns of a hernia since he was having some groin pain. The groin pain seemed to be more related to his penile prosthesis. His CT that I had gotten a while ago did not show any evidence of inguinal hernia. He reports that, again, he is not having pain or bulging. It is more of his issue with his penis.       Past Medical History  Allergies; Anxiety; Arthritis; Asthma; BPH (benign prostatic hyperplasia); Carpal tunnel syndrome; Chronic Obstructive Pulmonary Disease; Colon Polyps; Congestive heart failure; COPD (chronic obstructive pulmonary disease); Dementia; Depression; Diabetes; Diabetes mellitus type 2, noninsulin dependent; Diverticulitis; Erectile Dysfunction, Organic; Essential hypertension; Excess skin; Fatigue; GERD (gastroesophageal reflux disease); Gout; Heart attack; Heart Disease; High cholesterol; Hyperlipidemia; Hypertension; Hypertension, Benign Essential;  Hypogonadism in male; Insomnia; Leg swelling; Memory change; Myocardial Infarction; Osteoarthritis; Parkinson's Disease; Prostate Disorder; Rectal Bleeding; Renal cyst; Seasonal allergies; Sleep apnea; Stroke; TIA (transient ischemic attack); Tremor; Vitamin D deficiency         Past Surgical History  Appendectomy; Cardiac Catherization; Carpal Tunnel Release; Cataract surgery; Circumcision; Colonoscopy; Cystoscopy; EGD; Eye Implant; heart surgery; Hernia; Knee replacement, left; Knee replacement, right; Lap Band Surgery; Metal implants; orchiectomy; Penile implant; Surgical removal of toenail and nail matrix of both feet; Toe Surgery         Medication List  Advair -21 mcg/actuation inhalation HFA aerosol inhaler; allopurinol 300 mg oral tablet; amlodipine 10 mg oral tablet; Aspir-81 81 mg oral tablet,delayed release (DR/EC); atorvastatin 20 mg oral tablet; Blink Tears 0.25 % ophthalmic (eye) drops; brompheniramine-pseudoeph-DM 2-30-10 mg/5 mL oral syrup; Brovana 15 mcg/2 mL inhalation solution for nebulization; Celexa 20 mg oral tablet; cyanocobalamin (vitamin B-12) 1,000 mcg oral tablet; EpiPen 0.3 mg/0.3 mL injection auto-injector; Flonase Allergy Relief 50 mcg/actuation nasal spray,suspension; furosemide 20 mg oral tablet; ipratropium-albuterol 0.5 mg-3 mg(2.5 mg base)/3 mL inhalation solution for nebulization; lisinopril 5 mg oral tablet; melatonin 1 mg oral tablet; metoprolol succinate 25 mg oral tablet extended release 24 hr; Miralax 17 gram/dose oral powder; Namenda 10 mg oral tablet; pantoprazole 40 mg oral tablet,delayed release (DR/EC); potassium chloride 10 mEq oral capsule, extended release; Singulair 10 mg oral tablet; Spiriva Respimat 1.25 mcg/actuation inhalation mist; theophylline 200 mg oral tablet extended release 12 hr; Ventolin HFA 90 mcg/actuation inhalation HFA aerosol inhaler; Vitamin D2 1,250 mcg (50,000 unit) oral capsule; vitamin E 400 unit oral capsule; Zyrtec 10 mg oral tablet  "        Allergy List  NO KNOWN DRUG ALLERGIES       Allergies Reconciled  Family Medical History  Anus, Neoplasm; Colon Neoplasm, Malignant; Diabetes, unspecified type; Heart Attack (MI); Prostate cancer; Family history of diabetes mellitus         Social History  Alcohol (Current some day); lives alone; lives with children; Recreational Drug Use (Never); Retired; Tobacco (Never);          Immunizations  Name Date Admin   Influenza 10/18/2019   Influenza 11/02/2018         Review of Systems  · Gastrointestinal  o Denies  o : nausea, vomiting, diarrhea, constipation      Vitals  Date Time BP Position Site L\R Cuff Size HR RR TEMP (F) WT  HT  BMI kg/m2 BSA m2 O2 Sat FR L/min FiO2 HC       05/20/2021 10:16 AM         209lbs 6oz 5'  8\" 31.84 2.13             Physical Examination  · Constitutional  o Appearance  o : well developed, well-nourished, alert and in no acute distress  · Head and Face  o Head  o :   § Inspection  § : no deformities or lesions  · Eyes  o Conjunctivae  o : clear  o Sclerae  o : clear  · Neck  o Inspection/Palpation  o : normal appearance, no masses or tenderness, trachea midline  · Respiratory  o Respiratory Effort  o : breathing unlabored  o Inspection of Chest  o : normal appearance, no retractions  · Cardiovascular  o Heart  o : regular rate and rhythm  · Gastrointestinal  o Abdominal Examination  o : abdomen is soft. He has a very large pannus that lays over his groin. It does cause some penile retraction. He is tender in his scrotum and has a large varicocele.   · Lymphatic  o Neck  o : no lymphadenopathy present  o Axilla  o : no lymphadenopathy present  o Groin  o : no lymphadenopathy present  · Skin and Subcutaneous Tissue  o General Inspection  o : no rashes present, no lesions present, no areas of discoloration  · Neurologic  o Cranial Nerves  o : grossly intact  o Sensation  o : grossly intact  o Gait and Station  o :   § Gait Screening  § : normal gait, able to stand without " diffculty  o Cerebellar Function  o : no obvious abnormalities  · Psychiatric  o Judgement and Insight  o : judgment and insight intact  o Mood and Affect  o : mood normal, affect appropriate          Assessment     Patient with concerns of penile contraction and large overlying pannus.       Plan  · Medications  o Medications have been Reconciled  o Transition of Care or Provider Policy  · Instructions  o Follow up as needed.  o Electronically Identified Patient Education Materials Provided Electronically     Unfortunately I am of limited utility to Mr. Castellano. There is not anything that I can really help him with from a general surgery standpoint. There is not a hernia or general surgical issue here. If he has problems with the varicocele or issues with his penile surgery, he has been established with Dr. Lozano. I have recommended that he can follow-up with Dr. Lozano. In regards to the large pannus, I the panniculectomy would benefit him greatly but that is not something that I can perform. I would refer him to Dr. Ma but looking through the notes, it appears that Dr. Ma has seen him in the past and referred him to [facility]. I am not sure where he stands with that.     At this point in time, we will work on getting the referral to [facility], if that is going to be best for his panniculectomy. He can follow-up with me as needed. I discussed all of this with the patient. All questions were answered.  He voiced understanding and agreed to proceed with the above plan.             Electronically Signed by: Lisa Huggins-, -Author on May 21, 2021 03:50:23 PM  Electronically Co-signed by: Marco Lara MD -Reviewer on May 22, 2021 05:01:44 PM

## 2021-06-05 NOTE — OUTREACH NOTE
Quick Note      Patient Name: Reddy Castellano   Patient ID: [id number]   Sex: Male   YOB: 1935    Primary Care Provider: Agustina Gutierrez MD   Referring Provider: Christopher Lozano MD    Visit Date: May 27, 2021    Provider: Agustina Gutierrez MD   [place] [facility]   Location Address: [address] [place] [address]  [id number]   Location Phone: [phone]          History Of Present Illness  CCM Comprehensive Care Plan    This Chronic Medical Management Care Plan for Reddy Castellano, 86 year old /White male, has been monitored and managed and reviewed for the month of May. A cumulative time of 37 minutes was spent on this patient record, including phone call with patient, phone call with other providers, and chart review.   Regarding the patient's diagnoses Anxiety, Congestive heart failure, COPD (chronic obstructive pulmonary disease), Depression, Essential hypertension, Fatigue, and Hypertension, the following items were adressed: medical records and medications and any changes can be found within the plan section of the note. A detailed listing of time spent for chronic care management has been scanned into the patient's electronic record. Current medications include: Advair -21 mcg/actuation inhalation HFA aerosol inhaler, allopurinol 300 mg oral tablet, amlodipine 10 mg oral tablet, Aspir-81 81 mg oral tablet,delayed release (DR/EC), atorvastatin 20 mg oral tablet, Blink Tears 0.25 % ophthalmic (eye) drops, brompheniramine-pseudoeph-DM 2-30-10 mg/5 mL oral syrup, Brovana 15 mcg/2 mL inhalation solution for nebulization, Celexa 20 mg oral tablet, cyanocobalamin (vitamin B-12) 1,000 mcg oral tablet, EpiPen 0.3 mg/0.3 mL injection auto-injector, Flonase Allergy Relief 50 mcg/actuation nasal spray,suspension, furosemide 20 mg oral tablet, ipratropium-albuterol 0.5 mg-3 mg(2.5 mg base)/3 mL inhalation solution for nebulization, lisinopril 5 mg oral  tablet, melatonin 1 mg oral tablet, metoprolol succinate 25 mg oral tablet extended release 24 hr, Miralax 17 gram/dose oral powder, Namenda 10 mg oral tablet, pantoprazole 40 mg oral tablet,delayed release (DR/EC), potassium chloride 10 mEq oral capsule, extended release, Singulair 10 mg oral tablet, Spiriva Respimat 1.25 mcg/actuation inhalation mist, theophylline 200 mg oral tablet extended release 12 hr, Ventolin HFA 90 mcg/actuation inhalation HFA aerosol inhaler, Vitamin D2 1,250 mcg (50,000 unit) oral capsule, vitamin E 400 unit oral capsule, and Zyrtec 10 mg oral tablet and the patient is reported to be compliant with medication protocol. Medications are reported to be effective. Regarding these diagnoses, referrals were made to the following provider/s N/A.   The patient was monitored remotely for blood pressure and blood glucose for a period of 37 minutes.   This patient's physical needs include: DME supplies. Still requires CPAP.   Patient's mental support needs are currently being met.   The patient's cognitive support needs are currently being met.   The patient's psychosocial support needs are N/A,   This patient's functional needs include: physical healthcare and physician referral. may require follow up with Gen Surg.   This patient's environmental needs are N/A.           Assessment  · Chronic Care Management (CCM)     V68.89/Z02.89  · Essential hypertension     401.9/I10  · Congestive heart failure     428.0/I50.9  · Hypertension     401.9/I10  · Depression     311/F32.9  · Anxiety     300.02/F41.1  · COPD (chronic obstructive pulmonary disease)     496/J44.9      Plan  · Medications  o Medications have been Reconciled  o Transition of Care or Provider Policy  · Instructions  o Patient's Health Care Goals: Follow up with Gen Surg  o Provider's Health Care Goals: Overall health and wellness  o Patient was provided an electronic copy of care plan  o CCM services were explained and offered and patient  has accepted these services.  o Patient has given their written consent to recieve CCM services and understands that this includes the authorization of electronic communication of medical information with other treating providers.  o Patient understands that they may stop CCM services at any time and these changes will be effective at the end of the calendar month and will effectively revocate the agreement of CCM services.  o Patient understands that only one practioner can furnish and be paid for CCM services during one calendar month. Patient also understands that there may be co-payment or deductible fees in association with CCM services.  o Patient will continue with at least monthly follow-up calls with the Nurse Navigator.  · Associate Tasks  o Task ID [id number] CCM: CCM Note May 2021            Electronically Signed by: Raul Grande MA -Author on May 27, 2021 01:58:29 PM

## 2021-06-05 NOTE — OUTREACH NOTE
Quick Note      Patient Name: Reddy Castellano   Patient ID: [id number]   Sex: Male   YOB: 1935    Primary Care Provider: Agustina uGtierrez MD   Referring Provider: Christopher Lozano MD    Visit Date: May 26, 2021    Provider: Agustina Gutierrez MD   [place] [facility]   Location Address: [address] [place] [address]  [id number]   Location Phone: [phone]          History Of Present Illness  CCM Comprehensive Care Plan    This Chronic Medical Management Care Plan for Reddy Castellano, 86 year old /White male, has been monitored and managed, reviewed, and revised for the month of May. A cumulative time of 34 minutes was spent on this patient record, including phone call with patient, phone call with other providers, and chart review.   Regarding the patient's diagnoses Allergies, Anxiety, Arthritis, BPH (benign prostatic hyperplasia), Congestive heart failure, COPD (chronic obstructive pulmonary disease), Depression, Erectile Dysfunction, Organic, Essential hypertension, Excess skin, Fatigue, GERD (gastroesophageal reflux disease), Gout, Hypertension, Hypogonadism in male, Insomnia, Memory change, Osteoarthritis, Seasonal allergies, TIA (transient ischemic attack), Tremor, and Vitamin D deficiency, the following items were adressed: medical records and medications and any changes can be found within the plan section of the note. A detailed listing of time spent for chronic care management has been scanned into the patient's electronic record. Current medications include: Advair -21 mcg/actuation inhalation HFA aerosol inhaler, allopurinol 300 mg oral tablet, amlodipine 10 mg oral tablet, Aspir-81 81 mg oral tablet,delayed release (DR/EC), atorvastatin 20 mg oral tablet, Blink Tears 0.25 % ophthalmic (eye) drops, brompheniramine-pseudoeph-DM 2-30-10 mg/5 mL oral syrup, Brovana 15 mcg/2 mL inhalation solution for nebulization, Celexa 20 mg oral tablet,  cyanocobalamin (vitamin B-12) 1,000 mcg oral tablet, EpiPen 0.3 mg/0.3 mL injection auto-injector, Flonase Allergy Relief 50 mcg/actuation nasal spray,suspension, furosemide 20 mg oral tablet, ipratropium-albuterol 0.5 mg-3 mg(2.5 mg base)/3 mL inhalation solution for nebulization, lisinopril 5 mg oral tablet, melatonin 1 mg oral tablet, metoprolol succinate 25 mg oral tablet extended release 24 hr, Miralax 17 gram/dose oral powder, Namenda 10 mg oral tablet, pantoprazole 40 mg oral tablet,delayed release (DR/EC), potassium chloride 10 mEq oral capsule, extended release, Singulair 10 mg oral tablet, Spiriva Respimat 1.25 mcg/actuation inhalation mist, theophylline 200 mg oral tablet extended release 12 hr, Ventolin HFA 90 mcg/actuation inhalation HFA aerosol inhaler, Vitamin D2 1,250 mcg (50,000 unit) oral capsule, vitamin E 400 unit oral capsule, and Zyrtec 10 mg oral tablet and the patient is reported to be compliant with medication protocol. Medications are reported to be effective. Regarding these diagnoses, referrals were made to the following provider/s N/A.   The patient was monitored remotely for blood pressure and blood glucose for a period of NUMBER minutes.   This patient's physical needs include: DME supplies. patient requires CPAP.   Patient's mental support needs include: mental health education and continued support. Patient states PTSD.   The patient's cognitive support needs are currently being met.   The patient's psychosocial support needs include:, continued support. NOTE RE: PSYCHOSOCIAL NEEDS.   This patient's functional needs are N/A.   This patient's environmental needs are N/A.           Assessment  · Chronic Care Management (CCM)     V68.89/Z02.89  · Essential hypertension     401.9/I10  · Congestive heart failure     428.0/I50.9  · Hypertension     401.9/I10  · Depression     311/F32.9  · Anxiety     300.02/F41.1  · COPD (chronic obstructive pulmonary  disease)     496/J44.9      Plan  · Medications  o Medications have been Reconciled  o Transition of Care or Provider Policy  · Instructions  o Patient's Health Care Goals: Follow up with General Surg for poss hernia  o Provider's Health Care Goals: Continued health and wellness.   o Patient was provided an electronic copy of care plan  o CCM services were explained and offered and patient has accepted these services.  o Patient has given their written consent to recieve CCM services and understands that this includes the authorization of electronic communication of medical information with other treating providers.  o Patient understands that they may stop CCM services at any time and these changes will be effective at the end of the calendar month and will effectively revocate the agreement of CCM services.  o Patient understands that only one practioner can furnish and be paid for CCM services during one calendar month. Patient also understands that there may be co-payment or deductible fees in association with CCM services.  o Patient will continue with at least monthly follow-up calls with the Nurse Navigator.  · Associate Tasks  o Task ID [id number] CCM: CCM Note May 2021            Electronically Signed by: Raul Grande MA -Author on May 26, 2021 02:46:52 PM

## 2021-06-05 NOTE — OUTREACH NOTE
Quick Note      Patient Name: Reddy Castellano   Patient ID: [id number]   Sex: Male   YOB: 1935    Primary Care Provider: Agustina Gutierrez MD   Referring Provider: Christopher Lozano MD    Visit Date: May 26, 2021    Provider: Agustina Gutierrez MD   [place] [facility]   Location Address: [address] [place] [address], [address]  [place], [place]  [id number]   Location Phone: [phone]          History Of Present Illness     [facility]     TaskID: [id number]    Task Subject: CCM Note May 2021    Task Comments:    Date: May 19 2021  2:37PM     Creator: kaela  I returne the call to the patient and shared with him the appointment place and time and he verbalized understanding. The patient stated that his BP was WNL at this time with no major issues. He stated that he was not SOA or having any difficulties bvreathing . Patient stated he still uses his CPAP each night. H estated he felt as if this helped him sleep how ever he stated that his Pulm was going to order melatonin to help him rest better. No dosage known and meds not ordered yet so I did not place them on the patients chart. He stated that he had not been 100% satisfied with his current Pulm MD and was interested at some point in swithching . I shared that I would be hapy to help if he made that decision and he agreed. We discussed meds and Med list was reconciled. The patient stated npo further needs at this time. ( call 18 min )    Date: May 19 2021  2:32PM     Creator: rblair  Since the patient has had past visits with Dr. Kaiser I called the office and an appointment was set for 5-20-21 @ 10:15 AM . ( 6 min )    Date: May 19 2021  2:31PM     Creator: rblair  The patient called and left message to return call.  called the patient and he stated that he felt as if he had a hernia and was requesting that an appointment be made with a uroloigist . I recoomended that he see a general surgion and he agreed. I stated that I would make a calll and  get him scheduled and return his call and he agreed. ( 8 min )             Plan  · Medications  o Medications have been Reconciled  o Transition of Care or Provider Policy            Electronically Signed by: Raul Grande MA -Author on May 26, 2021 02:47:27 PM

## 2021-06-21 ENCOUNTER — PATIENT OUTREACH (OUTPATIENT)
Dept: CASE MANAGEMENT | Facility: OTHER | Age: 86
End: 2021-06-21

## 2021-06-21 ENCOUNTER — OFFICE VISIT (OUTPATIENT)
Dept: INTERNAL MEDICINE | Facility: CLINIC | Age: 86
End: 2021-06-21

## 2021-06-21 VITALS
BODY MASS INDEX: 31.83 KG/M2 | WEIGHT: 210 LBS | DIASTOLIC BLOOD PRESSURE: 66 MMHG | SYSTOLIC BLOOD PRESSURE: 124 MMHG | HEIGHT: 68 IN | HEART RATE: 70 BPM | TEMPERATURE: 98.9 F | OXYGEN SATURATION: 95 %

## 2021-06-21 DIAGNOSIS — G20 PARKINSON'S DISEASE (HCC): ICD-10-CM

## 2021-06-21 DIAGNOSIS — E11.65 TYPE 2 DIABETES MELLITUS WITH HYPERGLYCEMIA, WITHOUT LONG-TERM CURRENT USE OF INSULIN (HCC): Primary | ICD-10-CM

## 2021-06-21 DIAGNOSIS — I10 ESSENTIAL HYPERTENSION: ICD-10-CM

## 2021-06-21 DIAGNOSIS — E78.49 OTHER HYPERLIPIDEMIA: ICD-10-CM

## 2021-06-21 DIAGNOSIS — G30.9 ALZHEIMER'S DISEASE, UNSPECIFIED (CODE) (HCC): ICD-10-CM

## 2021-06-21 DIAGNOSIS — F03.90 DEMENTIA WITHOUT BEHAVIORAL DISTURBANCE, UNSPECIFIED DEMENTIA TYPE: Primary | ICD-10-CM

## 2021-06-21 DIAGNOSIS — F33.1 MAJOR DEPRESSIVE DISORDER, RECURRENT, MODERATE (HCC): ICD-10-CM

## 2021-06-21 DIAGNOSIS — E11.649 TYPE 2 DIABETES MELLITUS WITH HYPOGLYCEMIA WITHOUT COMA, WITH LONG-TERM CURRENT USE OF INSULIN (HCC): ICD-10-CM

## 2021-06-21 DIAGNOSIS — Z79.4 TYPE 2 DIABETES MELLITUS WITH HYPOGLYCEMIA WITHOUT COMA, WITH LONG-TERM CURRENT USE OF INSULIN (HCC): ICD-10-CM

## 2021-06-21 PROBLEM — M10.9 GOUT: Status: ACTIVE | Noted: 2021-04-27

## 2021-06-21 PROBLEM — G20.A1 PARKINSON'S DISEASE: Status: ACTIVE | Noted: 2021-06-21

## 2021-06-21 LAB
BASOPHILS # BLD AUTO: 0.08 10*3/MM3 (ref 0–0.2)
BASOPHILS NFR BLD AUTO: 1.3 % (ref 0–1.5)
DEPRECATED RDW RBC AUTO: 47.3 FL (ref 37–54)
EOSINOPHIL # BLD AUTO: 0.25 10*3/MM3 (ref 0–0.4)
EOSINOPHIL NFR BLD AUTO: 4.1 % (ref 0.3–6.2)
ERYTHROCYTE [DISTWIDTH] IN BLOOD BY AUTOMATED COUNT: 13.3 % (ref 12.3–15.4)
HBA1C MFR BLD: 5.3 % (ref 4.8–5.6)
HCT VFR BLD AUTO: 44.7 % (ref 37.5–51)
HGB BLD-MCNC: 14.6 G/DL (ref 13–17.7)
IMM GRANULOCYTES # BLD AUTO: 0.04 10*3/MM3 (ref 0–0.05)
IMM GRANULOCYTES NFR BLD AUTO: 0.7 % (ref 0–0.5)
LYMPHOCYTES # BLD AUTO: 1.88 10*3/MM3 (ref 0.7–3.1)
LYMPHOCYTES NFR BLD AUTO: 30.6 % (ref 19.6–45.3)
MCH RBC QN AUTO: 31.3 PG (ref 26.6–33)
MCHC RBC AUTO-ENTMCNC: 32.7 G/DL (ref 31.5–35.7)
MCV RBC AUTO: 95.9 FL (ref 79–97)
MONOCYTES # BLD AUTO: 0.41 10*3/MM3 (ref 0.1–0.9)
MONOCYTES NFR BLD AUTO: 6.7 % (ref 5–12)
NEUTROPHILS NFR BLD AUTO: 3.48 10*3/MM3 (ref 1.7–7)
NEUTROPHILS NFR BLD AUTO: 56.6 % (ref 42.7–76)
NRBC BLD AUTO-RTO: 0 /100 WBC (ref 0–0.2)
PLATELET # BLD AUTO: 166 10*3/MM3 (ref 140–450)
PMV BLD AUTO: 11.6 FL (ref 6–12)
RBC # BLD AUTO: 4.66 10*6/MM3 (ref 4.14–5.8)
WBC # BLD AUTO: 6.14 10*3/MM3 (ref 3.4–10.8)

## 2021-06-21 PROCEDURE — 80053 COMPREHEN METABOLIC PANEL: CPT | Performed by: INTERNAL MEDICINE

## 2021-06-21 PROCEDURE — 36415 COLL VENOUS BLD VENIPUNCTURE: CPT | Performed by: INTERNAL MEDICINE

## 2021-06-21 PROCEDURE — 80061 LIPID PANEL: CPT | Performed by: INTERNAL MEDICINE

## 2021-06-21 PROCEDURE — 99214 OFFICE O/P EST MOD 30 MIN: CPT | Performed by: INTERNAL MEDICINE

## 2021-06-21 PROCEDURE — 83036 HEMOGLOBIN GLYCOSYLATED A1C: CPT | Performed by: INTERNAL MEDICINE

## 2021-06-21 PROCEDURE — 85025 COMPLETE CBC W/AUTO DIFF WBC: CPT | Performed by: INTERNAL MEDICINE

## 2021-06-21 PROCEDURE — G0439 PPPS, SUBSEQ VISIT: HCPCS | Performed by: INTERNAL MEDICINE

## 2021-06-21 PROCEDURE — 99490 CHRNC CARE MGMT STAFF 1ST 20: CPT | Performed by: INTERNAL MEDICINE

## 2021-06-21 PROCEDURE — 84443 ASSAY THYROID STIM HORMONE: CPT | Performed by: INTERNAL MEDICINE

## 2021-06-21 NOTE — PATIENT INSTRUCTIONS
Medicare Wellness  Personal Prevention Plan of Service     Date of Office Visit:  2021  Encounter Provider:  Agustina Gutierrez MD  Place of Service:  [facility]  Patient Name: Reddy Castellano  :  1935    As part of the Medicare Wellness portion of your visit today, we are providing you with this personalized preventive plan of services (PPPS). This plan is based upon recommendations of the [place] Preventive Services Task Force (USPSTF) and the Advisory Committee on Immunization Practices (ACIP).    This lists the preventive care services that should be considered, and provides dates of when you are due. Items listed as completed are up-to-date and do not require any further intervention.    Health Maintenance   Topic Date Due   • TDAP/TD VACCINES (1 - Tdap) Never done   • ZOSTER VACCINE (1 of 2) Never done   • Pneumococcal Vaccine 65+ (1 of 1 - PPSV23) 2000   • ANNUAL WELLNESS VISIT  2021   • DIABETIC EYE EXAM  2021   • HEMOGLOBIN A1C  2021   • INFLUENZA VACCINE  2021   • URINE MICROALBUMIN  2021   • LIPID PANEL  2022   • COVID-19 Vaccine  Completed       Orders Placed This Encounter   Procedures   • Comprehensive metabolic panel     Standing Status:   Future     Standing Expiration Date:   2022     Order Specific Question:   Release to patient     Answer:   Immediate   • Lipid panel     Standing Status:   Future     Standing Expiration Date:   2022     Order Specific Question:   Release to patient     Answer:   Immediate   • TSH     Standing Status:   Future     Standing Expiration Date:   2022     Order Specific Question:   Release to patient     Answer:   Immediate   • Hemoglobin A1c     Standing Status:   Future     Standing Expiration Date:   2022     Order Specific Question:   Release to patient     Answer:   Immediate   • CBC w AUTO Differential     Standing Status:    Future     Standing Expiration Date:   6/21/2022     Order Specific Question:   Manual Differential     Answer:   No       Return in about 6 months (around 12/21/2021) for Next scheduled follow up.        Advance Care Planning and Advance Directives     You make decisions on a daily basis - decisions about where you want to live, your career, your home, your life. Perhaps one of the most important decisions you face is your choice for future medical care. Take time to talk with your family and your healthcare team and start planning today.  Advance Care Planning is a process that can help you:  · Understand possible future healthcare decisions in light of your own experiences  · Reflect on those decision in light of your goals and values  · Discuss your decisions with those closest to you and the healthcare professionals that care for you  · Make a plan by creating a document that reflects your wishes    Surrogate Decision Maker  In the event of a medical emergency, which has left you unable to communicate or to make your own decisions, you would need someone to make decisions for you.  It is important to discuss your preferences for medical treatment with this person while you are in good health.     Qualities of a surrogate decision maker:  • Willing to take on this role and responsibility  • Knows what you want for future medical care  • Willing to follow your wishes even if they don't agree with them  • Able to make difficult medical decisions under stressful circumstances    Advance Directives  These are legal documents you can create that will guide your healthcare team and decision maker(s) when needed. These documents can be stored in the electronic medical record.    · Living Will - a legal document to guide your care if you have a terminal condition or a serious illness and are unable to communicate. States vary by statute in document names/types, but most forms may include one or more of the following:         -  Directions regarding life-prolonging treatments        -  Directions regarding artificially provided nutrition/hydration        -  Choosing a healthcare decision maker        -  Direction regarding organ/tissue donation    · Durable Power of  for Healthcare - this document names an -in-fact to make medical decisions for you, but it may also allow this person to make personal and financial decisions for you. Please seek the advice of an  if you need this type of document.    **Advance Directives are not required and no one may discriminate against you if you do not sign one.    Medical Orders  Many states allow specific forms/orders signed by your physician to record your wishes for medical treatment in your current state of health. This form, signed in personal communication with your physician, addresses resuscitation and other medical interventions that you may or may not want.      For more information or to schedule a time with a [facility] Advance Care Planning Facilitator contact: [email or web address] or call [phone] and someone will contact you directly.

## 2021-06-21 NOTE — PROGRESS NOTES
Chief Complaint  Hyperlipidemia, Hypertension, and Pain (right leg injury)    Subjective    History of Present Illness    Reddy Castellano presents for Annual Wellness Visit as well as for follow-up on chronic medical problems including diabetes, hypertension and hyperlipidemia.     Past Medical History:   Diagnosis Date   • Allergies    • Anxiety    • Arthritis    • Asthma    • BPH (benign prostatic hyperplasia)    • Carpal tunnel syndrome    • Colon polyp    • Congestive heart failure (CHF) (CMS/[facility])    • COPD (chronic obstructive pulmonary disease) (CMS/[facility])    • Dementia (CMS/[facility])    • Depression    • Diabetes mellitus type 2, noninsulin dependent (CMS/[facility])    • Diverticulitis    • Erectile dysfunction of organic origin    • Essential hypertension 11/19/2019   • Excess skin    • Fatigue 11/09/2019   • GERD (gastroesophageal reflux disease)    • Gout    • Heart attack (CMS/[facility])    • Heart disease    • High cholesterol    • Hyperlipidemia    • Hypertension    • Hypogonadism in male    • Insomnia    • Leg swelling    • Memory change 08/02/2018   • Myocardial infarction (CMS/[facility])    • Osteoarthritis    • Parkinson's disease (CMS/[facility])    • Prostate disorder    • Rectal bleeding    • Renal cyst    • Seasonal allergies    • Sleep apnea    • Stroke (CMS/[facility])    • TIA (transient ischemic attack) 08/02/2018   • Tremor 08/02/2018   • Vitamin D deficiency      Past Surgical History:   Procedure Laterality Date   • APPENDECTOMY  2015   • CARDIAC CATHETERIZATION     • CARDIAC SURGERY  2014   • CARPAL TUNNEL RELEASE     • CATARACT EXTRACTION     • CIRCUMCISION  2000   • COLONOSCOPY  2014 2017   • CYSTOSCOPY     • ENDOSCOPY  2010 2017   • EYE SURGERY      eye implant   • HERNIA REPAIR  2000   • LAPAROSCOPIC GASTRIC BANDING     • ORCHIECTOMY Right 2010   • OTHER SURGICAL HISTORY      metal implants;    • PENILE PROSTHESIS IMPLANT     • TOE SURGERY     • TOENAIL EXCISION      surgical removal of toenail and nail matrix of both feet  "  • TOTAL KNEE ARTHROPLASTY Bilateral 2009 2010     Family History   Problem Relation Age of Onset   • Diabetes Mother    • Heart attack Father    • Diabetes Brother    • Rectal cancer Other         grandfather- rectal and colon   • Prostate cancer Other    • Diabetes Son      Social History     Tobacco Use   • Smoking status: Never Smoker   • Smokeless tobacco: Never Used   Substance Use Topics   • Alcohol use: Yes     Comment: occasional      Vitals:    06/21/21 1432 06/21/21 1435   BP:  124/66   Pulse: 70    Temp: 98.9 °F (37.2 °C)    SpO2: 95%    Weight: 95.3 kg (210 lb)    Height: 172.7 cm (68\")      Body mass index is 31.93 kg/m².    Result Review :   The following data was reviewed by: Agustina Gutierrez MD on 06/21/2021:  CMP    CMP 12/2/20 3/17/21 6/21/21   Glucose   85   Glucose 91 95    BUN 27 (A) 39 (A) 31 (A)   Creatinine 1.39 (A) 1.60 (A) 1.15   eGFR Non African Am   60 (A)   Sodium 147 147 142   Potassium 4.3 5.4 (A) 4.5   Chloride 110 109 110 (A)   Calcium 9.4 9.3 9.0   Albumin 3.8 4.1 4.00   Total Bilirubin 0.28 0.38 0.4   Alkaline Phosphatase 70 65 62   AST (SGOT) 15 13 (A) 13   ALT (SGPT) 10 8 (A) 5   (A) Abnormal value       Comments are available for some flowsheets but are not being displayed.           CBC    CBC 12/2/20 3/17/21 6/21/21   WBC 6.35 6.96 6.14   RBC 4.45 (A) 4.87 4.66   Hemoglobin 13.9 (A) 15.1 14.6   Hematocrit 43.5 48.4 44.7   MCV 97.8 (A) 99.4 (A) 95.9   MCH 31.2 (A) 31.0 31.3   MCHC 32.0 (A) 31.2 (A) 32.7   RDW 14.6 (A) 13.3 13.3   Platelets 153 144 166   (A) Abnormal value            Lipid Panel    Lipid Panel 12/2/20 3/17/21 6/21/21   Total Cholesterol   205 (A)   Total Cholesterol 190 236 (A)    Triglycerides 73 70 104   HDL Cholesterol 48 52 38 (A)   VLDL Cholesterol 15 14 19   LDL Cholesterol  127 (A) 170 (A) 148 (A)   LDL/HDL Ratio   3.85   (A) Abnormal value       Comments are available for some flowsheets but are not being displayed.           TSH    TSH " 3/17/21 6/21/21   TSH 2.010 1.910           A1C Last 3 Results    HGBA1C Last 3 Results 12/2/20 6/21/21   Hemoglobin A1C 5.6 5.30      Comments are available for some flowsheets but are not being displayed.               PSA    PSA 12/2/20   PSA 2.08                        The ABCs of the Annual Wellness Visit  Subsequent Medicare Wellness Visit    Chief Complaint   Patient presents with   • Hyperlipidemia   • Hypertension   • Pain     right leg injury       Subjective   History of Present Illness:  Reddy Castellano is a 86 y.o. male who presents for a Subsequent Medicare Wellness Visit.    HEALTH RISK ASSESSMENT    Recent Hospitalizations:  No hospitalization(s) within the last year.    Current Medical Providers:  Patient Care Team:  Agustina Gutierrez MD as PCP - General (Pediatrics)  Roxi Woodson, RN as Ambulatory  (Population Health)    Smoking Status:  Social History     Tobacco Use   Smoking Status Never Smoker   Smokeless Tobacco Never Used       Alcohol Consumption:  Social History     Substance and Sexual Activity   Alcohol Use Yes    Comment: occasional        Depression Screen:   PHQ-2/PHQ-9 Depression Screening 6/21/2021   Little interest or pleasure in doing things 0   Feeling down, depressed, or hopeless 2   Trouble falling or staying asleep, or sleeping too much 1   Feeling tired or having little energy 1   Poor appetite or overeating 0   Feeling bad about yourself - or that you are a failure or have let yourself or your family down 0   Trouble concentrating on things, such as reading the newspaper or watching television 0   Moving or speaking so slowly that other people could have noticed. Or the opposite - being so fidgety or restless that you have been moving around a lot more than usual 0   Thoughts that you would be better off dead, or of hurting yourself in some way 0   Total Score 4   If you checked off any problems, how difficult have these problems made it for you to  do your work, take care of things at home, or get along with other people? Not difficult at all       Fall Risk Screen:  RAJANI Fall Risk Assessment was completed, and patient is at LOW risk for falls.Assessment completed on:6/21/2021    Health Habits and Functional and Cognitive Screening:  Functional & Cognitive Status 6/21/2021   Do you have difficulty preparing food and eating? No   Do you have difficulty bathing yourself, getting dressed or grooming yourself? No   Do you have difficulty using the toilet? Yes   Do you have difficulty moving around from place to place? No   Do you have trouble with steps or getting out of a bed or a chair? No   Current Diet Low Carb Diet   Dental Exam Up to date   Eye Exam Up to date   Exercise (times per week) 0 times per week   Current Exercises Include No Regular Exercise   Do you need help using the phone?  No   Are you deaf or do you have serious difficulty hearing?  No   Do you need help with transportation? No   Do you need help shopping? No   Do you need help preparing meals?  No   Do you need help with housework?  No   Do you need help with laundry? No   Do you need help taking your medications? No   Do you need help managing money? No   Do you ever drive or ride in a car without wearing a seat belt? No   Have you felt unusual stress, anger or loneliness in the last month? No   Who do you live with? Child   If you need help, do you have trouble finding someone available to you? No   Have you been bothered in the last four weeks by sexual problems? No   Do you have difficulty concentrating, remembering or making decisions? No         Does the patient have evidence of cognitive impairment? Yes    Asprin use counseling:Start ASA 81 mg daily     Age-appropriate Screening Schedule:  Refer to the list below for future screening recommendations based on patient's age, sex and/or medical conditions. Orders for these recommended tests are listed in the plan section. The patient  has been provided with a written plan.    Health Maintenance   Topic Date Due   • TDAP/TD VACCINES (1 - Tdap) Never done   • ZOSTER VACCINE (1 of 2) Never done   • DIABETIC EYE EXAM  06/01/2021   • INFLUENZA VACCINE  08/01/2021   • URINE MICROALBUMIN  12/02/2021   • HEMOGLOBIN A1C  12/21/2021   • LIPID PANEL  06/21/2022          The following portions of the patient's history were reviewed and updated as appropriate: allergies, current medications, past family history, past medical history, past social history, past surgical history and problem list.    Outpatient Medications Prior to Visit   Medication Sig Dispense Refill   • allopurinol (ZYLOPRIM) 300 MG tablet      • atorvastatin (LIPITOR) 10 MG tablet Take 10 mg by mouth Daily.     • cephalexin (KEFLEX) 500 MG capsule Take 1 capsule by mouth 2 (Two) Times a Day for 10 days. 20 capsule 0   • desvenlafaxine (PRISTIQ) 50 MG 24 hr tablet Take 50 mg by mouth Daily.     • FUROSEMIDE PO Take  by mouth.     • LISINOPRIL PO Take  by mouth.     • Melatonin-Pyridoxine 1-10 MG tablet      • POTASSIUM CHLORIDE ER PO Take  by mouth.     • Ulysses-24 200 MG 24 hr capsule      • venlafaxine XR (EFFEXOR-XR) 75 MG 24 hr capsule      • vitamin B-12 (CYANOCOBALAMIN) 1000 MCG tablet      • vitamin D (ERGOCALCIFEROL) 1.25 MG (46555 UT) capsule capsule      • vitamin E 400 UNIT capsule Take 400 Units by mouth Daily.       No facility-administered medications prior to visit.       Patient Active Problem List   Diagnosis   • Gout   • Type 2 diabetes mellitus with hyperglycemia, without long-term current use of insulin (CMS/[facility])   • Essential hypertension   • Other hyperlipidemia   • Parkinson's disease (CMS/HCC)   • Alzheimer's disease, unspecified (CODE) (CMS/HCC)   • Major depressive disorder, recurrent, moderate (CMS/[facility])       Advanced Care Planning:  ACP discussion was held with the patient during this visit. Patient does not have an advance directive, information provided.    Review  "of Systems   Constitutional: Negative for appetite change and fatigue.   HENT: Negative for congestion and ear pain.    Eyes: Negative for pain and discharge.   Respiratory: Negative for cough and shortness of breath.    Cardiovascular: Negative for chest pain and palpitations.   Gastrointestinal: Negative for abdominal distention and abdominal pain.   Endocrine: Negative for cold intolerance and heat intolerance.   Genitourinary: Negative for dysuria and frequency.   Musculoskeletal: Positive for arthralgias and gait problem.   Neurological: Positive for tremors. Negative for speech difficulty.   Psychiatric/Behavioral: Negative for confusion. The patient is not nervous/anxious.        Compared to one year ago, the patient feels his physical health is better.  Compared to one year ago, the patient feels his mental health is better.    Reviewed chart for potential of high risk medication in the elderly: yes  Reviewed chart for potential of harmful drug interactions in the elderly:yes    Objective         Vitals:    06/21/21 1432 06/21/21 1435   BP:  124/66   Pulse: 70    Temp: 98.9 °F (37.2 °C)    SpO2: 95%    Weight: 95.3 kg (210 lb)    Height: 172.7 cm (68\")    PainSc:   6    PainLoc: Leg  Comment: right        Body mass index is 31.93 kg/m².  Discussed the patient's BMI with him. The BMI is above average; BMI management plan is completed.    Physical Exam  Vitals reviewed.   Constitutional:       Appearance: Normal appearance. He is well-developed.   HENT:      Head: Normocephalic and atraumatic.   Eyes:      Conjunctiva/sclera: Conjunctivae normal.      Pupils: Pupils are equal, round, and reactive to light.   Neck:      Thyroid: No thyroid mass, thyromegaly or thyroid tenderness.   Cardiovascular:      Rate and Rhythm: Normal rate and regular rhythm.      Heart sounds: No murmur heard.   No friction rub. No gallop.    Pulmonary:      Effort: Pulmonary effort is normal.      Breath sounds: Normal breath sounds. " No wheezing or rhonchi.   Lymphadenopathy:      Cervical: No cervical adenopathy.   Skin:     General: Skin is warm and dry.   Neurological:      Mental Status: He is alert and oriented to person, place, and time.   Psychiatric:         Mood and Affect: Affect normal.         Lab Results   Component Value Date    TRIG 104 06/21/2021    HDL 38 (L) 06/21/2021     (H) 06/21/2021    VLDL 19 06/21/2021    HGBA1C 5.30 06/21/2021        Assessment/Plan   Medicare Risks and Personalized Health Plan  CMS Preventative Services Quick Reference  Advance Directive Discussion  Dementia/Memory   Depression/Dysphoria  Diabetic Lab Screening   Immunizations Discussed/Encouraged (specific immunizations; Td, Pneumococcal 23 and Shingrix )  Inactivity/Sedentary    The above risks/problems have been discussed with the patient.  Pertinent information has been shared with the patient in the After Visit Summary.  Follow up plans and orders are seen below in the Assessment/Plan Section.    Diagnoses and all orders for this visit:    1. Type 2 diabetes mellitus with hyperglycemia, without long-term current use of insulin (CMS/[facility]) (Primary)  Assessment & Plan:  Well controlled on review of previous labs  Obtaining follow up labs today  Continue current management    Orders:  -     Comprehensive metabolic panel; Future  -     Lipid panel; Future  -     TSH; Future  -     CBC w AUTO Differential; Future  -     Hemoglobin A1c; Future  -     Comprehensive metabolic panel  -     Lipid panel  -     TSH  -     CBC w AUTO Differential  -     Hemoglobin A1c    2. Other hyperlipidemia  Assessment & Plan:  On statin, doing well  Obtaining follow up labs today      3. Essential hypertension  Assessment & Plan:  Well controlled  Continue current management      4. Parkinson's disease (CMS/[facility])  Assessment & Plan:  Followed by Neurology      5. Alzheimer's disease, unspecified (CODE) (CMS/[facility])  Assessment & Plan:  Followed by Neurology      6.  Major depressive disorder, recurrent, moderate (CMS/HCC)  Assessment & Plan:  Followed by psychiatry  On medication, doing well      7. Type 2 diabetes mellitus with hypoglycemia without coma, with long-term current use of insulin (CMS/[facility])  Assessment & Plan:  Well controlled on review of previous labs  Obtaining follow up labs today  Continue current management      Follow Up:  Return in about 6 months (around 12/21/2021) for Next scheduled follow up.     An After Visit Summary and PPPS were given to the patient.

## 2021-06-21 NOTE — ASSESSMENT & PLAN NOTE
Well controlled on review of previous labs  Obtaining follow up labs today  Continue current management

## 2021-06-22 ENCOUNTER — PATIENT OUTREACH (OUTPATIENT)
Dept: CASE MANAGEMENT | Facility: OTHER | Age: 86
End: 2021-06-22

## 2021-06-22 DIAGNOSIS — F03.90 DEMENTIA WITHOUT BEHAVIORAL DISTURBANCE, UNSPECIFIED DEMENTIA TYPE: Primary | ICD-10-CM

## 2021-06-22 LAB
ALBUMIN SERPL-MCNC: 4 G/DL (ref 3.5–5.2)
ALBUMIN/GLOB SERPL: 1.9 G/DL
ALP SERPL-CCNC: 62 U/L (ref 39–117)
ALT SERPL W P-5'-P-CCNC: 5 U/L (ref 1–41)
ANION GAP SERPL CALCULATED.3IONS-SCNC: 6.9 MMOL/L (ref 5–15)
AST SERPL-CCNC: 13 U/L (ref 1–40)
BILIRUB SERPL-MCNC: 0.4 MG/DL (ref 0–1.2)
BUN SERPL-MCNC: 31 MG/DL (ref 8–23)
BUN/CREAT SERPL: 27 (ref 7–25)
CALCIUM SPEC-SCNC: 9 MG/DL (ref 8.6–10.5)
CHLORIDE SERPL-SCNC: 110 MMOL/L (ref 98–107)
CHOLEST SERPL-MCNC: 205 MG/DL (ref 0–200)
CO2 SERPL-SCNC: 25.1 MMOL/L (ref 22–29)
CREAT SERPL-MCNC: 1.15 MG/DL (ref 0.76–1.27)
GFR SERPL CREATININE-BSD FRML MDRD: 60 ML/MIN/1.73
GLOBULIN UR ELPH-MCNC: 2.1 GM/DL
GLUCOSE SERPL-MCNC: 85 MG/DL (ref 65–99)
HDLC SERPL-MCNC: 38 MG/DL (ref 40–60)
LDLC SERPL CALC-MCNC: 148 MG/DL (ref 0–100)
LDLC/HDLC SERPL: 3.85 {RATIO}
POTASSIUM SERPL-SCNC: 4.5 MMOL/L (ref 3.5–5.2)
PROT SERPL-MCNC: 6.1 G/DL (ref 6–8.5)
SODIUM SERPL-SCNC: 142 MMOL/L (ref 136–145)
TRIGL SERPL-MCNC: 104 MG/DL (ref 0–150)
TSH SERPL DL<=0.05 MIU/L-ACNC: 1.91 UIU/ML (ref 0.27–4.2)
VLDLC SERPL-MCNC: 19 MG/DL (ref 5–40)

## 2021-06-22 NOTE — OUTREACH NOTE
CCM Interim Update    I reviewed appt list. It appears pt's appt on Friday is at 1030 and not 0830 like pt stated. I called and lmtrc with this info and the [facility]'s office number.    Ambulatory Case Management Note    Care Plan: Medication Regimen   Updates made since 6/22/2021 12:00 AM      Problem: MEDICATION ADHERENCE       Goal: Consistently take medications as prescribed       Task: Discuss barriers to medication adherence with patient Completed 6/22/2021   Responsible User: Roxi Woodson RN   Note:    Pt forgetful, son help with meds. Med list has been given and explained in depth multiple times     Task: Assist patients in setting up daily notes/alarms for meds. Consider pill box use Completed 6/22/2021   Responsible User: Roxi Woodson RN   Note:    Pt uses pill box/organizer         Roxi Woodson RN  Ambulatory Case Management    6/22/2021, 09:18 EDT

## 2021-06-22 NOTE — OUTREACH NOTE
"Ambulatory Case Management Note    [facility] Interim Update    6/21/2021: Pt presented to office for f/u appt with PCP Dr Gutierrez. After appt, he wished to talk to me. We met in consult room. He stated he is still have a urological issue. I reminded him that in Nov 2020 he told me his \"penis was back\" and it had popped out, resolving the \"one inch penis\" issue. He stated he did not remember this conversation and he must have been hallucinating when this happened and her told me. He has an appt on Friday in [place] for this issue, unsure if it is with urology or plastics. I will confirm. He stated it is at 0830. After talking to pt, I saw it it actually 1030. I will confirm with him. Pt fell a few weeks ago, injured his right lower leg. He stated he did not fx anything, confirmed by xray. He brought up being in pain while in the dental chair again and wanting a workup or imaging on his back. I reminded him that we also talked about this a few months ago and pt had realized that he only had back pain while laying in an unsupportive hard dental chair. He then added that it is happening more frequently- also while he laid on a hard flat xray table. I educated him that both of these surfaces are hard and unsupportive for his back and arthritis. He was adamant that I still ask PCP. I agreed.      Care Plan: Medication Regimen   Updates made since 6/22/2021 12:00 AM      Problem: MEDICATION ADHERENCE       Goal: Consistently take medications as prescribed       Task: Discuss barriers to medication adherence with patient Completed 6/22/2021   Responsible User: Roxi Woodson RN   Note:    Pt forgetful, son help with meds. Med list has been given and explained in depth multiple times     Task: Assist patients in setting up daily notes/alarms for meds. Consider pill box use Completed 6/22/2021   Responsible User: Roxi Woodson RN   Note:    Pt uses pill box/organizer         General & Health Literacy " Assessment    Questions/Answers      Most Recent Value   Assessment Completed With  Patient   Living Arrangement  Children [son lives with pt]   Type of Residence  Private Residence   Home Care Services  No   Communication Device  No   Other Issues  Hearing Impairment [[place]]   Bed or Wheelchair Confined  No   Difficulty Keeping Appointments  Yes [forgetful, reminders given]   [place] or Spiritual Beliefs that Impact Treatment  No   Chronic Pain  No        Care Evaluation    Questions/Answers      Most Recent Value   Medication Adherence  Other (see comment) [pt very forgetful, son helps with care]   Healthy Lifestyle (Self-Efficacy)  self-reports important symptoms to medical professional            Roxi Woodson RN  Ambulatory Case Management    6/21/2021, 09:13 EDT

## 2021-06-28 PROBLEM — E11.65 TYPE 2 DIABETES MELLITUS WITH HYPERGLYCEMIA, WITHOUT LONG-TERM CURRENT USE OF INSULIN (HCC): Status: ACTIVE | Noted: 2021-06-21

## 2021-06-29 ENCOUNTER — PATIENT OUTREACH (OUTPATIENT)
Dept: CASE MANAGEMENT | Facility: OTHER | Age: 86
End: 2021-06-29

## 2021-06-29 DIAGNOSIS — F03.90 DEMENTIA WITHOUT BEHAVIORAL DISTURBANCE, UNSPECIFIED DEMENTIA TYPE: Primary | ICD-10-CM

## 2021-06-29 NOTE — OUTREACH NOTE
[facility] End of Month Documentation    This Chronic Medical Management Care Plan for Reddy Castellano, 86 y.o. male, has been monitored and managed and a new plan of care implemented for the month of June.  A cumulative time of 30  minutes was spent on this patient record this month, including face to face visit with patient;phone call with patient;face to face with provider;chart review.    Regarding the patient's problems: has Gout; Type 2 diabetes mellitus with hyperglycemia, without long-term current use of insulin (CMS/[facility]); Essential hypertension; Other hyperlipidemia; Parkinson's disease (CMS/[facility]); Alzheimer's disease, unspecified (CODE) (CMS/[facility]); and Major depressive disorder, recurrent, moderate (CMS/[facility]) on their problem list., the following items were addressed: medical records;medications;referrals to community service providers and any changes can be found within the plan section of the note.  A detailed listing of time spent for chronic care management is tracked within each outreach encounter.  Current medications include:  has a current medication list which includes the following prescription(s): allopurinol, atorvastatin, desvenlafaxine, furosemide, lisinopril, melatonin-pyridoxine, potassium chloride, gris-24, venlafaxine xr, vitamin b-12, vitamin d, and vitamin e. and the patient is reported to be patient is compliant with medication protocol,  Medications are reported to be effective.  Regarding these diagnoses, referrals were made to the following provider(s):   Plastics.  All notes on chart for PCP to review.    The patient was monitored remotely for weight;activity level, pt was supposed to see [facility] this month.    The patient's physical needs include:  help taking medications as prescribed, pt's son moved in with him to help care for him. pt very forgetful.     The patient's mental support needs include:  continued support    The patient's cognitive support needs include:  continued  "support;medication, pt's son is caretaker, sees neuro for forgetfulness    The patient's psychosocial support needs include:  continued support    The patient's functional needs include: physician referral, pt referred to Plastics for \"urology issue\"    The patient's environmental needs include:  No data recorded    Refer to previous outreach notes for more information on the areas listed above.    Monthly Billing Diagnoses  (F03.90) Dementia without behavioral disturbance, unspecified dementia type (CMS/[facility])    Medications   · Medications have been reconciled    Care Plan progress this month:      Recently Modified Care Plans Updates made since 5/29/2021 12:00 AM     Medication Regimen         Problem Priority Last Modified     MEDICATION ADHERENCE --  6/22/2021  8:39 AM by Roxi Woodson RN              Goal Recent Progress Last Modified     Consistently take medications as prescribed --  6/22/2021  8:39 AM by Roxi Woodson RN              Task Due Date Last Modified     Discuss barriers to medication adherence with patient --  6/22/2021  8:40 AM by Roxi Woodson RN     Pt forgetful, son help with meds. Med list has been given and explained in depth multiple times       Assist patients in setting up daily notes/alarms for meds. Consider pill box use --  6/22/2021  8:40 AM by Roxi Woodson RN     Pt uses pill box/organizer                         Instructions   · Patient was provided an electronic copy of care plan  · CCM services were explained and offered and patient has accepted these services.  · Patient has given their written consent to receive CCM services and understands that this includes the authorization of electronic communication of medical information with the other treating providers.  · Patient understands that they may stop CCM services at any time and these changes will be effective at the end of the calendar month and will effectively revocate the agreement of CCM " services.  · Patient understands that only one practitioner can furnish and be paid for CCM services during one calendar month.  Patient also understands that there may be co-payment or deductible fees in association with CCM services.  · Patient will continue with at least monthly follow-up calls with the Nurse Navigator.    Roxi Woodson RN  Ambulatory Case Management    6/29/2021, 12:33 EDT

## 2021-07-13 ENCOUNTER — TELEPHONE (OUTPATIENT)
Dept: INTERNAL MEDICINE | Facility: CLINIC | Age: 86
End: 2021-07-13

## 2021-07-13 NOTE — TELEPHONE ENCOUNTER
Caller: Reddy Castellano    Relationship: Self    Best call back number:[phone]    Who are you requesting to speak with (clinical staff, provider,  specific staff member):LUKASZ    What was the call regarding: PATIENT WOULD LIKE A CALL BACK AND ONLY WILL STATES ITS REGARDING A FEW THINGS.    Do you require a callback: YES PLEASE CALL AND ADVISE

## 2021-07-14 ENCOUNTER — PATIENT OUTREACH (OUTPATIENT)
Dept: CASE MANAGEMENT | Facility: OTHER | Age: 86
End: 2021-07-14

## 2021-07-14 DIAGNOSIS — F03.90 DEMENTIA WITHOUT BEHAVIORAL DISTURBANCE, UNSPECIFIED DEMENTIA TYPE: Primary | ICD-10-CM

## 2021-07-14 PROCEDURE — 99490 CHRNC CARE MGMT STAFF 1ST 20: CPT | Performed by: INTERNAL MEDICINE

## 2021-07-14 PROCEDURE — 99439 CHRNC CARE MGMT STAF EA ADDL: CPT | Performed by: INTERNAL MEDICINE

## 2021-07-14 NOTE — OUTREACH NOTE
"Ambulatory Case Management Note    [facility] Interim Update    Pt called and lmtrc regarding urological issues that need to be addressed ASAP. Voicemail asked if I saw the Dr Lozano note. No recent note on file. No hx of [facility] appt pt was supposed to go to during the last week of June . I called pt back and lmtrc. Called [facility] and lmtrc as well.    [facility] Interim Update    I called [facility] again and pt was a no show on 6-25. Pt called back. He stated that he and his son went to the plastics appt. I told him that there are no records and the [facility] office stated they no showed. After much discussion and thinking, pt stated he must be mixed up. He did not go. I gave him the phone number for him to call himself and remake appt. I stressed the importance of attending this appt and getting his son to go with him so he doesn't get lost and miss appt. He agreed.    He stated he saw Lozano last month.Again, he has not seen him since 2/2020. Pt saw general sx a few months ago who stated he needed to f/u with Lozano and [facility]. I gave pt Lozano' number to make appt.    Pt called and told me he made his lozano appt for 8/31 and [facility] appt for 7/30 at 1010. He ensured me that son would go with him.    He is dealing with \"QTR\" and trying to get 100% disability.    Pt has PTSD and depression. He works with Zina from [employer]. Appt coming up.    We reviewed all appts in chart. Pt stated he wrote them down.    Pt to call back with refill requests once he looks over meds.      There are no recently modified care plans to display for this patient.      Roxi Woodson RN  Ambulatory Case Management    7/14/2021, 09:30 EDT    "

## 2021-07-14 NOTE — OUTREACH NOTE
"Ambulatory Case Management Note    [facility] Interim Update    Pt called and lmtrc regarding urological issues that need to be addressed ASAP. Voicemail asked if I saw the Dr Lozano note. No recent note on file. No hx of [facility] appt pt was supposed to go to during the last week of June . I called pt back and lmtrc. Called [facility] and lmtrc as well.    [facility] Interim Update    I called [facility] again and pt was a no show on 6-25. Pt called back. He stated that he and his son went to the plastics appt. I told him that there are no records and the [facility] office stated they no showed. After much discussion and thinking, pt stated he must be mixed up. He did not go. I gave him the phone number for him to call himself and remake appt. I stressed the importance of attending this appt and getting his son to go with him so he doesn't get lost and miss appt. He agreed.    He stated he saw Lozano last month.Again, he has not seen him since 2/2020. Pt saw general sx a few months ago who stated he needed to f/u with Lozano and [facility]. I gave pt Lozano' number to make appt.    Pt called and told me he made his lozano appt for 8/31 and [facility] appt for 7/30 at 1010. He ensured me that son would go with him.    He is dealing with \"QTR\" and trying to get 100% disability.    Pt has PTSD and depression. He works with Zina from [employer]. Appt coming up.    We reviewed all appts in chart. Pt stated he wrote them down.    Pt to call back with refill requests once he looks over meds.      There are no recently modified care plans to display for this patient.      Roxi Woodson RN  Ambulatory Case Management    7/14/2021, 08:08 EDT    "

## 2021-07-15 VITALS — WEIGHT: 209.37 LBS | HEIGHT: 68 IN | BODY MASS INDEX: 31.73 KG/M2

## 2021-07-27 ENCOUNTER — TELEPHONE (OUTPATIENT)
Dept: INTERNAL MEDICINE | Facility: CLINIC | Age: 86
End: 2021-07-27

## 2021-07-27 ENCOUNTER — PATIENT OUTREACH (OUTPATIENT)
Dept: CASE MANAGEMENT | Facility: OTHER | Age: 86
End: 2021-07-27

## 2021-07-27 DIAGNOSIS — F03.90 DEMENTIA WITHOUT BEHAVIORAL DISTURBANCE, UNSPECIFIED DEMENTIA TYPE: Primary | ICD-10-CM

## 2021-07-27 NOTE — TELEPHONE ENCOUNTER
Caller: SMOOTH    Relationship to patient: Provider    Best call back number: [phone]    Patient is needing: SMOOTH FROM [facility] [employer] [facility] CALLED IN AND SAID SHE HAD SENT A CONFIRMED FAX  ON 3/30/2021 WITH FORMS THAT NEEDED TO BE FILLED OUT CONCERNING DIABETIC SHOES FOR PATIENT. SMOOTH STATES THEY NEVER RECEIVED THE FORMS BACK. PLEASE CALL SMOOTH AND ADVISE.

## 2021-07-27 NOTE — OUTREACH NOTE
Ambulatory Case Management Note    [facility] Interim Update    Pt called and lmtrc regarding refills. I called back first time, lmtrc.    I called again, reached pt. He stated he was just waking up and had a nail cutting appt today. He agreed to call me back after.    [facility]M Interim Update    Pt called back stating he dropped off some paperwork for diabetic shoe order forms. [facility] faxed them over back in March 2021 but they are not in pt's chart.  Pt will call once home with med refill requests.        There are no recently modified care plans to display for this patient.      Roxi Woodson RN  Ambulatory Case Management    7/27/2021, 08:03 EDT

## 2021-07-28 ENCOUNTER — PATIENT OUTREACH (OUTPATIENT)
Dept: CASE MANAGEMENT | Facility: OTHER | Age: 86
End: 2021-07-28

## 2021-07-28 DIAGNOSIS — K21.9 GASTROESOPHAGEAL REFLUX DISEASE, UNSPECIFIED WHETHER ESOPHAGITIS PRESENT: Primary | ICD-10-CM

## 2021-07-28 RX ORDER — PANTOPRAZOLE SODIUM 40 MG/1
40 TABLET, DELAYED RELEASE ORAL DAILY
Qty: 90 TABLET | Refills: 1 | Status: SHIPPED | OUTPATIENT
Start: 2021-07-28 | End: 2022-04-12 | Stop reason: SDUPTHER

## 2021-07-28 RX ORDER — PANTOPRAZOLE SODIUM 40 MG/1
40 TABLET, DELAYED RELEASE ORAL DAILY
COMMUNITY
End: 2021-07-28 | Stop reason: SDUPTHER

## 2021-07-28 NOTE — OUTREACH NOTE
Ambulatory Case Management Note    [facility] Interim Update    Pt called requesting refill on Pantoprazole. Refill request sent in.    We reviewed appts.        There are no recently modified care plans to display for this patient.      Roxi Woodson RN  Ambulatory Case Management    7/28/2021, 12:15 EDT

## 2021-07-29 ENCOUNTER — PATIENT OUTREACH (OUTPATIENT)
Dept: CASE MANAGEMENT | Facility: OTHER | Age: 86
End: 2021-07-29

## 2021-07-29 DIAGNOSIS — K21.9 GASTROESOPHAGEAL REFLUX DISEASE, UNSPECIFIED WHETHER ESOPHAGITIS PRESENT: ICD-10-CM

## 2021-07-29 DIAGNOSIS — F03.90 DEMENTIA WITHOUT BEHAVIORAL DISTURBANCE, UNSPECIFIED DEMENTIA TYPE: Primary | ICD-10-CM

## 2021-07-29 NOTE — OUTREACH NOTE
[facility] End of Month Documentation    This Chronic Medical Management Care Plan for Reddy Castellano, 86 y.o. male, has been monitored and managed and a new plan of care implemented for the month of July.  A cumulative time of 62  minutes was spent on this patient record this month, including phone call with patient;chart review;electronic communication with primary care provider.    Regarding the patient's problems: has Gout; Type 2 diabetes mellitus with hyperglycemia, without long-term current use of insulin (CMS/[facility]); Essential hypertension; Other hyperlipidemia; Parkinson's disease (CMS/[facility]); Alzheimer's disease, unspecified (CODE) (CMS/[facility]); and Major depressive disorder, recurrent, moderate (CMS/[facility]) on their problem list., the following items were addressed: medical records;medications;referrals to community service providers, pt to see [facility] this month and any changes can be found within the plan section of the note.  A detailed listing of time spent for chronic care management is tracked within each outreach encounter.  Current medications include:  has a current medication list which includes the following prescription(s): allopurinol, atorvastatin, desvenlafaxine, furosemide, lisinopril, melatonin-pyridoxine, pantoprazole, potassium chloride, gris-24, venlafaxine xr, vitamin b-12, vitamin d, and vitamin e. and the patient is reported to be patient is compliant with medication protocol,  Medications are reported to be effective.  Regarding these diagnoses, referrals were made to the following provider(s):  [facility].  All notes on chart for PCP to review.    The patient was monitored remotely for weight;activity level, ED, penile/urological issues.    The patient's physical needs include:  help taking medications as prescribed, son helps with meds. pt forgetful.     The patient's mental support needs include:  continued support    The patient's cognitive support needs include:  continued  "support;medication, pt's son is caretaker, sees neuro     The patient's psychosocial support needs include:  continued support    The patient's functional needs include: physician referral, pt referred to Plastics for \"urology issue\"    The patient's environmental needs include:  n/a    Care Plan overall comments:  No data recorded    Refer to previous outreach notes for more information on the areas listed above.    Monthly Billing Diagnoses  (F03.90) Dementia without behavioral disturbance, unspecified dementia type (CMS/[facility])    (K21.9) Gastroesophageal reflux disease, unspecified whether esophagitis present    Medications   · Medications have been reconciled    Care Plan progress this month:      Recently Modified Care Plans Updates made since 6/28/2021 12:00 AM    No recently modified care plans.              Instructions   · Patient was provided an electronic copy of care plan  · CCM services were explained and offered and patient has accepted these services.  · Patient has given their written consent to receive CCM services and understands that this includes the authorization of electronic communication of medical information with the other treating providers.  · Patient understands that they may stop CCM services at any time and these changes will be effective at the end of the calendar month and will effectively revocate the agreement of CCM services.  · Patient understands that only one practitioner can furnish and be paid for CCM services during one calendar month.  Patient also understands that there may be co-payment or deductible fees in association with CCM services.  · Patient will continue with at least monthly follow-up calls with the Nurse Navigator.    Roxi Woodson RN  Ambulatory Case Management    7/29/2021, 11:41 EDT  "

## 2021-08-30 PROBLEM — N50.82 SCROTAL PAIN: Status: ACTIVE | Noted: 2021-08-30

## 2021-08-30 NOTE — PROGRESS NOTES
Chief Complaint    Urologic complaint    Subjective          Reddy Castellano presents to [facility] UROLOGY  History of Present Illness    87 yo WM with h/o ED / scrotal pain here today with urgency and urge incontinence     patient over the last month has been having some urgency and urge incontinence.  No pads but says he could use pads.    No burning or dysuria.    Voiding okay.  No prostate meds.  . No GH. Good stream.    pain across his pannus and left groin has resolved.    Bothered by his penis.  Ever since penile implant has been out (Dr. Wolf)  patient has been having some trouble standing dry around his penis he states.  Patient is circumcised    UA today    Previous    11/20 scrotal ultrasoundstatus post left orchiectomy, large right-sided hydrocele, normal right testicle.    1/23/20 CT abdomen/pelvis without - stable adrenal gland.  Consistent with benign lipid rich adenoma.    L 2.4 cm nodule. No  Nephrolithiasis.  Small stones in the gallbladder.  Small fat-containing umbilical hernia.  Large pannus with multiple loops of small bowel: Extending into the pannus.  No true hernia identified.  No pelvic or inguinal adenopathy.      PVR   8/21   000  2019  000    11/19 creatinine 1.4, GFR 46    patient has lost 35 lbs - he is trying.    IPP placed several years ago.  He had erosion of some [facility] and [facility] [place]    12/20 PSA 2.08           Results for orders placed or performed in visit on 08/31/21   Bladder Scan   Result Value Ref Range    Urine Volume 0          Past History:  Medical History: has a past medical history of Allergies, Anxiety, Arthritis, Asthma, BPH (benign prostatic hyperplasia), Carpal tunnel syndrome, Colon polyp, Congestive heart failure (CHF) (CMS/[facility]), COPD (chronic obstructive pulmonary disease) (CMS/[facility]), Dementia (CMS/[facility]), Depression, Diabetes mellitus type 2, noninsulin dependent (CMS/[facility]), Diverticulitis, Erectile dysfunction of  organic origin, Essential hypertension (2019), Excess skin, Fatigue (2019), GERD (gastroesophageal reflux disease), Gout, Heart attack (CMS/HCC), Heart disease, High cholesterol, Hyperlipidemia, Hypertension, Hypogonadism in male, Insomnia, Leg swelling, Memory change (2018), Myocardial infarction (CMS/[facility]), Osteoarthritis, Parkinson's disease (CMS/[facility]), Prostate disorder, Rectal bleeding, Renal cyst, Seasonal allergies, Sleep apnea, Stroke (CMS/[facility]), TIA (transient ischemic attack) (2018), Tremor (2018), and Vitamin D deficiency.   Surgical History: has a past surgical history that includes Appendectomy (); Cardiac catheterization; Carpal tunnel release; Cataract extraction; Circumcision, non- (); Colonoscopy (2014); Cystoscopy; Esophagogastroduodenoscopy (2010); Eye surgery; Cardiac surgery (); Hernia repair (); Total knee arthroplasty (Bilateral, 2009); Laparoscopic gastric banding; Other surgical history; Orchiectomy (Right, ); Penile prosthesis implant; TOENAIL EXCISION; and Toe Surgery.   Family History: family history includes Diabetes in his brother, mother, and son; Heart attack in his father; Prostate cancer in an other family member; Rectal cancer in an other family member.   Social History: reports that he has never smoked. He has never used smokeless tobacco. He reports current alcohol use. He reports that he does not use drugs.  Allergies: Patient has no known allergies.       Current Outpatient Medications:   •  allopurinol (ZYLOPRIM) 300 MG tablet, , Disp: , Rfl:   •  atorvastatin (LIPITOR) 10 MG tablet, Take 10 mg by mouth Daily., Disp: , Rfl:   •  desvenlafaxine (PRISTIQ) 50 MG 24 hr tablet, Take 50 mg by mouth Daily., Disp: , Rfl:   •  FUROSEMIDE PO, Take  by mouth., Disp: , Rfl:   •  LISINOPRIL PO, Take  by mouth., Disp: , Rfl:   •  Melatonin-Pyridoxine 1-10 MG tablet, , Disp: , Rfl:   •  pantoprazole (PROTONIX) 40 MG EC  tablet, Take 1 tablet by mouth Daily., Disp: 90 tablet, Rfl: 1  •  POTASSIUM CHLORIDE ER PO, Take  by mouth., Disp: , Rfl:   •  Ulysses-24 200 MG 24 hr capsule, , Disp: , Rfl:   •  venlafaxine XR (EFFEXOR-XR) 75 MG 24 hr capsule, , Disp: , Rfl:   •  vitamin B-12 (CYANOCOBALAMIN) 1000 MCG tablet, , Disp: , Rfl:   •  vitamin D (ERGOCALCIFEROL) 1.25 MG (28465 UT) capsule capsule, , Disp: , Rfl:   •  vitamin E 400 UNIT capsule, Take 400 Units by mouth Daily., Disp: , Rfl:      Physical exam       Alert and orient x3  Well appearing, well developed, in no acute distress   Unlabored respirations  Nontender/nondistended    Grossly oriented to person, place and time, judgment is intact, normal mood and affect    Results for orders placed or performed in visit on 06/21/21   Comprehensive metabolic panel    Specimen: Hand, Right; Blood   Result Value Ref Range    Glucose 85 65 - 99 mg/dL    BUN 31 (H) 8 - 23 mg/dL    Creatinine 1.15 0.76 - 1.27 mg/dL    Sodium 142 136 - 145 mmol/L    Potassium 4.5 3.5 - 5.2 mmol/L    Chloride 110 (H) 98 - 107 mmol/L    CO2 25.1 22.0 - 29.0 mmol/L    Calcium 9.0 8.6 - 10.5 mg/dL    Total Protein 6.1 6.0 - 8.5 g/dL    Albumin 4.00 3.50 - 5.20 g/dL    ALT (SGPT) 5 1 - 41 U/L    AST (SGOT) 13 1 - 40 U/L    Alkaline Phosphatase 62 39 - 117 U/L    Total Bilirubin 0.4 0.0 - 1.2 mg/dL    eGFR Non African Amer 60 (L) >60 mL/min/1.73    Globulin 2.1 gm/dL    A/G Ratio 1.9 g/dL    BUN/Creatinine Ratio 27.0 (H) 7.0 - 25.0    Anion Gap 6.9 5.0 - 15.0 mmol/L   Lipid panel    Specimen: Hand, Right; Blood   Result Value Ref Range    Total Cholesterol 205 (H) 0 - 200 mg/dL    Triglycerides 104 0 - 150 mg/dL    HDL Cholesterol 38 (L) 40 - 60 mg/dL    LDL Cholesterol  148 (H) 0 - 100 mg/dL    VLDL Cholesterol 19 5 - 40 mg/dL    LDL/HDL Ratio 3.85    TSH    Specimen: Hand, Right; Blood   Result Value Ref Range    TSH 1.910 0.270 - 4.200 uIU/mL   Hemoglobin A1c    Specimen: Hand, Right; Blood   Result Value Ref  Range    Hemoglobin A1C 5.30 4.80 - 5.60 %   CBC Auto Differential    Specimen: Hand, Right; Blood   Result Value Ref Range    WBC 6.14 3.40 - 10.80 10*3/mm3    RBC 4.66 4.14 - 5.80 10*6/mm3    Hemoglobin 14.6 13.0 - 17.7 g/dL    Hematocrit 44.7 37.5 - 51.0 %    MCV 95.9 79.0 - 97.0 fL    MCH 31.3 26.6 - 33.0 pg    MCHC 32.7 31.5 - 35.7 g/dL    RDW 13.3 12.3 - 15.4 %    RDW-SD 47.3 37.0 - 54.0 fl    MPV 11.6 6.0 - 12.0 fL    Platelets 166 140 - 450 10*3/mm3    Neutrophil % 56.6 42.7 - 76.0 %    Lymphocyte % 30.6 19.6 - 45.3 %    Monocyte % 6.7 5.0 - 12.0 %    Eosinophil % 4.1 0.3 - 6.2 %    Basophil % 1.3 0.0 - 1.5 %    Immature Grans % 0.7 (H) 0.0 - 0.5 %    Neutrophils, Absolute 3.48 1.70 - 7.00 10*3/mm3    Lymphocytes, Absolute 1.88 0.70 - 3.10 10*3/mm3    Monocytes, Absolute 0.41 0.10 - 0.90 10*3/mm3    Eosinophils, Absolute 0.25 0.00 - 0.40 10*3/mm3    Basophils, Absolute 0.08 0.00 - 0.20 10*3/mm3    Immature Grans, Absolute 0.04 0.00 - 0.05 10*3/mm3    nRBC 0.0 0.0 - 0.2 /100 WBC        Objective     Vital Signs:   There were no vitals taken for this visit.             Assessment and Plan        Urgency and urge incontinence    I will go ahead and get patient set up for cystoscopy to rule out underlying pathology.  Patient understands we are doing this to make sure there is no underlying malignancy which could be detriment to his health or cause death.  Follow-up in a few weeks for office cystoscopy    Patient is very bothered by his urge incontinence.  After discussion of risk benefits we will place him on Myrbetriq 25 mg daily.  1 month samples given today        Patient is wanting me to examine his penis and area where he had the previous IPP explant when he comes back in for cystoscopy as he is very worried about this and does feel like this was done wrong when he had explant.    candidal groin infection    Patient with some bilateral groin Candida and I will go ahead and give him some nystatin cream to  affected area twice daily. Reassessment and side effect discussed    Follow-up for cystoscopy

## 2021-08-31 ENCOUNTER — OFFICE VISIT (OUTPATIENT)
Dept: UROLOGY | Facility: CLINIC | Age: 86
End: 2021-08-31

## 2021-08-31 VITALS — HEART RATE: 68 BPM | WEIGHT: 216.4 LBS | HEIGHT: 68 IN | BODY MASS INDEX: 32.8 KG/M2

## 2021-08-31 DIAGNOSIS — N39.41 URGE INCONTINENCE OF URINE: ICD-10-CM

## 2021-08-31 DIAGNOSIS — N48.1 BALANITIS: ICD-10-CM

## 2021-08-31 DIAGNOSIS — B37.89 CANDIDA RASH OF GROIN: ICD-10-CM

## 2021-08-31 DIAGNOSIS — N50.82 SCROTAL PAIN: Primary | ICD-10-CM

## 2021-08-31 DIAGNOSIS — R39.15 URGENCY OF URINATION: ICD-10-CM

## 2021-08-31 LAB — URINE VOLUME: 0

## 2021-08-31 PROCEDURE — 99214 OFFICE O/P EST MOD 30 MIN: CPT | Performed by: UROLOGY

## 2021-08-31 RX ORDER — MONTELUKAST SODIUM 10 MG/1
10 TABLET ORAL
COMMUNITY
Start: 2019-02-22 | End: 2021-11-18 | Stop reason: SDUPTHER

## 2021-08-31 RX ORDER — NYSTATIN 100000 U/G
CREAM TOPICAL AS NEEDED
Qty: 15 G | Refills: 5 | Status: SHIPPED | OUTPATIENT
Start: 2021-08-31

## 2021-08-31 RX ORDER — AMLODIPINE BESYLATE 10 MG/1
10 TABLET ORAL DAILY
COMMUNITY
Start: 2019-02-22 | End: 2021-12-02

## 2021-08-31 RX ORDER — CITALOPRAM 20 MG/1
20 TABLET ORAL DAILY
COMMUNITY
Start: 2020-11-11 | End: 2021-09-14 | Stop reason: SDUPTHER

## 2021-08-31 RX ORDER — DESVENLAFAXINE SUCCINATE 50 MG/1
50 TABLET, EXTENDED RELEASE ORAL DAILY
COMMUNITY
End: 2021-11-23

## 2021-08-31 RX ORDER — CETIRIZINE HYDROCHLORIDE 10 MG/1
10 TABLET ORAL
COMMUNITY
Start: 2018-01-29 | End: 2021-11-22 | Stop reason: SDUPTHER

## 2021-08-31 RX ORDER — UREA 10 %
LOTION (ML) TOPICAL
COMMUNITY
Start: 2021-08-04 | End: 2021-11-23

## 2021-08-31 RX ORDER — PANTOPRAZOLE SODIUM 40 MG/1
40 TABLET, DELAYED RELEASE ORAL
COMMUNITY
Start: 2019-02-22 | End: 2021-11-23

## 2021-08-31 RX ORDER — BUPROPION HYDROCHLORIDE 150 MG/1
150 TABLET ORAL EVERY MORNING
COMMUNITY
Start: 2021-08-04

## 2021-08-31 RX ORDER — MEMANTINE HYDROCHLORIDE 5 MG/1
5 TABLET ORAL DAILY
COMMUNITY
Start: 2021-08-04 | End: 2022-07-21 | Stop reason: SDUPTHER

## 2021-08-31 RX ORDER — VENLAFAXINE 75 MG/1
TABLET ORAL
COMMUNITY
Start: 2021-08-04 | End: 2021-11-23

## 2021-09-14 ENCOUNTER — TELEPHONE (OUTPATIENT)
Dept: INTERNAL MEDICINE | Facility: CLINIC | Age: 86
End: 2021-09-14

## 2021-09-14 ENCOUNTER — TELEPHONE (OUTPATIENT)
Dept: UROLOGY | Facility: CLINIC | Age: 86
End: 2021-09-14

## 2021-09-14 ENCOUNTER — PATIENT OUTREACH (OUTPATIENT)
Dept: CASE MANAGEMENT | Facility: OTHER | Age: 86
End: 2021-09-14

## 2021-09-14 DIAGNOSIS — F03.90 DEMENTIA WITHOUT BEHAVIORAL DISTURBANCE, UNSPECIFIED DEMENTIA TYPE: Primary | ICD-10-CM

## 2021-09-14 DIAGNOSIS — N48.9 PENILE ABNORMALITY: ICD-10-CM

## 2021-09-14 DIAGNOSIS — F33.1 MAJOR DEPRESSIVE DISORDER, RECURRENT, MODERATE (HCC): Primary | ICD-10-CM

## 2021-09-14 PROCEDURE — 99490 CHRNC CARE MGMT STAFF 1ST 20: CPT | Performed by: INTERNAL MEDICINE

## 2021-09-14 RX ORDER — CITALOPRAM 20 MG/1
20 TABLET ORAL DAILY
Qty: 90 TABLET | Refills: 0 | Status: SHIPPED | OUTPATIENT
Start: 2021-09-14 | End: 2022-04-21 | Stop reason: SDUPTHER

## 2021-09-14 NOTE — TELEPHONE ENCOUNTER
Caller: Reddy Castellano    Relationship: Self    Best call back number: [phone]    What is the best time to reach you: TOMORROW    Who are you requesting to speak with (clinical staff, provider,  specific staff member): LUKASZ    Do you know the name of the person who called:     What was the call regarding: PATIENT STATES THAT HE IS LEAVING  HIS HOME AND FOR LUKASZ TO CALL HIM TOMORROW     Do you require a callback: YES

## 2021-09-14 NOTE — OUTREACH NOTE
Ambulatory Case Management Note    CCM Interim Update    Pt called and lmtrc about refill on Celexa. When I called him back I verified the dose and his pharmacy and refilled me.     He said he had an appt with the [facility] doctor but could not recall the details. After chart review, it appeared that the provider was open to helping the pt surgically, but wanted to consult another urologist in  for help. Referral entered and appt for 2021 made. Pt unaware of this appt nor his other upcoming appts. I gave him the appt dates, locations, office numbers, and provider names. Unsure of appt times because they are not [facility] offices. Pt agreed to call himself and check on times.    He saw Dr Lozano recently for scrotal pain. Per note, [facility] was going to do an in-office cystoscopy in about two weeks (that was 15 days ago.) No f/u appt made, no order or referral entered. I agreed to call his office and ask for update. I called and lmtrc with front office staff who will call me back directly.    Pt stated he was supposed to start on Myrbetric from [facility] for urinary incontinence (samples given) but does not remember this nor thinks he has started the med. He will look for it.     Pt's friend passed away.  is today. He is leaving around 2 pm today.        There are no recently modified care plans to display for this patient.      Roxi Woodson RN  Ambulatory Case Management    2021, 11:17 EDT

## 2021-09-15 ENCOUNTER — TELEPHONE (OUTPATIENT)
Dept: INTERNAL MEDICINE | Facility: CLINIC | Age: 86
End: 2021-09-15

## 2021-09-15 ENCOUNTER — PATIENT OUTREACH (OUTPATIENT)
Dept: CASE MANAGEMENT | Facility: OTHER | Age: 86
End: 2021-09-15

## 2021-09-15 DIAGNOSIS — R32 URINARY INCONTINENCE, UNSPECIFIED TYPE: Primary | ICD-10-CM

## 2021-09-15 NOTE — TELEPHONE ENCOUNTER
Caller: Reddy Castellano    Relationship: Self    Best call back number: [phone]    Who are you requesting to speak with (clinical staff, provider,  specific staff member): LUKASZ    What was the call regarding: PATIENT MISSED A CALL FROM LUKASZ. ATTEMPTED TO WARM TRANSFER. PLEASE CALL PATIENT BACK.

## 2021-09-15 NOTE — OUTREACH NOTE
Ambulatory Case Management Note    CCM Interim Update    Pt called office line and lmtrc for me. I called back and lmtrc. It appears Dr Lozano' office has not got pt scheduled for cysto yet.        There are no recently modified care plans to display for this patient.      Roxi Woodson RN  Ambulatory Case Management    9/15/2021, 11:35 EDT    
Pt AOx4, ambulatory, c/o neck and upper back pain s/p fall 3 weeks ago. Pt states current morphine and percocet medications at home are not hleping with pain. Pt denies any other fall/trauma. No LOC, no HA, no n/v/f

## 2021-09-16 ENCOUNTER — TELEPHONE (OUTPATIENT)
Dept: UROLOGY | Facility: CLINIC | Age: 86
End: 2021-09-16

## 2021-09-16 ENCOUNTER — PATIENT OUTREACH (OUTPATIENT)
Dept: CASE MANAGEMENT | Facility: OTHER | Age: 86
End: 2021-09-16

## 2021-09-16 DIAGNOSIS — R32 URINARY INCONTINENCE, UNSPECIFIED TYPE: Primary | ICD-10-CM

## 2021-09-16 NOTE — OUTREACH NOTE
Ambulatory Case Management Note    CCM Interim Update    Pt called the call center again instead of me. I called him back, no answer, lmtrc. I left a detailed message, stating the cysto had not been scheduled yet but I would call once it had. I left my direct phone number, again, on his voicemail.        There are no recently modified care plans to display for this patient.      Roxi Woodson RN  Ambulatory Case Management    9/16/2021, 08:16 EDT

## 2021-09-29 ENCOUNTER — PATIENT OUTREACH (OUTPATIENT)
Dept: CASE MANAGEMENT | Facility: OTHER | Age: 86
End: 2021-09-29

## 2021-09-29 DIAGNOSIS — F33.1 MAJOR DEPRESSIVE DISORDER, RECURRENT, MODERATE (HCC): ICD-10-CM

## 2021-09-29 DIAGNOSIS — N48.9 PENILE ABNORMALITY: ICD-10-CM

## 2021-09-29 DIAGNOSIS — F03.90 DEMENTIA WITHOUT BEHAVIORAL DISTURBANCE, UNSPECIFIED DEMENTIA TYPE: Primary | ICD-10-CM

## 2021-09-29 NOTE — OUTREACH NOTE
[facility] End of Month Documentation    This Chronic Medical Management Care Plan for Reddy Castellano, 86 y.o. male, has been monitored and managed and a new plan of care implemented for the month of September.  A cumulative time of 33  minutes was spent on this patient record this month, including phone call with patient;chart review;face to face with provider.    Regarding the patient's problems: has Gout; Type 2 diabetes mellitus with hyperglycemia, without long-term current use of insulin (CMS/[facility]); Essential hypertension; Other hyperlipidemia; Parkinson's disease (CMS/[facility]); Alzheimer's disease, unspecified (CODE) (CMS/[facility]); Major depressive disorder, recurrent, moderate (CMS/[facility]); Scrotal pain; Urge incontinence of urine; Urgency of urination; Balanitis; and Candida rash of groin on their problem list., the following items were addressed: medical records;medications and any changes can be found within the plan section of the note.  A detailed listing of time spent for chronic care management is tracked within each outreach encounter.  Current medications include:  has a current medication list which includes the following prescription(s): allopurinol, amlodipine, atorvastatin, bupropion xl, cetirizine, citalopram, desvenlafaxine, desvenlafaxine, furosemide, lisinopril, melatonin, melatonin-pyridoxine, memantine, montelukast, nystatin, pantoprazole, pantoprazole, potassium chloride, gris-24, venlafaxine, venlafaxine xr, vitamin b-12, vitamin d, and vitamin e. and the patient is reported to be patient is compliant with medication protocol,  Medications are reported to be effective.  .  All notes on chart for PCP to review.    The patient was monitored remotely for activity level;medications.    The patient's physical needs include:  help taking medications as prescribed, son helps with meds. pt forgetful.     The patient's mental support needs include:  continued support    The patient's cognitive support needs include:   "continued support;medication    The patient's psychosocial support needs include:  continued support    The patient's functional needs include: physician referral, pt referred to Plastics for \"urology issue\"    The patient's environmental needs include:  n/a    Care Plan overall comments:  No data recorded    Refer to previous outreach notes for more information on the areas listed above.    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          Instructions   · Patient was provided an electronic copy of care plan  · CCM services were explained and offered and patient has accepted these services.  · Patient has given their written consent to receive CCM services and understands that this includes the authorization of electronic communication of medical information with the other treating providers.  · Patient understands that they may stop CCM services at any time and these changes will be effective at the end of the calendar month and will effectively revocate the agreement of CCM services.  · Patient understands that only one practitioner can furnish and be paid for CCM services during one calendar month.  Patient also understands that there may be co-payment or deductible fees in association with CCM services.  · Patient will continue with at least monthly follow-up calls with the Nurse Navigator.    Roxi Woodson RN  Ambulatory Case Management    9/29/2021, 15:53 EDT  "

## 2021-10-06 ENCOUNTER — PATIENT OUTREACH (OUTPATIENT)
Dept: CASE MANAGEMENT | Facility: OTHER | Age: 86
End: 2021-10-06

## 2021-10-06 DIAGNOSIS — F33.1 MAJOR DEPRESSIVE DISORDER, RECURRENT, MODERATE (HCC): ICD-10-CM

## 2021-10-06 DIAGNOSIS — N48.9 PENILE ABNORMALITY: ICD-10-CM

## 2021-10-06 DIAGNOSIS — F03.90 DEMENTIA WITHOUT BEHAVIORAL DISTURBANCE, UNSPECIFIED DEMENTIA TYPE: Primary | ICD-10-CM

## 2021-10-06 PROCEDURE — 99439 CHRNC CARE MGMT STAF EA ADDL: CPT | Performed by: INTERNAL MEDICINE

## 2021-10-06 PROCEDURE — 99490 CHRNC CARE MGMT STAFF 1ST 20: CPT | Performed by: INTERNAL MEDICINE

## 2021-10-06 NOTE — OUTREACH NOTE
Ambulatory Case Management Note    CCM Interim Update  Pt called to discuss appts.   SDOH addressed.  Did sleep study last night and saw Dr Tyler Menjivar yesterday. I will request records.   Meds reviewed.    SDOH updated and reviewed with the patient during this program:     Financial Resource Strain: Low Risk    • Difficulty of Paying Living Expenses: Not hard at all       Physical Activity: Inactive   • Days of Exercise per Week: 0 days   • Minutes of Exercise per Session: 0 min       Food Insecurity: No Food Insecurity   • Worried About Running Out of Food in the Last Year: Never true   • Ran Out of Food in the Last Year: Never true       Social Connections: Moderately Integrated   • Frequency of Communication with Friends and Family: More than three times a week   • Frequency of Social Gatherings with Friends and Family: Once a week   • Attends [employer] Services: 1 to 4 times per year   • Active Member of Clubs or Organizations: Yes   • Attends Club or Organization Meetings: 1 to 4 times per year   • Marital Status:        Transportation Needs: No Transportation Needs   • Lack of Transportation (Medical): No   • Lack of Transportation (Non-Medical): No       Housing Stability: Low Risk    • Unable to Pay for Housing in the Last Year: No   • Number of Places Lived in the Last Year: 1   • Unstable Housing in the Last Year: No       Stress: Stress Concern Present   • Feeling of Stress : Very much             There are no recently modified care plans to display for this patient.      Roxi Woodson RN  Ambulatory Case Management    10/6/2021, 11:34 EDT

## 2021-10-12 ENCOUNTER — HOSPITAL ENCOUNTER (EMERGENCY)
Facility: HOSPITAL | Age: 86
Discharge: HOME | End: 2021-10-13
Attending: EMERGENCY MEDICINE | Admitting: EMERGENCY MEDICINE

## 2021-10-12 ENCOUNTER — APPOINTMENT (OUTPATIENT)
Dept: GENERAL RADIOLOGY | Facility: HOSPITAL | Age: 86
End: 2021-10-12

## 2021-10-12 DIAGNOSIS — R55 NEAR SYNCOPE: Primary | ICD-10-CM

## 2021-10-12 LAB
ALBUMIN SERPL-MCNC: 3.7 G/DL (ref 3.5–5.2)
ALBUMIN/GLOB SERPL: 1.5 G/DL
ALP SERPL-CCNC: 59 U/L (ref 39–117)
ALT SERPL W P-5'-P-CCNC: 12 U/L (ref 1–41)
ANION GAP SERPL CALCULATED.3IONS-SCNC: 12.1 MMOL/L (ref 5–15)
AST SERPL-CCNC: 14 U/L (ref 1–40)
BASOPHILS # BLD AUTO: 0.05 10*3/MM3 (ref 0–0.2)
BASOPHILS NFR BLD AUTO: 0.7 % (ref 0–1.5)
BILIRUB SERPL-MCNC: 0.3 MG/DL (ref 0–1.2)
BUN SERPL-MCNC: 31 MG/DL (ref 8–23)
BUN/CREAT SERPL: 21.1 (ref 7–25)
CALCIUM SPEC-SCNC: 8.7 MG/DL (ref 8.6–10.5)
CHLORIDE SERPL-SCNC: 110 MMOL/L (ref 98–107)
CK MB SERPL-CCNC: 2.58 NG/ML
CK SERPL-CCNC: 38 U/L (ref 20–200)
CO2 SERPL-SCNC: 20.9 MMOL/L (ref 22–29)
CREAT SERPL-MCNC: 1.47 MG/DL (ref 0.76–1.27)
DEPRECATED RDW RBC AUTO: 45.7 FL (ref 37–54)
EOSINOPHIL # BLD AUTO: 0.25 10*3/MM3 (ref 0–0.4)
EOSINOPHIL NFR BLD AUTO: 3.4 % (ref 0.3–6.2)
ERYTHROCYTE [DISTWIDTH] IN BLOOD BY AUTOMATED COUNT: 13.1 % (ref 12.3–15.4)
GFR SERPL CREATININE-BSD FRML MDRD: 45 ML/MIN/1.73
GLOBULIN UR ELPH-MCNC: 2.4 GM/DL
GLUCOSE SERPL-MCNC: 111 MG/DL (ref 65–99)
HCT VFR BLD AUTO: 42.5 % (ref 37.5–51)
HGB BLD-MCNC: 14.1 G/DL (ref 13–17.7)
HOLD SPECIMEN: NORMAL
HOLD SPECIMEN: NORMAL
IMM GRANULOCYTES # BLD AUTO: 0.03 10*3/MM3 (ref 0–0.05)
IMM GRANULOCYTES NFR BLD AUTO: 0.4 % (ref 0–0.5)
LIPASE SERPL-CCNC: 37 U/L (ref 13–60)
LYMPHOCYTES # BLD AUTO: 1.81 10*3/MM3 (ref 0.7–3.1)
LYMPHOCYTES NFR BLD AUTO: 24.5 % (ref 19.6–45.3)
MAGNESIUM SERPL-MCNC: 1.8 MG/DL (ref 1.6–2.4)
MCH RBC QN AUTO: 31.5 PG (ref 26.6–33)
MCHC RBC AUTO-ENTMCNC: 33.2 G/DL (ref 31.5–35.7)
MCV RBC AUTO: 94.9 FL (ref 79–97)
MONOCYTES # BLD AUTO: 0.57 10*3/MM3 (ref 0.1–0.9)
MONOCYTES NFR BLD AUTO: 7.7 % (ref 5–12)
NEUTROPHILS NFR BLD AUTO: 4.68 10*3/MM3 (ref 1.7–7)
NEUTROPHILS NFR BLD AUTO: 63.3 % (ref 42.7–76)
NRBC BLD AUTO-RTO: 0 /100 WBC (ref 0–0.2)
NT-PROBNP SERPL-MCNC: 450.8 PG/ML (ref 0–1800)
PLATELET # BLD AUTO: 141 10*3/MM3 (ref 140–450)
PMV BLD AUTO: 10.6 FL (ref 6–12)
POTASSIUM SERPL-SCNC: 4.1 MMOL/L (ref 3.5–5.2)
PROT SERPL-MCNC: 6.1 G/DL (ref 6–8.5)
RBC # BLD AUTO: 4.48 10*6/MM3 (ref 4.14–5.8)
SODIUM SERPL-SCNC: 143 MMOL/L (ref 136–145)
TROPONIN I SERPL-MCNC: 0.01 NG/ML (ref 0–0.6)
TROPONIN I SERPL-MCNC: 0.01 NG/ML (ref 0–0.6)
WBC # BLD AUTO: 7.39 10*3/MM3 (ref 3.4–10.8)
WHOLE BLOOD HOLD SPECIMEN: NORMAL
WHOLE BLOOD HOLD SPECIMEN: NORMAL

## 2021-10-12 PROCEDURE — 83690 ASSAY OF LIPASE: CPT | Performed by: EMERGENCY MEDICINE

## 2021-10-12 PROCEDURE — 85025 COMPLETE CBC W/AUTO DIFF WBC: CPT

## 2021-10-12 PROCEDURE — 99284 EMERGENCY DEPT VISIT MOD MDM: CPT

## 2021-10-12 PROCEDURE — 93005 ELECTROCARDIOGRAM TRACING: CPT | Performed by: EMERGENCY MEDICINE

## 2021-10-12 PROCEDURE — 82550 ASSAY OF CK (CPK): CPT | Performed by: EMERGENCY MEDICINE

## 2021-10-12 PROCEDURE — 84484 ASSAY OF TROPONIN QUANT: CPT

## 2021-10-12 PROCEDURE — 93005 ELECTROCARDIOGRAM TRACING: CPT

## 2021-10-12 PROCEDURE — 80053 COMPREHEN METABOLIC PANEL: CPT | Performed by: EMERGENCY MEDICINE

## 2021-10-12 PROCEDURE — 83735 ASSAY OF MAGNESIUM: CPT | Performed by: EMERGENCY MEDICINE

## 2021-10-12 PROCEDURE — 71045 X-RAY EXAM CHEST 1 VIEW: CPT

## 2021-10-12 PROCEDURE — 82553 CREATINE MB FRACTION: CPT | Performed by: EMERGENCY MEDICINE

## 2021-10-12 PROCEDURE — 83880 ASSAY OF NATRIURETIC PEPTIDE: CPT

## 2021-10-12 PROCEDURE — 93010 ELECTROCARDIOGRAM REPORT: CPT | Performed by: INTERNAL MEDICINE

## 2021-10-12 RX ORDER — SODIUM CHLORIDE 0.9 % (FLUSH) 0.9 %
10 SYRINGE (ML) INJECTION AS NEEDED
Status: DISCONTINUED | OUTPATIENT
Start: 2021-10-12 | End: 2021-10-13 | Stop reason: HOSPADM

## 2021-10-12 RX ORDER — ASPIRIN 81 MG/1
324 TABLET, CHEWABLE ORAL ONCE
Status: COMPLETED | OUTPATIENT
Start: 2021-10-12 | End: 2021-10-12

## 2021-10-12 RX ADMIN — ASPIRIN 243 MG: 81 TABLET, CHEWABLE ORAL at 21:12

## 2021-10-13 ENCOUNTER — PATIENT OUTREACH (OUTPATIENT)
Dept: CASE MANAGEMENT | Facility: OTHER | Age: 86
End: 2021-10-13

## 2021-10-13 VITALS
HEART RATE: 65 BPM | HEIGHT: 69 IN | WEIGHT: 200 LBS | SYSTOLIC BLOOD PRESSURE: 162 MMHG | TEMPERATURE: 97.9 F | DIASTOLIC BLOOD PRESSURE: 81 MMHG | OXYGEN SATURATION: 97 % | RESPIRATION RATE: 17 BRPM | BODY MASS INDEX: 29.62 KG/M2

## 2021-10-13 DIAGNOSIS — R53.1 WEAKNESS GENERALIZED: Primary | ICD-10-CM

## 2021-10-13 DIAGNOSIS — R55 NEAR SYNCOPE: ICD-10-CM

## 2021-10-13 LAB
HOLD SPECIMEN: NORMAL
HOLD SPECIMEN: NORMAL

## 2021-10-13 NOTE — OUTREACH NOTE
Ambulatory Case Management Note    [facility] Interim Update    Front office staff stated pt came in to see me today but I was unavailable. He had something urgent to discuss with me. I reviewed ER note and then called him. No answer, lmtrc.    [facility] Interim Update    Pt called and stated he was unsure of his medical disability status. He is struggling getting answers from his Dr Carranza with [facility]. I offered to call and f/u. Son was on this call too and verified he was unsure of status. After many calls and talking to 5 people, I learned that pt's 100% disability got approved today. Pt will be mailed something regarding this. Pt updated.    Reviewed meds.  Reviewed my contact info.    Pt went to ER for low BS, but I do not see low BS on ER report. Pt's BP elevated in ER. Checked BP while on phone 158/83 HR 57  He was taken off of his metoprolol for unknown reason, by a provider he cannot recall, over a month ago. He agreed to call me Monday with BP log. I will update PCP with log.        There are no recently modified care plans to display for this patient.      Roxi Woodson RN  Ambulatory Case Management    10/13/2021, 15:27 EDT

## 2021-10-13 NOTE — ED PROVIDER NOTES
Time: 9:09 PM EDT  Arrived by: ambulance  Chief Complaint:   Chief Complaint   Patient presents with   • Fatigue   • Weakness - Generalized     History provided by: pt and EMS  History is limited by: N/A     History of Present Illness:  Patient is a 86 y.o. year old male that presents to the emergency department with WEAKNESS.    Pt states he was getting out of his car when he suddenly did not feel well. Pt denies any SOB, nausea, vomiting, diarrhea, fever, chills, LOC, or chest pain. Pt states he felt like he wanted to pass out which was similar to his previous episode where he had an MI.  Pt states he feels fine now.   Pt has hx of MI.      History provided by:  Patient and EMS personnel   used: No    Weakness - Generalized  Severity:  Moderate  Onset quality:  Sudden  Timing:  Constant  Progression:  Resolved  Chronicity:  New  Relieved by:  None tried  Worsened by:  Nothing  Ineffective treatments:  None tried  Associated symptoms: no abdominal pain, no chest pain, no diarrhea, no dizziness, no dysuria, no fever, no headaches, no nausea, no seizures, no shortness of breath and no vomiting        Similar Symptoms Previously: none  Recently seen: not recently seen in this ED    Patient Care Team  Primary Care Provider: Agustina Gutierrez MD    Past Medical History:     No Known Allergies  Past Medical History:   Diagnosis Date   • Allergies    • Anxiety    • Arthritis    • Asthma    • BPH (benign prostatic hyperplasia)    • Carpal tunnel syndrome    • Colon polyp    • Congestive heart failure (CHF) (Formerly [facility])    • COPD (chronic obstructive pulmonary disease) (Formerly [facility])    • Dementia (Formerly [facility])    • Depression    • Diabetes mellitus type 2, noninsulin dependent (Formerly [facility])    • Diverticulitis    • Erectile dysfunction of organic origin    • Essential hypertension 11/19/2019   • Excess skin    • Fatigue 11/09/2019   • GERD (gastroesophageal reflux disease)    • Gout    • Heart attack (Formerly [facility])    • Heart disease     • High cholesterol    • Hyperlipidemia    • Hypertension    • Hypogonadism in male    • Insomnia    • Leg swelling    • Memory change 08/02/2018    currently, pt is taking namenda. i will pursue a mocha at his follow up    • Myocardial infarction (HCC)    • Osteoarthritis    • Parkinson's disease (HCC)    • Prostate disorder    • Rectal bleeding    • Renal cyst    • Seasonal allergies    • Sleep apnea    • Stroke (HCC)    • TIA (transient ischemic attack) 08/02/2018    reportedly, the pt has a history of TIA. This can be discussed further at his follow up. i was asked that he cont. aspirin.   • Tremor 08/02/2018    pt was a 3 mo history of a high frequency, low amplitude tremor of both hands that worsens some what with activity. tremor does not interfere with his activities of daily living. he has no evidence of bradykinesia on exam. pakinsons seems unlikely at this point. potential etiologies would include essential tremor and physiologic tremor. he does note that he started theophylline approx. 3 months ag   • Vitamin D deficiency      Past Surgical History:   Procedure Laterality Date   • APPENDECTOMY  2015   • CARDIAC CATHETERIZATION     • CARDIAC SURGERY  2014   • CARPAL TUNNEL RELEASE     • CATARACT EXTRACTION     • CIRCUMCISION  2000   • COLONOSCOPY  2014 2017   • CYSTOSCOPY     • ENDOSCOPY  2010 2017   • EYE SURGERY      eye implant   • HERNIA REPAIR  2000   • LAPAROSCOPIC GASTRIC BANDING     • ORCHIECTOMY Right 2010   • OTHER SURGICAL HISTORY      metal implants;    • PENILE PROSTHESIS IMPLANT     • TOE SURGERY     • TOENAIL EXCISION      surgical removal of toenail and nail matrix of both feet   • TOTAL KNEE ARTHROPLASTY Bilateral 2009 2010     Family History   Problem Relation Age of Onset   • Diabetes Mother    • Heart attack Father    • Diabetes Brother    • Rectal cancer Other         grandfather- rectal and colon   • Prostate cancer Other    • Diabetes Son        Home Medications:  Prior to  Admission medications    Medication Sig Start Date End Date Taking? Authorizing Provider   allopurinol (ZYLOPRIM) 300 MG tablet  4/27/21   Nurse Pancho Greer RN   amLODIPine (NORVASC) 10 MG tablet Take 10 mg by mouth Daily. 2/22/19 12/2/21  Lorenza Harding MD   atorvastatin (LIPITOR) 10 MG tablet Take 10 mg by mouth Daily.    Nurse Pancho Greer RN   buPROPion XL (WELLBUTRIN XL) 150 MG 24 hr tablet  8/4/21   Lorenza Harding MD   cetirizine (zyrTEC) 10 MG tablet Take 10 mg by mouth. 1/29/18 11/25/21  Lorenza Harding MD   citalopram (CeleXA) 20 MG tablet Take 1 tablet by mouth Daily. 9/14/21 9/14/22  Agustina Gutierrez MD   desvenlafaxine (PRISTIQ) 50 MG 24 hr tablet Take 50 mg by mouth Daily.    Nurse Pancho Greer RN   desvenlafaxine (PRISTIQ) 50 MG 24 hr tablet Take 50 mg by mouth Daily.    ProviderLorenza MD   FUROSEMIDE PO Take  by mouth.    Emergency, Nurse MINAL Deleon   LISINOPRIL PO Take  by mouth.    Nurse Pancho Greer RN   melatonin 1 MG tablet  8/4/21   Lorenza Harding MD   Melatonin-Pyridoxine 1-10 MG tablet  5/20/21   Nurse Pancho Greer RN   memantine (NAMENDA) 5 MG tablet  8/4/21   ProviderLorenza MD   montelukast (SINGULAIR) 10 MG tablet Take 10 mg by mouth. 2/22/19 12/2/21  Lorenza Harding MD   nystatin (MYCOSTATIN) 802658 UNIT/GM cream Apply  topically to the appropriate area as directed As Needed (scrotal pain). 8/31/21   Christopher Lozano MD   pantoprazole (PROTONIX) 40 MG EC tablet Take 1 tablet by mouth Daily. 7/28/21   Agustina Gutierrez MD   pantoprazole (PROTONIX) 40 MG EC tablet Take 40 mg by mouth. 2/22/19 11/25/21  Lorenza Harding MD   POTASSIUM CHLORIDE ER PO Take  by mouth.    Nurse Pancho Greer RN   Ulysses-24 200 MG 24 hr capsule  5/20/21   Nurse Pancho Greer RN   venlafaxine (EFFEXOR) 75 MG tablet  8/4/21   Provider, MD Lorenza   venlafaxine XR (EFFEXOR-XR) 75 MG 24 hr capsule  5/20/21   Emergency,  "Nurse Pancho RN   vitamin B-12 (CYANOCOBALAMIN) 1000 MCG tablet  4/22/21   Emergency, Nurse Pancho RN   vitamin D (ERGOCALCIFEROL) 1.25 MG (45108 UT) capsule capsule  4/22/21   Emergency, Nurse Epic, RN   vitamin E 400 UNIT capsule Take 400 Units by mouth Daily.    Emergency, Nurse Pancho RN        Social History:   Social History     Tobacco Use   • Smoking status: Never Smoker   • Smokeless tobacco: Never Used   Vaping Use   • Vaping Use: Never used   Substance Use Topics   • Alcohol use: Yes     Comment: occasional    • Drug use: Never     Recent travel: not applicable     Review of Systems:  Review of Systems   Constitutional: Negative for chills and fever.   HENT: Negative for congestion, ear pain, rhinorrhea, sore throat and tinnitus.    Eyes: Negative for photophobia and pain.   Respiratory: Negative for choking and shortness of breath.    Cardiovascular: Negative for chest pain, palpitations and leg swelling.   Gastrointestinal: Negative for abdominal distention, abdominal pain, diarrhea, nausea and vomiting.   Endocrine: Negative for polydipsia.   Genitourinary: Negative for difficulty urinating, dysuria and hematuria.   Musculoskeletal: Negative for back pain, gait problem and neck pain.   Skin: Negative for rash.   Neurological: Negative for dizziness, seizures, speech difficulty, weakness, light-headedness, numbness and headaches.   Hematological: Negative for adenopathy.   Psychiatric/Behavioral: Negative for agitation, confusion, self-injury and suicidal ideas.        Physical Exam:  /68   Pulse 55   Temp 97.9 °F (36.6 °C)   Resp 17   Ht 175.3 cm (69\")   Wt 90.7 kg (200 lb)   SpO2 96%   BMI 29.53 kg/m²     Physical Exam  Vitals and nursing note reviewed.   Constitutional:       Appearance: Normal appearance. He is well-developed.   HENT:      Head: Normocephalic and atraumatic.      Right Ear: External ear normal.      Left Ear: External ear normal.      Nose: Nose normal.      Mouth/Throat: "      Mouth: Mucous membranes are moist.      Pharynx: Oropharynx is clear.   Eyes:      General: Lids are normal.      Extraocular Movements: Extraocular movements intact.      Conjunctiva/sclera: Conjunctivae normal.      Pupils: Pupils are equal, round, and reactive to light.   Cardiovascular:      Rate and Rhythm: Normal rate and regular rhythm.      Pulses: Normal pulses.      Heart sounds: Normal heart sounds. No murmur heard.      Pulmonary:      Effort: Pulmonary effort is normal.      Breath sounds: Normal breath sounds. No stridor. No wheezing.   Abdominal:      Palpations: Abdomen is soft.      Tenderness: There is no abdominal tenderness.   Musculoskeletal:         General: Normal range of motion.      Cervical back: Full passive range of motion without pain, normal range of motion and neck supple.      Right lower leg: No edema.      Left lower leg: No edema.   Skin:     General: Skin is warm and dry.      Capillary Refill: Capillary refill takes less than 2 seconds.   Neurological:      General: No focal deficit present.      Mental Status: He is alert and oriented to person, place, and time. Mental status is at baseline.      Cranial Nerves: Cranial nerves are intact.      Sensory: Sensation is intact.      Motor: Motor function is intact.      Coordination: Coordination is intact.   Psychiatric:         Attention and Perception: Attention and perception normal.         Mood and Affect: Mood and affect normal.         Speech: Speech normal.         Behavior: Behavior normal. Behavior is cooperative.         Thought Content: Thought content normal.         Cognition and Memory: Cognition and memory normal.                Medications in the Emergency Department:  Medications   sodium chloride 0.9 % flush 10 mL (has no administration in time range)   aspirin chewable tablet 324 mg (243 mg Oral Given 10/12/21 2112)        Labs  Lab Results (last 24 hours)     Procedure Component Value Units Date/Time     POC Troponin I with Hold Tube [[id number]] Collected: 10/12/21 2019    Specimen: Blood Updated: 10/12/21 2028    Narrative:      The following orders were created for panel order POC Troponin I with Hold Tube.  Procedure                               Abnormality         Status                     ---------                               -----------         ------                     POC Troponin I[[id number]]                                                              HOLD Troponin-I Tube[[id number]]                             In process                   Please view results for these tests on the individual orders.    POC Troponin I [[id number]]  (Normal) Collected: 10/12/21 2019    Specimen: Blood Updated: 10/12/21 2032     Troponin I 0.01 ng/mL      Comment: Serial Number: [id number]:  [id number]       CBC & Differential [[id number]]  (Normal) Collected: 10/12/21 2020    Specimen: Blood Updated: 10/12/21 2034    Narrative:      The following orders were created for panel order CBC & Differential.  Procedure                               Abnormality         Status                     ---------                               -----------         ------                     CBC Auto Differential[[id number]]        Normal              Final result                 Please view results for these tests on the individual orders.    Comprehensive Metabolic Panel [[id number]]  (Abnormal) Collected: 10/12/21 2020    Specimen: Blood Updated: 10/12/21 2109     Glucose 111 mg/dL      BUN 31 mg/dL      Creatinine 1.47 mg/dL      Sodium 143 mmol/L      Potassium 4.1 mmol/L      Comment: Slight hemolysis detected by analyzer. Results may be affected.        Chloride 110 mmol/L      CO2 20.9 mmol/L      Calcium 8.7 mg/dL      Total Protein 6.1 g/dL      Albumin 3.70 g/dL      ALT (SGPT) 12 U/L      AST (SGOT) 14 U/L      Alkaline Phosphatase 59 U/L      Total Bilirubin 0.3 mg/dL      eGFR Non African Amer 45 mL/min/1.73      Globulin 2.4  gm/dL      A/G Ratio 1.5 g/dL      BUN/Creatinine Ratio 21.1     Anion Gap 12.1 mmol/L     Narrative:      GFR Normal >60  Chronic Kidney Disease <60  Kidney Failure <15      Lipase [[id number]]  (Normal) Collected: 10/12/21 2020    Specimen: Blood Updated: 10/12/21 2109     Lipase 37 U/L     BNP [[id number]]  (Normal) Collected: 10/12/21 2020    Specimen: Blood Updated: 10/12/21 2102     proBNP 450.8 pg/mL     Narrative:      Among patients with dyspnea, NT-proBNP is highly sensitive for the detection of acute congestive heart failure. In addition NT-proBNP of <300 pg/ml effectively rules out acute congestive heart failure with 99% negative predictive value.    Results may be falsely decreased if patient taking Biotin.      Magnesium [[id number]]  (Normal) Collected: 10/12/21 2020    Specimen: Blood Updated: 10/12/21 2109     Magnesium 1.8 mg/dL     CK Total & CKMB [[id number]]  (Normal) Collected: 10/12/21 2020    Specimen: Blood Updated: 10/12/21 2109     CKMB 2.58 ng/mL      Creatine Kinase 38 U/L     Narrative:      CKMB results may be falsely decreased if patient taking Biotin.    CBC Auto Differential [[id number]]  (Normal) Collected: 10/12/21 2020    Specimen: Blood Updated: 10/12/21 2034     WBC 7.39 10*3/mm3      RBC 4.48 10*6/mm3      Hemoglobin 14.1 g/dL      Hematocrit 42.5 %      MCV 94.9 fL      MCH 31.5 pg      MCHC 33.2 g/dL      RDW 13.1 %      RDW-SD 45.7 fl      MPV 10.6 fL      Platelets 141 10*3/mm3      Neutrophil % 63.3 %      Lymphocyte % 24.5 %      Monocyte % 7.7 %      Eosinophil % 3.4 %      Basophil % 0.7 %      Immature Grans % 0.4 %      Neutrophils, Absolute 4.68 10*3/mm3      Lymphocytes, Absolute 1.81 10*3/mm3      Monocytes, Absolute 0.57 10*3/mm3      Eosinophils, Absolute 0.25 10*3/mm3      Basophils, Absolute 0.05 10*3/mm3      Immature Grans, Absolute 0.03 10*3/mm3      nRBC 0.0 /100 WBC     POC Troponin I with Hold Tube [[id number]] Collected: 10/12/21 1532    Specimen: Blood  Updated: 10/12/21 2205    Narrative:      The following orders were created for panel order POC Troponin I with Hold Tube.  Procedure                               Abnormality         Status                     ---------                               -----------         ------                     POC Troponin I[[id number]]                                                              HOLD Troponin-I Tube[[id number]]                             In process                   Please view results for these tests on the individual orders.    POC Troponin I [[id number]]  (Normal) Collected: 10/12/21 2200    Specimen: Blood Updated: 10/12/21 2213     Troponin I 0.01 ng/mL      Comment: Serial Number: [id number]:  [id number]              Imaging:  XR Chest 1 View    Result Date: 10/12/2021  PROCEDURE: XR CHEST 1 VW  COMPARISON: [facility], , CHEST AP/PA 1 VIEW, 6/27/2020, 22:33.  INDICATIONS: Chest Pain, FATIGUE, WEAKNESS  FINDINGS:  There are lower lung volumes with mild patchy bibasilar opacities.  Right hemidiaphragm remains elevated.  No pneumothorax or large pleural effusion is seen.  Cardiac silhouette remains mildly enlarged.  CONCLUSION: Lower lung volumes with mild patchy basilar opacities, which may be due to atelectasis and/or pneumonia.       BI CANALES MD       Electronically Signed and Approved By: BI CANALES MD on 10/12/2021 at 20:40               Procedures:  Procedures    Progress  ED Course as of 10/12/21 2311   e Oct 12, 2021   2105 ECG 12 Lead  Sinus rhythm with rate of 60. IVCD consider RBBB. No acute ST elevation or depression. No significant change when compared to previous. EKG interpreted by me [LD]   2250 ECG 12 Lead  Sinus rhythm with rate of 55. IVCD. RBBB. No acute ST elevation or depression. No significant change when compared to previous. EKG interpreted by me [LD]      ED Course User Index  [LD] Joyce Farmer MD                            Medical Decision  Making:  MDM  Number of Diagnoses or Management Options  Diagnosis management comments: Patient is afebrile nontoxic-appearing.  Vital signs stable.  At time of evaluation he is lying in bed in no acute distress.  Patient reports episode where he did not feel well.  He had a near syncopal episode.  He denies any chest pain, shortness of breath, cough, congestion, nausea, vomiting, diarrhea, fever, chills.  Patient was concerned because he had similar episode when he had his previous heart attack.  Patient stated was recently being seen by his cardiologist and did not have any issues.  EKG shows sinus rhythm no acute ST elevation.  2 sets of troponins 2 hours apart were both negative.  He has no symptoms at this time and states he feels well.  Discussed results with patient.  He feels comfortable going home.  Recommend close follow-up with his primary physician and cardiologist.  Discussed return precautions, discharge instructions and answered all his questions.       Amount and/or Complexity of Data Reviewed  Clinical lab tests: ordered and reviewed  Tests in the radiology section of CPT®: ordered and reviewed  Tests in the medicine section of CPT®: reviewed and ordered  Review and summarize past medical records: yes  Independent visualization of images, tracings, or specimens: yes    Risk of Complications, Morbidity, and/or Mortality  Presenting problems: moderate  Diagnostic procedures: moderate  Management options: moderate         Final diagnoses:   Near syncope        Disposition:  ED Disposition     ED Disposition Condition Comment    Discharge Stable           This medical record created using voice recognition software and may contain unintended errors.    Documentation assistance provided by Ivanna Carlos acting as scribe for Joyce Farmer MD. Information recorded by the scribe was done at my direction and has been verified and validated by me.          Ivanna Carlos  10/12/21 2128       Darian  MD Joyce  10/12/21 3606

## 2021-10-14 LAB
QT INTERVAL: 479 MS
QT INTERVAL: 483 MS

## 2021-10-19 ENCOUNTER — PATIENT OUTREACH (OUTPATIENT)
Dept: CASE MANAGEMENT | Facility: OTHER | Age: 86
End: 2021-10-19

## 2021-10-19 DIAGNOSIS — F03.90 DEMENTIA WITHOUT BEHAVIORAL DISTURBANCE, UNSPECIFIED DEMENTIA TYPE: ICD-10-CM

## 2021-10-19 DIAGNOSIS — I10 HYPERTENSION, UNSPECIFIED TYPE: Primary | ICD-10-CM

## 2021-10-19 NOTE — OUTREACH NOTE
Ambulatory Case Management Note    [facility] Interim Update    Called for BP log, no answer. lmtrc      [facility] Interim Update    Pt gave me a BP log- 117/85 HR 59   120/70  HR 59   135/68 HR 61  120/70 HR 62   145/74 HR 66  128/74 HR 64    I will call pt back in 2-3 weeks for log. No acute concerns.    [facility] Interim Update    10/22: Pt called and lmtrc, concerned, stating he has a mas in his throat he needs evaluated. I called back x2, no answer, lmtrc. Chart review reveals no recent ENT appt.        There are no recently modified care plans to display for this patient.      Roxi Woodson RN  Ambulatory Case Management    10/19/2021, 10:53 EDT

## 2021-10-28 ENCOUNTER — PATIENT OUTREACH (OUTPATIENT)
Dept: CASE MANAGEMENT | Facility: OTHER | Age: 86
End: 2021-10-28

## 2021-10-28 DIAGNOSIS — I10 HYPERTENSION, UNSPECIFIED TYPE: Primary | ICD-10-CM

## 2021-10-28 DIAGNOSIS — N48.9 PENILE ABNORMALITY: ICD-10-CM

## 2021-10-28 DIAGNOSIS — F03.90 DEMENTIA WITHOUT BEHAVIORAL DISTURBANCE, UNSPECIFIED DEMENTIA TYPE: ICD-10-CM

## 2021-10-28 NOTE — PROGRESS NOTES
Procedures       Urinalysis was checked today and was negative for signs of infection      Cytoscopy Procedure:     Procedure: Flexible cytoscope was passed per urethra into the bladder without difficulty after proper consent. The bladder was inspected in a systematic meridian fashion.     4 cm prostate    Minimal trabeculation.    There were no tumors, lesions, stones, or other abnormalities noted within the bladder. Of note, there was no increased vascularity as well. Both ureteral orifices were identified and were normal in appearance. The flexible cytoscope was removed. The patient tolerated the procedure well.           Urgency and urge incontinence      patient no longer having urgency or urge incontinence that is bothering him.  Did not try Myrbetriq.    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  He is worried about this but I discussed nothing you can do other than lose weight for a buried penis patient voiced understanding.  He has seen a plastic surgeon about his suprapubic fat pad also.    No signs of inguinal hernia or abnormalities in the groins.  Patient has been worried about this so he was given reassurance    Follow-up as needed                This document has been electronically signed by Christopher Lozano MD  October 28, 2021 10:24 EDT

## 2021-10-29 ENCOUNTER — OFFICE VISIT (OUTPATIENT)
Dept: UROLOGY | Facility: CLINIC | Age: 86
End: 2021-10-29

## 2021-10-29 VITALS — BODY MASS INDEX: 29.62 KG/M2 | WEIGHT: 200 LBS | RESPIRATION RATE: 19 BRPM | HEIGHT: 69 IN

## 2021-10-29 DIAGNOSIS — R39.15 URGENCY OF URINATION: Primary | ICD-10-CM

## 2021-10-29 PROBLEM — J30.2 SEASONAL ALLERGIES: Status: ACTIVE | Noted: 2018-06-01

## 2021-10-29 PROBLEM — G25.0 ESSENTIAL TREMOR: Status: ACTIVE | Noted: 2021-01-22

## 2021-10-29 PROBLEM — M06.9 RHEUMATOID ARTHRITIS: Status: ACTIVE | Noted: 2018-06-01

## 2021-10-29 PROBLEM — IMO0002: Status: ACTIVE | Noted: 2018-05-29

## 2021-10-29 PROBLEM — L98.7 EXCESS SKIN OF ABDOMINAL WALL: Status: ACTIVE | Noted: 2021-07-30

## 2021-10-29 PROBLEM — I21.9 MYOCARDIAL INFARCTION: Status: ACTIVE | Noted: 2018-06-01

## 2021-10-29 PROBLEM — J44.9 CHRONIC OBSTRUCTIVE PULMONARY DISEASE: Status: ACTIVE | Noted: 2018-06-01

## 2021-10-29 PROBLEM — K43.9 VENTRAL HERNIA: Status: ACTIVE | Noted: 2018-03-08

## 2021-10-29 PROBLEM — G47.30 SLEEP APNEA: Status: ACTIVE | Noted: 2018-06-01

## 2021-10-29 PROBLEM — N42.9 DISORDER OF PROSTATE: Status: ACTIVE | Noted: 2018-06-01

## 2021-10-29 PROCEDURE — 52000 CYSTOURETHROSCOPY: CPT | Performed by: UROLOGY

## 2021-10-29 RX ORDER — PROPRANOLOL HYDROCHLORIDE 20 MG/1
20 TABLET ORAL
COMMUNITY
Start: 2021-09-22 | End: 2021-11-23

## 2021-10-29 RX ORDER — CARBOXYMETHYLCELLULOSE SODIUM 0.5 G/100ML
SOLUTION/ DROPS OPHTHALMIC
COMMUNITY
Start: 2021-09-01 | End: 2021-11-23

## 2021-10-29 RX ORDER — CLOPIDOGREL BISULFATE 75 MG/1
1 TABLET ORAL DAILY
COMMUNITY

## 2021-10-29 RX ORDER — DONEPEZIL HYDROCHLORIDE 5 MG/1
5 TABLET, FILM COATED ORAL NIGHTLY
COMMUNITY
Start: 2021-09-22 | End: 2022-03-17 | Stop reason: SDUPTHER

## 2021-10-29 RX ORDER — CARBOXYMETHYLCELLULOSE SODIUM 5 MG/ML
SOLUTION/ DROPS OPHTHALMIC
COMMUNITY
Start: 2021-09-01 | End: 2021-11-23

## 2021-10-29 NOTE — OUTREACH NOTE
[facility] End of Month Documentation    This Chronic Medical Management Care Plan for Reddy Castellano, 86 y.o. male, has been monitored and managed and a new plan of care implemented for the month of October.  A cumulative time of 143  minutes was spent on this patient record this month, including phone call with patient; chart review.    Regarding the patient's problems: has Gout; Type 2 diabetes mellitus with hyperglycemia, without long-term current use of insulin (HCC); Essential hypertension; Other hyperlipidemia; Parkinson's disease (HCC); Alzheimer's disease, unspecified (CODE) (HCC); Major depressive disorder, recurrent, moderate (HCC); Scrotal pain; Urge incontinence of urine; Urgency of urination; Balanitis; Candida rash of groin; Abnormal weight loss; Anxiety; Rheumatoid arthritis (HCC); Chronic obstructive pulmonary disease (HCC); Constipation; Depressive disorder; Disease; Disorder of prostate; Disorder of vision; Essential tremor; Excess skin of abdominal wall; Heart failure (HCC); Mechanical complication of genitourinary device; Morbid obesity (HCC); Myocardial infarction (HCC); Numbness; Pneumonia; Seasonal allergies; Sleep apnea; Spondylosis of lumbar spine; Tingling of skin; and Ventral hernia on their problem list., the following items were addressed: medical records; medications and any changes can be found within the plan section of the note.  A detailed listing of time spent for chronic care management is tracked within each outreach encounter.  Current medications include:  has a current medication list which includes the following prescription(s): albuterol, allopurinol, amlodipine, atorvastatin, bupropion xl, carboxymethylcellulose, cetirizine, citalopram, clopidogrel, desvenlafaxine, desvenlafaxine, donepezil, furosemide, lisinopril, lubricating plus eye drops, melatonin, melatonin-pyridoxine, memantine, montelukast, nystatin, pantoprazole, pantoprazole, potassium chloride, propranolol, gris-24,  "venlafaxine, venlafaxine xr, vitamin b-12, vitamin d, and vitamin e. and the patient is reported to be patient is compliant with medication protocol,  Medications are reported to be effective. .  All notes on chart for PCP to review.    The patient was monitored remotely for activity level; medications, ED, penile/urological issues.    The patient's physical needs include:  help taking medications as prescribed, son helps with meds. pt forgetful.     The patient's mental support needs include:  continued support    The patient's cognitive support needs include:  continued support; medication, pt's son is caretaker, sees neuro     The patient's psychosocial support needs include:  continued support, pt sees [employer]    The patient's functional needs include: physician referral, pt referred to Plastics for \"urology issue\"    The patient's environmental needs include:  n/a    Care Plan overall comments:  No data recorded    Refer to previous outreach notes for more information on the areas listed above.    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        Instructions   · Patient was provided an electronic copy of care plan  · CCM services were explained and offered and patient has accepted these services.  · Patient has given their written consent to receive CCM services and understands that this includes the authorization of electronic communication of medical information with the other treating providers.  · Patient understands that they may stop CCM services at any time and these changes will be effective at the end of the calendar month and will effectively revocate the agreement of CCM " services.  · Patient understands that only one practitioner can furnish and be paid for CCM services during one calendar month.  Patient also understands that there may be co-payment or deductible fees in association with CCM services.  · Patient will continue with at least monthly follow-up calls with the Nurse Navigator.    Roxi Woodson RN  Ambulatory Case Management    10/29/2021, 14:56 EDT

## 2021-11-02 ENCOUNTER — TELEPHONE (OUTPATIENT)
Dept: CASE MANAGEMENT | Facility: OTHER | Age: 86
End: 2021-11-02

## 2021-11-02 ENCOUNTER — PATIENT OUTREACH (OUTPATIENT)
Dept: CASE MANAGEMENT | Facility: OTHER | Age: 86
End: 2021-11-02

## 2021-11-02 DIAGNOSIS — F03.90 DEMENTIA WITHOUT BEHAVIORAL DISTURBANCE, UNSPECIFIED DEMENTIA TYPE: ICD-10-CM

## 2021-11-02 DIAGNOSIS — J39.2 THROAT MASS: ICD-10-CM

## 2021-11-02 DIAGNOSIS — N48.9 PENILE ABNORMALITY: ICD-10-CM

## 2021-11-02 DIAGNOSIS — I10 HYPERTENSION, UNSPECIFIED TYPE: Primary | ICD-10-CM

## 2021-11-02 PROCEDURE — 99490 CHRNC CARE MGMT STAFF 1ST 20: CPT | Performed by: INTERNAL MEDICINE

## 2021-11-02 PROCEDURE — 99439 CHRNC CARE MGMT STAF EA ADDL: CPT | Performed by: INTERNAL MEDICINE

## 2021-11-02 NOTE — TELEPHONE ENCOUNTER
BP log- 113/57  132/86  98/65  119/76  148/88  145/74 120/70  135/68  120/70  145/74  128/74  122/77  124/69    [facility] at [facility] (unsure which one) stopped his metoprolol succ. 25mg. He verified he is still not taking it. Pt taking Norvasc 10 mg qday, Lisinopril 5 mg day. Will monitor and check on BP. If you have have any new orders, please advise.

## 2021-11-02 NOTE — OUTREACH NOTE
Ambulatory Case Management Note    [facility] Interim Update    Called for BP log- 113/57  132/86  98/65  119/76  148/88  145/74 120/70  135/68  120/70  145/74  128/74  122/77  124/69    Pt's doctor at [facility] (unsure which one) stopped his metoprolol. He verified he is still not taking it. Pt taking Norvasc 10 mg qday, Lisinopril 5 mg day    Pt c/o throat mass inside of throat. No palpable mass from outside, cannot see it when he looks inside mouth, but can touch it with his fingers. It is on his left side. Has been there for a few months. No pain. No sick or resp s/s. Does not see ENT. Made appt with PA next week for exam.    [facility] Interim Update    Pt called back to discuss how his Dr Lozano appt went. I had to, again, tell him that Dr Lozano is not taking care of his penile concern., that the  providers are and that that is why this Nov urology appt with  is so important.        There are no recently modified care plans to display for this patient.      Roxi Woodson RN  Ambulatory Case Management    11/2/2021, 08:17 EDT

## 2021-11-08 ENCOUNTER — OFFICE VISIT (OUTPATIENT)
Dept: INTERNAL MEDICINE | Facility: CLINIC | Age: 86
End: 2021-11-08

## 2021-11-08 VITALS
HEART RATE: 78 BPM | SYSTOLIC BLOOD PRESSURE: 111 MMHG | RESPIRATION RATE: 17 BRPM | BODY MASS INDEX: 30.69 KG/M2 | TEMPERATURE: 98.1 F | WEIGHT: 207.2 LBS | OXYGEN SATURATION: 98 % | HEIGHT: 69 IN | DIASTOLIC BLOOD PRESSURE: 72 MMHG

## 2021-11-08 DIAGNOSIS — R59.1 LYMPHADENOPATHY OF HEAD AND NECK: Primary | ICD-10-CM

## 2021-11-08 PROCEDURE — 99213 OFFICE O/P EST LOW 20 MIN: CPT | Performed by: PHYSICIAN ASSISTANT

## 2021-11-08 NOTE — ASSESSMENT & PLAN NOTE
Will get ultrasound of neck.  If negative could potentially consider referral to GI for EGD.   
Acute deep vein thrombosis (DVT) of iliac vein of left lower extremity

## 2021-11-08 NOTE — PROGRESS NOTES
"Chief Complaint  Difficulty Swallowing (patient says he has no clue why he is here)    Subjective          Reddy Castellano presents to [facility]  Neck mass- per patient, noticed a mass on the inside of his neck a couple weeks ago.  Not tender to the touch, does not seem to be changing but does admit to some trouble swallowing.  Palpates area on L anterior neck.    Fall- patient tripped over block in parking lot and fell on his knees.  He scraped his knees and they are  to the touch but otherwise healing ok.  No head injury.      Objective   Vital Signs:   /72 (BP Location: Left arm, Patient Position: Sitting)   Pulse 78   Temp 98.1 °F (36.7 °C) (Oral)   Resp 17   Ht 175.3 cm (69.02\")   Wt 94 kg (207 lb 3.2 oz)   SpO2 98%   BMI 30.58 kg/m²     Physical Exam  Vitals reviewed.   Constitutional:       Appearance: Normal appearance. He is well-developed.   HENT:      Head: Normocephalic and atraumatic.      Right Ear: Tympanic membrane, ear canal and external ear normal.      Left Ear: Tympanic membrane, ear canal and external ear normal.      Mouth/Throat:      Pharynx: No oropharyngeal exudate.   Eyes:      Conjunctiva/sclera: Conjunctivae normal.      Pupils: Pupils are equal, round, and reactive to light.   Neck:      Comments: Palpable area on L anterior cervical region  Cardiovascular:      Rate and Rhythm: Normal rate and regular rhythm.      Heart sounds: No murmur heard.  No friction rub. No gallop.    Pulmonary:      Effort: Pulmonary effort is normal.      Breath sounds: Normal breath sounds. No wheezing or rhonchi.   Musculoskeletal:      Cervical back: Neck supple.   Skin:     General: Skin is warm and dry.      Comments: Healing of superficial abrasion on knees bilaterally   Neurological:      Mental Status: He is alert and oriented to person, place, and time.      Cranial Nerves: No cranial nerve deficit.   Psychiatric:         Mood " and Affect: Mood and affect normal.         Behavior: Behavior normal.         Thought Content: Thought content normal.         Judgment: Judgment normal.        Result Review :          Procedures      Assessment and Plan    Diagnoses and all orders for this visit:    1. Lymphadenopathy of head and neck (Primary)  Assessment & Plan:  Will get ultrasound of neck.  If negative could potentially consider referral to GI for EGD.     Orders:  -     US Head Neck Soft Tissue; Future            Follow Up   Return if symptoms worsen or fail to improve.  Patient was given instructions and counseling regarding his condition or for health maintenance advice. Please see specific information pulled into the AVS if appropriate.

## 2021-11-18 RX ORDER — ERGOCALCIFEROL 1.25 MG/1
50000 CAPSULE ORAL 2 TIMES WEEKLY
Qty: 26 CAPSULE | Refills: 0 | Status: SHIPPED | OUTPATIENT
Start: 2021-11-18 | End: 2022-03-16 | Stop reason: SDUPTHER

## 2021-11-18 RX ORDER — MONTELUKAST SODIUM 10 MG/1
10 TABLET ORAL NIGHTLY
Qty: 90 TABLET | Refills: 1 | Status: SHIPPED | OUTPATIENT
Start: 2021-11-18 | End: 2022-10-26 | Stop reason: SDUPTHER

## 2021-11-22 RX ORDER — CETIRIZINE HYDROCHLORIDE 10 MG/1
10 TABLET ORAL DAILY
Qty: 90 TABLET | Refills: 1 | Status: SHIPPED | OUTPATIENT
Start: 2021-11-22 | End: 2022-08-12 | Stop reason: SDUPTHER

## 2021-11-23 ENCOUNTER — ANESTHESIA (OUTPATIENT)
Dept: PERIOP | Facility: HOSPITAL | Age: 86
End: 2021-11-23

## 2021-11-23 ENCOUNTER — ANESTHESIA EVENT (OUTPATIENT)
Dept: PERIOP | Facility: HOSPITAL | Age: 86
End: 2021-11-23

## 2021-11-23 ENCOUNTER — HOSPITAL ENCOUNTER (EMERGENCY)
Facility: HOSPITAL | Age: 86
Discharge: HOME | End: 2021-11-23
Attending: EMERGENCY MEDICINE | Admitting: INTERNAL MEDICINE

## 2021-11-23 VITALS
HEIGHT: 68 IN | DIASTOLIC BLOOD PRESSURE: 79 MMHG | RESPIRATION RATE: 16 BRPM | HEART RATE: 62 BPM | BODY MASS INDEX: 31.98 KG/M2 | TEMPERATURE: 96.9 F | OXYGEN SATURATION: 96 % | SYSTOLIC BLOOD PRESSURE: 153 MMHG | WEIGHT: 210.98 LBS

## 2021-11-23 DIAGNOSIS — T18.9XXA SWALLOWED FOREIGN BODY, INITIAL ENCOUNTER: Primary | ICD-10-CM

## 2021-11-23 LAB
ALBUMIN SERPL-MCNC: 3.7 G/DL (ref 3.5–5.2)
ALBUMIN/GLOB SERPL: 1.7 G/DL
ALP SERPL-CCNC: 75 U/L (ref 39–117)
ALT SERPL W P-5'-P-CCNC: 9 U/L (ref 1–41)
ANION GAP SERPL CALCULATED.3IONS-SCNC: 7.2 MMOL/L (ref 5–15)
AST SERPL-CCNC: 13 U/L (ref 1–40)
BASOPHILS # BLD AUTO: 0.08 10*3/MM3 (ref 0–0.2)
BASOPHILS NFR BLD AUTO: 1.2 % (ref 0–1.5)
BILIRUB SERPL-MCNC: 0.5 MG/DL (ref 0–1.2)
BUN SERPL-MCNC: 21 MG/DL (ref 8–23)
BUN/CREAT SERPL: 15.3 (ref 7–25)
CALCIUM SPEC-SCNC: 8.9 MG/DL (ref 8.6–10.5)
CHLORIDE SERPL-SCNC: 107 MMOL/L (ref 98–107)
CO2 SERPL-SCNC: 25.8 MMOL/L (ref 22–29)
CREAT SERPL-MCNC: 1.37 MG/DL (ref 0.76–1.27)
DEPRECATED RDW RBC AUTO: 47.1 FL (ref 37–54)
EOSINOPHIL # BLD AUTO: 0.22 10*3/MM3 (ref 0–0.4)
EOSINOPHIL NFR BLD AUTO: 3.2 % (ref 0.3–6.2)
ERYTHROCYTE [DISTWIDTH] IN BLOOD BY AUTOMATED COUNT: 13.5 % (ref 12.3–15.4)
GFR SERPL CREATININE-BSD FRML MDRD: 49 ML/MIN/1.73
GLOBULIN UR ELPH-MCNC: 2.2 GM/DL
GLUCOSE BLDC GLUCOMTR-MCNC: 88 MG/DL (ref 70–99)
GLUCOSE SERPL-MCNC: 90 MG/DL (ref 65–99)
HCT VFR BLD AUTO: 42.6 % (ref 37.5–51)
HGB BLD-MCNC: 14.2 G/DL (ref 13–17.7)
IMM GRANULOCYTES # BLD AUTO: 0.03 10*3/MM3 (ref 0–0.05)
IMM GRANULOCYTES NFR BLD AUTO: 0.4 % (ref 0–0.5)
LYMPHOCYTES # BLD AUTO: 1.87 10*3/MM3 (ref 0.7–3.1)
LYMPHOCYTES NFR BLD AUTO: 27.4 % (ref 19.6–45.3)
MCH RBC QN AUTO: 31.8 PG (ref 26.6–33)
MCHC RBC AUTO-ENTMCNC: 33.3 G/DL (ref 31.5–35.7)
MCV RBC AUTO: 95.3 FL (ref 79–97)
MONOCYTES # BLD AUTO: 0.47 10*3/MM3 (ref 0.1–0.9)
MONOCYTES NFR BLD AUTO: 6.9 % (ref 5–12)
NEUTROPHILS NFR BLD AUTO: 4.16 10*3/MM3 (ref 1.7–7)
NEUTROPHILS NFR BLD AUTO: 60.9 % (ref 42.7–76)
NRBC BLD AUTO-RTO: 0 /100 WBC (ref 0–0.2)
PLATELET # BLD AUTO: 153 10*3/MM3 (ref 140–450)
PMV BLD AUTO: 10.3 FL (ref 6–12)
POTASSIUM SERPL-SCNC: 4.5 MMOL/L (ref 3.5–5.2)
PROT SERPL-MCNC: 5.9 G/DL (ref 6–8.5)
RBC # BLD AUTO: 4.47 10*6/MM3 (ref 4.14–5.8)
SODIUM SERPL-SCNC: 140 MMOL/L (ref 136–145)
WBC NRBC COR # BLD: 6.83 10*3/MM3 (ref 3.4–10.8)

## 2021-11-23 PROCEDURE — 85025 COMPLETE CBC W/AUTO DIFF WBC: CPT | Performed by: EMERGENCY MEDICINE

## 2021-11-23 PROCEDURE — 25010000002 DEXAMETHASONE PER 1 MG: Performed by: NURSE ANESTHETIST, CERTIFIED REGISTERED

## 2021-11-23 PROCEDURE — 82962 GLUCOSE BLOOD TEST: CPT

## 2021-11-23 PROCEDURE — 43235 EGD DIAGNOSTIC BRUSH WASH: CPT | Performed by: INTERNAL MEDICINE

## 2021-11-23 PROCEDURE — 99204 OFFICE O/P NEW MOD 45 MIN: CPT | Performed by: INTERNAL MEDICINE

## 2021-11-23 PROCEDURE — 36415 COLL VENOUS BLD VENIPUNCTURE: CPT

## 2021-11-23 PROCEDURE — 99284 EMERGENCY DEPT VISIT MOD MDM: CPT

## 2021-11-23 PROCEDURE — 25010000002 ONDANSETRON PER 1 MG: Performed by: NURSE ANESTHETIST, CERTIFIED REGISTERED

## 2021-11-23 PROCEDURE — 80053 COMPREHEN METABOLIC PANEL: CPT | Performed by: EMERGENCY MEDICINE

## 2021-11-23 PROCEDURE — 25010000002 PROPOFOL 10 MG/ML EMULSION: Performed by: NURSE ANESTHETIST, CERTIFIED REGISTERED

## 2021-11-23 RX ORDER — SODIUM CHLORIDE, SODIUM LACTATE, POTASSIUM CHLORIDE, CALCIUM CHLORIDE 600; 310; 30; 20 MG/100ML; MG/100ML; MG/100ML; MG/100ML
INJECTION, SOLUTION INTRAVENOUS CONTINUOUS PRN
Status: DISCONTINUED | OUTPATIENT
Start: 2021-11-23 | End: 2021-11-23 | Stop reason: SURG

## 2021-11-23 RX ORDER — PROMETHAZINE HYDROCHLORIDE 12.5 MG/1
25 TABLET ORAL ONCE AS NEEDED
Status: DISCONTINUED | OUTPATIENT
Start: 2021-11-23 | End: 2021-11-23 | Stop reason: HOSPADM

## 2021-11-23 RX ORDER — PROMETHAZINE HYDROCHLORIDE 25 MG/1
25 SUPPOSITORY RECTAL ONCE AS NEEDED
Status: DISCONTINUED | OUTPATIENT
Start: 2021-11-23 | End: 2021-11-23 | Stop reason: HOSPADM

## 2021-11-23 RX ORDER — DEXAMETHASONE SODIUM PHOSPHATE 4 MG/ML
INJECTION, SOLUTION INTRA-ARTICULAR; INTRALESIONAL; INTRAMUSCULAR; INTRAVENOUS; SOFT TISSUE AS NEEDED
Status: DISCONTINUED | OUTPATIENT
Start: 2021-11-23 | End: 2021-11-23 | Stop reason: SURG

## 2021-11-23 RX ORDER — SUCCINYLCHOLINE/SOD CL,ISO/PF 100 MG/5ML
SYRINGE (ML) INTRAVENOUS AS NEEDED
Status: DISCONTINUED | OUTPATIENT
Start: 2021-11-23 | End: 2021-11-23 | Stop reason: SURG

## 2021-11-23 RX ORDER — PROPOFOL 10 MG/ML
VIAL (ML) INTRAVENOUS AS NEEDED
Status: DISCONTINUED | OUTPATIENT
Start: 2021-11-23 | End: 2021-11-23 | Stop reason: SURG

## 2021-11-23 RX ORDER — LIDOCAINE HYDROCHLORIDE 20 MG/ML
INJECTION, SOLUTION INFILTRATION; PERINEURAL AS NEEDED
Status: DISCONTINUED | OUTPATIENT
Start: 2021-11-23 | End: 2021-11-23 | Stop reason: SURG

## 2021-11-23 RX ORDER — ONDANSETRON 2 MG/ML
INJECTION INTRAMUSCULAR; INTRAVENOUS AS NEEDED
Status: DISCONTINUED | OUTPATIENT
Start: 2021-11-23 | End: 2021-11-23 | Stop reason: SURG

## 2021-11-23 RX ORDER — ONDANSETRON 2 MG/ML
4 INJECTION INTRAMUSCULAR; INTRAVENOUS ONCE AS NEEDED
Status: DISCONTINUED | OUTPATIENT
Start: 2021-11-23 | End: 2021-11-23 | Stop reason: HOSPADM

## 2021-11-23 RX ADMIN — LIDOCAINE HYDROCHLORIDE 100 MG: 20 INJECTION, SOLUTION INFILTRATION; PERINEURAL at 16:58

## 2021-11-23 RX ADMIN — Medication 140 MG: at 16:58

## 2021-11-23 RX ADMIN — DEXAMETHASONE SODIUM PHOSPHATE 4 MG: 4 INJECTION INTRA-ARTICULAR; INTRALESIONAL; INTRAMUSCULAR; INTRAVENOUS; SOFT TISSUE at 17:02

## 2021-11-23 RX ADMIN — PROPOFOL 150 MG: 10 INJECTION, EMULSION INTRAVENOUS at 16:58

## 2021-11-23 RX ADMIN — ONDANSETRON 4 MG: 2 INJECTION INTRAMUSCULAR; INTRAVENOUS at 17:02

## 2021-11-23 RX ADMIN — SODIUM CHLORIDE, POTASSIUM CHLORIDE, SODIUM LACTATE AND CALCIUM CHLORIDE: 600; 310; 30; 20 INJECTION, SOLUTION INTRAVENOUS at 16:53

## 2021-11-23 NOTE — ANESTHESIA POSTPROCEDURE EVALUATION
Patient: Reddy Castellano    Procedure Summary     Date: 11/23/21 Room / Location: [facility] OR 02 / [facility] MAIN OR    Anesthesia Start: 1653 Anesthesia Stop: 1725    Procedure: ESOPHAGOGASTRODUODENOSCOPY (N/A ) Diagnosis:       Swallowed foreign body, initial encounter      (Swallowed foreign body, initial encounter [T18.9XXA])    Surgeons: Anika Cummins MD Provider: Antonio Castillo MD    Anesthesia Type: general ASA Status: 3          Anesthesia Type: general    Vitals  Vitals Value Taken Time   /83 11/23/21 1745   Temp     Pulse 67 11/23/21 1746   Resp     SpO2 97 % 11/23/21 1746   Vitals shown include unvalidated device data.        Post Anesthesia Care and Evaluation    Patient location during evaluation: bedside  Patient participation: complete - patient participated  Level of consciousness: awake  Pain score: 0  Pain management: adequate  Airway patency: patent  Anesthetic complications: No anesthetic complications  PONV Status: none  Cardiovascular status: acceptable and stable  Respiratory status: acceptable and room air  Hydration status: acceptable    Comments: An Anesthesiologist personally participated in the most demanding procedures (including induction and emergence if applicable) in the anesthesia plan, monitored the course of anesthesia administration at frequent intervals and remained physically present and available for immediate diagnosis and treatment of emergencies.

## 2021-11-23 NOTE — ANESTHESIA PREPROCEDURE EVALUATION
Anesthesia Evaluation     NPO Solid Status: > 2 hours  NPO Liquid Status: > 2 hours           Airway   Mallampati: II  TM distance: >3 FB  Neck ROM: full  No difficulty expected  Dental    (+) partials        Pulmonary     breath sounds clear to auscultation  (+) pneumonia resolved , COPD moderate, asthma,sleep apnea on CPAP,   Cardiovascular   Exercise tolerance: unable to assess    ECG reviewed  PT is on anticoagulation therapy  Rhythm: regular  Rate: normal    (+) hypertension well controlled, past MI  >12 months, cardiac stents more than 12 months ago CHF Diastolic >=55%, hyperlipidemia,       Neuro/Psych  (+) TIA, tremors, numbness, psychiatric history Depression and Anxiety, dementia, poor historian.,     GI/Hepatic/Renal/Endo    (+) obesity, morbid obesity, GERD well controlled, GI bleeding resolved, renal disease, diabetes mellitus type 2,     Musculoskeletal     Abdominal   (+) obese,     Abdomen: soft.  Bowel sounds: normal.   Substance History      OB/GYN          Other   arthritis,                      Anesthesia Plan    ASA 3     general     intravenous induction     Anesthetic plan, all risks, benefits, and alternatives have been provided, discussed and informed consent has been obtained with: patient.  Use of blood products discussed with patient .   Plan discussed with CRNA.

## 2021-11-29 ENCOUNTER — PATIENT OUTREACH (OUTPATIENT)
Dept: CASE MANAGEMENT | Facility: OTHER | Age: 86
End: 2021-11-29

## 2021-11-29 DIAGNOSIS — N48.9 PENILE ABNORMALITY: ICD-10-CM

## 2021-11-29 DIAGNOSIS — I10 HYPERTENSION, UNSPECIFIED TYPE: ICD-10-CM

## 2021-11-29 DIAGNOSIS — F03.90 DEMENTIA WITHOUT BEHAVIORAL DISTURBANCE, UNSPECIFIED DEMENTIA TYPE: Primary | ICD-10-CM

## 2021-11-30 ENCOUNTER — LAB (OUTPATIENT)
Dept: LAB | Facility: HOSPITAL | Age: 86
End: 2021-11-30

## 2021-11-30 ENCOUNTER — TRANSCRIBE ORDERS (OUTPATIENT)
Dept: ADMINISTRATIVE | Facility: HOSPITAL | Age: 86
End: 2021-11-30

## 2021-11-30 DIAGNOSIS — M10.9 GOUT, UNSPECIFIED CAUSE, UNSPECIFIED CHRONICITY, UNSPECIFIED SITE: Primary | ICD-10-CM

## 2021-11-30 DIAGNOSIS — Z79.899 ENCOUNTER FOR LONG-TERM (CURRENT) USE OF OTHER MEDICATIONS: ICD-10-CM

## 2021-11-30 DIAGNOSIS — M10.9 GOUT, UNSPECIFIED CAUSE, UNSPECIFIED CHRONICITY, UNSPECIFIED SITE: ICD-10-CM

## 2021-11-30 PROCEDURE — 82565 ASSAY OF CREATININE: CPT

## 2021-11-30 PROCEDURE — 84520 ASSAY OF UREA NITROGEN: CPT

## 2021-11-30 PROCEDURE — 84550 ASSAY OF BLOOD/URIC ACID: CPT

## 2021-11-30 NOTE — OUTREACH NOTE
[facility] End of Month Documentation    This Chronic Medical Management Care Plan for Reddy Castellano, 86 y.o. male, has been monitored and managed and a new plan of care implemented for the month of November.  A cumulative time of 57  minutes was spent on this patient record this month, including phone call with patient; electronic communication with primary care provider; chart review.    Regarding the patient's problems: has Gout; Type 2 diabetes mellitus with hyperglycemia, without long-term current use of insulin (HCC); Essential hypertension; Other hyperlipidemia; Parkinson's disease (HCC); Alzheimer's disease, unspecified (CODE) ([facility]); Major depressive disorder, recurrent, moderate (HCC); Scrotal pain; Urge incontinence of urine; Urgency of urination; Balanitis; Candida rash of groin; Abnormal weight loss; Anxiety; Rheumatoid arthritis (HCC); Chronic obstructive pulmonary disease (HCC); Constipation; Depressive disorder; Disease; Disorder of prostate; Disorder of vision; Essential tremor; Excess skin of abdominal wall; Heart failure ([facility]); Mechanical complication of genitourinary device; Morbid obesity ([facility]); Myocardial infarction ([facility]); Numbness; Pneumonia; Seasonal allergies; Sleep apnea; Spondylosis of lumbar spine; Tingling of skin; Ventral hernia; Lymphadenopathy of head and neck; and Swallowed foreign body on their problem list., the following items were addressed: medical records; medications, pt to see  urology this month and any changes can be found within the plan section of the note.  A detailed listing of time spent for chronic care management is tracked within each outreach encounter.  Current medications include:  has a current medication list which includes the following prescription(s): amlodipine, atorvastatin, bupropion xl, cetirizine, citalopram, clopidogrel, donepezil, lisinopril, memantine, montelukast, nystatin, pantoprazole, venlafaxine xr, and vitamin d. and the patient is reported to be  "patient is compliant with medication protocol,  Medications are reported to be effective.  Regarding these diagnoses, referrals were made to the following provider(s):   plastics.  All notes on chart for PCP to review.    The patient was monitored remotely for blood pressure; mood & behavior; medications, ED, penile/urological issues.    The patient's physical needs include:  help taking medications as prescribed, memory.     The patient's mental support needs include:  continued support    The patient's cognitive support needs include:  continued support; medication, pt's son was helping with care, but he has moved    The patient's psychosocial support needs include:  pt sees [employer]    The patient's functional needs include: physician referral; physical healthcare, pt referred to Plastics for \"urology issue\"    The patient's environmental needs include:  n/a    Care Plan overall comments:  No data recorded    Refer to previous outreach notes for more information on the areas listed above.    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        Instructions   · Patient was provided an electronic copy of care plan  · CCM services were explained and offered and patient has accepted these services.  · Patient has given their written consent to receive CCM services and understands that this includes the authorization of electronic communication of medical information with the other treating providers.  · Patient understands that they may stop CCM services at any time and these changes will be effective at the end of the calendar month and will effectively revocate the agreement of CCM " services.  · Patient understands that only one practitioner can furnish and be paid for CCM services during one calendar month.  Patient also understands that there may be co-payment or deductible fees in association with CCM services.  · Patient will continue with at least monthly follow-up calls with the Nurse Navigator.    Roxi Woodson RN  Ambulatory Case Management    11/30/2021, 09:38 EST

## 2021-12-01 ENCOUNTER — PATIENT OUTREACH (OUTPATIENT)
Dept: CASE MANAGEMENT | Facility: OTHER | Age: 86
End: 2021-12-01

## 2021-12-01 ENCOUNTER — TELEPHONE (OUTPATIENT)
Dept: GASTROENTEROLOGY | Facility: CLINIC | Age: 86
End: 2021-12-01

## 2021-12-01 DIAGNOSIS — F03.90 DEMENTIA WITHOUT BEHAVIORAL DISTURBANCE, UNSPECIFIED DEMENTIA TYPE: Primary | ICD-10-CM

## 2021-12-01 DIAGNOSIS — I10 HYPERTENSION, UNSPECIFIED TYPE: ICD-10-CM

## 2021-12-01 LAB
BUN SERPL-MCNC: 24 MG/DL (ref 8–23)
CREAT SERPL-MCNC: 1.25 MG/DL (ref 0.76–1.27)
GFR SERPL CREATININE-BSD FRML MDRD: 55 ML/MIN/1.73
URATE SERPL-MCNC: 5.3 MG/DL (ref 3.4–7)

## 2021-12-01 PROCEDURE — 99439 CHRNC CARE MGMT STAF EA ADDL: CPT | Performed by: INTERNAL MEDICINE

## 2021-12-01 PROCEDURE — 99490 CHRNC CARE MGMT STAFF 1ST 20: CPT | Performed by: INTERNAL MEDICINE

## 2021-12-01 NOTE — OUTREACH NOTE
Ambulatory Case Management Note    [facility] Interim Update    Pt called and lmtrc. I called back x2, lmtrc.    Chart review- pt swallowed toothpick last week and went to ER at [facility]. EGD done, toothpick not retrieved. No Dc summary in.      [facility] Interim Update    Pt called back. Verified appts and US tomorrow. Reviewed  ER visit, no concerns or complaints. Asked about his gastric band, stated it was not where it should be on the EGD. Agreed to call GI and f/u.    [facility] Interim Update    Called and spoke to Tati FONTAINE with GI. She will put in message to Dr Cummins in regards to band and call back.      There are no recently modified care plans to display for this patient.      Roxi Woodson RN  Ambulatory Case Management    12/1/2021, 12:54 EST

## 2021-12-01 NOTE — TELEPHONE ENCOUNTER
From my standpoint, he does not need further evaluation of gastric band.  The only reason to evaluate it further by his surgeon would be if he is having symptoms related to it such as dysphagia.  If he is eating and swallowing ok, I would leave it alone especially given his age.

## 2021-12-02 ENCOUNTER — HOSPITAL ENCOUNTER (OUTPATIENT)
Dept: ULTRASOUND IMAGING | Facility: HOSPITAL | Age: 86
Discharge: HOME | End: 2021-12-02
Admitting: PHYSICIAN ASSISTANT

## 2021-12-02 DIAGNOSIS — R59.1 LYMPHADENOPATHY OF HEAD AND NECK: ICD-10-CM

## 2021-12-02 PROCEDURE — 76536 US EXAM OF HEAD AND NECK: CPT

## 2021-12-03 DIAGNOSIS — R59.1 LYMPHADENOPATHY OF HEAD AND NECK: Primary | ICD-10-CM

## 2021-12-03 NOTE — TELEPHONE ENCOUNTER
Followed up with office/, pt reported dysphagia in past and had US of neck and showed a possible atypical lymphnode, and report suggests CT. Plan of care is to f/u on a as needed bases with gastro.

## 2021-12-08 ENCOUNTER — PATIENT OUTREACH (OUTPATIENT)
Dept: CASE MANAGEMENT | Facility: OTHER | Age: 86
End: 2021-12-08

## 2021-12-08 DIAGNOSIS — F03.90 DEMENTIA WITHOUT BEHAVIORAL DISTURBANCE, UNSPECIFIED DEMENTIA TYPE: Primary | ICD-10-CM

## 2021-12-08 DIAGNOSIS — J39.2 THROAT MASS: ICD-10-CM

## 2021-12-08 NOTE — OUTREACH NOTE
Ambulatory Case Management Note    [facility] Interim Update    Pt called and lmtrc stating he had to talk to me urgently about a kidney issue he was having. No new orders or imaging on chart related to kidneys or UTI. I called and lmtrc.    [facility] Interim Update    Pt called stating he is having urinary incontinence, urge. At his last urology appt in Oct 2021, he told Dr Lozano that he was not having urinary issues so was not started on Myrbetric. He stated he never took it and is open to it. I will call urology at 0830 once they open. Pt denies any urinary s/s - pain, frequency, blood, cramps, foul smell.    Reviewed upcoming appt dates, times, locations    [facility] Interim Update    Called urology and talked to Isabelle. She stated pt could come get samples. Called pt back with this info. He had lost the information for Dr Lozano' office that I had just given him. I encouraged him to come up with a better, more organized system. Info given to pt and he agreed to go get his samples    [facility] Interim Update    12/9: Pt called to review Dr Forrester appt. Agreed to go to that and his CT today        There are no recently modified care plans to display for this patient.      Roxi Woodson RN  Ambulatory Case Management    12/8/2021, 07:39 EST

## 2021-12-09 ENCOUNTER — HOSPITAL ENCOUNTER (OUTPATIENT)
Dept: CT IMAGING | Facility: HOSPITAL | Age: 86
Discharge: HOME | End: 2021-12-09
Admitting: PHYSICIAN ASSISTANT

## 2021-12-09 DIAGNOSIS — R59.1 LYMPHADENOPATHY OF HEAD AND NECK: ICD-10-CM

## 2021-12-09 PROCEDURE — 70491 CT SOFT TISSUE NECK W/DYE: CPT

## 2021-12-09 PROCEDURE — 0 IOPAMIDOL PER 1 ML: Performed by: PHYSICIAN ASSISTANT

## 2021-12-09 RX ADMIN — IOPAMIDOL 100 ML: 755 INJECTION, SOLUTION INTRAVENOUS at 12:53

## 2021-12-17 ENCOUNTER — PATIENT OUTREACH (OUTPATIENT)
Dept: CASE MANAGEMENT | Facility: OTHER | Age: 86
End: 2021-12-17

## 2021-12-17 DIAGNOSIS — R25.1 TREMOR: Primary | ICD-10-CM

## 2021-12-17 NOTE — OUTREACH NOTE
Ambulatory Case Management Note    [facility] Interim Update    Pt called to c/o hand tremors, wants to see neuro. I explained that he has an appt 12-22 with Dr Forrester in [place]. He stated he has the date/time/address for that appt. No acute concerns.    [facility] Interim Update    12/20: Pt called for appt info and Dr Forrester's office number        There are no recently modified care plans to display for this patient.      Roxi Woodson RN  Ambulatory Case Management    12/17/2021, 09:38 EST

## 2021-12-27 ENCOUNTER — PATIENT OUTREACH (OUTPATIENT)
Dept: CASE MANAGEMENT | Facility: OTHER | Age: 86
End: 2021-12-27

## 2021-12-27 DIAGNOSIS — I10 HYPERTENSION, UNSPECIFIED TYPE: ICD-10-CM

## 2021-12-27 DIAGNOSIS — F03.90 DEMENTIA WITHOUT BEHAVIORAL DISTURBANCE, UNSPECIFIED DEMENTIA TYPE: Primary | ICD-10-CM

## 2021-12-27 DIAGNOSIS — K59.00 CONSTIPATION, UNSPECIFIED CONSTIPATION TYPE: Primary | ICD-10-CM

## 2021-12-27 RX ORDER — POLYETHYLENE GLYCOL 3350 17 G/17G
17 POWDER, FOR SOLUTION ORAL DAILY
Qty: 850 G | Refills: 2 | Status: SHIPPED | OUTPATIENT
Start: 2021-12-27

## 2021-12-27 NOTE — OUTREACH NOTE
[facility] End of Month Documentation    This Chronic Medical Management Care Plan for Reddy Castellano, 86 y.o. male, has been monitored and managed and a new plan of care implemented for the month of December.  A cumulative time of 71  minutes was spent on this patient record this month, including phone call with patient; chart review.    Regarding the patient's problems: has Gout; Type 2 diabetes mellitus with hyperglycemia, without long-term current use of insulin (HCC); Essential hypertension; Other hyperlipidemia; Parkinson's disease (HCC); Alzheimer's disease, unspecified (CODE) (HCC); Major depressive disorder, recurrent, moderate (HCC); Scrotal pain; Urge incontinence of urine; Urgency of urination; Balanitis; Candida rash of groin; Abnormal weight loss; Anxiety; Rheumatoid arthritis (HCC); Chronic obstructive pulmonary disease (HCC); Constipation; Depressive disorder; Disease; Disorder of prostate; Disorder of vision; Essential tremor; Excess skin of abdominal wall; Heart failure (HCC); Mechanical complication of genitourinary device; Morbid obesity (HCC); Myocardial infarction ([facility]); Numbness; Pneumonia; Seasonal allergies; Sleep apnea; Spondylosis of lumbar spine; Tingling of skin; Ventral hernia; Lymphadenopathy of head and neck; and Swallowed foreign body on their problem list., the following items were addressed: medical records,  12/3 and any changes can be found within the plan section of the note.  A detailed listing of time spent for chronic care management is tracked within each outreach encounter.  Current medications include:  has a current medication list which includes the following prescription(s): atorvastatin, bupropion xl, cetirizine, citalopram, clopidogrel, donepezil, lisinopril, memantine, montelukast, nystatin, pantoprazole, polyethylene glycol, venlafaxine xr, and vitamin d. and the patient is reported to be patient is compliant with medication protocol,  Medications are reported to be  "effective.     All notes on chart for PCP to review.    The patient was monitored remotely for mood & behavior; medications, memory.    The patient's physical needs include:  help taking medications as prescribed; physical healthcare, memory.     The patient's mental support needs include:  continued support    The patient's cognitive support needs include:  continued support; medication, pt's son was helping with care, but he has moved    The patient's psychosocial support needs include:  continued support, pt sees [employer]    The patient's functional needs include: physical healthcare, pt referred to Plastics for \"urology issue\"    The patient's environmental needs include:  not applicable    Care Plan overall comments:  No data recorded    Refer to previous outreach notes for more information on the areas listed above.    Monthly Billing Diagnoses  (F03.90) Dementia without behavioral disturbance, unspecified dementia type (HCC)    (I10) Hypertension, unspecified type    Medications   · Medications have been reconciled    Care Plan progress this month:      Recently Modified Care Plans Updates made since 11/26/2021 12:00 AM    No recently modified care plans.          Instructions   · Patient was provided an electronic copy of care plan  · CCM services were explained and offered and patient has accepted these services.  · Patient has given their written consent to receive CCM services and understands that this includes the authorization of electronic communication of medical information with the other treating providers.  · Patient understands that they may stop CCM services at any time and these changes will be effective at the end of the calendar month and will effectively revocate the agreement of CCM services.  · Patient understands that only one practitioner can furnish and be paid for CCM services during one calendar month.  Patient also understands that there may be co-payment or deductible fees in " association with CCM services.  · Patient will continue with at least monthly follow-up calls with the Nurse Navigator.    Roxi Woodson RN  Ambulatory Case Management    12/27/2021, 16:01 EST

## 2022-01-10 ENCOUNTER — OFFICE VISIT (OUTPATIENT)
Dept: INTERNAL MEDICINE | Facility: CLINIC | Age: 87
End: 2022-01-10

## 2022-01-10 VITALS
WEIGHT: 210 LBS | RESPIRATION RATE: 18 BRPM | OXYGEN SATURATION: 100 % | HEART RATE: 59 BPM | BODY MASS INDEX: 31.83 KG/M2 | DIASTOLIC BLOOD PRESSURE: 78 MMHG | HEIGHT: 68 IN | TEMPERATURE: 97.8 F | SYSTOLIC BLOOD PRESSURE: 110 MMHG

## 2022-01-10 DIAGNOSIS — R68.89 COLD INTOLERANCE: ICD-10-CM

## 2022-01-10 DIAGNOSIS — G20 PARKINSON'S DISEASE: ICD-10-CM

## 2022-01-10 DIAGNOSIS — E11.65 TYPE 2 DIABETES MELLITUS WITH HYPERGLYCEMIA, WITHOUT LONG-TERM CURRENT USE OF INSULIN: ICD-10-CM

## 2022-01-10 DIAGNOSIS — N39.41 URGE INCONTINENCE OF URINE: Primary | ICD-10-CM

## 2022-01-10 DIAGNOSIS — Z86.2 HISTORY OF IRON DEFICIENCY ANEMIA: ICD-10-CM

## 2022-01-10 LAB
BILIRUB UR QL STRIP: NEGATIVE
CLARITY UR: CLEAR
COLOR UR: YELLOW
GLUCOSE UR STRIP-MCNC: NEGATIVE MG/DL
HGB UR QL STRIP.AUTO: NEGATIVE
KETONES UR QL STRIP: NEGATIVE
LEUKOCYTE ESTERASE UR QL STRIP.AUTO: NEGATIVE
NITRITE UR QL STRIP: NEGATIVE
PH UR STRIP.AUTO: 6 [PH] (ref 5–8)
PROT UR QL STRIP: NEGATIVE
SP GR UR STRIP: 1.02 (ref 1–1.03)
UROBILINOGEN UR QL STRIP: NORMAL

## 2022-01-10 PROCEDURE — 82043 UR ALBUMIN QUANTITATIVE: CPT

## 2022-01-10 PROCEDURE — 81003 URINALYSIS AUTO W/O SCOPE: CPT | Performed by: INTERNAL MEDICINE

## 2022-01-10 PROCEDURE — 99214 OFFICE O/P EST MOD 30 MIN: CPT | Performed by: INTERNAL MEDICINE

## 2022-01-10 RX ORDER — PROPRANOLOL HYDROCHLORIDE 10 MG/1
10 TABLET ORAL ONCE
COMMUNITY
Start: 2021-12-28 | End: 2022-06-14 | Stop reason: SDUPTHER

## 2022-01-10 RX ORDER — UREA 40 %
CREAM (GRAM) TOPICAL
COMMUNITY
Start: 2021-12-28 | End: 2022-03-17

## 2022-01-10 NOTE — PROGRESS NOTES
"Chief Complaint  Diabetes and Follow-up    Subjective          Reddy Castellano presents to [facility]  History of Present Illness    Lap band issues -    The patient recently was seen in the GI office to evaluate his Lap-Band situation.  He has been in the emergency department in the last few months with issues with swallowing.  The specialist reports that he wants him to get a scan because his lap band has \"dropped\".  He also reports specialist mention that they wanted to take the water out of his Lap-Band permanently due to the recent difficulty swallowing episodes however the patient is not wanting to do this at this time.  He reports wanting to see what the scan shows before proceeding with removing the water from the Lap-Band.  He denies any further eating or swallowing issues and no episodes of vomiting.     Diabetes -   Sugars running good. On a specific diet - no flour no sugar. Overall doing well.      Urge Incontinence -   Increase in incontinence episodes.  Denies abdominal pain or burning with urination.  He is currently taking Myrbetriq and has been for quite some time however he does not think it is working.  He did see Dr. Lozano in the office last year and had a procedure done.  He reports his most recent increase in incontinence has been within the last 3 weeks.    Cold intolerance -  He reports a recent increase in fatigue and unable to tolerate the cold.  He also reports a history of iron deficiency anemia, that required treatment. He hasn't had his iron checked lately and would like that looked into.      Objective   Vital Signs:   /78 (BP Location: Right arm, Patient Position: Sitting, Cuff Size: Adult)   Pulse 59   Temp 97.8 °F (36.6 °C) (Temporal)   Resp 18   Ht 172.7 cm (68\")   Wt 95.3 kg (210 lb)   SpO2 100%   BMI 31.93 kg/m²     Physical Exam  Vitals reviewed.   Constitutional:       Appearance: Normal appearance. He is " well-developed.   HENT:      Head: Normocephalic and atraumatic.      Mouth/Throat:      Pharynx: No oropharyngeal exudate.   Eyes:      Conjunctiva/sclera: Conjunctivae normal.      Pupils: Pupils are equal, round, and reactive to light.   Cardiovascular:      Rate and Rhythm: Normal rate and regular rhythm.      Heart sounds: No murmur heard.  No friction rub. No gallop.    Pulmonary:      Effort: Pulmonary effort is normal.      Breath sounds: Normal breath sounds. No wheezing or rhonchi.   Skin:     General: Skin is warm and dry.   Neurological:      Mental Status: He is alert and oriented to person, place, and time.   Psychiatric:         Mood and Affect: Affect normal.        Result Review :     Common labs    Common Labsle 10/12/21 10/12/21 11/23/21 11/23/21 11/30/21 11/30/21 11/30/21    2020 2020 1401 1401 1523 1523 1523   Glucose  111 (A)  90      BUN  31 (A)  21  24 (A)    Creatinine  1.47 (A)  1.37 (A)   1.25   eGFR Non African Am  45 (A)  49 (A)   55 (A)   Sodium  143  140      Potassium  4.1  4.5      Chloride  110 (A)  107      Calcium  8.7  8.9      Albumin  3.70  3.70      Total Bilirubin  0.3  0.5      Alkaline Phosphatase  59  75      AST (SGOT)  14  13      ALT (SGPT)  12  9      WBC 7.39  6.83       Hemoglobin 14.1  14.2       Hematocrit 42.5  42.6       Platelets 141  153       Uric Acid     5.3     (A) Abnormal value       Comments are available for some flowsheets but are not being displayed.           Data reviewed: GI studies Recent GI office visit          Assessment and Plan    Diagnoses and all orders for this visit:    1. Urge incontinence of urine (Primary)  Comments:  Will obtain urinalysis in office to check for UTI.  Results will be called to the patient and further treatment plan will be explained at that time.    Orders:  -     Urinalysis With Culture If Indicated -    2. Type 2 diabetes mellitus with hyperglycemia, without long-term current use of insulin ([facility])  Comments:  Sugars  are running good at home.  We will recheck A1c during this visit.  No change medications at this time.  Orders:  -     CBC w AUTO Differential; Future  -     Comprehensive metabolic panel; Future  -     Hemoglobin A1c; Future  -     Lipid panel; Future  -     MicroAlbumin, Urine, Random - Urine, Clean Catch; Future    3. Cold intolerance  Comments:  He reports having an increase in being cold all the time.  We will draw an iron level.  He reports a history of iron deficiency anemia.  Orders:  -     CBC w AUTO Differential; Future  -     Iron and TIBC; Future    4. History of iron deficiency anemia  -     CBC w AUTO Differential; Future  -     Iron and TIBC; Future    Treatment plan options discussed with the patient about his urge incontinence include treating for UTI if results come back positive in office, as well as possibly increasing his Myrbetriq - however unsure if that would improve symptoms or not.  Discussed the patient calling Dr. Lozano office for follow-up for urology specific concerns.      I spent 36 minutes caring for Reddy on this date of service. This time includes time spent by me in the following activities:preparing for the visit, reviewing tests, obtaining and/or reviewing a separately obtained history, performing a medically appropriate examination and/or evaluation , counseling and educating the patient/family/caregiver, ordering medications, tests, or procedures, referring and communicating with other health care professionals , documenting information in the medical record and independently interpreting results and communicating that information with the patient/family/caregiver  Follow Up   Return in about 3 months (around 4/10/2022), or if symptoms worsen or fail to improve.  Patient was given instructions and counseling regarding his condition or for health maintenance advice. Please see specific information pulled into the AVS if appropriate.

## 2022-01-11 LAB — ALBUMIN UR-MCNC: 2 MG/DL

## 2022-02-18 ENCOUNTER — CLINICAL SUPPORT (OUTPATIENT)
Dept: INTERNAL MEDICINE | Facility: CLINIC | Age: 87
End: 2022-02-18

## 2022-02-18 DIAGNOSIS — Z86.2 HISTORY OF IRON DEFICIENCY ANEMIA: ICD-10-CM

## 2022-02-18 DIAGNOSIS — E78.49 OTHER HYPERLIPIDEMIA: ICD-10-CM

## 2022-02-18 DIAGNOSIS — E11.65 TYPE 2 DIABETES MELLITUS WITH HYPERGLYCEMIA, WITHOUT LONG-TERM CURRENT USE OF INSULIN: ICD-10-CM

## 2022-02-18 DIAGNOSIS — N39.41 URGE INCONTINENCE OF URINE: Primary | ICD-10-CM

## 2022-02-18 DIAGNOSIS — I10 ESSENTIAL HYPERTENSION: ICD-10-CM

## 2022-02-18 LAB
BASOPHILS # BLD AUTO: 0.11 10*3/MM3 (ref 0–0.2)
BASOPHILS NFR BLD AUTO: 1.7 % (ref 0–1.5)
DEPRECATED RDW RBC AUTO: 45.6 FL (ref 37–54)
EOSINOPHIL # BLD AUTO: 0.32 10*3/MM3 (ref 0–0.4)
EOSINOPHIL NFR BLD AUTO: 5.1 % (ref 0.3–6.2)
ERYTHROCYTE [DISTWIDTH] IN BLOOD BY AUTOMATED COUNT: 13 % (ref 12.3–15.4)
HCT VFR BLD AUTO: 46.6 % (ref 37.5–51)
HGB BLD-MCNC: 15.5 G/DL (ref 13–17.7)
IMM GRANULOCYTES # BLD AUTO: 0.03 10*3/MM3 (ref 0–0.05)
IMM GRANULOCYTES NFR BLD AUTO: 0.5 % (ref 0–0.5)
LYMPHOCYTES # BLD AUTO: 1.99 10*3/MM3 (ref 0.7–3.1)
LYMPHOCYTES NFR BLD AUTO: 31.6 % (ref 19.6–45.3)
MCH RBC QN AUTO: 32.1 PG (ref 26.6–33)
MCHC RBC AUTO-ENTMCNC: 33.3 G/DL (ref 31.5–35.7)
MCV RBC AUTO: 96.5 FL (ref 79–97)
MONOCYTES # BLD AUTO: 0.53 10*3/MM3 (ref 0.1–0.9)
MONOCYTES NFR BLD AUTO: 8.4 % (ref 5–12)
NEUTROPHILS NFR BLD AUTO: 3.32 10*3/MM3 (ref 1.7–7)
NEUTROPHILS NFR BLD AUTO: 52.7 % (ref 42.7–76)
NRBC BLD AUTO-RTO: 0 /100 WBC (ref 0–0.2)
PLATELET # BLD AUTO: 126 10*3/MM3 (ref 140–450)
PMV BLD AUTO: 11.6 FL (ref 6–12)
RBC # BLD AUTO: 4.83 10*6/MM3 (ref 4.14–5.8)
WBC NRBC COR # BLD: 6.3 10*3/MM3 (ref 3.4–10.8)

## 2022-02-18 PROCEDURE — 80053 COMPREHEN METABOLIC PANEL: CPT

## 2022-02-18 PROCEDURE — 83540 ASSAY OF IRON: CPT

## 2022-02-18 PROCEDURE — 85025 COMPLETE CBC W/AUTO DIFF WBC: CPT

## 2022-02-18 PROCEDURE — 80061 LIPID PANEL: CPT

## 2022-02-18 PROCEDURE — 83036 HEMOGLOBIN GLYCOSYLATED A1C: CPT

## 2022-02-18 PROCEDURE — 36415 COLL VENOUS BLD VENIPUNCTURE: CPT

## 2022-02-18 PROCEDURE — 84466 ASSAY OF TRANSFERRIN: CPT

## 2022-02-19 LAB
ALBUMIN SERPL-MCNC: 4 G/DL (ref 3.5–5.2)
ALBUMIN/GLOB SERPL: 1.7 G/DL
ALP SERPL-CCNC: 65 U/L (ref 39–117)
ALT SERPL W P-5'-P-CCNC: 13 U/L (ref 1–41)
ANION GAP SERPL CALCULATED.3IONS-SCNC: 10 MMOL/L (ref 5–15)
AST SERPL-CCNC: 16 U/L (ref 1–40)
BILIRUB SERPL-MCNC: 0.5 MG/DL (ref 0–1.2)
BUN SERPL-MCNC: 26 MG/DL (ref 8–23)
BUN/CREAT SERPL: 20 (ref 7–25)
CALCIUM SPEC-SCNC: 8.8 MG/DL (ref 8.6–10.5)
CHLORIDE SERPL-SCNC: 106 MMOL/L (ref 98–107)
CHOLEST SERPL-MCNC: 223 MG/DL (ref 0–200)
CO2 SERPL-SCNC: 24 MMOL/L (ref 22–29)
CREAT SERPL-MCNC: 1.3 MG/DL (ref 0.76–1.27)
GFR SERPL CREATININE-BSD FRML MDRD: 52 ML/MIN/1.73
GLOBULIN UR ELPH-MCNC: 2.4 GM/DL
GLUCOSE SERPL-MCNC: 94 MG/DL (ref 65–99)
HBA1C MFR BLD: 5.5 % (ref 4.8–5.6)
HDLC SERPL-MCNC: 46 MG/DL (ref 40–60)
IRON 24H UR-MRATE: 146 MCG/DL (ref 59–158)
IRON SATN MFR SERPL: 40 % (ref 20–50)
LDLC SERPL CALC-MCNC: 154 MG/DL (ref 0–100)
LDLC/HDLC SERPL: 3.3 {RATIO}
POTASSIUM SERPL-SCNC: 5.6 MMOL/L (ref 3.5–5.2)
PROT SERPL-MCNC: 6.4 G/DL (ref 6–8.5)
SODIUM SERPL-SCNC: 140 MMOL/L (ref 136–145)
TIBC SERPL-MCNC: 362 MCG/DL (ref 298–536)
TRANSFERRIN SERPL-MCNC: 243 MG/DL (ref 200–360)
TRIGL SERPL-MCNC: 125 MG/DL (ref 0–150)
VLDLC SERPL-MCNC: 23 MG/DL (ref 5–40)

## 2022-02-22 ENCOUNTER — PATIENT OUTREACH (OUTPATIENT)
Dept: CASE MANAGEMENT | Facility: OTHER | Age: 87
End: 2022-02-22

## 2022-02-22 DIAGNOSIS — N48.9 PENILE ABNORMALITY: ICD-10-CM

## 2022-02-22 DIAGNOSIS — G20 PARKINSON'S DISEASE: ICD-10-CM

## 2022-02-22 DIAGNOSIS — F03.90 DEMENTIA WITHOUT BEHAVIORAL DISTURBANCE, UNSPECIFIED DEMENTIA TYPE: Primary | ICD-10-CM

## 2022-02-22 DIAGNOSIS — I10 HYPERTENSION, UNSPECIFIED TYPE: ICD-10-CM

## 2022-02-22 PROCEDURE — 99487 CPLX CHRNC CARE 1ST 60 MIN: CPT | Performed by: INTERNAL MEDICINE

## 2022-02-22 NOTE — OUTREACH NOTE
Ambulatory Case Management Note      [facility] Interim Update    Pt called to f/u - he sees Dr Forrester in [place] but has a referral to Dr Borja in [facility], not sure why. No indication from last office note why, just Dx of Parkinsons. Pt would like to see Dr Borja instead of driving out of town. I will f/u with Huong's office about referral status.    Pt saw Dr Mendez ([facility]) who referred him to  Urology, Dr Márquez. Dr Márquez appt was 11/15/2021. Note stated that pt needed cardiac clearance (Dr Tyler Menjivar) but no forms in chart and pt unsure of status on procedure or clearance. I called and lmtrc with Dr Márquez's office.    Pt has imaging ordered from Dr Guadalupe (GI/lap band). Pt agreed to get it done tomorrow.    Son back in town living with pt.      [facility] Interim Update    Callled Dr Laureano office and talked to scheduling staff. Pt welcome to come back, needs to call and make apt. I called pt to relay this info. Left him a voicemail with office number.      [facility] Interim Update    Pt called and I gave him the contact info for neuro - he agreed to call              Roxi Woodson RN  Ambulatory Case Management    2/22/2022, 14:48 EST

## 2022-02-23 DIAGNOSIS — I10 ESSENTIAL HYPERTENSION: ICD-10-CM

## 2022-02-23 DIAGNOSIS — E11.65 TYPE 2 DIABETES MELLITUS WITH HYPERGLYCEMIA, WITHOUT LONG-TERM CURRENT USE OF INSULIN: ICD-10-CM

## 2022-02-23 DIAGNOSIS — E87.5 HYPERKALEMIA: Primary | ICD-10-CM

## 2022-02-25 ENCOUNTER — PATIENT OUTREACH (OUTPATIENT)
Dept: CASE MANAGEMENT | Facility: OTHER | Age: 87
End: 2022-02-25

## 2022-02-25 DIAGNOSIS — F03.90 DEMENTIA WITHOUT BEHAVIORAL DISTURBANCE, UNSPECIFIED DEMENTIA TYPE: Primary | ICD-10-CM

## 2022-02-25 DIAGNOSIS — I10 HYPERTENSION, UNSPECIFIED TYPE: ICD-10-CM

## 2022-02-25 DIAGNOSIS — N48.9 PENILE ABNORMALITY: ICD-10-CM

## 2022-02-25 DIAGNOSIS — G20 PARKINSON'S DISEASE: ICD-10-CM

## 2022-02-25 NOTE — OUTREACH NOTE
Ambulatory Case Management Note    [facility] Interim Update    I had not heard back from [facility] (Dr áMrquez). I called again and left message with office staff to have someone call me with update.        There are no recently modified care plans to display for this patient.      Roxi Woodson RN  Ambulatory Case Management    2/25/2022, 10:55 EST

## 2022-02-25 NOTE — OUTREACH NOTE
[facility] End of Month Documentation    This Chronic Medical Management Care Plan for Reddy Castellano, 86 y.o. male, has been monitored and managed and a new plan of care implemented for the month of February.  A cumulative time of 66  minutes was spent on this patient record this month, including phone call with other providers; chart review; phone call with patient.    Regarding the patient's problems: has Gout; Type 2 diabetes mellitus with hyperglycemia, without long-term current use of insulin (HCC); Essential hypertension; Other hyperlipidemia; Parkinson's disease (HCC); Alzheimer's disease, unspecified (CODE) ([facility]); Major depressive disorder, recurrent, moderate (HCC); Scrotal pain; Urge incontinence of urine; Urgency of urination; Balanitis; Candida rash of groin; Abnormal weight loss; Anxiety; Rheumatoid arthritis (HCC); Chronic obstructive pulmonary disease (HCC); Constipation; Depressive disorder; Disease; Disorder of prostate; Disorder of vision; Essential tremor; Excess skin of abdominal wall; Heart failure ([facility]); Mechanical complication of genitourinary device; Morbid obesity ([facility]); Myocardial infarction ([facility]); Numbness; Pneumonia; Seasonal allergies; Sleep apnea; Spondylosis of lumbar spine; Tingling of skin; Ventral hernia; Lymphadenopathy of head and neck; and Swallowed foreign body on their problem list., the following items were addressed: medical records; medications; transitions to medical care; referrals to community service providers and any changes can be found within the plan section of the note.  A detailed listing of time spent for chronic care management is tracked within each outreach encounter.  Current medications include:  has a current medication list which includes the following prescription(s): atorvastatin, bupropion xl, cetirizine, citalopram, clopidogrel, donepezil, lisinopril, memantine, mirabegron er, montelukast, nystatin, pantoprazole, polyethylene glycol, propranolol, urea,  venlafaxine xr, and vitamin d. and the patient is reported to be patient is compliant with medication protocol,  Medications are reported to be effective.  Regarding these diagnoses, referrals were made to the following provider(s):  [facility].  All notes on chart for PCP to review.    The patient was monitored remotely for mood & behavior; medications, memory, referrals, plastic/urology surgery.    The patient's physical needs include:  help taking medications as prescribed; physical healthcare; physician referral.     The patient's mental support needs include:  continued support    The patient's cognitive support needs include:  continued support; medication; coordination of community providers, pt's son was helping with care and moved back, referred back to local neuro    The patient's psychosocial support needs include:  continued support    The patient's functional needs include: physical healthcare    The patient's environmental needs include:  not applicable    Care Plan overall comments:  No data recorded    Refer to previous outreach notes for more information on the areas listed above.    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            Instructions   · Patient was provided an electronic copy of care plan  · CCM services were explained and offered and patient has accepted these services.  · Patient has given their written consent to receive CCM services and understands that this includes the authorization of electronic communication of medical information with the other treating providers.  · Patient understands that they may stop CCM  services at any time and these changes will be effective at the end of the calendar month and will effectively revocate the agreement of CCM services.  · Patient understands that only one practitioner can furnish and be paid for CCM services during one calendar month.  Patient also understands that there may be co-payment or deductible fees in association with CCM services.  · Patient will continue with at least monthly follow-up calls with the Nurse Navigator.    Roxi Woodson RN  Ambulatory Case Management    2/25/2022, 10:56 EST

## 2022-02-28 ENCOUNTER — OFFICE VISIT (OUTPATIENT)
Dept: NEUROLOGY | Facility: CLINIC | Age: 87
End: 2022-02-28

## 2022-02-28 VITALS
HEART RATE: 42 BPM | DIASTOLIC BLOOD PRESSURE: 53 MMHG | SYSTOLIC BLOOD PRESSURE: 110 MMHG | WEIGHT: 222 LBS | BODY MASS INDEX: 33.65 KG/M2 | HEIGHT: 68 IN

## 2022-02-28 DIAGNOSIS — G30.9 ALZHEIMER'S DISEASE, UNSPECIFIED (CODE): Primary | ICD-10-CM

## 2022-02-28 DIAGNOSIS — G25.0 ESSENTIAL TREMOR: ICD-10-CM

## 2022-02-28 PROBLEM — G20 PARKINSON'S DISEASE: Status: RESOLVED | Noted: 2021-06-21 | Resolved: 2022-02-28

## 2022-02-28 PROBLEM — G20.A1 PARKINSON'S DISEASE: Status: RESOLVED | Noted: 2021-06-21 | Resolved: 2022-02-28

## 2022-02-28 PROCEDURE — 99215 OFFICE O/P EST HI 40 MIN: CPT | Performed by: PSYCHIATRY & NEUROLOGY

## 2022-02-28 NOTE — PROGRESS NOTES
"Chief Complaint  Follow-up    Subjective          Reddy Castellano is a 86 y.o. male who presents to [facility]  History of Present Illness  86-year-old man here for follow-up of his essential tremor and memory loss.  He saw Dr. Forrester at [facility] and he started him on propanolol 20 mg 3 times a day.  He also started him on Donepezil 5 mg back in September and saw him last December 2021.  There is also memantine 5 mg in his chart.  He tells me that he is back here to see me because his tremor has increased however it does not affect his activities of daily living other than his writing might be affected.  He drove here and is independent with all activities of daily living.  He is living with his son.    Objective   Vital Signs:   /53   Pulse (!) 42   Ht 172.7 cm (67.99\")   Wt 101 kg (222 lb)   BMI 33.76 kg/m²     Physical Exam   He is alert, fluent, phasic, follows commands well.  Mini-Mental Status score is 29 out of 30.  He missed 1 out of 3 recent objects.  Clock drawing is intact.  He can tell me the president [place].  He is able to tell me that [place] invaded [facility] which was wrong.  It is [place].  He tells me that Eugenio wants everything.  I discussed with him regarding semiautomatic guns.  He is against people buying semiautomatic guns or converting it to automatic guns.  He states that he has several pistols.  He was telling me about M 16 done when he was in [place] and told me that he was able to control it like a BB gun whereas others had a difficult time with it.  There is no facial bradykinesia.  There is no tremor noted on finger-to-nose testing as well as hands extended.  There is no significant tremor noted on Archimedes spiral.  Heart is regular rhythm normal rate.  Repeat pulse rate is 65.        Assessment and Plan  Diagnoses and all orders for this visit:    1. Alzheimer's disease, unspecified (CODE) (HCC) " (Primary)  Assessment & Plan:  He is to continue taking Donepezil at 5 mg daily.  He is to follow-up with his primary care provider to prescribe these medications in the future.  I have not made a follow-up visit to see him.      2. Essential tremor  Assessment & Plan:  I discussed with him that I would recommend for him to continue the lower dose of Inderal at 10 mg 3 times a day as Dr. Forrester had prescribed for him.  I would recommend for him not to increase it to a higher dose since he has a risk of getting bradycardia especially along with his Donepezil.  Adverse effects would outweigh the benefits.  He states that he is not being bothered by the tremor at this time.         Total time spent with the patient and coordinating patient care was 45 minutes.    Follow Up  No follow-ups on file.  Patient was given instructions and counseling regarding his condition or for health maintenance advice. Please see specific information pulled into the AVS if appropriate.

## 2022-02-28 NOTE — ASSESSMENT & PLAN NOTE
I discussed with him that I would recommend for him to continue the lower dose of Inderal at 10 mg 3 times a day as Dr. Forrester had prescribed for him.  I would recommend for him not to increase it to a higher dose since he has a risk of getting bradycardia especially along with his Donepezil.  Adverse effects would outweigh the benefits.  He states that he is not being bothered by the tremor at this time.

## 2022-02-28 NOTE — ASSESSMENT & PLAN NOTE
He is to continue taking Donepezil at 5 mg daily.  He is to follow-up with his primary care provider to prescribe these medications in the future.  I have not made a follow-up visit to see him.

## 2022-03-02 ENCOUNTER — TELEPHONE (OUTPATIENT)
Dept: INTERNAL MEDICINE | Facility: CLINIC | Age: 87
End: 2022-03-02

## 2022-03-02 ENCOUNTER — PATIENT OUTREACH (OUTPATIENT)
Dept: CASE MANAGEMENT | Facility: OTHER | Age: 87
End: 2022-03-02

## 2022-03-02 DIAGNOSIS — Z91.14 NONCOMPLIANCE WITH MEDICATION REGIMEN: ICD-10-CM

## 2022-03-02 DIAGNOSIS — N48.9 PENILE ABNORMALITY: Primary | ICD-10-CM

## 2022-03-02 DIAGNOSIS — I10 ESSENTIAL HYPERTENSION: Primary | ICD-10-CM

## 2022-03-02 DIAGNOSIS — F33.1 MAJOR DEPRESSIVE DISORDER, RECURRENT, MODERATE: ICD-10-CM

## 2022-03-02 DIAGNOSIS — Z79.899 POLYPHARMACY: ICD-10-CM

## 2022-03-02 DIAGNOSIS — E11.65 TYPE 2 DIABETES MELLITUS WITH HYPERGLYCEMIA, WITHOUT LONG-TERM CURRENT USE OF INSULIN: ICD-10-CM

## 2022-03-02 DIAGNOSIS — R68.89 FORGETFULNESS: ICD-10-CM

## 2022-03-02 DIAGNOSIS — F03.90 DEMENTIA WITHOUT BEHAVIORAL DISTURBANCE, UNSPECIFIED DEMENTIA TYPE: ICD-10-CM

## 2022-03-02 DIAGNOSIS — G20 PARKINSON'S DISEASE: ICD-10-CM

## 2022-03-02 DIAGNOSIS — G30.9 ALZHEIMER'S DISEASE, UNSPECIFIED (CODE): ICD-10-CM

## 2022-03-02 NOTE — TELEPHONE ENCOUNTER
Caller: Reddy Castellano    Relationship: Self    Best call back number: [phone]    Requested Prescriptions:       atorvastatin (LIPITOR)   PATIENT SAID HE IS TAKING 20 MG ONCE DAILY     AMLODIPINE 10 MG ONCE DAILY PER PATIENT    VITAMIN E 400 UNITS ONCE DAILY PER PATIENT  MEDICATIONS NOT LISTED       Pharmacy where request should be sent: [facility] [place] [facility] [place] - [address] - [phone] [facility] [phone] FX     Additional details provided by patient: PATIENT HAS MORE THAN A THREE DAY SUPPLY. MEDICATIONS NOT LISTED IN PATIENT'S LIST OR NOT AT THE SAME DOSAGE.    Does the patient have less than a 3 day supply:  [] Yes  [x] No    Geena Eddy Rep   03/02/22 11:35 EST

## 2022-03-02 NOTE — OUTREACH NOTE
Ambulatory Case Management Note    [facility] Interim Update    Pt called for med refills; however, he also called the office main line and a refill encounter has been started. I called pt back and told him someone was working on it, [facility] Interim Update    Called pt again for med discrepancies. No answer. [facility].  Also- called Dr Mendez's office for update on pt's plastic surgery, no answer. [facility] Interim Update    Called [facility] (Dr Mendez) and talked to surgery coordinator. Pt has to have proof of conservative measures to treat his groin rash (reason for skin removal) and operative note from lap band procedure. I agreed to look through chart for rash notes and contact pt for lp band operative, notes. Staff also informed me that since pt has Medicare, they do not do prior authorizations. The issue with that is there is a chance that pt's surgery will not be approved, and there is NO way of knowing that before surgery. Pt would have to have the surgery, submit it to insurance and then wait for approval or denial. Per  office, procedure can be $50,000-75,000. I called pt, no answer, [facility].     I did find records mentioning rash, vaguely- Dr Moeller on 8/31/2021, 12/29/2020  Marco 5/20/2021  And Placido on 11/23/2021  All sent to fax # [phone]  Pt will also have to go to  office again if agreeable to surgery. This is because he has to sign waiver, verbalizing there is a chance he might be responsible 100% for the cost of surgery.               Roxi Woodson RN  Ambulatory Case Management    3/2/2022, 13:15 EST

## 2022-03-03 NOTE — TELEPHONE ENCOUNTER
Called pt, no answer, left detailed message Per chart review, he has been on lipitor 10, not 20. Also, Vit E discontinued in Nov 2021 by ER provider. I asked pt to call me or main office to address (I will be working on and off until 3/7). Thanks.

## 2022-03-08 ENCOUNTER — PATIENT OUTREACH (OUTPATIENT)
Dept: CASE MANAGEMENT | Facility: OTHER | Age: 87
End: 2022-03-08

## 2022-03-08 DIAGNOSIS — G20 PARKINSON'S DISEASE: ICD-10-CM

## 2022-03-08 DIAGNOSIS — F03.90 DEMENTIA WITHOUT BEHAVIORAL DISTURBANCE, UNSPECIFIED DEMENTIA TYPE: ICD-10-CM

## 2022-03-08 DIAGNOSIS — N48.9 PENILE ABNORMALITY: Primary | ICD-10-CM

## 2022-03-08 NOTE — OUTREACH NOTE
[facility] Interim Update    Pt called and lmtrc about meds not being filled. I called him back this morning, explaining that we were unable to verify the dose before sending because what he requested and what we actually give him are two different doses. No answer, lmtrc. Also, left on voicemail that I had surgery update.    [facility] Interim Update    Called pt again, no answer, lmtrc  Called son, no answer, lmtrc  meds still have not been filled      [facility] Interim Update    Called son and pt again, no answer, lmtrc x2      [facility] Interim Update    Called son and pt, no answer. Left message stating that it is unusual to not hear from pt and I will consider a well check with police if I don't hear back. Found Jeri's number (grandchild) on chart, called her. She agreed to reach out to [facility] and have him or Reddy call me.

## 2022-03-14 NOTE — TELEPHONE ENCOUNTER
Caller: Reddy Castellano    Relationship to patient: Self    Best call back number: [phone]    Patient is needing: PATIENT CALLED STATING HE IS NEEDING A CALL BACK TO SPEAK WITH LUKASZ. THE PATIENT STATED HE STILL HAS NOT RECEIVED HIS MEDICATIONS AT THE PHARMACY AND WOULD LIKE TO KNOW THE STATUS OF THEM. PLEASE ADVISE THANK YOU.         [facility] [place] [place] [facility] [address] - [phone] [facility] [phone]   [phone]

## 2022-03-15 ENCOUNTER — PATIENT OUTREACH (OUTPATIENT)
Dept: CASE MANAGEMENT | Facility: OTHER | Age: 87
End: 2022-03-15

## 2022-03-15 DIAGNOSIS — F03.90 DEMENTIA WITHOUT BEHAVIORAL DISTURBANCE, UNSPECIFIED DEMENTIA TYPE: Primary | ICD-10-CM

## 2022-03-15 DIAGNOSIS — I10 HYPERTENSION, UNSPECIFIED TYPE: ICD-10-CM

## 2022-03-15 PROCEDURE — 99439 CHRNC CARE MGMT STAF EA ADDL: CPT | Performed by: INTERNAL MEDICINE

## 2022-03-15 PROCEDURE — 99490 CHRNC CARE MGMT STAFF 1ST 20: CPT | Performed by: INTERNAL MEDICINE

## 2022-03-15 NOTE — TELEPHONE ENCOUNTER
Spoke to pt- he and [facility] pharmacy both confirmed that pt was last given Norvasc 10mg daily and Lipitor 10mg daily. HOWEVER, both were last refilled Dec 2020. Pt adamant that he has been taking as prescribed. I told him that if that refill in Dec 2020 was the last one (he was given #90), it was not possible he has been taking them daily. He said he has a drawer that has extra refills of meds. None have been sent in though since 2020. He uses no other pharmacy. Pt has been on these meds long term, checked in prior computer system. Please fill if agreeable. He also wants Vit E refilled, does not know why he is on it, even though [facility] it in Nov 2021.    Appt with you 4/11    Also, ordering [facility] to get nurse out to look at meds, please sign order.

## 2022-03-15 NOTE — TELEPHONE ENCOUNTER
Called pt x3, voicemail full    Called [facility] pharmacy to verify doses of med:  Lipitor- pt requested 20 mg daily, 10mg last filled in Epic, [facility] stated they filled 20 mg in Dec 2020.  Norvasc- pt requested 10 mg daily, we last filled 20mg in [place]. [facility] filled 10 mg last in Dec 2020.  Vit E- hospital DC'd in Nov 2021

## 2022-03-16 DIAGNOSIS — G30.9 ALZHEIMER'S DISEASE, UNSPECIFIED (CODE): Primary | ICD-10-CM

## 2022-03-16 DIAGNOSIS — Z79.899 MEDICATION MANAGEMENT: ICD-10-CM

## 2022-03-16 RX ORDER — ERGOCALCIFEROL 1.25 MG/1
50000 CAPSULE ORAL 2 TIMES WEEKLY
Qty: 26 CAPSULE | Refills: 0 | Status: SHIPPED | OUTPATIENT
Start: 2022-03-17 | End: 2022-10-26 | Stop reason: SDUPTHER

## 2022-03-16 RX ORDER — ATORVASTATIN CALCIUM 10 MG/1
10 TABLET, FILM COATED ORAL DAILY
Qty: 90 TABLET | Refills: 1 | Status: SHIPPED | OUTPATIENT
Start: 2022-03-16 | End: 2022-09-09 | Stop reason: SDUPTHER

## 2022-03-16 NOTE — TELEPHONE ENCOUNTER
Pt agreed to this.  Has 1 week left of Norvasc, will continue to record BP log daily. Will continue log after, so we can see how BP trending.    Home health will see pt tomorrow. Instructed to call me and I will go through meds one by one.

## 2022-03-16 NOTE — TELEPHONE ENCOUNTER
I will refill lipitor, and vitamin d as those are both due for refills and on his medication list. I will not be refilling Norvasc or Vitamin E as he hasn't not been on it in quite some time. We can discuss further when he arrives in the office. He had lab work in February that showed he is doing well and does not need the extra medications.     I don't see the  order, I will sign.

## 2022-03-17 ENCOUNTER — PATIENT OUTREACH (OUTPATIENT)
Dept: CASE MANAGEMENT | Facility: OTHER | Age: 87
End: 2022-03-17

## 2022-03-17 ENCOUNTER — TELEPHONE (OUTPATIENT)
Dept: CASE MANAGEMENT | Facility: OTHER | Age: 87
End: 2022-03-17

## 2022-03-17 DIAGNOSIS — Z79.899 POLYPHARMACY: ICD-10-CM

## 2022-03-17 DIAGNOSIS — F03.90 DEMENTIA WITHOUT BEHAVIORAL DISTURBANCE, UNSPECIFIED DEMENTIA TYPE: Primary | ICD-10-CM

## 2022-03-17 DIAGNOSIS — I10 HYPERTENSION, UNSPECIFIED TYPE: ICD-10-CM

## 2022-03-17 DIAGNOSIS — G20 PARKINSON'S DISEASE: ICD-10-CM

## 2022-03-17 RX ORDER — EPINEPHRINE 0.15 MG/.15ML
INJECTION SUBCUTANEOUS
COMMUNITY

## 2022-03-17 RX ORDER — ALLOPURINOL 300 MG/1
150 TABLET ORAL DAILY
COMMUNITY
End: 2022-06-14 | Stop reason: SDUPTHER

## 2022-03-17 RX ORDER — CARBOXYMETHYLCELLULOSE SODIUM 5 MG/ML
1 SOLUTION/ DROPS OPHTHALMIC 4 TIMES DAILY PRN
COMMUNITY

## 2022-03-17 RX ORDER — DONEPEZIL HYDROCHLORIDE 5 MG/1
5 TABLET, FILM COATED ORAL NIGHTLY
Qty: 90 TABLET | Refills: 1 | Status: SHIPPED | OUTPATIENT
Start: 2022-03-17 | End: 2022-08-04 | Stop reason: SDUPTHER

## 2022-03-17 RX ORDER — ASPIRIN 81 MG/1
81 TABLET ORAL DAILY
COMMUNITY

## 2022-03-17 RX ORDER — POTASSIUM CHLORIDE 750 MG/1
20 CAPSULE, EXTENDED RELEASE ORAL 2 TIMES DAILY
COMMUNITY

## 2022-03-17 RX ORDER — LANOLIN ALCOHOL/MO/W.PET/CERES
1000 CREAM (GRAM) TOPICAL DAILY
COMMUNITY
End: 2022-04-12 | Stop reason: SDUPTHER

## 2022-03-17 RX ORDER — ALBUTEROL SULFATE 90 UG/1
2 AEROSOL, METERED RESPIRATORY (INHALATION) EVERY 6 HOURS PRN
COMMUNITY

## 2022-03-17 RX ORDER — FUROSEMIDE 20 MG/1
20 TABLET ORAL DAILY
COMMUNITY
End: 2022-06-14 | Stop reason: SDUPTHER

## 2022-03-17 RX ORDER — MULTIPLE VITAMINS W/ MINERALS TAB 9MG-400MCG
1 TAB ORAL DAILY
COMMUNITY

## 2022-03-17 RX ORDER — UREA 10 %
6 LOTION (ML) TOPICAL NIGHTLY PRN
COMMUNITY

## 2022-03-17 RX ORDER — BROMPHENIRAMINE MALEATE, PSEUDOEPHEDRINE HYDROCHLORIDE, AND DEXTROMETHORPHAN HYDROBROMIDE 2; 30; 10 MG/5ML; MG/5ML; MG/5ML
10 SYRUP ORAL 4 TIMES DAILY PRN
COMMUNITY
End: 2022-09-09 | Stop reason: SDUPTHER

## 2022-03-17 RX ORDER — AMLODIPINE BESYLATE 10 MG/1
10 TABLET ORAL DAILY
COMMUNITY
End: 2022-04-01 | Stop reason: SDUPTHER

## 2022-03-17 RX ORDER — FLUTICASONE PROPIONATE 50 MCG
2 SPRAY, SUSPENSION (ML) NASAL DAILY
COMMUNITY

## 2022-03-17 NOTE — TELEPHONE ENCOUNTER
If the patient has had the medication discontinued by Dr Gutierrez since 2018, I feel that decision is appropriate as he hasn't had any further events/stents/etc to consider him unsafe on just an 81mg ASA daily.

## 2022-03-17 NOTE — OUTREACH NOTE
AMBULATORY CASE MANAGEMENT NOTE    Name and Relationship of Patient/Support Person: Reddy Castellano - Self    CCM Interim Update    Kerri with [facility] called, two hour med rec done, see note to provider.    Pt c/o feeling tired during the day, son agreed. Upon further discussion, it was evident pt not wearing cpap nightly or when he does, not all night. Education on correlation between this and poor sleep. Also, pt is on many meds, many allergy meds, and not exercising. I educated and urged pt to increase activity, do things that stimulated his body (be in bright rooms, open windows and curtains.) Also, age alone and chronic conditions can cause fatigue.      Per chart, pt was supposed to have f/u labs done last week but did not complete. K was high. I instructed pt to come get labs.    Home health will not be able to follow pt after today since pt is not home bound.    Regarding meds, the only meds at this time not filled were amlodipine and Vit E, they were not on med list and had not been filled since 2020. Pt was supposed to decrease his Propranolol from 20mg TID to 10mg after Dr Borja visit Feb 2022. He did not. I educated pt that this could be causing his low heart rate. Vitals during home health visit: 118/64 (standing) HR 42 last time, temp 97.1, O2 99% room air.  Pt to take BP daily and call for abn values.    Care Coordination    Spoke with pt, Kerri FONTAINE and son.      Education Documentation  Unresolved/Worsening Symptoms, taught by Roxi Woodson RN at 3/17/2022  1:20 PM.  Learner: Patient  Readiness: Acceptance  Method: Explanation  Response: Verbalizes Understanding    Medication Management, taught by Roxi Woodson RN at 3/17/2022  1:20 PM.  Learner: Family, Patient  Readiness: Acceptance  Method: Explanation  Response: Verbalizes Understanding    Blood Pressure Monitoring, taught by Roxi Woodson RN at 3/17/2022  1:20 PM.  Learner: Family, Patient  Readiness:  Acceptance  Method: Explanation  Response: Verbalizes Understanding    Medication Management, taught by Roxi Woodson, RN at 3/17/2022  1:19 PM.  Learner: Patient  Readiness: Acceptance  Method: Explanation  Response: Verbalizes Understanding    medication management, taught by Roxi Woodson, RN at 3/17/2022  1:19 PM.  Learner: Family, Patient  Readiness: Acceptance  Method: Explanation, Teach Back  Response: Verbalizes Understanding            CCM Interim Update    3/18: Called pt and reminded him he needed labs, he agreed to come Monday. Updated him on not resuming Plavix per LUCI Talbert' orders. I will cancel Dr Mitzy chowdary, per pt, since he is re-established with Dr Borja.      ROXI RAMOS  Ambulatory Case Management    3/17/2022, 13:20 EDT

## 2022-03-17 NOTE — TELEPHONE ENCOUNTER
Spent two hours doing med rec with pt while home health at his home. Multiple other meds added (all were in previous computer system and prescribed by pcp or other providers.)    Plavix- med on pt list. Pt NOT taking. Does not know the last time he was on it. Matt Menard'd 2018. But pt told Dr Lozano 10/2021 he was taking it. However, pt was not and has not been taking it, unsure how it got listed as med during that visit. Pt has had CVA, TIAs, and MI. Only on Aspirin 81mg. Please advise. No allergies.

## 2022-03-28 ENCOUNTER — PATIENT OUTREACH (OUTPATIENT)
Dept: CASE MANAGEMENT | Facility: OTHER | Age: 87
End: 2022-03-28

## 2022-03-28 DIAGNOSIS — F03.90 DEMENTIA WITHOUT BEHAVIORAL DISTURBANCE, UNSPECIFIED DEMENTIA TYPE: ICD-10-CM

## 2022-03-28 DIAGNOSIS — I10 HYPERTENSION, UNSPECIFIED TYPE: Primary | ICD-10-CM

## 2022-03-28 NOTE — TELEPHONE ENCOUNTER
Due to discrepancies, we did not fill Amlodipine for pt. Pt was instructed to take BP daily starting 3/16. That would have given us a log of one week with the med, and then a few days of BP since running out. Pt did not keep log. Has been out of med for 7+ days. He gave me yesterday's and today's readings:    141/84  Hr 70  141/83  HR 65    No acute concerns or complaints. Will call on Friday for log. I educated on pt on importance of keeping a log and that we cannot make med changes based off of one BP reading. He agreed.

## 2022-03-28 NOTE — OUTREACH NOTE
AMBULATORY CASE MANAGEMENT NOTE    Name and Relationship of Patient/Support Person: Reddy Castellano - Self    Pt called and asked for refill on Amlodipine. Educated pt again that that med is not going to be filled until he is either seen on 4/11 or he gives me a log of BP since running out. There was a discrepancy on BP dose from what pt reported, what was at pharmacy, when it was filled last, and what pcp noted him to be on. Pt verbalized understanding. Pt's systolic BP in 140s. Sent to LUCI Talbert. Will call him later this week for BP log. Instructed pt to call if systolic was 145+    Education Documentation  No documentation found.        BRANDON RAMOS  Ambulatory Case Management    3/28/2022, 10:46 EDT

## 2022-03-30 ENCOUNTER — PATIENT OUTREACH (OUTPATIENT)
Dept: CASE MANAGEMENT | Facility: OTHER | Age: 87
End: 2022-03-30

## 2022-03-30 DIAGNOSIS — F03.90 DEMENTIA WITHOUT BEHAVIORAL DISTURBANCE, UNSPECIFIED DEMENTIA TYPE: ICD-10-CM

## 2022-03-30 DIAGNOSIS — I10 HYPERTENSION, UNSPECIFIED TYPE: Primary | ICD-10-CM

## 2022-03-30 NOTE — OUTREACH NOTE
[facility] End of Month Documentation    This Chronic Medical Management Care Plan for Reddy Castellano, 86 y.o. male, has been monitored and managed and a new plan of care implemented for the month of March.  A cumulative time of 305  minutes was spent on this patient record this month, including phone call with relative; phone call with patient; phone call with other providers; chart review; electronic communication with other providers; electronic communication with primary care provider.    Regarding the patient's problems: has Gout; Type 2 diabetes mellitus with hyperglycemia, without long-term current use of insulin (HCC); Essential hypertension; Other hyperlipidemia; Alzheimer's disease, unspecified (CODE) ([facility]); Major depressive disorder, recurrent, moderate ([facility]); Scrotal pain; Urge incontinence of urine; Urgency of urination; Balanitis; Candida rash of groin; Abnormal weight loss; Anxiety; Rheumatoid arthritis ([facility]); Chronic obstructive pulmonary disease ([facility]); Constipation; Depressive disorder; Disease; Disorder of prostate; Disorder of vision; Essential tremor; Excess skin of abdominal wall; Heart failure ([facility]); Mechanical complication of genitourinary device; Morbid obesity ([facility]); Myocardial infarction ([facility]); Numbness; Pneumonia; Seasonal allergies; Sleep apnea; Spondylosis of lumbar spine; Tingling of skin; Ventral hernia; Lymphadenopathy of head and neck; and Swallowed foreign body on their problem list., the following items were addressed: medical records; medications, polypharmacy and any changes can be found within the plan section of the note.  A detailed listing of time spent for chronic care management is tracked within each outreach encounter.  Current medications include:  has a current medication list which includes the following prescription(s): albuterol sulfate hfa, allopurinol, amlodipine, aspirin, atorvastatin, brompheniramine-pseudoephedrine-dm, bupropion xl, carboxymethylcellulose, cetirizine,  citalopram, clopidogrel, donepezil, epinephrine, fluticasone, fluticasone-salmeterol, furosemide, lisinopril, melatonin, memantine, mirabegron er, montelukast, multivitamin with minerals, nystatin, pantoprazole, polyethylene glycol, polyethylene glycol 400, potassium chloride, propranolol, tiotropium bromide monohydrate, venlafaxine xr, vitamin b-12, and vitamin d. and the patient is reported to be patient is noncompliant with medication protocol; caregiver will take responsibility for med compliance,  Medications are reported to be non-effective in controlling symptoms and changes have been made to the medication protocol, appt with pcp on 4/11.  Regarding these diagnoses, referrals were made to the following provider(s):  Home health.  All notes on chart for PCP to review.    The patient was monitored remotely for blood pressure; weight; activity level; mood & behavior; medications, memory, med compliance, plastic/urology surgery.    The patient's physical needs include:  help taking medications as prescribed; physical healthcare, memory.     The patient's mental support needs include:  continued support    The patient's cognitive support needs include:  continued support; medication; coordination of community providers; increased support, son to help organize pills/meds    The patient's psychosocial support needs include:  continued support, pt sees Jyoti    The patient's functional needs include: physical healthcare    The patient's environmental needs include:  not applicable    Care Plan overall comments:  No data recorded    Refer to previous outreach notes for more information on the areas listed above.    Monthly Billing Diagnoses  (I10) Hypertension, unspecified type    (F03.90) Dementia without behavioral disturbance, unspecified dementia type (HCC)    Medications   · Medications have been reconciled    Care Plan progress this month:      Recently Modified Care Plans Updates made since 2/27/2022 12:00 AM  "    Hypertension (Adult)         Problem Priority Last Modified     ELS HYPERTENSION (HYPERTENSION) (ADULT) --  3/17/2022  1:09 PM by Roxi Woodson RN              Goal Recent Progress Last Modified     Hypertension Monitored --  3/17/2022  1:09 PM by Roxi Woodson RN     Evidence-based guidance:   Promote initial use of ambulatory blood pressure measurements (for 3 days) to rule out \"white-coat\" effect; identify masked hypertension and presence or absence of nocturnal \"dipping\" of blood pressure.    Encourage continued use of home blood pressure monitoring and recording in blood pressure log; include symptoms of hypotension or potential medication side effects in log.   Review blood pressure measurements taken inside and outside of the provider office; establish baseline and monitor trends; compare to target ranges or patient goal.   Share overall cardiovascular risk with patient; encourage changes to lifestyle risk factors, including alcohol consumption, smoking, inadequate exercise, poor dietary habits and stress.    Notes:              Task Due Date Last Modified     Identify and Monitor Blood Pressure Elevation --  3/17/2022  1:10 PM by Roxi Woodson RN     Care Management Activities:      - home or ambulatory blood pressure monitoring encouraged      Notes:                Problem Priority Last Modified     ELS DISEASE PROGRESSION (HYPERTENSION) (ADULT) --  3/17/2022  1:09 PM by Roxi Woodson RN              Goal Recent Progress Last Modified     Disease Progression Prevented or Minimized --  3/17/2022  1:09 PM by Roxi Woodson RN     Evidence-based guidance:   Tailor lifestyle advice to individual; review progress regularly; give frequent encouragement and respond positively to incremental successes.   Assess for and promote awareness of worsening disease or development of comorbidity.   Prepare patient for laboratory and diagnostic exams based on risk and presentation. "   Prepare patient for use of pharmacologic therapy that may include diuretic, beta-blocker, beta-blocker/thiazide combination, angiotensin-converting enzyme inhibitor, renin-angiotensin blocker or calcium-channel blocker.   Expect periodic adjustments to pharmacologic therapy; manage side effects.   Promote a healthy diet that includes primarily plant-based foods, such as fruits, vegetables, whole grains, beans and legumes, low-fat dairy and lean meats.    Consider moderate reduction in sodium intake by avoiding the addition of salt to prepared foods and limiting processed meats, canned soup, frozen meals and salty snacks.    Promote a regular, daily exercise goal of 150 minutes per week of moderate exercise based on tolerance, ability and patient choice; consider referral to physical therapist, community wellness and/or activity program.   Encourage the avoidance of no more than 2 hours per day of sedentary activity, such as recreational screen time.   Review sources of stress; explore current coping strategies and encourage use of mindfulness, yoga, meditation or exercise to manage stress.    Notes:              Task Due Date Last Modified     Alleviate Barriers to Hypertension Treatment --  3/17/2022  1:10 PM by Roxi Woodson RN     Care Management Activities:      - medication side effects managed  - quality of sleep assessed  - sleep hygiene techniques encouraged      Notes:                Problem Priority Last Modified     ELS RESISTANT HYPERTENSION (HYPERTENSION) (ADULT) --  3/17/2022  1:09 PM by Roxi Woodson RN              Goal Recent Progress Last Modified     Response to Treatment Maximized --  3/17/2022  1:09 PM by Roxi Woodson RN     Evidence-based guidance:   Assess patient response to treatment, including presence or absence of medication side effects, degree of blood pressure control and patient satisfaction.   Assess technique (including cuff size and placement), measurement  times, condition and calibration of blood pressure cuff set (both at-home and in-office equipment).   Assess factors that may influence response to treatment, including nonadherence to pharmacologic treatment plan, diet or activity changes and/or presence of pain, stress or sleep disturbance.   Screen for signs and symptoms of depression; if present, refer for or complete a comprehensive assessment.   Evaluate social and economic barriers that may affect adherence to treatment plan   Address pharmacologic nonadherence by simplifying dosing regimen, counseling or support by pharmacist, financial assistance, self-monitoring of blood pressure, use of motivational interviewing, voice or text messages.   Encourage behavioral adherence strategies, like habit-based interventions that link medication taking with existing daily routines.   Assess barriers to regular, daily physical activity; support family or support person-oriented activity changes and utilization of community activity or sports program.   Address barriers to dietary changes, especially sodium restriction, with referrals to community programs, like cooking classes, meal services or intensive education when available.   Refer to community-based peer support program or nurse home-visiting program.   Assess for chronic pain; when present add additional goals (Chronic Pain Care Plan Guide) as needed.   Provide frequent follow-up by telephone, telemonitoring, patient-practice portal or with home visit.   Review alcohol use screen; address using brief intervention beginning with risk that interferes with blood pressure control; refer for treatment when excessive alcohol use is noted.   Screen for obstructive sleep apnea; prepare patient for polysomnography based on risk and presentation and use of noninvasive ventilation to relieve obstructive sleep apnea when present.    Notes:              Task Due Date Last Modified     Facilitate Adherence to Lifestyle Change  --  3/17/2022  1:10 PM by Roxi Woodson, RN     Care Management Activities:      - barriers to lifestyle change identified  - follow-up frequency increased  - medication adherence assessment completed  - support and encouragement provided      Notes:                            Instructions   · Patient was provided an electronic copy of care plan  · CCM services were explained and offered and patient has accepted these services.  · Patient has given their written consent to receive CCM services and understands that this includes the authorization of electronic communication of medical information with the other treating providers.  · Patient understands that they may stop CCM services at any time and these changes will be effective at the end of the calendar month and will effectively revocate the agreement of CCM services.  · Patient understands that only one practitioner can furnish and be paid for CCM services during one calendar month.  Patient also understands that there may be co-payment or deductible fees in association with CCM services.  · Patient will continue with at least monthly follow-up calls with the Nurse Navigator.    ROXI RAMOS  Ambulatory Case Management    3/30/2022, 11:04 EDT

## 2022-03-30 NOTE — OUTREACH NOTE
AMBULATORY CASE MANAGEMENT NOTE    Name and Relationship of Patient/Support Person: Kerri RN with [facility] -     [facility] Interim Update    Pt called to ask if HH was coming today. I checked with Kerri RN who said pt was DC'd. Pt verbalized understanding.          BRANDON RAMOS  Ambulatory Case Management    3/30/2022, 10:58 EDT

## 2022-04-01 ENCOUNTER — TELEPHONE (OUTPATIENT)
Dept: SURGERY | Facility: CLINIC | Age: 87
End: 2022-04-01

## 2022-04-01 ENCOUNTER — TELEPHONE (OUTPATIENT)
Dept: CASE MANAGEMENT | Facility: OTHER | Age: 87
End: 2022-04-01

## 2022-04-01 ENCOUNTER — PATIENT OUTREACH (OUTPATIENT)
Dept: CASE MANAGEMENT | Facility: OTHER | Age: 87
End: 2022-04-01

## 2022-04-01 DIAGNOSIS — I10 HYPERTENSION, UNSPECIFIED TYPE: Primary | ICD-10-CM

## 2022-04-01 DIAGNOSIS — F03.90 DEMENTIA WITHOUT BEHAVIORAL DISTURBANCE, UNSPECIFIED DEMENTIA TYPE: ICD-10-CM

## 2022-04-01 DIAGNOSIS — R32 URINARY INCONTINENCE, UNSPECIFIED TYPE: ICD-10-CM

## 2022-04-01 DIAGNOSIS — R39.15 URINARY URGENCY: Primary | ICD-10-CM

## 2022-04-01 RX ORDER — AMLODIPINE BESYLATE 10 MG/1
10 TABLET ORAL DAILY
Qty: 90 TABLET | Refills: 0 | Status: SHIPPED | OUTPATIENT
Start: 2022-04-01 | End: 2022-08-23 | Stop reason: SDUPTHER

## 2022-04-01 NOTE — TELEPHONE ENCOUNTER
PCP-Dr Ceci Diane Nurse . [phone]  Myrbetriq is not helping at all. Still having urgency/incontience. What else can he do or try? Does he need another appt or can Dr. Lozano send in something else? Please call Roxi back, she said pt has dementia and she would like to know what Dr. Lozano said so she can chart and keep up with the patients care.

## 2022-04-01 NOTE — OUTREACH NOTE
AMBULATORY CASE MANAGEMENT NOTE    Name and Relationship of Patient/Support Person: Reddy Castellano - Self    Pt called c/o continued urinary urge incontinence. Dr Lozano gave pt samples of Myrbetric. Pt has not told Dr Lozano that it has not helped. I agreed to do so. I called and left message with office staff.    Pt stated his Bps since running out of Amlodipine have been:  159/99  144/79  147/99 and 164/108 today. He has not taken his BP meds this AM, agreed to do so and I will call him later today then fwd to provider.    [facility] Interim Update    1430: BP now 163/94  Pt not in distress. [facility] refilled norvasc 10 mg daily. [facility]  Amb[facility] Case Management    4/1/2022, 13:16 EDT

## 2022-04-04 ENCOUNTER — PATIENT OUTREACH (OUTPATIENT)
Dept: CASE MANAGEMENT | Facility: OTHER | Age: 87
End: 2022-04-04

## 2022-04-04 DIAGNOSIS — I10 HYPERTENSION, UNSPECIFIED TYPE: Primary | ICD-10-CM

## 2022-04-04 DIAGNOSIS — R32 URINARY INCONTINENCE, UNSPECIFIED TYPE: ICD-10-CM

## 2022-04-04 DIAGNOSIS — R39.15 URINARY URGENCY: ICD-10-CM

## 2022-04-04 DIAGNOSIS — F03.90 DEMENTIA WITHOUT BEHAVIORAL DISTURBANCE, UNSPECIFIED DEMENTIA TYPE: ICD-10-CM

## 2022-04-04 LAB
BACTERIA UR QL AUTO: NORMAL /HPF
BILIRUB UR QL STRIP: NEGATIVE
CLARITY UR: CLEAR
COLOR UR: YELLOW
GLUCOSE UR STRIP-MCNC: NEGATIVE MG/DL
HGB UR QL STRIP.AUTO: NEGATIVE
HYALINE CASTS UR QL AUTO: NORMAL /LPF
KETONES UR QL STRIP: NEGATIVE
LEUKOCYTE ESTERASE UR QL STRIP.AUTO: NEGATIVE
NITRITE UR QL STRIP: NEGATIVE
PH UR STRIP.AUTO: 6 [PH] (ref 5–8)
PROT UR QL STRIP: NEGATIVE
RBC # UR STRIP: NORMAL /HPF
REF LAB TEST METHOD: NORMAL
SP GR UR STRIP: 1.01 (ref 1–1.03)
SQUAMOUS #/AREA URNS HPF: NORMAL /HPF
UROBILINOGEN UR QL STRIP: NORMAL
WBC # UR STRIP: NORMAL /HPF

## 2022-04-04 PROCEDURE — 87086 URINE CULTURE/COLONY COUNT: CPT | Performed by: NURSE PRACTITIONER

## 2022-04-04 PROCEDURE — 81001 URINALYSIS AUTO W/SCOPE: CPT | Performed by: NURSE PRACTITIONER

## 2022-04-04 NOTE — OUTREACH NOTE
AMBULATORY CASE MANAGEMENT NOTE    Name and Relationship of Patient/Support Person:  -     Allison called with Dr Lozano office stating pt can  samples of Gemtesa for urinary incontinence and also leave a urine sample for culture. They close at 4pm. I called pt and he verbalized understanding. He will go today.        BRANDON RAMOS  Ambulatory Case Management    4/4/2022, 08:39 EDT

## 2022-04-04 NOTE — TELEPHONE ENCOUNTER
Called and spoke with thiago. She will let patient know and he will come pick these up at some point this week

## 2022-04-05 ENCOUNTER — TELEPHONE (OUTPATIENT)
Dept: UROLOGY | Facility: CLINIC | Age: 87
End: 2022-04-05

## 2022-04-05 DIAGNOSIS — R35.0 URINARY FREQUENCY: Primary | ICD-10-CM

## 2022-04-05 LAB — BACTERIA SPEC AEROBE CULT: NORMAL

## 2022-04-05 NOTE — TELEPHONE ENCOUNTER
----- Message from MALCOLM Hoyos sent at 4/5/2022  5:49 AM EDT -----  Regarding: FW:  Patient UA unremarkable are we waiting on a CX for the patient as well?  ----- Message -----  From: Lab, Background User  Sent: 4/4/2022  10:18 PM EDT  To: MALCOLM Hoyos

## 2022-04-05 NOTE — TELEPHONE ENCOUNTER
Called patient to let him know his urine culture was contaminated. He said he is feeling better but will come in Friday to repeat the urine culture if he is still having symptoms. Order placed.

## 2022-04-05 NOTE — PROGRESS NOTES
Please inform of contaminated CX  Azithromycin Pregnancy And Lactation Text: This medication is considered safe during pregnancy and is also secreted in breast milk.

## 2022-04-06 ENCOUNTER — HOSPITAL ENCOUNTER (EMERGENCY)
Facility: HOSPITAL | Age: 87
Discharge: HOME | End: 2022-04-06
Attending: EMERGENCY MEDICINE | Admitting: EMERGENCY MEDICINE

## 2022-04-06 ENCOUNTER — PATIENT OUTREACH (OUTPATIENT)
Dept: CASE MANAGEMENT | Facility: OTHER | Age: 87
End: 2022-04-06

## 2022-04-06 VITALS
WEIGHT: 218.03 LBS | BODY MASS INDEX: 33.04 KG/M2 | OXYGEN SATURATION: 96 % | HEART RATE: 61 BPM | HEIGHT: 68 IN | RESPIRATION RATE: 16 BRPM | SYSTOLIC BLOOD PRESSURE: 119 MMHG | TEMPERATURE: 97.8 F | DIASTOLIC BLOOD PRESSURE: 61 MMHG

## 2022-04-06 DIAGNOSIS — R32 URINARY INCONTINENCE, UNSPECIFIED TYPE: Primary | ICD-10-CM

## 2022-04-06 DIAGNOSIS — F03.90 DEMENTIA WITHOUT BEHAVIORAL DISTURBANCE, UNSPECIFIED DEMENTIA TYPE: ICD-10-CM

## 2022-04-06 LAB
BILIRUB UR QL STRIP: NEGATIVE
CLARITY UR: CLEAR
COLOR UR: YELLOW
GLUCOSE UR STRIP-MCNC: NEGATIVE MG/DL
HGB UR QL STRIP.AUTO: NEGATIVE
KETONES UR QL STRIP: NEGATIVE
LEUKOCYTE ESTERASE UR QL STRIP.AUTO: NEGATIVE
NITRITE UR QL STRIP: NEGATIVE
PH UR STRIP.AUTO: 6 [PH] (ref 5–8)
PROT UR QL STRIP: NEGATIVE
SP GR UR STRIP: 1.01 (ref 1–1.03)
UROBILINOGEN UR QL STRIP: NORMAL

## 2022-04-06 PROCEDURE — 81003 URINALYSIS AUTO W/O SCOPE: CPT | Performed by: EMERGENCY MEDICINE

## 2022-04-06 PROCEDURE — 99283 EMERGENCY DEPT VISIT LOW MDM: CPT

## 2022-04-06 NOTE — ED TRIAGE NOTES
Pt to ED from home with urinary incontinence.      Pt recently prescribed gemtesa for incontinence and states he wonders if the medicine is making his symptoms worse.

## 2022-04-06 NOTE — ED NOTES
Monitored pt. On RA while awake to ensure sats stayed above 92%. Pt. Maintained sats at 95-96% RA.

## 2022-04-06 NOTE — ED NOTES
Placed pt. On 2L supplemental o2 via nasal cannula d/t o2 dropping while asleep. Pt. And family report that pt. Has hx. Of sleep apnea.

## 2022-04-06 NOTE — ED PROVIDER NOTES
Time: 7:16 AM EDT  Arrived by: private car  Chief Complaint: urinary incontinence    History provided by: pt  History is limited by: N/A     History of Present Illness:    Reddy Castellano is a 86 y.o. male who presents to the emergency department today with complaints of urinary incontinence over the past several days. Pt was recently seen for sx where he was prescribed Gemtesa with no relief. He denies any new problems such as dysuria or hematuria, just no relief from the incontinence. He denies fever, nausea, emesis, abdominal pain, or any other pertinent sx.      History provided by:  Patient   used: No      Patient Care Team  Primary Care Provider: Agustina Gutierrez MD    Past Medical History:     No Known Allergies  Past Medical History:   Diagnosis Date   • Allergies    • Anxiety    • Arthritis    • Asthma    • BPH (benign prostatic hyperplasia)    • Carpal tunnel syndrome    • Colon polyp    • Congestive heart failure (CHF) ([facility])    • COPD (chronic obstructive pulmonary disease) ([facility])    • Dementia ([facility])    • Depression    • Diabetes mellitus type 2, noninsulin dependent ([facility])    • Diverticulitis    • Erectile dysfunction of organic origin    • Essential hypertension 11/19/2019   • Excess skin    • Fatigue 11/09/2019   • GERD (gastroesophageal reflux disease)    • Gout    • Heart attack ([facility])    • Heart disease    • High cholesterol    • Hyperlipidemia    • Hypertension    • Hypogonadism in male    • Insomnia    • Leg swelling    • Memory change 08/02/2018    currently, pt is taking namenda. i will pursue a mocha at his follow up    • Myocardial infarction (HCC)    • Osteoarthritis    • Parkinson's disease ([facility])    • Prostate disorder    • Rectal bleeding    • Renal cyst    • Seasonal allergies    • Sleep apnea    • Stroke ([facility])    • TIA (transient ischemic attack) 08/02/2018    reportedly, the pt has a history of TIA. This can be discussed further at his follow up. i was asked that  he cont. aspirin.   • Tremor 08/02/2018    pt was a 3 mo history of a high frequency, low amplitude tremor of both hands that worsens some what with activity. tremor does not interfere with his activities of daily living. he has no evidence of bradykinesia on exam. pakinsons seems unlikely at this point. potential etiologies would include essential tremor and physiologic tremor. he does note that he started theophylline approx. 3 months ag   • Vitamin D deficiency      Past Surgical History:   Procedure Laterality Date   • APPENDECTOMY  2015   • CARDIAC CATHETERIZATION     • CARDIAC SURGERY  2014   • CARPAL TUNNEL RELEASE     • CATARACT EXTRACTION     • CIRCUMCISION  2000   • COLONOSCOPY  2014 2017   • CYSTOSCOPY     • ENDOSCOPY  2010 2017   • ENDOSCOPY N/A 11/23/2021    Procedure: ESOPHAGOGASTRODUODENOSCOPY;  Surgeon: Anika Cummins MD;  Location: [facility] MAIN OR;  Service: Gastroenterology;  Laterality: N/A;  gastritis   • EYE SURGERY      eye implant   • HERNIA REPAIR  2000   • LAPAROSCOPIC GASTRIC BANDING     • ORCHIECTOMY Right 2010   • OTHER SURGICAL HISTORY      metal implants;    • PENILE PROSTHESIS IMPLANT     • TOE SURGERY     • TOENAIL EXCISION      surgical removal of toenail and nail matrix of both feet   • TOTAL KNEE ARTHROPLASTY Bilateral 2009 2010     Family History   Problem Relation Age of Onset   • Diabetes Mother    • Heart attack Father    • Diabetes Brother    • Rectal cancer Other         grandfather- rectal and colon   • Prostate cancer Other    • Diabetes Son        Home Medications:  Prior to Admission medications    Medication Sig Start Date End Date Taking? Authorizing Provider   albuterol sulfate  (90 Base) MCG/ACT inhaler Inhale 2 puffs Every 6 (Six) Hours As Needed.    Provider, MD Lorenza   allopurinol (ZYLOPRIM) 300 MG tablet Take 150 mg by mouth Daily.    Provider, MD Lorenza   amLODIPine (NORVASC) 10 MG tablet Take 1 tablet by mouth Daily. 4/1/22   Nitish  MALCOLM Baltazar   aspirin 81 MG EC tablet Take 81 mg by mouth Daily.    Lorenza Harding MD   atorvastatin (LIPITOR) 10 MG tablet Take 1 tablet by mouth Daily. 3/16/22   Delaney Talbert APRN   brompheniramine-pseudoephedrine-DM 30-2-10 MG/5ML syrup Take 10 mL by mouth 4 (Four) Times a Day As Needed.    Lorenza Harding MD   buPROPion XL (WELLBUTRIN XL) 150 MG 24 hr tablet Take 150 mg by mouth Every Morning. 8/4/21   Lorenza Harding MD   carboxymethylcellulose (REFRESH PLUS) 0.5 % solution Administer 1 drop to both eyes 4 (Four) Times a Day As Needed.    Lorenza Harding MD   cetirizine (zyrTEC) 10 MG tablet Take 1 tablet by mouth Daily for 90 days. 11/22/21 2/20/22  Agustina Gutierrez MD   citalopram (CeleXA) 20 MG tablet Take 1 tablet by mouth Daily. 9/14/21 9/14/22  Agustina Gutierrez MD   clopidogrel (PLAVIX) 75 MG tablet Take 1 tablet by mouth Daily.    Lorenza Harding MD   donepezil (ARICEPT) 5 MG tablet Take 1 tablet by mouth Every Night. 3/17/22 3/17/23  Delaney Talbert APRN   EPINEPHrine 0.15 MG/0.15ML solution auto-injector injection Inject  under the skin into the appropriate area as directed.    Lorenza Harding MD   fluticasone (FLONASE) 50 MCG/ACT nasal spray 2 sprays into the nostril(s) as directed by provider Daily.    Lorenza Harding MD   fluticasone-salmeterol (ADVAIR HFA) 230-21 MCG/ACT inhaler Inhale 2 puffs Every 6 (Six) Hours.    Lorenza Harding MD   furosemide (LASIX) 20 MG tablet Take 20 mg by mouth Daily.    Lorenza Harding MD   lisinopril (PRINIVIL,ZESTRIL) 5 MG tablet Take 5 mg by mouth Daily.    Emergency, Nurse Epic, RN   melatonin 1 MG tablet Take 6 mg by mouth At Night As Needed.    Lorenza Harding MD   memantine (NAMENDA) 5 MG tablet Take 5 mg by mouth Daily. 8/4/21   Lorenza Harding MD   Mirabegron ER (Myrbetriq) 25 MG tablet sustained-release 24 hour 24 hr tablet Take  by mouth Daily.    Provider,  MD Lorenza   montelukast (SINGULAIR) 10 MG tablet Take 1 tablet by mouth Every Night. 11/18/21 8/28/24  Agustina Gutierrez MD   multivitamin with minerals tablet tablet Take 1 tablet by mouth Daily.    ProviderLorenza MD   nystatin (MYCOSTATIN) 756595 UNIT/GM cream Apply  topically to the appropriate area as directed As Needed (scrotal pain). 8/31/21   Christopher Lozano MD   pantoprazole (PROTONIX) 40 MG EC tablet Take 1 tablet by mouth Daily. 7/28/21   Agustina Gutierrez MD   polyethylene glycol (GlycoLax) 17 GM/SCOOP powder Take 17 g by mouth Daily. 12/27/21   Agustina Gutierrez MD   Polyethylene Glycol 400 0.25 % solution Apply 1 drop to eye(s) as directed by provider As Needed.    Lorenza Harding MD   potassium chloride (MICRO-K) 10 MEQ CR capsule Take 20 mEq by mouth 2 (Two) Times a Day.    Lorenza Harding MD   propranolol (INDERAL) 10 MG tablet Take 10 mg by mouth 1 (One) Time. 12/28/21   Lorenza Harding MD   Tiotropium Bromide Monohydrate (SPIRIVA RESPIMAT) 1.25 MCG/ACT aerosol solution inhaler Inhale 2 puffs Every 12 (Twelve) Hours.    Lorenza Harding MD   venlafaxine XR (EFFEXOR-XR) 75 MG 24 hr capsule Take 75 mg by mouth Daily. 5/20/21   Emergency, Nurse Epic, RN   vitamin B-12 (CYANOCOBALAMIN) 1000 MCG tablet Take 1,000 mcg by mouth Daily.    ProviderLorenza MD   vitamin D (ERGOCALCIFEROL) 1.25 MG (90006 UT) capsule capsule Take 1 capsule by mouth 2 (Two) Times a Week. 3/17/22   Delaney Talbert APRN        Social History:   Social History     Tobacco Use   • Smoking status: Never Smoker   • Smokeless tobacco: Never Used   Vaping Use   • Vaping Use: Never used   Substance Use Topics   • Alcohol use: Yes     Comment: occasional    • Drug use: Never       Review of Systems:  Review of Systems   Constitutional: Negative for chills and fever.   HENT: Negative for congestion, rhinorrhea and sore throat.    Eyes: Negative for pain and visual  "disturbance.   Respiratory: Negative for apnea, cough, chest tightness and shortness of breath.    Cardiovascular: Negative for chest pain and palpitations.   Gastrointestinal: Negative for abdominal pain, diarrhea, nausea and vomiting.   Genitourinary: Negative for difficulty urinating, dysuria and enuresis.        Incontinence   Musculoskeletal: Negative for joint swelling and myalgias.   Skin: Negative for color change.   Neurological: Negative for seizures and headaches.   Psychiatric/Behavioral: Negative.    All other systems reviewed and are negative.       Physical Exam:  /62 (BP Location: Left arm, Patient Position: Lying)   Pulse 51   Temp 97.8 °F (36.6 °C) (Oral)   Resp 18   Ht 172.7 cm (68\")   Wt 98.9 kg (218 lb 0.6 oz)   SpO2 93%   BMI 33.15 kg/m²     Physical Exam  Vitals and nursing note reviewed.   Constitutional:       General: He is not in acute distress.     Appearance: Normal appearance.   HENT:      Head: Normocephalic and atraumatic.      Nose: Nose normal.      Mouth/Throat:      Pharynx: Oropharynx is clear.   Eyes:      General: No scleral icterus.     Conjunctiva/sclera: Conjunctivae normal.   Cardiovascular:      Rate and Rhythm: Normal rate and regular rhythm.      Heart sounds: Normal heart sounds. No murmur heard.  Pulmonary:      Effort: No respiratory distress.      Breath sounds: Normal breath sounds. No wheezing, rhonchi or rales.   Chest:      Chest wall: No tenderness.   Abdominal:      Palpations: Abdomen is soft.      Tenderness: There is no abdominal tenderness. There is no guarding or rebound.      Comments: No rigidity.   Musculoskeletal:         General: No tenderness. Normal range of motion.      Cervical back: Normal range of motion and neck supple.      Right lower leg: No edema.      Left lower leg: No edema.   Skin:     General: Skin is warm and dry.   Neurological:      Mental Status: He is alert. Mental status is at baseline.   Psychiatric:         Mood " and Affect: Mood normal.         Behavior: Behavior normal.                Medications in the Emergency Department:  Medications - No data to display     Labs  Lab Results (last 24 hours)     Procedure Component Value Units Date/Time    Urinalysis With Culture If Indicated - Urine, Clean Catch [[id number]]  (Normal) Collected: 04/06/22 0832    Specimen: Urine, Clean Catch Updated: 04/06/22 0847     Color, UA Yellow     Appearance, UA Clear     pH, UA 6.0     Specific Gravity, UA 1.010     Glucose, UA Negative     Ketones, UA Negative     Bilirubin, UA Negative     Blood, UA Negative     Protein, UA Negative     Leuk Esterase, UA Negative     Nitrite, UA Negative     Urobilinogen, UA 0.2 E.U./dL    Narrative:      Urine microscopic not indicated.           Imaging:  No Radiology Exams Resulted Within Past 24 Hours    Procedures:  Procedures    Progress                            Medical Decision Making:  MDM   86-year-old male patient who is seeing Dr. Lozano, urology, for urinary incontinence presents after having an episode of losing his bladder in bed overnight.  Patient was just started on a new over active bladder medication, Gemtesa, 2 days ago.  Patient's bladder cyst scan shows an empty bladder.  His urine is negative for urinary tract infection.  Patient is asymptomatic in the emergency department.  He was discussed with the on-call urologist, , who states that medication will likely take several weeks to be effective.  Patient stable for discharge with urology follow-up.      Final diagnoses:   Urinary incontinence, unspecified type        Disposition:  ED Disposition     ED Disposition   Discharge    Condition   Stable    Comment   --             Documentation assistance provided by Serina Gooden acting as scribe for Jones Olson DO. Information recorded by the scribe was done at my direction and has been verified and validated by me.          Serina Gooden  04/06/22 0748       Jones Olson,  DO  04/06/22 1031

## 2022-04-06 NOTE — OUTREACH NOTE
AMBULATORY CASE MANAGEMENT NOTE    Name and Relationship of Patient/Support Person: Reddy Castellano - Self    Pt went to ER for urinary incontinence. Was started on OAB med from Dr Lozano office last week. Per ER report, pt had only been taking this new med for a few days. Med can take weeks to be effective. Reinforced this teaching. Pt verbalized understanding. Urine culture from urology office needs to be repeated. Pt will go Friday.    Called Dr Forrester's office to confirm canceled appt since re-establishing with Dr Borja. Appt canceled, it is just still showing up on chart.    Pt picked up refill on Norvasc and has been taking. I will get log from him tomorrow.      BRANDON RAMOS  Ambulatory Case Management    4/6/2022, 13:58 EDT

## 2022-04-07 NOTE — TELEPHONE ENCOUNTER
Pt stated he went to Dr Tyler Pena today (cardio) and systolic BP over 200 in both arms. Doctor told him to f/u with PCP. Since returning home, pt's BP wnl at 113/60 , no HA, dizziness, pain, or concerns. Advised pt to check BP again tonight and call on-call provider for elevated systolic BP of 160 or more, and/or go to urgent care/ER. He agreed. Will call tomorrow for BP. (Currently requesting cardio notes from today).

## 2022-04-07 NOTE — TELEPHONE ENCOUNTER
UPDATE: Talked to Dr Tyler Pena myself. Pt's BP was not elevated. It was great, at 110/62. Will call tomorrow for update. Called pt who admitted he might have misunderstood doctor.

## 2022-04-08 ENCOUNTER — PATIENT OUTREACH (OUTPATIENT)
Dept: CASE MANAGEMENT | Facility: OTHER | Age: 87
End: 2022-04-08

## 2022-04-08 DIAGNOSIS — Z79.899 POLYPHARMACY: ICD-10-CM

## 2022-04-08 DIAGNOSIS — I10 HYPERTENSION, UNSPECIFIED TYPE: ICD-10-CM

## 2022-04-08 DIAGNOSIS — F03.90 DEMENTIA WITHOUT BEHAVIORAL DISTURBANCE, UNSPECIFIED DEMENTIA TYPE: Primary | ICD-10-CM

## 2022-04-08 DIAGNOSIS — G20 PARKINSON'S DISEASE: ICD-10-CM

## 2022-04-08 NOTE — OUTREACH NOTE
AMBULATORY CASE MANAGEMENT NOTE    Name and Relationship of Patient/Support Person: Reddy Castellano - Self    CCM Interim Update    BP log:  Last night 145/77  This /77    Verified appt for Monday         BRANDON RACHEL  Ambulatory Case Management    4/8/2022, 11:35 EDT

## 2022-04-11 ENCOUNTER — OFFICE VISIT (OUTPATIENT)
Dept: INTERNAL MEDICINE | Facility: CLINIC | Age: 87
End: 2022-04-11

## 2022-04-11 VITALS
TEMPERATURE: 98 F | HEIGHT: 68 IN | HEART RATE: 70 BPM | BODY MASS INDEX: 33.34 KG/M2 | OXYGEN SATURATION: 97 % | WEIGHT: 220 LBS | SYSTOLIC BLOOD PRESSURE: 120 MMHG | DIASTOLIC BLOOD PRESSURE: 68 MMHG

## 2022-04-11 DIAGNOSIS — N39.41 URGE INCONTINENCE OF URINE: ICD-10-CM

## 2022-04-11 DIAGNOSIS — E87.5 HYPERKALEMIA: Primary | ICD-10-CM

## 2022-04-11 DIAGNOSIS — I10 ESSENTIAL HYPERTENSION: ICD-10-CM

## 2022-04-11 DIAGNOSIS — E11.65 TYPE 2 DIABETES MELLITUS WITH HYPERGLYCEMIA, WITHOUT LONG-TERM CURRENT USE OF INSULIN: ICD-10-CM

## 2022-04-11 LAB
ANION GAP SERPL CALCULATED.3IONS-SCNC: 12.1 MMOL/L (ref 5–15)
BILIRUB UR QL STRIP: NEGATIVE
BUN SERPL-MCNC: 41 MG/DL (ref 8–23)
BUN/CREAT SERPL: 21.6 (ref 7–25)
CALCIUM SPEC-SCNC: 9.8 MG/DL (ref 8.6–10.5)
CHLORIDE SERPL-SCNC: 106 MMOL/L (ref 98–107)
CLARITY UR: CLEAR
CO2 SERPL-SCNC: 25.9 MMOL/L (ref 22–29)
COLOR UR: ABNORMAL
CREAT SERPL-MCNC: 1.9 MG/DL (ref 0.76–1.27)
EGFRCR SERPLBLD CKD-EPI 2021: 33.9 ML/MIN/1.73
GLUCOSE SERPL-MCNC: 95 MG/DL (ref 65–99)
GLUCOSE UR STRIP-MCNC: NEGATIVE MG/DL
HGB UR QL STRIP.AUTO: NEGATIVE
KETONES UR QL STRIP: NEGATIVE
LEUKOCYTE ESTERASE UR QL STRIP.AUTO: NEGATIVE
NITRITE UR QL STRIP: NEGATIVE
PH UR STRIP.AUTO: 5.5 [PH] (ref 5–8)
POTASSIUM SERPL-SCNC: 5.3 MMOL/L (ref 3.5–5.2)
PROT UR QL STRIP: NEGATIVE
SODIUM SERPL-SCNC: 144 MMOL/L (ref 136–145)
SP GR UR STRIP: 1.02 (ref 1–1.03)
UROBILINOGEN UR QL STRIP: ABNORMAL

## 2022-04-11 PROCEDURE — 81003 URINALYSIS AUTO W/O SCOPE: CPT | Performed by: PHYSICIAN ASSISTANT

## 2022-04-11 PROCEDURE — 36415 COLL VENOUS BLD VENIPUNCTURE: CPT | Performed by: PHYSICIAN ASSISTANT

## 2022-04-11 PROCEDURE — 80048 BASIC METABOLIC PNL TOTAL CA: CPT | Performed by: PHYSICIAN ASSISTANT

## 2022-04-11 PROCEDURE — 99214 OFFICE O/P EST MOD 30 MIN: CPT | Performed by: PHYSICIAN ASSISTANT

## 2022-04-11 NOTE — PROGRESS NOTES
"Chief Complaint  Follow-up and Diabetes    Subjective          Reddy Castellano presents to [facility]  Urinary frequency- still about the same since starting new medicine. Is scheduled to follow up with Dr. Lozano for management.  Reviewed report from patient's visit to ED with Jones Olson DO (04/06/2022).     Hypertension- running well, over the last month has been ranging from 123//88.  No chest pain, headaches or lower extremity edema.    Blood sugar- running well, no hypoglycemic episodes, a1c from previous visit 5.50.    Hyperkalemia-potassium slightly elevated on previous labs patient needs repeat lab work to monitor this.  Patient takes 1 tablet twice daily potassium supplement daily.    Requested patient to bring medication list at next visit since it appears he is on Celexa and Effexor, patient is unsure whether he currently is taking both or not.    Patient just got back from [employer] and had a lewis time with his son.           Objective   Vital Signs:   /68 (BP Location: Left arm, Patient Position: Sitting, Cuff Size: Large Adult)   Pulse 70   Temp 98 °F (36.7 °C) (Temporal)   Ht 172.7 cm (68\")   Wt 99.8 kg (220 lb)   SpO2 97%   BMI 33.45 kg/m²     Physical Exam  Vitals reviewed.   Constitutional:       Appearance: Normal appearance. He is well-developed.   HENT:      Head: Normocephalic and atraumatic.      Mouth/Throat:      Pharynx: No oropharyngeal exudate.   Eyes:      Conjunctiva/sclera: Conjunctivae normal.      Pupils: Pupils are equal, round, and reactive to light.   Cardiovascular:      Rate and Rhythm: Normal rate and regular rhythm.      Heart sounds: No murmur heard.    No friction rub. No gallop.   Pulmonary:      Effort: Pulmonary effort is normal.      Breath sounds: Normal breath sounds. No wheezing or rhonchi.   Musculoskeletal:      Cervical back: Normal range of motion and neck supple. No tenderness. "      Right lower leg: No edema.      Left lower leg: No edema.   Lymphadenopathy:      Cervical: No cervical adenopathy.   Skin:     General: Skin is warm and dry.   Neurological:      Mental Status: He is alert and oriented to person, place, and time.      Cranial Nerves: No cranial nerve deficit.   Psychiatric:         Mood and Affect: Mood and affect normal.         Behavior: Behavior normal.         Thought Content: Thought content normal.         Judgment: Judgment normal.        Result Review :          Procedures      Assessment and Plan    Diagnoses and all orders for this visit:    1. Hyperkalemia (Primary)  Assessment & Plan:  Will recheck electrolytes today.    Orders:  -     Basic metabolic panel    2. Urge incontinence of urine  Assessment & Plan:  Continue current management with urology.  Call for any questions or concerns.    Orders:  -     Urinalysis With Culture If Indicated - Urine, Clean Catch    3. Essential hypertension  Assessment & Plan:  Hypertension is unchanged.  Continue current treatment regimen.  Blood pressure will be reassessed in 3 months.      4. Type 2 diabetes mellitus with hyperglycemia, without long-term current use of insulin (HCC)  Assessment & Plan:  Diabetes is unchanged.   Continue current treatment regimen.  Diabetes will be reassessed in 3 months.              Follow Up   Return in about 3 months (around 7/11/2022).  Patient was given instructions and counseling regarding his condition or for health maintenance advice. Please see specific information pulled into the AVS if appropriate.

## 2022-04-12 DIAGNOSIS — K21.9 GASTROESOPHAGEAL REFLUX DISEASE, UNSPECIFIED WHETHER ESOPHAGITIS PRESENT: ICD-10-CM

## 2022-04-12 DIAGNOSIS — E87.5 HYPERKALEMIA: Primary | ICD-10-CM

## 2022-04-12 RX ORDER — LANOLIN ALCOHOL/MO/W.PET/CERES
1000 CREAM (GRAM) TOPICAL DAILY
Qty: 90 TABLET | Refills: 1 | Status: SHIPPED | OUTPATIENT
Start: 2022-04-12 | End: 2022-10-26 | Stop reason: SDUPTHER

## 2022-04-12 RX ORDER — PANTOPRAZOLE SODIUM 40 MG/1
40 TABLET, DELAYED RELEASE ORAL DAILY
Qty: 90 TABLET | Refills: 1 | Status: SHIPPED | OUTPATIENT
Start: 2022-04-12 | End: 2022-09-16 | Stop reason: SDUPTHER

## 2022-04-12 NOTE — TELEPHONE ENCOUNTER
Per med list, pt to take 20 meq of Potassium BID. One month ago, K was high at 5.6. Pt cut back to 10 meq BID (was not instructed to, but did.) K now 5.3, slightly elevated. Do you want to decrease K further to 10 meq daily, instead of BID? Please advise.

## 2022-04-13 ENCOUNTER — PATIENT OUTREACH (OUTPATIENT)
Dept: CASE MANAGEMENT | Facility: OTHER | Age: 87
End: 2022-04-13

## 2022-04-13 DIAGNOSIS — F03.90 DEMENTIA WITHOUT BEHAVIORAL DISTURBANCE, UNSPECIFIED DEMENTIA TYPE: Primary | ICD-10-CM

## 2022-04-13 DIAGNOSIS — I10 HYPERTENSION, UNSPECIFIED TYPE: ICD-10-CM

## 2022-04-13 NOTE — OUTREACH NOTE
AMBULATORY CASE MANAGEMENT NOTE    Name and Relationship of Patient/Support Person: Nona Reddy - Self    BP stable at 127/76  Will call in 2 weeks to remind of labs and check on BP.  Reviewed upcoming appts.  No acute needs voiced.  Switching to K 10meq daily. Pt agreed.        BRANDON RAMOS  Ambulatory Case Management    4/13/2022, 08:53 EDT

## 2022-04-14 ENCOUNTER — TELEPHONE (OUTPATIENT)
Dept: INTERNAL MEDICINE | Facility: CLINIC | Age: 87
End: 2022-04-14

## 2022-04-14 NOTE — TELEPHONE ENCOUNTER
Red rule verified and correct.    Relayed message from Tamanna LEBRON.     Tamanna Dias PA-C   4/12/2022  7:48 PM EDT         Kidney function slightly worse.  Will repeat levels in two weeks which will also check potassium levels.     Patient verbalized understanding.

## 2022-04-19 ENCOUNTER — LAB (OUTPATIENT)
Dept: LAB | Facility: HOSPITAL | Age: 87
End: 2022-04-19

## 2022-04-19 ENCOUNTER — TRANSCRIBE ORDERS (OUTPATIENT)
Dept: ADMINISTRATIVE | Facility: HOSPITAL | Age: 87
End: 2022-04-19

## 2022-04-19 DIAGNOSIS — E87.5 HYPERKALEMIA: ICD-10-CM

## 2022-04-19 DIAGNOSIS — Z79.899 ENCOUNTER FOR LONG-TERM (CURRENT) USE OF OTHER MEDICATIONS: Primary | ICD-10-CM

## 2022-04-19 DIAGNOSIS — Z79.899 ENCOUNTER FOR LONG-TERM (CURRENT) USE OF OTHER MEDICATIONS: ICD-10-CM

## 2022-04-19 LAB
ALBUMIN SERPL-MCNC: 4.2 G/DL (ref 3.5–5.2)
ALBUMIN/GLOB SERPL: 1.4 G/DL
ALP SERPL-CCNC: 72 U/L (ref 39–117)
ALT SERPL W P-5'-P-CCNC: 10 U/L (ref 1–41)
ANION GAP SERPL CALCULATED.3IONS-SCNC: 12 MMOL/L (ref 5–15)
AST SERPL-CCNC: 14 U/L (ref 1–40)
BILIRUB SERPL-MCNC: 0.4 MG/DL (ref 0–1.2)
BUN SERPL-MCNC: 42 MG/DL (ref 8–23)
BUN/CREAT SERPL: 22.2 (ref 7–25)
CALCIUM SPEC-SCNC: 9.5 MG/DL (ref 8.6–10.5)
CHLORIDE SERPL-SCNC: 106 MMOL/L (ref 98–107)
CO2 SERPL-SCNC: 24 MMOL/L (ref 22–29)
CREAT SERPL-MCNC: 1.89 MG/DL (ref 0.76–1.27)
EGFRCR SERPLBLD CKD-EPI 2021: 34.1 ML/MIN/1.73
GLOBULIN UR ELPH-MCNC: 2.9 GM/DL
GLUCOSE SERPL-MCNC: 128 MG/DL (ref 65–99)
POTASSIUM SERPL-SCNC: 4.8 MMOL/L (ref 3.5–5.2)
PROT SERPL-MCNC: 7.1 G/DL (ref 6–8.5)
SODIUM SERPL-SCNC: 142 MMOL/L (ref 136–145)
URATE SERPL-MCNC: 5.7 MG/DL (ref 3.4–7)

## 2022-04-19 PROCEDURE — 84550 ASSAY OF BLOOD/URIC ACID: CPT

## 2022-04-19 PROCEDURE — 80053 COMPREHEN METABOLIC PANEL: CPT

## 2022-04-21 DIAGNOSIS — F33.1 MAJOR DEPRESSIVE DISORDER, RECURRENT, MODERATE: ICD-10-CM

## 2022-04-21 RX ORDER — CITALOPRAM 20 MG/1
20 TABLET ORAL DAILY
Qty: 90 TABLET | Refills: 0 | Status: SHIPPED | OUTPATIENT
Start: 2022-04-21 | End: 2022-09-23

## 2022-04-25 ENCOUNTER — PATIENT OUTREACH (OUTPATIENT)
Dept: CASE MANAGEMENT | Facility: OTHER | Age: 87
End: 2022-04-25

## 2022-04-25 DIAGNOSIS — I10 HYPERTENSION, UNSPECIFIED TYPE: ICD-10-CM

## 2022-04-25 DIAGNOSIS — F03.90 DEMENTIA WITHOUT BEHAVIORAL DISTURBANCE, UNSPECIFIED DEMENTIA TYPE: ICD-10-CM

## 2022-04-25 DIAGNOSIS — F33.1 MAJOR DEPRESSIVE DISORDER, RECURRENT, MODERATE: Primary | ICD-10-CM

## 2022-04-25 NOTE — OUTREACH NOTE
AMBULATORY CASE MANAGEMENT NOTE    Name and Relationship of Patient/Support Person: Reddy Castellano - Self    Returned pt's call. He had cleared his voice mails that had piled up over the last few months and was verifying things that I had called about in past. No new needs. Will call for bp tomorrow, pt still in bed.    [facility] Interim Update    4/26: called for BP log, 142/73  137/74  98/69  137/74    [facility] Interim Update    4/27: Pt called to discuss meds. No acute needs.        BRANDON RAMOS  Ambulatory Case Management    4/25/2022, 08:19 EDT

## 2022-04-26 ENCOUNTER — TELEPHONE (OUTPATIENT)
Dept: INTERNAL MEDICINE | Facility: CLINIC | Age: 87
End: 2022-04-26

## 2022-04-26 NOTE — TELEPHONE ENCOUNTER
Red rule verified and correct.    Needing a copy of pt's LOV - 1/10/2022 to be able to order his diabetic shoes.    Faxed.  Confirmation received.

## 2022-04-28 ENCOUNTER — PATIENT OUTREACH (OUTPATIENT)
Dept: CASE MANAGEMENT | Facility: OTHER | Age: 87
End: 2022-04-28

## 2022-04-28 DIAGNOSIS — I10 HYPERTENSION, UNSPECIFIED TYPE: ICD-10-CM

## 2022-04-28 DIAGNOSIS — F33.1 MAJOR DEPRESSIVE DISORDER, RECURRENT, MODERATE: Primary | ICD-10-CM

## 2022-04-28 DIAGNOSIS — F03.90 DEMENTIA WITHOUT BEHAVIORAL DISTURBANCE, UNSPECIFIED DEMENTIA TYPE: ICD-10-CM

## 2022-04-28 PROCEDURE — 99489 CPLX CHRNC CARE EA ADDL 30: CPT | Performed by: INTERNAL MEDICINE

## 2022-04-28 PROCEDURE — 99487 CPLX CHRNC CARE 1ST 60 MIN: CPT | Performed by: INTERNAL MEDICINE

## 2022-04-28 NOTE — OUTREACH NOTE
[facility] [place]    This Chronic Medical Management Care Plan for Reddy Castellano, 86 y.o. male, has been monitored and managed and a new plan of care implemented for the month of April.  A cumulative time of 137  minutes was spent on this patient record this month, including phone call with patient; phone call with other providers; chart review; electronic communication with other providers; electronic communication with primary care provider.    Regarding the patient's problems: has Gout; Type 2 diabetes mellitus with hyperglycemia, without long-term current use of insulin (HCC); Essential hypertension; Other hyperlipidemia; Alzheimer's disease, unspecified (CODE) ([facility]); Major depressive disorder, recurrent, moderate ([facility]); Scrotal pain; Urge incontinence of urine; Urgency of urination; Balanitis; Candida rash of groin; Abnormal weight loss; Anxiety; Rheumatoid arthritis ([facility]); Chronic obstructive pulmonary disease (HCC); Constipation; Depressive disorder; Disease; Disorder of prostate; Disorder of vision; Essential tremor; Excess skin of abdominal wall; Heart failure ([facility]); Mechanical complication of genitourinary device; Morbid obesity ([facility]); Myocardial infarction ([facility]); Numbness; Pneumonia; Seasonal allergies; Sleep apnea; Spondylosis of lumbar spine; Tingling of skin; Ventral hernia; Lymphadenopathy of head and neck; Swallowed foreign body; and Hyperkalemia on their problem list., the following items were addressed: medical records; medications, polypharmacy and any changes can be found within the plan section of the note.  A detailed listing of time spent for chronic care management is tracked within each outreach encounter.  Current medications include:  has a current medication list which includes the following prescription(s): albuterol sulfate hfa, allopurinol, amlodipine, aspirin, atorvastatin, brompheniramine-pseudoephedrine-dm, bupropion xl, carboxymethylcellulose, cetirizine, citalopram,  clopidogrel, donepezil, epinephrine, fluticasone, fluticasone-salmeterol, furosemide, lisinopril, melatonin, memantine, mirabegron er, montelukast, multivitamin with minerals, nystatin, pantoprazole, polyethylene glycol, potassium chloride, propranolol, tiotropium bromide monohydrate, venlafaxine xr, vitamin b-12, and vitamin d. and the patient is reported to be patient is compliant with medication protocol,  Medications are reported to be non-effective in controlling symptoms and changes have been made to the medication protocol, appt with pcp on 4/11 to manage BP.  Regarding these diagnoses, referrals were made to the following provider(s):  none.  All notes on chart for PCP to review.    The patient was monitored remotely for blood pressure; weight; activity level; medications, memory, med compliance.    The patient's physical needs include:  help taking medications as prescribed; physical healthcare, memory.     The patient's mental support needs include:  continued support    The patient's cognitive support needs include:  continued support; medication; increased support    The patient's psychosocial support needs include:  continued support, pt sees [facility]    The patient's functional needs include: physical healthcare    The patient's environmental needs include:  not applicable    Care Plan overall comments:  No data recorded    Refer to previous outreach notes for more information on the areas listed above.    Monthly Billing Diagnoses  (F33.1) Major depressive disorder, recurrent, moderate (HCC)    (F03.90) Dementia without behavioral disturbance, unspecified dementia type (HCC)    (I10) Hypertension, unspecified type    Medications   · Medications have been reconciled    Care Plan progress this month:      Recently Modified Care Plans Updates made since 3/28/2022 12:00 AM    No recently modified care plans.              Instructions   · Patient was provided an electronic copy of care plan  · CCM services  were explained and offered and patient has accepted these services.  · Patient has given their written consent to receive CCM services and understands that this includes the authorization of electronic communication of medical information with the other treating providers.  · Patient understands that they may stop CCM services at any time and these changes will be effective at the end of the calendar month and will effectively revocate the agreement of CCM services.  · Patient understands that only one practitioner can furnish and be paid for CCM services during one calendar month.  Patient also understands that there may be co-payment or deductible fees in association with CCM services.  · Patient will continue with at least monthly follow-up calls with the Nurse Navigator.    BRANDON RAMOS  Ambulatory Case Management    4/28/2022, 15:11 EDT

## 2022-05-03 ENCOUNTER — PATIENT OUTREACH (OUTPATIENT)
Dept: CASE MANAGEMENT | Facility: OTHER | Age: 87
End: 2022-05-03

## 2022-05-03 DIAGNOSIS — F33.1 MAJOR DEPRESSIVE DISORDER, RECURRENT, MODERATE: Primary | ICD-10-CM

## 2022-05-03 DIAGNOSIS — I10 HYPERTENSION, UNSPECIFIED TYPE: ICD-10-CM

## 2022-05-03 NOTE — OUTREACH NOTE
AMBULATORY CASE MANAGEMENT NOTE    Name and Relationship of Patient/Support Person: Reddy Castellano - Self    Pt called to check status of refill request on Celexa. Nitish sent it in on 4/21.  I will call pt this week for BP log.  No acute needs.        BRANDON RAMOS  Ambulatory Case Management    5/3/2022, 14:21 EDT

## 2022-05-04 ENCOUNTER — TELEPHONE (OUTPATIENT)
Dept: INTERNAL MEDICINE | Facility: CLINIC | Age: 87
End: 2022-05-04

## 2022-05-04 NOTE — TELEPHONE ENCOUNTER
Caller: [facility] - [place], [place] - [address] - [phone]  - [phone]    Relationship: Pharmacy [facility]    Best call back number: [phone] [facility]    What is the best time to reach you: ANYTIME    Who are you requesting to speak with (clinical staff, provider,  specific staff member): CLINICAL FOR [facility]        What was the call regarding: QUESTIONS ABOUT A CITALOPRAM PRESCRIPTION    Do you require a callback: YES

## 2022-05-05 NOTE — TELEPHONE ENCOUNTER
Okay to send in the refill. I'm not sure where he has had the medication and how he has been able to keep it this long without a refill, but its fine to send in.

## 2022-05-05 NOTE — TELEPHONE ENCOUNTER
"Called [facility] who stated they received our prescription for Celexa on pt 4/21. However, pt had not picked up a refill since Sept 2021, and it was a 90 day fill. Pt should have been out 4-5 months ago. Per chart review, and in depth med rec last month, pt has been taking this med. Also, I verified with pharmacy that Dr Gutierrez was the last one to refill. Pt is still taking the med because he called me last month when he needed to refill. Can I get the verbal \"OK\" to proceed with refill?  "

## 2022-05-09 ENCOUNTER — PATIENT OUTREACH (OUTPATIENT)
Dept: CASE MANAGEMENT | Facility: OTHER | Age: 87
End: 2022-05-09

## 2022-05-09 DIAGNOSIS — F33.1 MAJOR DEPRESSIVE DISORDER, RECURRENT, MODERATE: Primary | ICD-10-CM

## 2022-05-09 DIAGNOSIS — F03.90 DEMENTIA WITHOUT BEHAVIORAL DISTURBANCE, UNSPECIFIED DEMENTIA TYPE: ICD-10-CM

## 2022-05-09 DIAGNOSIS — I10 HYPERTENSION, UNSPECIFIED TYPE: Primary | ICD-10-CM

## 2022-05-09 DIAGNOSIS — I10 HYPERTENSION, UNSPECIFIED TYPE: ICD-10-CM

## 2022-05-09 PROCEDURE — 99490 CHRNC CARE MGMT STAFF 1ST 20: CPT | Performed by: INTERNAL MEDICINE

## 2022-05-09 PROCEDURE — 99439 CHRNC CARE MGMT STAF EA ADDL: CPT | Performed by: INTERNAL MEDICINE

## 2022-05-09 RX ORDER — LISINOPRIL 5 MG/1
5 TABLET ORAL DAILY
Qty: 90 TABLET | Refills: 1 | Status: SHIPPED | OUTPATIENT
Start: 2022-05-09 | End: 2022-09-23 | Stop reason: DRUGHIGH

## 2022-05-09 NOTE — TELEPHONE ENCOUNTER
Pt requested refill. Has not been refilled since switching to Epic. (I tried to look in [place], could not log on.) I called [facility] [place] pharmacy who verified that pt is supposed to be on this but has not filled since Dec 2020. Pt has been taking per pt and per home health nurse who helped with med rec last month. (Pt thinks he stopped taking meds for a time period last year when he was in the hospital. But again, nurse and pt agreed that he has been taking this the last few months). Ok to refill Lisinopril 5mg daily?

## 2022-05-09 NOTE — OUTREACH NOTE
AMBULATORY CASE MANAGEMENT NOTE    Name and Relationship of Patient/Support Person: Nona Reddy - Self    BP log-    115/67  128/80  HR 57  130/79 HR 76  129/81 HR 67  123/81 HR 62  132/76 HR 78    [facility]. [facility].  Will call next month for BP log.  See telephone call for rx request.    BRANDON RAMOS  Ambulatory Case Management    5/9/2022, 14:16 EDT

## 2022-05-17 ENCOUNTER — PATIENT OUTREACH (OUTPATIENT)
Dept: CASE MANAGEMENT | Facility: OTHER | Age: 87
End: 2022-05-17

## 2022-05-17 DIAGNOSIS — F03.90 DEMENTIA WITHOUT BEHAVIORAL DISTURBANCE, UNSPECIFIED DEMENTIA TYPE: ICD-10-CM

## 2022-05-17 DIAGNOSIS — Z79.899 POLYPHARMACY: ICD-10-CM

## 2022-05-17 DIAGNOSIS — I10 HYPERTENSION, UNSPECIFIED TYPE: Primary | ICD-10-CM

## 2022-05-17 DIAGNOSIS — G20 PARKINSON'S DISEASE: ICD-10-CM

## 2022-05-17 NOTE — OUTREACH NOTE
"AMBULATORY CASE MANAGEMENT NOTE    Name and Relationship of Patient/Support Person: Reddy Castellano - Self    Pt getting [facility] done at [facility] today at 3pm.  Systolic -108, stable.  No acute concerns.    CCM Interim Update    Pt called stating he saw [facility] at [facility] and they are \"taking over\" his CPAP. adjustments to be made via [facility]. I gave him their new phone number.  He asked about Lisinopril refill, again. I reminded him that it got sent to [facility] on 5/9. He agreed to call pharmacy.      BRANDON RAMOS  Ambulatory Case Management    5/17/2022, 11:09 EDT  "

## 2022-05-27 ENCOUNTER — PATIENT OUTREACH (OUTPATIENT)
Dept: CASE MANAGEMENT | Facility: OTHER | Age: 87
End: 2022-05-27

## 2022-05-27 DIAGNOSIS — I10 HYPERTENSION, UNSPECIFIED TYPE: Primary | ICD-10-CM

## 2022-05-27 DIAGNOSIS — F03.90 DEMENTIA WITHOUT BEHAVIORAL DISTURBANCE, UNSPECIFIED DEMENTIA TYPE: ICD-10-CM

## 2022-05-27 NOTE — OUTREACH NOTE
[facility] End of Month Documentation    This Chronic Medical Management Care Plan for Reddy Castellano, 87 y.o. male, has been monitored and managed and a new plan of care implemented for the month of May.  A cumulative time of 60  minutes was spent on this patient record this month, including chart review; phone call with patient.    Regarding the patient's problems: has Gout; Type 2 diabetes mellitus with hyperglycemia, without long-term current use of insulin (HCC); Essential hypertension; Other hyperlipidemia; Alzheimer's disease, unspecified (CODE) (HCC); Major depressive disorder, recurrent, moderate (HCC); Scrotal pain; Urge incontinence of urine; Urgency of urination; Balanitis; Candida rash of groin; Abnormal weight loss; Anxiety; Rheumatoid arthritis (HCC); Chronic obstructive pulmonary disease (HCC); Constipation; Depressive disorder; Disease; Disorder of prostate; Disorder of vision; Essential tremor; Excess skin of abdominal wall; Heart failure ([facility]); Mechanical complication of genitourinary device; Morbid obesity ([facility]); Myocardial infarction ([facility]); Numbness; Pneumonia; Seasonal allergies; Sleep apnea; Spondylosis of lumbar spine; Tingling of skin; Ventral hernia; Lymphadenopathy of head and neck; Swallowed foreign body; and Hyperkalemia on their problem list., the following items were addressed: medical records; medications, polypharmacy and any changes can be found within the plan section of the note.  A detailed listing of time spent for chronic care management is tracked within each outreach encounter.  Current medications include:  has a current medication list which includes the following prescription(s): albuterol sulfate hfa, allopurinol, amlodipine, aspirin, atorvastatin, brompheniramine-pseudoephedrine-dm, bupropion xl, carboxymethylcellulose, cetirizine, citalopram, clopidogrel, donepezil, epinephrine, fluticasone, fluticasone-salmeterol, furosemide, lisinopril, melatonin, memantine, mirabegron er,  montelukast, multivitamin with minerals, nystatin, pantoprazole, polyethylene glycol, potassium chloride, propranolol, tiotropium bromide monohydrate, venlafaxine xr, vitamin b-12, and vitamin d. and the patient is reported to be patient is compliant with medication protocol,  Medications are reported to be effective.  Regarding these diagnoses, referrals were made to the following provider(s):  none.  All notes on chart for PCP to review.    The patient was monitored remotely for blood pressure; activity level; medications, memory, med compliance.    The patient's physical needs include:  help taking medications as prescribed; physical healthcare, memory.     The patient's mental support needs include:  continued support    The patient's cognitive support needs include:  medication; continued support, son to help organize pills/meds    The patient's psychosocial support needs include:  continued support; medication management or adherence, pt sees [facility]    The patient's functional needs include: physical healthcare    The patient's environmental needs include:  not applicable    Care Plan overall comments:  No data recorded    Refer to previous outreach notes for more information on the areas listed above.    Monthly Billing Diagnoses  (I10) Hypertension, unspecified type    (F03.90) Dementia without behavioral disturbance, unspecified dementia type (HCC)    Medications   · Medications have been reconciled    Care Plan progress this month:      Recently Modified Care Plans Updates made since 4/26/2022 12:00 AM    No recently modified care plans.          Instructions   · Patient was provided an electronic copy of care plan  · CCM services were explained and offered and patient has accepted these services.  · Patient has given their written consent to receive CCM services and understands that this includes the authorization of electronic communication of medical information with the other treating  providers.  · Patient understands that they may stop CCM services at any time and these changes will be effective at the end of the calendar month and will effectively revocate the agreement of CCM services.  · Patient understands that only one practitioner can furnish and be paid for CCM services during one calendar month.  Patient also understands that there may be co-payment or deductible fees in association with CCM services.  · Patient will continue with at least monthly follow-up calls with the Nurse Navigator.    BRANDON RAMOS  Ambulatory Case Management    5/27/2022, 10:20 EDT

## 2022-06-14 ENCOUNTER — PATIENT OUTREACH (OUTPATIENT)
Dept: CASE MANAGEMENT | Facility: OTHER | Age: 87
End: 2022-06-14

## 2022-06-14 ENCOUNTER — TELEPHONE (OUTPATIENT)
Dept: UROLOGY | Facility: CLINIC | Age: 87
End: 2022-06-14

## 2022-06-14 DIAGNOSIS — F03.90 DEMENTIA WITHOUT BEHAVIORAL DISTURBANCE, UNSPECIFIED DEMENTIA TYPE: ICD-10-CM

## 2022-06-14 DIAGNOSIS — G20 PARKINSON'S DISEASE: ICD-10-CM

## 2022-06-14 DIAGNOSIS — Z79.899 POLYPHARMACY: Primary | ICD-10-CM

## 2022-06-14 DIAGNOSIS — I10 HYPERTENSION, UNSPECIFIED TYPE: Primary | ICD-10-CM

## 2022-06-14 DIAGNOSIS — Z79.899 POLYPHARMACY: ICD-10-CM

## 2022-06-14 PROCEDURE — 99439 CHRNC CARE MGMT STAF EA ADDL: CPT | Performed by: INTERNAL MEDICINE

## 2022-06-14 NOTE — OUTREACH NOTE
AMBULATORY CASE MANAGEMENT NOTE    Name and Relationship of Patient/Support Person: Reddy Castellano - Self    Refills requested per pt, see note to pcp.    BP stable:   May readings: 112/72  125/75  125/84  141/83  140/83  121/71  118/71  121/71  136/75  133/86  Carlie: 136/78  129/78  113/73    Gemtesa having minimal effectiveness and is almost out of med. Was on Myrbetric prior. Pt agreed to have me make appt with Dr Lozano. Appt made for August 22 at 1130. Pt added to cancellation list as well. I requested a refill of samples for Gemtesa. Message taken by staff and she agreed to relay to clinical staff.     Pt updated.      BRANDON RAMOS  Ambulatory Case Management    6/14/2022, 14:28 EDT

## 2022-06-14 NOTE — TELEPHONE ENCOUNTER
"Provider: DR ROBINS  Caller: BRANDON  Relationship to Patient: PROVIDER  Pharmacy: [facility]  Phone Number: [phone]  Reason for Call: BRANDON CALLED TO SCHEDULE FOLLOW UP APPT FOR PT, NEXT AVAILABLE IS 8-22-22, PT ADDED TO WAITLIST.     PT HAD BEEN GIVEN SAMPLES GEMTESA, PT STATES THEY DON'T SEEM TO BE WORKING WELL, PER PT- \"MINIMAL EFFECTIVENESS\" PT'S APPT NOT UNTIL AUGUST, CAN PT  MORE SAMPLES OF THE GEMTESA OR CAN A RX BE SENT TO THE PHARMACY TO GET PT THRU UNTIL AUGUST? YOU CAN CALL BRANDON BACK [phone]  When was the patient last seen: 10-29-21    "

## 2022-06-14 NOTE — TELEPHONE ENCOUNTER
pt asking for Propranolol (filled by Dr Forrester, neuro, who he no longer sees), Allopurinol, and Lasix. All these meds have not been filled since switching to [facility]. Please send in to [facility].

## 2022-06-15 RX ORDER — PROPRANOLOL HYDROCHLORIDE 10 MG/1
10 TABLET ORAL DAILY
Qty: 90 TABLET | Refills: 1 | Status: SHIPPED | OUTPATIENT
Start: 2022-06-15

## 2022-06-15 RX ORDER — ALLOPURINOL 300 MG/1
150 TABLET ORAL DAILY
Qty: 45 TABLET | Refills: 1 | Status: SHIPPED | OUTPATIENT
Start: 2022-06-15

## 2022-06-15 RX ORDER — FUROSEMIDE 20 MG/1
20 TABLET ORAL DAILY
Qty: 90 TABLET | Refills: 1 | Status: SHIPPED | OUTPATIENT
Start: 2022-06-15

## 2022-06-16 ENCOUNTER — PATIENT OUTREACH (OUTPATIENT)
Dept: CASE MANAGEMENT | Facility: OTHER | Age: 87
End: 2022-06-16

## 2022-06-16 DIAGNOSIS — R32 URINARY INCONTINENCE, UNSPECIFIED TYPE: Primary | ICD-10-CM

## 2022-06-16 NOTE — OUTREACH NOTE
AMBULATORY CASE MANAGEMENT NOTE    Name and Relationship of Patient/Support Person: Reddy Castellano - Self    Called pt to let him know Dr Lozano' office staff agreed to give him two months' of Gemtesa and he can  at anytime. Called x2, no answer, no voicemail.        BRANDON RAMOS  Ambulatory Case Management    6/16/2022, 14:14 EDT

## 2022-06-16 NOTE — TELEPHONE ENCOUNTER
Roxi Momin asked for you to call her.  She wants to know if the patient is okay to come get samples.

## 2022-06-16 NOTE — TELEPHONE ENCOUNTER
Called Roxi back and told her I will put 2 months worth of Gemtesa samples in a bag for patient to . She verbalized understanding

## 2022-06-18 ENCOUNTER — APPOINTMENT (OUTPATIENT)
Dept: GENERAL RADIOLOGY | Facility: HOSPITAL | Age: 87
End: 2022-06-18

## 2022-06-18 PROCEDURE — 99283 EMERGENCY DEPT VISIT LOW MDM: CPT

## 2022-06-18 PROCEDURE — 71046 X-RAY EXAM CHEST 2 VIEWS: CPT

## 2022-06-19 ENCOUNTER — APPOINTMENT (OUTPATIENT)
Dept: CT IMAGING | Facility: HOSPITAL | Age: 87
End: 2022-06-19

## 2022-06-19 ENCOUNTER — HOSPITAL ENCOUNTER (EMERGENCY)
Facility: HOSPITAL | Age: 87
Discharge: HOME | End: 2022-06-19
Attending: EMERGENCY MEDICINE | Admitting: EMERGENCY MEDICINE

## 2022-06-19 VITALS
WEIGHT: 214.95 LBS | DIASTOLIC BLOOD PRESSURE: 68 MMHG | OXYGEN SATURATION: 98 % | HEIGHT: 68 IN | RESPIRATION RATE: 18 BRPM | BODY MASS INDEX: 32.58 KG/M2 | TEMPERATURE: 98.3 F | SYSTOLIC BLOOD PRESSURE: 132 MMHG | HEART RATE: 60 BPM

## 2022-06-19 DIAGNOSIS — S20.219A CONTUSION OF CHEST WALL, UNSPECIFIED LATERALITY, INITIAL ENCOUNTER: Primary | ICD-10-CM
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
## 2022-06-19 PROCEDURE — 71250 CT THORAX DX C-: CPT

## 2022-06-19 RX ORDER — ACETAMINOPHEN 325 MG/1
975 TABLET ORAL ONCE
Status: COMPLETED | OUTPATIENT
Start: 2022-06-19 | End: 2022-06-19

## 2022-06-19 RX ADMIN — ACETAMINOPHEN 975 MG: 325 TABLET ORAL at 00:39
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
## 2022-06-22 ENCOUNTER — PATIENT OUTREACH (OUTPATIENT)
Dept: CASE MANAGEMENT | Facility: OTHER | Age: 87
End: 2022-06-22
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
## 2022-06-27 ENCOUNTER — APPOINTMENT (OUTPATIENT)
Dept: CT IMAGING | Facility: HOSPITAL | Age: 87
End: 2022-06-27

## 2022-06-27 ENCOUNTER — HOSPITAL ENCOUNTER (EMERGENCY)
Facility: HOSPITAL | Age: 87
Discharge: HOME | End: 2022-06-27
Attending: EMERGENCY MEDICINE | Admitting: EMERGENCY MEDICINE

## 2022-06-27 VITALS
SYSTOLIC BLOOD PRESSURE: 130 MMHG | RESPIRATION RATE: 20 BRPM | OXYGEN SATURATION: 94 % | DIASTOLIC BLOOD PRESSURE: 74 MMHG | HEART RATE: 61 BPM | WEIGHT: 220 LBS | BODY MASS INDEX: 33.34 KG/M2 | HEIGHT: 68 IN | TEMPERATURE: 98.1 F

## 2022-06-27 DIAGNOSIS — R93.5 ABNORMAL CT OF THE ABDOMEN: ICD-10-CM
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
## 2022-06-27 DIAGNOSIS — S39.011A STRAIN OF ABDOMINAL MUSCLE, INITIAL ENCOUNTER: ICD-10-CM

## 2022-06-27 LAB
ALBUMIN SERPL-MCNC: 3.9 G/DL (ref 3.5–5.2)
ALBUMIN/GLOB SERPL: 1.5 G/DL
ALP SERPL-CCNC: 82 U/L (ref 39–117)
ALT SERPL W P-5'-P-CCNC: 8 U/L (ref 1–41)
ANION GAP SERPL CALCULATED.3IONS-SCNC: 7.8 MMOL/L (ref 5–15)
AST SERPL-CCNC: 12 U/L (ref 1–40)
BASOPHILS # BLD AUTO: 0.08 10*3/MM3 (ref 0–0.2)
BASOPHILS NFR BLD AUTO: 1.1 % (ref 0–1.5)
BILIRUB SERPL-MCNC: 0.4 MG/DL (ref 0–1.2)
BILIRUB UR QL STRIP: NEGATIVE
BUN SERPL-MCNC: 33 MG/DL (ref 8–23)
BUN/CREAT SERPL: 22.4 (ref 7–25)
CALCIUM SPEC-SCNC: 9.3 MG/DL (ref 8.6–10.5)
CHLORIDE SERPL-SCNC: 111 MMOL/L (ref 98–107)
CLARITY UR: CLEAR
CO2 SERPL-SCNC: 26.2 MMOL/L (ref 22–29)
COLOR UR: YELLOW
CREAT SERPL-MCNC: 1.47 MG/DL (ref 0.76–1.27)
DEPRECATED RDW RBC AUTO: 50.9 FL (ref 37–54)
EGFRCR SERPLBLD CKD-EPI 2021: 45.9 ML/MIN/1.73
EOSINOPHIL # BLD AUTO: 0.31 10*3/MM3 (ref 0–0.4)
EOSINOPHIL NFR BLD AUTO: 4.1 % (ref 0.3–6.2)
ERYTHROCYTE [DISTWIDTH] IN BLOOD BY AUTOMATED COUNT: 14.1 % (ref 12.3–15.4)
GLOBULIN UR ELPH-MCNC: 2.6 GM/DL
GLUCOSE SERPL-MCNC: 112 MG/DL (ref 65–99)
GLUCOSE UR STRIP-MCNC: NEGATIVE MG/DL
HCT VFR BLD AUTO: 41.5 % (ref 37.5–51)
HGB BLD-MCNC: 13.4 G/DL (ref 13–17.7)
HGB UR QL STRIP.AUTO: NEGATIVE
HOLD SPECIMEN: NORMAL
HOLD SPECIMEN: NORMAL
IMM GRANULOCYTES # BLD AUTO: 0.07 10*3/MM3 (ref 0–0.05)
IMM GRANULOCYTES NFR BLD AUTO: 0.9 % (ref 0–0.5)
KETONES UR QL STRIP: NEGATIVE
LEUKOCYTE ESTERASE UR QL STRIP.AUTO: NEGATIVE
LIPASE SERPL-CCNC: 21 U/L (ref 13–60)
LYMPHOCYTES # BLD AUTO: 2.17 10*3/MM3 (ref 0.7–3.1)
LYMPHOCYTES NFR BLD AUTO: 28.6 % (ref 19.6–45.3)
MCH RBC QN AUTO: 31.8 PG (ref 26.6–33)
MCHC RBC AUTO-ENTMCNC: 32.3 G/DL (ref 31.5–35.7)
MCV RBC AUTO: 98.6 FL (ref 79–97)
MONOCYTES # BLD AUTO: 0.51 10*3/MM3 (ref 0.1–0.9)
MONOCYTES NFR BLD AUTO: 6.7 % (ref 5–12)
NEUTROPHILS NFR BLD AUTO: 4.44 10*3/MM3 (ref 1.7–7)
NEUTROPHILS NFR BLD AUTO: 58.6 % (ref 42.7–76)
NITRITE UR QL STRIP: NEGATIVE
NRBC BLD AUTO-RTO: 0 /100 WBC (ref 0–0.2)
PH UR STRIP.AUTO: 6 [PH] (ref 5–8)
PLATELET # BLD AUTO: 180 10*3/MM3 (ref 140–450)
PMV BLD AUTO: 10 FL (ref 6–12)
POTASSIUM SERPL-SCNC: 4.9 MMOL/L (ref 3.5–5.2)
PROT SERPL-MCNC: 6.5 G/DL (ref 6–8.5)
PROT UR QL STRIP: NEGATIVE
RBC # BLD AUTO: 4.21 10*6/MM3 (ref 4.14–5.8)
SODIUM SERPL-SCNC: 145 MMOL/L (ref 136–145)
SP GR UR STRIP: 1.01 (ref 1–1.03)
UROBILINOGEN UR QL STRIP: NORMAL
WBC NRBC COR # BLD: 7.58 10*3/MM3 (ref 3.4–10.8)
WHOLE BLOOD HOLD COAG: NORMAL
WHOLE BLOOD HOLD SPECIMEN: NORMAL

## 2022-06-27 PROCEDURE — 83690 ASSAY OF LIPASE: CPT

## 2022-06-27 PROCEDURE — 96360 HYDRATION IV INFUSION INIT: CPT

## 2022-06-27 PROCEDURE — 80053 COMPREHEN METABOLIC PANEL: CPT

## 2022-06-27 PROCEDURE — 36415 COLL VENOUS BLD VENIPUNCTURE: CPT

## 2022-06-27 PROCEDURE — 99283 EMERGENCY DEPT VISIT LOW MDM: CPT

## 2022-06-27 PROCEDURE — 81003 URINALYSIS AUTO W/O SCOPE: CPT

## 2022-06-27 PROCEDURE — 74177 CT ABD & PELVIS W/CONTRAST: CPT

## 2022-06-27 PROCEDURE — 0 IOPAMIDOL PER 1 ML: Performed by: EMERGENCY MEDICINE

## 2022-06-27 PROCEDURE — 85025 COMPLETE CBC W/AUTO DIFF WBC: CPT

## 2022-06-27 RX ORDER — METHOCARBAMOL 750 MG/1
750 TABLET, FILM COATED ORAL 3 TIMES DAILY PRN
Qty: 30 TABLET | Refills: 0 | Status: SHIPPED | OUTPATIENT
Start: 2022-06-27

## 2022-06-27 RX ORDER — SODIUM CHLORIDE 0.9 % (FLUSH) 0.9 %
10 SYRINGE (ML) INJECTION AS NEEDED
Status: DISCONTINUED | OUTPATIENT
Start: 2022-06-27 | End: 2022-06-27 | Stop reason: HOSPADM

## 2022-06-27 RX ADMIN — IOPAMIDOL 100 ML: 755 INJECTION, SOLUTION INTRAVENOUS at 14:23

## 2022-06-27 RX ADMIN — SODIUM CHLORIDE 1000 ML: 9 INJECTION, SOLUTION INTRAVENOUS at 14:37

## 2022-06-29 ENCOUNTER — PATIENT OUTREACH (OUTPATIENT)
Dept: CASE MANAGEMENT | Facility: OTHER | Age: 87
End: 2022-06-29

## 2022-06-29 DIAGNOSIS — F03.90 DEMENTIA WITHOUT BEHAVIORAL DISTURBANCE, UNSPECIFIED DEMENTIA TYPE: ICD-10-CM

## 2022-06-29 DIAGNOSIS — F33.1 MAJOR DEPRESSIVE DISORDER, RECURRENT, MODERATE: ICD-10-CM
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
## 2022-06-29 DIAGNOSIS — I10 HYPERTENSION, UNSPECIFIED TYPE: ICD-10-CM

## 2022-06-29 DIAGNOSIS — Z79.899 POLYPHARMACY: ICD-10-CM

## 2022-06-29 DIAGNOSIS — G20 PARKINSON'S DISEASE: ICD-10-CM

## 2022-06-29 PROCEDURE — 99490 CHRNC CARE MGMT STAFF 1ST 20: CPT | Performed by: INTERNAL MEDICINE
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
## 2022-07-06 ENCOUNTER — PATIENT OUTREACH (OUTPATIENT)
Dept: CASE MANAGEMENT | Facility: OTHER | Age: 87
End: 2022-07-06

## 2022-07-06 DIAGNOSIS — G20 PARKINSON'S DISEASE: Primary | ICD-10-CM

## 2022-07-06 NOTE — OUTREACH NOTE
AMBULATORY CASE MANAGEMENT NOTE    Name and Relationship of Patient/Support Person: Reddy Castellano - Self    Pt called asking for a refill on Namenda. Per cart, we discussed this last month- [facility] refills this and he was going to ask them for a refill. He has not done this yet. He agreed to call them for a refill.      BRANDON RAMOS  Ambulatory Case Management    7/6/2022, 07:53 EDT

## 2022-07-13 ENCOUNTER — OFFICE VISIT (OUTPATIENT)
Dept: INTERNAL MEDICINE | Facility: CLINIC | Age: 87
End: 2022-07-13

## 2022-07-13 VITALS
BODY MASS INDEX: 32.95 KG/M2 | DIASTOLIC BLOOD PRESSURE: 52 MMHG | SYSTOLIC BLOOD PRESSURE: 114 MMHG | TEMPERATURE: 97.5 F | OXYGEN SATURATION: 96 % | WEIGHT: 217.4 LBS | HEIGHT: 68 IN | HEART RATE: 50 BPM

## 2022-07-13 DIAGNOSIS — N28.89 RENAL MASS, RIGHT: ICD-10-CM

## 2022-07-13 DIAGNOSIS — K86.2 PANCREATIC CYST: Primary | ICD-10-CM

## 2022-07-13 PROCEDURE — 99214 OFFICE O/P EST MOD 30 MIN: CPT | Performed by: INTERNAL MEDICINE

## 2022-07-13 RX ORDER — CARBOXYMETHYLCELLULOSE SODIUM 5 MG/ML
1 SOLUTION/ DROPS OPHTHALMIC
COMMUNITY
End: 2022-07-13

## 2022-07-19 ENCOUNTER — PATIENT OUTREACH (OUTPATIENT)
Dept: CASE MANAGEMENT | Facility: OTHER | Age: 87
End: 2022-07-19

## 2022-07-19 DIAGNOSIS — G20 PARKINSON'S DISEASE: Primary | ICD-10-CM

## 2022-07-19 NOTE — OUTREACH NOTE
AMBULATORY CASE MANAGEMENT NOTE    Name and Relationship of Patient/Support Person: Reddy Castellano - Self    Chart review- saw PCP last week. Note not signed. BP stable.    Pt asked for a neuro appt due to worsening condition. One for 7/20 but unclear if pt was notified. Called pt who was not aware. Gave him date, time and address of appt.    No acute needs.      BRANDON RAMOS  Ambulatory Case Management    7/19/2022, 08:17 EDT

## 2022-07-21 ENCOUNTER — PATIENT OUTREACH (OUTPATIENT)
Dept: CASE MANAGEMENT | Facility: OTHER | Age: 87
End: 2022-07-21

## 2022-07-21 DIAGNOSIS — I10 HYPERTENSION, UNSPECIFIED TYPE: ICD-10-CM

## 2022-07-21 DIAGNOSIS — G20 PARKINSON'S DISEASE: Primary | ICD-10-CM

## 2022-07-21 DIAGNOSIS — F33.1 MAJOR DEPRESSIVE DISORDER, RECURRENT, MODERATE: ICD-10-CM

## 2022-07-21 RX ORDER — MEMANTINE HYDROCHLORIDE 5 MG/1
5 TABLET ORAL DAILY
Qty: 7 TABLET | Refills: 0 | Status: SHIPPED | OUTPATIENT
Start: 2022-07-21 | End: 2022-08-04 | Stop reason: SDUPTHER

## 2022-07-21 NOTE — OUTREACH NOTE
AMBULATORY CASE MANAGEMENT NOTE    Name and Relationship of Patient/Support Person: Reddy Castellano - Self    Made MRI appt for 8/3 08[facility].  Neuro remade for 8/4.  PCP appt note not signed.  Appts reviewed in detail.    Pt put of his med for depression. Said it was his Namenda. Educated that this med for for memory. He agreed to call the office the psych office and make appt ASAP so he does not run out. Requested curtesy refill be sent in x 7 days by pcp.    BRANDON RAMOS  Ambulatory Case Management    7/21/2022, 08:47 EDT

## 2022-07-21 NOTE — TELEPHONE ENCOUNTER
Pt is out of Namenda and cannot get refill until appt with Astra on 7/27. Can we do a week's worth curtsey refill until then? Thanks!

## 2022-07-28 ENCOUNTER — PATIENT OUTREACH (OUTPATIENT)
Dept: CASE MANAGEMENT | Facility: OTHER | Age: 87
End: 2022-07-28

## 2022-07-28 DIAGNOSIS — I10 HYPERTENSION, UNSPECIFIED TYPE: ICD-10-CM

## 2022-07-28 DIAGNOSIS — F33.1 MAJOR DEPRESSIVE DISORDER, RECURRENT, MODERATE: ICD-10-CM

## 2022-07-28 DIAGNOSIS — G20 PARKINSON'S DISEASE: Primary | ICD-10-CM

## 2022-07-28 PROCEDURE — 99490 CHRNC CARE MGMT STAFF 1ST 20: CPT | Performed by: INTERNAL MEDICINE

## 2022-07-28 NOTE — OUTREACH NOTE
[facility] End of Month Documentation    This Chronic Medical Management Care Plan for Reddy Castellano, 87 y.o. male, has been monitored and managed and a new plan of care implemented for the month of July.  A cumulative time of 36  minutes was spent on this patient record this month, including phone call with patient; chart review.    Regarding the patient's problems: has Gout; Type 2 diabetes mellitus with hyperglycemia, without long-term current use of insulin (HCC); Essential hypertension; Other hyperlipidemia; Alzheimer's disease, unspecified (CODE) (HCC); Major depressive disorder, recurrent, moderate (HCC); Scrotal pain; Urge incontinence of urine; Urgency of urination; Balanitis; Candida rash of groin; Abnormal weight loss; Anxiety; Rheumatoid arthritis (HCC); Chronic obstructive pulmonary disease (HCC); Constipation; Depressive disorder; Disease; Disorder of prostate; Disorder of vision; Essential tremor; Excess skin of abdominal wall; Heart failure (HCC); Mechanical complication of genitourinary device; Morbid obesity ([facility]); Myocardial infarction ([facility]); Numbness; Pneumonia; Seasonal allergies; Sleep apnea; Spondylosis of lumbar spine; Tingling of skin; Ventral hernia; Lymphadenopathy of head and neck; Swallowed foreign body; and Hyperkalemia on their problem list., the following items were addressed: medical records; medications and any changes can be found within the plan section of the note.  A detailed listing of time spent for chronic care management is tracked within each outreach encounter.  Current medications include:  has a current medication list which includes the following prescription(s): albuterol sulfate hfa, allopurinol, amlodipine, aspirin, atorvastatin, brompheniramine-pseudoephedrine-dm, bupropion xl, carboxymethylcellulose, cetirizine, citalopram, clopidogrel, donepezil, epinephrine, fluticasone, fluticasone-salmeterol, furosemide, lisinopril, melatonin, memantine, methocarbamol, montelukast,  multivitamin with minerals, nystatin, pantoprazole, polyethylene glycol, potassium chloride, propranolol, tiotropium bromide monohydrate, venlafaxine xr, vitamin b-12, and vitamin d. and the patient is reported to be patient is compliant with medication protocol,  Medications are reported to be effective.  Regarding these diagnoses, referrals were made to the following provider(s):  none.  All notes on chart for PCP to review.    The patient was monitored remotely for blood pressure; mood & behavior; medications, memory, tremors.    The patient's physical needs include:  help taking medications as prescribed; physical healthcare, memory.     The patient's mental support needs include:  continued support    The patient's cognitive support needs include:  medication; continued support, son to help organize pills/meds    The patient's psychosocial support needs include:  continued support; medication management or adherence, pt sees Jyoti    The patient's functional needs include: physical healthcare    The patient's environmental needs include:  not applicable    Care Plan overall comments:  No data recorded    Refer to previous outreach notes for more information on the areas listed above.    Monthly Billing Diagnoses  (G20) Parkinson's disease (HCC)    (I10) Hypertension, unspecified type    (F33.1) Major depressive disorder, recurrent, moderate (HCC)    Medications   · Medications have been reconciled    Care Plan progress this month:      Recently Modified Care Plans Updates made since 6/27/2022 12:00 AM    No recently modified care plans.          Instructions   · Patient was provided an electronic copy of care plan  · CCM services were explained and offered and patient has accepted these services.  · Patient has given their written consent to receive CCM services and understands that this includes the authorization of electronic communication of medical information with the other treating providers.  · Patient  understands that they may stop CCM services at any time and these changes will be effective at the end of the calendar month and will effectively revocate the agreement of CCM services.  · Patient understands that only one practitioner can furnish and be paid for CCM services during one calendar month.  Patient also understands that there may be co-payment or deductible fees in association with CCM services.  · Patient will continue with at least monthly follow-up calls with the Nurse Navigator.    BRANDON RAMOS  Ambulatory Case Management    7/28/2022, 12:30 EDT

## 2022-08-02 ENCOUNTER — HOSPITAL ENCOUNTER (OUTPATIENT)
Dept: MRI IMAGING | Facility: HOSPITAL | Age: 87
Discharge: HOME | End: 2022-08-02
Admitting: INTERNAL MEDICINE

## 2022-08-02 DIAGNOSIS — K86.2 PANCREATIC CYST: ICD-10-CM

## 2022-08-02 LAB
CREAT BLDA-MCNC: 1.7 MG/DL
EGFRCR SERPLBLD CKD-EPI 2021: 38.5 ML/MIN/1.73

## 2022-08-02 PROCEDURE — 74183 MRI ABD W/O CNTR FLWD CNTR: CPT

## 2022-08-02 PROCEDURE — 0 GADOBENATE DIMEGLUMINE 529 MG/ML SOLUTION: Performed by: INTERNAL MEDICINE

## 2022-08-02 PROCEDURE — 82565 ASSAY OF CREATININE: CPT

## 2022-08-02 PROCEDURE — A9577 INJ MULTIHANCE: HCPCS | Performed by: INTERNAL MEDICINE

## 2022-08-02 RX ADMIN — GADOBENATE DIMEGLUMINE 20 ML: 529 INJECTION, SOLUTION INTRAVENOUS at 10:08

## 2022-08-03 ENCOUNTER — APPOINTMENT (OUTPATIENT)
Dept: MRI IMAGING | Facility: HOSPITAL | Age: 87
End: 2022-08-03

## 2022-08-03 ENCOUNTER — TELEPHONE (OUTPATIENT)
Dept: CASE MANAGEMENT | Facility: OTHER | Age: 87
End: 2022-08-03

## 2022-08-03 NOTE — TELEPHONE ENCOUNTER
Called pt to remind him of appt tomorrow. No answer, lmtrc.    Reviewed MRI. PCP has not reviewed.

## 2022-08-04 ENCOUNTER — OFFICE VISIT (OUTPATIENT)
Dept: NEUROLOGY | Facility: CLINIC | Age: 87
End: 2022-08-04

## 2022-08-04 ENCOUNTER — TELEPHONE (OUTPATIENT)
Dept: CASE MANAGEMENT | Facility: OTHER | Age: 87
End: 2022-08-04

## 2022-08-04 VITALS
HEART RATE: 53 BPM | BODY MASS INDEX: 32.89 KG/M2 | DIASTOLIC BLOOD PRESSURE: 75 MMHG | SYSTOLIC BLOOD PRESSURE: 133 MMHG | HEIGHT: 68 IN | WEIGHT: 217 LBS

## 2022-08-04 DIAGNOSIS — G20 PARKINSON'S DISEASE: Primary | ICD-10-CM

## 2022-08-04 DIAGNOSIS — G30.9 ALZHEIMER'S DISEASE, UNSPECIFIED (CODE): Primary | ICD-10-CM

## 2022-08-04 DIAGNOSIS — G25.0 ESSENTIAL TREMOR: ICD-10-CM

## 2022-08-04 PROCEDURE — 99214 OFFICE O/P EST MOD 30 MIN: CPT | Performed by: PSYCHIATRY & NEUROLOGY

## 2022-08-04 RX ORDER — MEMANTINE HYDROCHLORIDE 10 MG/1
TABLET ORAL
Qty: 60 TABLET | Refills: 10 | Status: SHIPPED | OUTPATIENT
Start: 2022-08-04 | End: 2022-08-04 | Stop reason: SDUPTHER

## 2022-08-04 RX ORDER — MEMANTINE HYDROCHLORIDE 5 MG/1
TABLET ORAL
Qty: 60 TABLET | Refills: 10 | Status: SHIPPED | OUTPATIENT
Start: 2022-08-04 | End: 2022-08-04 | Stop reason: SDUPTHER

## 2022-08-04 RX ORDER — MEMANTINE HYDROCHLORIDE 10 MG/1
TABLET ORAL
Qty: 180 TABLET | Refills: 3 | Status: SHIPPED | OUTPATIENT
Start: 2022-08-04

## 2022-08-04 RX ORDER — DONEPEZIL HYDROCHLORIDE 5 MG/1
5 TABLET, FILM COATED ORAL NIGHTLY
Qty: 90 TABLET | Refills: 3 | Status: SHIPPED | OUTPATIENT
Start: 2022-08-04 | End: 2023-08-04

## 2022-08-04 NOTE — ASSESSMENT & PLAN NOTE
He is to continue taking Donepezil 5 mg daily and memantine 10 mg twice a day.  We will follow-up with nurse practitioner Rebekah Flores in 10 months

## 2022-08-04 NOTE — PROGRESS NOTES
"Chief Complaint  Follow-up (Worsening tremors. )    Subjective          Reddy Castellano is a 87 y.o. male who presents to [facility]  History of Present Illness  87-year-old man here for follow-up of his tremors and memory loss.  His tremors are controlled.  Is taking Inderal 10 mg 3 times a day.  He is independent with activities of daily living.  For his Alzheimer's dementia he is taking memantine 5 mg daily however supposed to take 10 mg twice a day.  He is also taking Aricept 5 mg daily.  He is independent with activities of daily living feeling writing, eating, toileting.  The treatment is not back to his activities of daily living.    Objective   Vital Signs:   /75 (BP Location: Left arm, Patient Position: Sitting, Cuff Size: Adult)   Pulse 53   Ht 172.7 cm (67.99\")   Wt 98.4 kg (217 lb)   BMI 33.00 kg/m²     Physical Exam   He is alert, fluent, phasic, follows commands well.  His Mini-Mental Status score is 24 out of 30.  He missed the month, day, date, 2 out of 3 recent objects and 1 out of 5 spelling world backwards.  On repeat examination he got 2 out of the 3 recent memory testing.  There is no significant tremor noted on Archimedes spiral.  There is no tremor noted hands extended.  Heart is regular and rhythm normal in rate.  His rate initially tested was 53.  Repeat was 62.  Patient gait is unremarkable.        Assessment and Plan  There are no diagnoses linked to this encounter.     Total time spent with the patient and coordinating patient care was 30 minutes.    Follow Up  No follow-ups on file.  Patient was given instructions and counseling regarding his condition or for health maintenance advice. Please see specific information pulled into the AVS if appropriate.       "

## 2022-08-05 ENCOUNTER — PATIENT OUTREACH (OUTPATIENT)
Dept: CASE MANAGEMENT | Facility: OTHER | Age: 87
End: 2022-08-05

## 2022-08-05 DIAGNOSIS — G20 PARKINSON'S DISEASE: Primary | ICD-10-CM

## 2022-08-05 NOTE — OUTREACH NOTE
AMBULATORY CASE MANAGEMENT NOTE    Name and Relationship of Patient/Support Person: Reddy Castellano - Self    Pt's MRI in. Waiting for pcp to review.  dicussed neuro appt and med changes.    [facility] Interim Update    0948: Discussed MRI with pcp. Results in chart. Called to discuss with pt, no answer, left message.        BRANDON RAMOS  Ambulatory Case Management    8/5/2022, 09:00 EDT

## 2022-08-11 ENCOUNTER — TELEPHONE (OUTPATIENT)
Dept: CASE MANAGEMENT | Facility: OTHER | Age: 87
End: 2022-08-11

## 2022-08-11 PROBLEM — N40.1 BENIGN PROSTATIC HYPERPLASIA WITH URINARY FREQUENCY: Status: ACTIVE | Noted: 2022-08-11

## 2022-08-11 PROBLEM — R35.0 BENIGN PROSTATIC HYPERPLASIA WITH URINARY FREQUENCY: Status: ACTIVE | Noted: 2022-08-11

## 2022-08-11 NOTE — PROGRESS NOTES
Chief Complaint    Urologic complaint    Subjective          Reddy Castellano presents to [facility] UROLOGY  History of Present Illness    88 yo       ED   Urinary incontinence  History of scrotal pain  Renal mass    Patient been dealing with some urgent continence.  Wears 1 depends daily.    Myrbetriq  -did not help.    Voiding okay.  Does not strain. No prostate meds.  . No GH. Good stream.    No burning/dysuria.  No gross hematuria/ UTI    scrotal pain here today with urgency and urge incontinence      8/22   1.7, GFR 38    PVR     8/21   000  2019 000    Previous    10/21 cystoscopy-4 cm prostate, minimal trabeculation, negative otherwise      Bothered by his penis.  Ever since penile implant has been out (Dr. Wolf)  patient has been having some trouble standing dry around his penis he states.  Patient is circumcised    11/20 scrotal ultrasoundstatus post left orchiectomy, large right-sided hydrocele, normal right testicle.    1/23/20 CT abdomen/pelvis without - stable adrenal gland.  Consistent with benign lipid rich adenoma.    L 2.4 cm nodule. No  Nephrolithiasis.  Small stones in the gallbladder.  Small fat-containing umbilical hernia.  Large pannus with multiple loops of small bowel: Extending into the pannus.  No true hernia identified.  No pelvic or inguinal adenopathy.      11/19 creatinine 1.4, GFR 46    patient has lost 35 lbs - he is trying.    IPP placed several years ago.  He had erosion of some tubing and [facility] [place].    12/20 PSA 2.08     Results for orders placed or performed in visit on 08/12/22   POC Urinalysis Dipstick, Automated    Specimen: Urine   Result Value Ref Range    Color Yellow Yellow, Straw, Dark Yellow, Jeri    Clarity, UA Clear Clear    Specific Gravity  1.030 1.005 - 1.030    pH, Urine 6.0 5.0 - 8.0    Leukocytes Negative Negative    Nitrite, UA Negative Negative    Protein, POC Negative Negative mg/dL    Glucose, UA Negative  Negative mg/dL    Ketones, UA Negative Negative    Urobilinogen, UA Normal Normal    Bilirubin Negative Negative    Blood, UA Negative Negative    Lot Number [id number]     Expiration Date 2022              Results for orders placed or performed during the hospital encounter of 22   POC Creatinine    Specimen: Blood   Result Value Ref Range    Creatinine 1.70 mg/dL    eGFR 38.5 (L) >60.0 mL/min/1.73         Past History:  Medical History: has a past medical history of Allergies, Anxiety, Arthritis, Asthma, BPH (benign prostatic hyperplasia), Carpal tunnel syndrome, Colon polyp, Congestive heart failure (CHF) ([facility]), COPD (chronic obstructive pulmonary disease) ([facility]), Dementia ([facility]), Depression, Diabetes mellitus type 2, noninsulin dependent ([facility]), Diverticulitis, Erectile dysfunction of organic origin, Essential hypertension (2019), Excess skin, Fatigue (2019), GERD (gastroesophageal reflux disease), Gout, Heart attack ([facility]), Heart disease, High cholesterol, Hyperlipidemia, Hypertension, Hypogonadism in male, Insomnia, Leg swelling, Memory change (2018), Myocardial infarction ([facility]), Osteoarthritis, Parkinson's disease ([facility]), Prostate disorder, Rectal bleeding, Renal cyst, Seasonal allergies, Sleep apnea, Stroke ([facility]), TIA (transient ischemic attack) (2018), Tremor (2018), and Vitamin D deficiency.   Surgical History: has a past surgical history that includes Appendectomy (); Cardiac catheterization; Carpal tunnel release; Cataract extraction; Circumcision, non- (); Colonoscopy (2014); Cystoscopy; Esophagogastroduodenoscopy (2010); Eye surgery; Cardiac surgery (); Hernia repair (); Total knee arthroplasty (Bilateral, 2009); Laparoscopic gastric banding; Other surgical history; Orchiectomy (Right, ); Penile prosthesis implant; TOENAIL EXCISION; Toe Surgery; and Esophagogastroduodenoscopy (N/A, 2021).   Family History: family history  includes Diabetes in his brother, mother, and son; Heart attack in his father; Prostate cancer in an other family member; Rectal cancer in an other family member.   Social History: reports that he has never smoked. He has never used smokeless tobacco. He reports current alcohol use. He reports that he does not use drugs.  Allergies: Patient has no known allergies.       Current Outpatient Medications:   •  albuterol sulfate  (90 Base) MCG/ACT inhaler, Inhale 2 puffs Every 6 (Six) Hours As Needed., Disp: , Rfl:   •  allopurinol (ZYLOPRIM) 300 MG tablet, Take 0.5 tablets by mouth Daily., Disp: 45 tablet, Rfl: 1  •  amLODIPine (NORVASC) 10 MG tablet, Take 1 tablet by mouth Daily., Disp: 90 tablet, Rfl: 0  •  aspirin 81 MG EC tablet, Take 81 mg by mouth Daily., Disp: , Rfl:   •  atorvastatin (LIPITOR) 10 MG tablet, Take 1 tablet by mouth Daily., Disp: 90 tablet, Rfl: 1  •  brompheniramine-pseudoephedrine-DM 30-2-10 MG/5ML syrup, Take 10 mL by mouth 4 (Four) Times a Day As Needed., Disp: , Rfl:   •  buPROPion XL (WELLBUTRIN XL) 150 MG 24 hr tablet, Take 150 mg by mouth Every Morning., Disp: , Rfl:   •  carboxymethylcellulose (REFRESH PLUS) 0.5 % solution, Administer 1 drop to both eyes 4 (Four) Times a Day As Needed., Disp: , Rfl:   •  cetirizine (zyrTEC) 10 MG tablet, Take 1 tablet by mouth Daily for 90 days., Disp: 90 tablet, Rfl: 1  •  citalopram (CeleXA) 20 MG tablet, Take 1 tablet by mouth Daily., Disp: 90 tablet, Rfl: 0  •  clopidogrel (PLAVIX) 75 MG tablet, Take 1 tablet by mouth Daily., Disp: , Rfl:   •  donepezil (ARICEPT) 5 MG tablet, Take 1 tablet by mouth Every Night., Disp: 90 tablet, Rfl: 3  •  EPINEPHrine 0.15 MG/0.15ML solution auto-injector injection, Inject  under the skin into the appropriate area as directed., Disp: , Rfl:   •  fluticasone (FLONASE) 50 MCG/ACT nasal spray, 2 sprays into the nostril(s) as directed by provider Daily., Disp: , Rfl:   •  fluticasone-salmeterol (ADVAIR HFA) 230-68  MCG/ACT inhaler, Inhale 2 puffs Every 6 (Six) Hours., Disp: , Rfl:   •  furosemide (LASIX) 20 MG tablet, Take 1 tablet by mouth Daily., Disp: 90 tablet, Rfl: 1  •  lisinopril (PRINIVIL,ZESTRIL) 5 MG tablet, Take 1 tablet by mouth Daily., Disp: 90 tablet, Rfl: 1  •  melatonin 1 MG tablet, Take 6 mg by mouth At Night As Needed., Disp: , Rfl:   •  memantine (NAMENDA) 10 MG tablet, Take 1 tablet twice a day, Disp: 180 tablet, Rfl: 3  •  methocarbamol (ROBAXIN) 750 MG tablet, Take 1 tablet by mouth 3 (Three) Times a Day As Needed for Muscle Spasms., Disp: 30 tablet, Rfl: 0  •  montelukast (SINGULAIR) 10 MG tablet, Take 1 tablet by mouth Every Night., Disp: 90 tablet, Rfl: 1  •  multivitamin with minerals tablet tablet, Take 1 tablet by mouth Daily., Disp: , Rfl:   •  nystatin (MYCOSTATIN) 826947 UNIT/GM cream, Apply  topically to the appropriate area as directed As Needed (scrotal pain)., Disp: 15 g, Rfl: 5  •  pantoprazole (PROTONIX) 40 MG EC tablet, Take 1 tablet by mouth Daily., Disp: 90 tablet, Rfl: 1  •  polyethylene glycol (GlycoLax) 17 GM/SCOOP powder, Take 17 g by mouth Daily., Disp: 850 g, Rfl: 2  •  potassium chloride (MICRO-K) 10 MEQ CR capsule, Take 20 mEq by mouth 2 (Two) Times a Day., Disp: , Rfl:   •  propranolol (INDERAL) 10 MG tablet, Take 1 tablet by mouth Daily., Disp: 90 tablet, Rfl: 1  •  Tiotropium Bromide Monohydrate (SPIRIVA RESPIMAT) 1.25 MCG/ACT aerosol solution inhaler, Inhale 2 puffs Every 12 (Twelve) Hours., Disp: , Rfl:   •  venlafaxine XR (EFFEXOR-XR) 75 MG 24 hr capsule, Take 75 mg by mouth Daily., Disp: , Rfl:   •  vitamin B-12 (CYANOCOBALAMIN) 1000 MCG tablet, Take 1 tablet by mouth Daily., Disp: 90 tablet, Rfl: 1  •  vitamin D (ERGOCALCIFEROL) 1.25 MG (46134 UT) capsule capsule, Take 1 capsule by mouth 2 (Two) Times a Week., Disp: 26 capsule, Rfl: 0     Physical exam       Alert and orient x3  Well appearing, well developed, in no acute distress     Grossly oriented to person, place and  time, judgment is intact, normal mood and affect    Results for orders placed or performed during the hospital encounter of 08/02/22   POC Creatinine    Specimen: Blood   Result Value Ref Range    Creatinine 1.70 mg/dL    eGFR 38.5 (L) >60.0 mL/min/1.73        Objective     Vital Signs:   There were no vitals taken for this visit.             Assessment and Plan        Urgency and urge incontinence      We did discuss risk and benefits of procedures including Botox.  At this time patient not interested in any procedures       We will try Gemtesa 75 mg daily samples.  Risk and benefits discussed patient will call if he wants prescription    If he decides he wants prescription he will need an appoint with nurse practitioner in 1 year      Follow-up with me as needed.         Addendum       8/22  MRI abdomen with and without - multiple clusters of pancreatic cystic lesions throughout the pancreas.  1.7 cm within the pancreatic tail.  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  Recommend MRCP in 1 year    Small 1.1 x 1.2 cm partially exophytic lesion arising to the lateral pole the right kidney.  Demonstrates apparent internal enhancement.  Suspicious for primary renal neoplasm.      Primary care's office contacted us as I was unaware he had a renal mass.  I did call and was able to discuss with the son, have not yet reached the patient.  I did discuss that the small enhancing renal mass is about 80% chance of being a cancer.  That being said because he is 87 and the mass is small it likely will never have to have any treatment.  I did discuss active surveillance is a reasonable option.  That being said there is always a small risk that it could have progression and we do need to follow this with serial imaging.    I will plan on seeing him back in 6 months with MRI abdomen with and without contrast.

## 2022-08-12 ENCOUNTER — PATIENT OUTREACH (OUTPATIENT)
Dept: CASE MANAGEMENT | Facility: OTHER | Age: 87
End: 2022-08-12

## 2022-08-12 ENCOUNTER — OFFICE VISIT (OUTPATIENT)
Dept: UROLOGY | Facility: CLINIC | Age: 87
End: 2022-08-12

## 2022-08-12 VITALS — BODY MASS INDEX: 33.56 KG/M2 | RESPIRATION RATE: 13 BRPM | HEIGHT: 68 IN | WEIGHT: 221.4 LBS

## 2022-08-12 DIAGNOSIS — N40.1 BENIGN PROSTATIC HYPERPLASIA WITH URINARY FREQUENCY: Primary | ICD-10-CM

## 2022-08-12 DIAGNOSIS — N28.89 RENAL MASS: ICD-10-CM

## 2022-08-12 DIAGNOSIS — G20 PARKINSON'S DISEASE: Primary | ICD-10-CM

## 2022-08-12 DIAGNOSIS — R35.0 BENIGN PROSTATIC HYPERPLASIA WITH URINARY FREQUENCY: Primary | ICD-10-CM

## 2022-08-12 LAB
BILIRUB BLD-MCNC: NEGATIVE MG/DL
CLARITY, POC: CLEAR
COLOR UR: YELLOW
EXPIRATION DATE: NORMAL
GLUCOSE UR STRIP-MCNC: NEGATIVE MG/DL
KETONES UR QL: NEGATIVE
LEUKOCYTE EST, POC: NEGATIVE
Lab: NORMAL
NITRITE UR-MCNC: NEGATIVE MG/ML
PH UR: 6 [PH] (ref 5–8)
PROT UR STRIP-MCNC: NEGATIVE MG/DL
RBC # UR STRIP: NEGATIVE /UL
SP GR UR: 1.03 (ref 1–1.03)
UROBILINOGEN UR QL: NORMAL

## 2022-08-12 PROCEDURE — 99490 CHRNC CARE MGMT STAFF 1ST 20: CPT | Performed by: INTERNAL MEDICINE

## 2022-08-12 PROCEDURE — 99214 OFFICE O/P EST MOD 30 MIN: CPT | Performed by: UROLOGY

## 2022-08-12 PROCEDURE — 99439 CHRNC CARE MGMT STAF EA ADDL: CPT | Performed by: INTERNAL MEDICINE

## 2022-08-12 PROCEDURE — 81003 URINALYSIS AUTO W/O SCOPE: CPT | Performed by: UROLOGY

## 2022-08-12 RX ORDER — CETIRIZINE HYDROCHLORIDE 10 MG/1
10 TABLET ORAL DAILY
Qty: 90 TABLET | Refills: 1 | Status: SHIPPED | OUTPATIENT
Start: 2022-08-12 | End: 2023-02-23

## 2022-08-12 NOTE — OUTREACH NOTE
AMBULATORY CASE MANAGEMENT NOTE    Name and Relationship of Patient/Support Person: Reddy Castellano - Self    Reminded pt of urology appt today. He agreed he is gooing.  Refilled zyrtec.  Told pt, again, that we do not fill his Namenda, that neuro did earlier this month. That med, along with Protonix appear to be at pharmacy. He agreed to get those filled/picked up. He also agreed to ask his psych provider for refill on Citalopram.     CCM Interim Update    Pt was coughing yesterday, not now. Was fatigue, not current. Advised to monitor for sick s/s.        BRANDON RAMOS  Ambulatory Case Management    8/12/2022, 13:03 EDT

## 2022-08-15 ENCOUNTER — TELEPHONE (OUTPATIENT)
Dept: CASE MANAGEMENT | Facility: OTHER | Age: 87
End: 2022-08-15

## 2022-08-15 DIAGNOSIS — N28.89 RENAL MASS: Primary | ICD-10-CM

## 2022-08-15 NOTE — TELEPHONE ENCOUNTER
Per urology office note last week, Dr Lozano did not note the findings from MRI done earlier this month. I called his office ans asked staff to discuss with Dr Lozano and let pt and I know if pt needs additional work up.

## 2022-08-15 NOTE — TELEPHONE ENCOUNTER
"Office staff at [facility]'s office messaged me:   \" called and talked with patients son in regards to renal mass. He tried calling the patient, he did not answer. He will continue to try and get a hold of the patient though. We are going to see him back in 6 months with an MRI of the abdomen. I will get these appointments made for him, and mail the information to him as well as call him.\"    I also called pt, no answer, left message.     "

## 2022-08-16 ENCOUNTER — PATIENT OUTREACH (OUTPATIENT)
Dept: CASE MANAGEMENT | Facility: OTHER | Age: 87
End: 2022-08-16

## 2022-08-16 DIAGNOSIS — F03.90 DEMENTIA WITHOUT BEHAVIORAL DISTURBANCE, UNSPECIFIED DEMENTIA TYPE: Primary | ICD-10-CM

## 2022-08-16 NOTE — OUTREACH NOTE
AMBULATORY CASE MANAGEMENT NOTE    Name and Relationship of Patient/Support Person: Reddy Castellano - Self    Reviewed new Namenda dose with pt.        BRANDON RAMOS  Ambulatory Case Management    8/16/2022, 10:40 EDT

## 2022-08-23 RX ORDER — AMLODIPINE BESYLATE 10 MG/1
10 TABLET ORAL DAILY
Qty: 90 TABLET | Refills: 0 | Status: SHIPPED | OUTPATIENT
Start: 2022-08-23

## 2022-08-30 ENCOUNTER — PATIENT OUTREACH (OUTPATIENT)
Dept: CASE MANAGEMENT | Facility: OTHER | Age: 87
End: 2022-08-30

## 2022-08-30 DIAGNOSIS — F03.90 DEMENTIA WITHOUT BEHAVIORAL DISTURBANCE, UNSPECIFIED DEMENTIA TYPE: Primary | ICD-10-CM

## 2022-08-30 DIAGNOSIS — I10 HYPERTENSION, UNSPECIFIED TYPE: ICD-10-CM

## 2022-08-30 NOTE — OUTREACH NOTE
[facility] End of Month Documentation    This Chronic Medical Management Care Plan for Reddy Castellano, 87 y.o. male, has been monitored and managed and a new plan of care implemented for the month of August.  A cumulative time of 56  minutes was spent on this patient record this month, including phone call with patient; chart review.    Regarding the patient's problems: has Gout; Type 2 diabetes mellitus with hyperglycemia, without long-term current use of insulin (HCC); Essential hypertension; Other hyperlipidemia; Alzheimer's disease, unspecified (CODE) (HCC); Major depressive disorder, recurrent, moderate (HCC); Scrotal pain; Urge incontinence of urine; Urgency of urination; Balanitis; Candida rash of groin; Abnormal weight loss; Anxiety; Rheumatoid arthritis (HCC); Chronic obstructive pulmonary disease (HCC); Constipation; Depressive disorder; Disease; Disorder of prostate; Disorder of vision; Essential tremor; Excess skin of abdominal wall; Heart failure ([facility]); Mechanical complication of genitourinary device; Morbid obesity ([facility]); Myocardial infarction ([facility]); Numbness; Pneumonia; Seasonal allergies; Sleep apnea; Spondylosis of lumbar spine; Tingling of skin; Ventral hernia; Lymphadenopathy of head and neck; Swallowed foreign body; Hyperkalemia; Benign prostatic hyperplasia with urinary frequency; and Renal mass on their problem list., the following items were addressed: medical records; medications and any changes can be found within the plan section of the note.  A detailed listing of time spent for chronic care management is tracked within each outreach encounter.  Current medications include:  has a current medication list which includes the following prescription(s): albuterol sulfate hfa, allopurinol, amlodipine, aspirin, atorvastatin, brompheniramine-pseudoephedrine-dm, bupropion xl, carboxymethylcellulose, cetirizine, citalopram, clopidogrel, donepezil, epinephrine, fluticasone, fluticasone-salmeterol,  furosemide, lisinopril, melatonin, memantine, methocarbamol, montelukast, multivitamin with minerals, nystatin, pantoprazole, polyethylene glycol, potassium chloride, propranolol, tiotropium bromide monohydrate, venlafaxine xr, vitamin b-12, and vitamin d. and the patient is reported to be patient is compliant with medication protocol,  Medications are reported to be effective.  Regarding these diagnoses, referrals were made to the following provider(s):  none.  All notes on chart for PCP to review.    The patient was monitored remotely for blood pressure; mood & behavior; medications, memory, tremors.    The patient's physical needs include:  help taking medications as prescribed; physical healthcare, memory.     The patient's mental support needs include:  continued support    The patient's cognitive support needs include:  medication; continued support    The patient's psychosocial support needs include:  continued support; medication management or adherence, pt sees [facility]    The patient's functional needs include: physical healthcare    The patient's environmental needs include:  not applicable    Care Plan overall comments:  No data recorded    Refer to previous outreach notes for more information on the areas listed above.    Monthly Billing Diagnoses  (F03.90) Dementia without behavioral disturbance, unspecified dementia type (HCC)    (I10) Hypertension, unspecified type    Medications   · Medications have been reconciled    Care Plan progress this month:      Recently Modified Care Plans Updates made since 7/30/2022 12:00 AM    No recently modified care plans.              Instructions   · Patient was provided an electronic copy of care plan  · CCM services were explained and offered and patient has accepted these services.  · Patient has given their written consent to receive CCM services and understands that this includes the authorization of electronic communication of medical information with the other  treating providers.  · Patient understands that they may stop CCM services at any time and these changes will be effective at the end of the calendar month and will effectively revocate the agreement of CCM services.  · Patient understands that only one practitioner can furnish and be paid for CCM services during one calendar month.  Patient also understands that there may be co-payment or deductible fees in association with CCM services.  · Patient will continue with at least monthly follow-up calls with the Nurse Navigator.    BRANDON RAMOS  Ambulatory Case Management    8/30/2022, 07:41 EDT

## 2022-09-01 ENCOUNTER — TELEPHONE (OUTPATIENT)
Dept: CASE MANAGEMENT | Facility: OTHER | Age: 87
End: 2022-09-01
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
## 2022-09-02 ENCOUNTER — PATIENT OUTREACH (OUTPATIENT)
Dept: CASE MANAGEMENT | Facility: OTHER | Age: 87
End: 2022-09-02

## 2022-09-02 DIAGNOSIS — I10 HYPERTENSION, UNSPECIFIED TYPE: ICD-10-CM

## 2022-09-02 DIAGNOSIS — F03.90 DEMENTIA WITHOUT BEHAVIORAL DISTURBANCE, UNSPECIFIED DEMENTIA TYPE: Primary | ICD-10-CM

## 2022-09-02 NOTE — OUTREACH NOTE
AMBULATORY CASE MANAGEMENT NOTE    Name and Relationship of Patient/Support Person: Reddy Castellano - Self    called pt for bp log. He was not home. Agreed to have me call him next week.  No acute concerns voiced.         BRANDON RAMOS  Ambulatory Case Management    9/2/2022, 14:12 EDT

## 2022-09-09 ENCOUNTER — PATIENT OUTREACH (OUTPATIENT)
Dept: CASE MANAGEMENT | Facility: OTHER | Age: 87
End: 2022-09-09

## 2022-09-09 DIAGNOSIS — I10 HYPERTENSION, UNSPECIFIED TYPE: Primary | ICD-10-CM

## 2022-09-09 RX ORDER — BROMPHENIRAMINE MALEATE, PSEUDOEPHEDRINE HYDROCHLORIDE, AND DEXTROMETHORPHAN HYDROBROMIDE 2; 30; 10 MG/5ML; MG/5ML; MG/5ML
10 SYRUP ORAL 4 TIMES DAILY PRN
Qty: 473 ML | Refills: 0 | Status: SHIPPED | OUTPATIENT
Start: 2022-09-09

## 2022-09-09 RX ORDER — ATORVASTATIN CALCIUM 10 MG/1
10 TABLET, FILM COATED ORAL DAILY
Qty: 90 TABLET | Refills: 1 | Status: SHIPPED | OUTPATIENT
Start: 2022-09-09

## 2022-09-09 NOTE — TELEPHONE ENCOUNTER
While on phone with pt for med refill, I asked for bp log. He was still in bed. agreed to call me back later.

## 2022-09-09 NOTE — OUTREACH NOTE
AMBULATORY CASE MANAGEMENT NOTE    Name and Relationship of Patient/Support Person: Reddy Castellano - Self       BP and HR    95/68   71  151/82   68  129/67   67  158/83   68  162/86   68  119/75   65  136/83   57  118/75   57    Pt takes BP at all times of the day, before and after meds, unsure where these numbers lie. I will call in 1-2 weeks for TIME specific log and report to pcp.    [facility] Having issues with current pulm doctor with CPAP supplies.    Pt has nagging cough, dry, x3 days. He has BromPhed through lung doctor. Not interested in tessalon perles, wants more cough syrup. advised pt to call lung office for refill. He agreed.      BRANDON RAMOS  Ambulatory Case Management    9/9/2022, 10:11 EDT

## 2022-09-09 NOTE — TELEPHONE ENCOUNTER
t has nagging cough, dry, x3 days. No other sick s/s. He has BromPhed through lung doctor. Not interested in tessalon perles, wants more cough syrup. advised pt to call lung office for refill. He did and office closed. Requested pcp refill.

## 2022-09-16 DIAGNOSIS — K21.9 GASTROESOPHAGEAL REFLUX DISEASE, UNSPECIFIED WHETHER ESOPHAGITIS PRESENT: ICD-10-CM

## 2022-09-16 RX ORDER — PANTOPRAZOLE SODIUM 40 MG/1
40 TABLET, DELAYED RELEASE ORAL DAILY
Qty: 90 TABLET | Refills: 1 | Status: SHIPPED | OUTPATIENT
Start: 2022-09-16

## 2022-09-20 ENCOUNTER — TELEPHONE (OUTPATIENT)
Dept: CASE MANAGEMENT | Facility: OTHER | Age: 87
End: 2022-09-20

## 2022-09-22 DIAGNOSIS — F33.1 MAJOR DEPRESSIVE DISORDER, RECURRENT, MODERATE: ICD-10-CM

## 2022-09-22 RX ORDER — CITALOPRAM 20 MG/1
20 TABLET ORAL DAILY
Qty: 90 TABLET | Refills: 1 | Status: CANCELLED | OUTPATIENT
Start: 2022-09-22 | End: 2023-09-22

## 2022-09-22 NOTE — TELEPHONE ENCOUNTER
1. Pt c/o fatigue and feeling bad, dry coughing (taking cough med) the last 4 days. BP's constantly running 150s/90s. On Norvasc 10 mg daily and Lisinopril 5 mg daily. Inderal 10 mg daily. Please advise.    2. Pended refill of Celexa.

## 2022-09-22 NOTE — TELEPHONE ENCOUNTER
He needs to be seen in clinic or urgent care about his cough and feeling bad. May need to be tested for COVID.   Cough medication can elevated blood pressure, so would try to avoid this.

## 2022-09-22 NOTE — TELEPHONE ENCOUNTER
He also has Effexor on his med list. He should not be taking both these medications. Please verify which he is taking.

## 2022-09-23 RX ORDER — LISINOPRIL 10 MG/1
10 TABLET ORAL DAILY
Qty: 90 TABLET | Refills: 0 | Status: SHIPPED | OUTPATIENT
Start: 2022-09-23

## 2022-09-23 NOTE — TELEPHONE ENCOUNTER
"Pt is taking Effexor and Celexa. Dr Gutierrez last to give Celexa and [facility] provider refills Effexor. Please advise.    Pt advised to decrease/avoid cough medicine due to HTN. BP today 159/97 has not taken cough medicine in 2-3 days. Please advise on BP. Cough improved, not feeling \"bad\", denies needing appt.  "

## 2022-09-23 NOTE — TELEPHONE ENCOUNTER
Per Dr Gutierrez, pt to stop Celexa, no tapering needed. Increase Lisinopril to 10 mg daily. Monitor BP daily and I will call in 1-2 weeks. Pt advised on mental health sign to watch for as this med is DC'd.

## 2022-09-29 ENCOUNTER — PATIENT OUTREACH (OUTPATIENT)
Dept: CASE MANAGEMENT | Facility: OTHER | Age: 87
End: 2022-09-29

## 2022-09-29 DIAGNOSIS — G20 PARKINSON'S DISEASE: ICD-10-CM

## 2022-09-29 DIAGNOSIS — F33.1 MAJOR DEPRESSIVE DISORDER, RECURRENT, MODERATE: Primary | ICD-10-CM

## 2022-09-29 DIAGNOSIS — I10 HYPERTENSION, UNSPECIFIED TYPE: ICD-10-CM

## 2022-09-29 DIAGNOSIS — K21.9 GASTROESOPHAGEAL REFLUX DISEASE, UNSPECIFIED WHETHER ESOPHAGITIS PRESENT: ICD-10-CM

## 2022-09-29 NOTE — OUTREACH NOTE
[facility] End of Month Documentation    This Chronic Medical Management Care Plan for Reddy Castellano, 87 y.o. male, has been monitored and managed and a new plan of care implemented for the month of September.  A cumulative time of 52  minutes was spent on this patient record this month, including chart review; phone call with patient; electronic communication with primary care provider.    Regarding the patient's problems: has Gout; Type 2 diabetes mellitus with hyperglycemia, without long-term current use of insulin (HCC); Essential hypertension; Other hyperlipidemia; Alzheimer's disease, unspecified (CODE) ([facility]); Major depressive disorder, recurrent, moderate (HCC); Scrotal pain; Urge incontinence of urine; Urgency of urination; Balanitis; Candida rash of groin; Abnormal weight loss; Anxiety; Rheumatoid arthritis ([facility]); Chronic obstructive pulmonary disease (HCC); Constipation; Depressive disorder; Disease; Disorder of prostate; Disorder of vision; Essential tremor; Excess skin of abdominal wall; Heart failure ([facility]); Mechanical complication of genitourinary device; Morbid obesity ([facility]); Myocardial infarction ([facility]); Numbness; Pneumonia; Seasonal allergies; Sleep apnea; Spondylosis of lumbar spine; Tingling of skin; Ventral hernia; Lymphadenopathy of head and neck; Swallowed foreign body; Hyperkalemia; Benign prostatic hyperplasia with urinary frequency; and Renal mass on their problem list., the following items were addressed: medical records; medications and any changes can be found within the plan section of the note.  A detailed listing of time spent for chronic care management is tracked within each outreach encounter.  Current medications include:  has a current medication list which includes the following prescription(s): albuterol sulfate hfa, allopurinol, amlodipine, aspirin, atorvastatin, brompheniramine-pseudoephedrine-dm, bupropion xl, carboxymethylcellulose, cetirizine, clopidogrel, donepezil, epinephrine,  fluticasone, fluticasone-salmeterol, furosemide, lisinopril, melatonin, memantine, methocarbamol, montelukast, multivitamin with minerals, nystatin, pantoprazole, polyethylene glycol, potassium chloride, propranolol, tiotropium bromide monohydrate, venlafaxine xr, vitamin b-12, and vitamin d. and the patient is reported to be patient is compliant with medication protocol,  Medications are reported to be effective.  Regarding these diagnoses, referrals were made to the following provider(s):  none.  All notes on chart for PCP to review.    The patient was monitored remotely for blood pressure; medications, memory, appts.    The patient's physical needs include:  help taking medications as prescribed; physical healthcare, memory.     The patient's mental support needs include:  continued support    The patient's cognitive support needs include:  medication; continued support    The patient's psychosocial support needs include:  continued support; medication management or adherence, pt sees [facility]    The patient's functional needs include: physical healthcare    The patient's environmental needs include:  not applicable    Care Plan overall comments:  No data recorded    Refer to previous outreach notes for more information on the areas listed above.    Monthly Billing Diagnoses  (F33.1) Major depressive disorder, recurrent, moderate (HCC)    (K21.9) Gastroesophageal reflux disease, unspecified whether esophagitis present    (I10) Hypertension, unspecified type    (G20) Parkinson's disease (HCC)    Medications   · Medications have been reconciled    Care Plan progress this month:      Recently Modified Care Plans Updates made since 8/29/2022 12:00 AM    No recently modified care plans.            Instructions   · Patient was provided an electronic copy of care plan  · CCM services were explained and offered and patient has accepted these services.  · Patient has given their written consent to receive CCM services and  understands that this includes the authorization of electronic communication of medical information with the other treating providers.  · Patient understands that they may stop CCM services at any time and these changes will be effective at the end of the calendar month and will effectively revocate the agreement of CCM services.  · Patient understands that only one practitioner can furnish and be paid for CCM services during one calendar month.  Patient also understands that there may be co-payment or deductible fees in association with CCM services.  · Patient will continue with at least monthly follow-up calls with the Nurse Navigator.    BRANDON RAMOS  Ambulatory Case Management    9/29/2022, 13:02 EDT

## 2022-10-06 ENCOUNTER — PATIENT OUTREACH (OUTPATIENT)
Dept: CASE MANAGEMENT | Facility: OTHER | Age: 87
End: 2022-10-06

## 2022-10-06 DIAGNOSIS — K21.9 GASTROESOPHAGEAL REFLUX DISEASE, UNSPECIFIED WHETHER ESOPHAGITIS PRESENT: ICD-10-CM

## 2022-10-06 DIAGNOSIS — I10 HYPERTENSION, UNSPECIFIED TYPE: ICD-10-CM

## 2022-10-06 DIAGNOSIS — F33.1 MAJOR DEPRESSIVE DISORDER, RECURRENT, MODERATE: Primary | ICD-10-CM

## 2022-10-06 DIAGNOSIS — G20 PARKINSON'S DISEASE: ICD-10-CM

## 2022-10-06 NOTE — OUTREACH NOTE
AMBULATORY CASE MANAGEMENT NOTE    Name and Relationship of Patient/Support Person: Nona Reddy - Self    Reminded pt of upcoming appt on 10/14.    Pt still having dry cough. No other sick s/s. Denied needing cough meds.    Pt fell while going out the back door, was wearing reading glasses that he did not need at the time, will not wear them unless reading. Fell two days ago. scratched L arm. Putting abx cream on it and keeping it clean. No head injury, no LOC. Instructed to call if s/s of infection.    No acute needs.    BP slightly elevated-  135/76  147/78  133/82  141/78  146/81  130/75    Pt stated he had increased Lisinopril 10 mg two weeks ago like advised by pcp. He will bring log to pcp appt next week.      Education Documentation  No documentation found.        BRANDON RAMOS  Ambulatory Case Management    10/6/2022, 15:34 EDT

## 2022-10-13 NOTE — PROGRESS NOTES
The ABCs of the Annual Wellness Visit  Subsequent Medicare Wellness Visit    Chief Complaint   Patient presents with   • Annual Exam      Subjective    History of Present Illness:  Reddy Castellano is a 87 y.o. male who presents for a Subsequent Medicare Wellness Visit.    The following portions of the patient's history were reviewed and   updated as appropriate: allergies, current medications, past family history, past medical history, past social history, past surgical history and problem list.    Compared to one year ago, the patient feels his physical   health is the same.    Compared to one year ago, the patient feels his mental   health is the same.    Recent Hospitalizations:  He was not admitted to the hospital during the last year.       Current Medical Providers:  Patient Care Team:  Agustina Gutierrez MD as PCP - General (Pediatrics)  Roxi Woodson RN as Ambulatory  (Population Health)  Christopher Lozano MD as Consulting Physician (Urology)    Outpatient Medications Prior to Visit   Medication Sig Dispense Refill   • albuterol sulfate  (90 Base) MCG/ACT inhaler Inhale 2 puffs Every 6 (Six) Hours As Needed.     • allopurinol (ZYLOPRIM) 300 MG tablet Take 0.5 tablets by mouth Daily. 45 tablet 1   • amLODIPine (NORVASC) 10 MG tablet Take 1 tablet by mouth Daily. 90 tablet 0   • aspirin 81 MG EC tablet Take 81 mg by mouth Daily.     • atorvastatin (LIPITOR) 10 MG tablet Take 1 tablet by mouth Daily. 90 tablet 1   • buPROPion XL (WELLBUTRIN XL) 150 MG 24 hr tablet Take 150 mg by mouth Every Morning.     • carboxymethylcellulose (REFRESH PLUS) 0.5 % solution Administer 1 drop to both eyes 4 (Four) Times a Day As Needed.     • cetirizine (zyrTEC) 10 MG tablet Take 1 tablet by mouth Daily for 90 days. 90 tablet 1   • clopidogrel (PLAVIX) 75 MG tablet Take 1 tablet by mouth Daily.     • donepezil (ARICEPT) 5 MG tablet Take 1 tablet by mouth Every Night. 90 tablet 3   • EPINEPHrine  0.15 MG/0.15ML solution auto-injector injection Inject  under the skin into the appropriate area as directed.     • fluticasone (FLONASE) 50 MCG/ACT nasal spray 2 sprays into the nostril(s) as directed by provider Daily.     • fluticasone-salmeterol (ADVAIR HFA) 230-21 MCG/ACT inhaler Inhale 2 puffs Every 6 (Six) Hours.     • furosemide (LASIX) 20 MG tablet Take 1 tablet by mouth Daily. 90 tablet 1   • lisinopril (PRINIVIL,ZESTRIL) 10 MG tablet Take 1 tablet by mouth Daily. 90 tablet 0   • melatonin 1 MG tablet Take 6 mg by mouth At Night As Needed.     • memantine (NAMENDA) 10 MG tablet Take 1 tablet twice a day 180 tablet 3   • methocarbamol (ROBAXIN) 750 MG tablet Take 1 tablet by mouth 3 (Three) Times a Day As Needed for Muscle Spasms. 30 tablet 0   • montelukast (SINGULAIR) 10 MG tablet Take 1 tablet by mouth Every Night. 90 tablet 1   • multivitamin with minerals tablet tablet Take 1 tablet by mouth Daily.     • nystatin (MYCOSTATIN) 884002 UNIT/GM cream Apply  topically to the appropriate area as directed As Needed (scrotal pain). 15 g 5   • polyethylene glycol (GlycoLax) 17 GM/SCOOP powder Take 17 g by mouth Daily. 850 g 2   • potassium chloride (MICRO-K) 10 MEQ CR capsule Take 20 mEq by mouth 2 (Two) Times a Day.     • propranolol (INDERAL) 10 MG tablet Take 1 tablet by mouth Daily. 90 tablet 1   • Tiotropium Bromide Monohydrate (SPIRIVA RESPIMAT) 1.25 MCG/ACT aerosol solution inhaler Inhale 2 puffs Every 12 (Twelve) Hours.     • venlafaxine XR (EFFEXOR-XR) 75 MG 24 hr capsule Take 75 mg by mouth Daily.     • vitamin B-12 (CYANOCOBALAMIN) 1000 MCG tablet Take 1 tablet by mouth Daily. 90 tablet 1   • vitamin D (ERGOCALCIFEROL) 1.25 MG (95403 UT) capsule capsule Take 1 capsule by mouth 2 (Two) Times a Week. 26 capsule 0   • brompheniramine-pseudoephedrine-DM 30-2-10 MG/5ML syrup Take 10 mL by mouth 4 (Four) Times a Day As Needed for Cough. 473 mL 0   • pantoprazole (PROTONIX) 40 MG EC tablet Take 1 tablet by  mouth Daily. 90 tablet 1     No facility-administered medications prior to visit.       No opioid medication identified on active medication list. I have reviewed chart for other potential  high risk medication/s and harmful drug interactions in the elderly.          Aspirin is on active medication list. Aspirin use is indicated based on review of current medical condition/s. Pros and cons of this therapy have been discussed today. Benefits of this medication outweigh potential harm.  Patient has been encouraged to continue taking this medication.  .      Patient Active Problem List   Diagnosis   • Gout   • Type 2 diabetes mellitus with hyperglycemia, without long-term current use of insulin ([facility])   • Essential hypertension   • Other hyperlipidemia   • Alzheimer's disease, unspecified (CODE) ([facility])   • Major depressive disorder, recurrent, moderate ([facility])   • Scrotal pain   • Urge incontinence of urine   • Urgency of urination   • Balanitis   • Candida rash of groin   • Abnormal weight loss   • Anxiety   • Rheumatoid arthritis ([facility])   • Chronic obstructive pulmonary disease ([facility])   • Constipation   • Depressive disorder   • Disease   • Disorder of prostate   • Disorder of vision   • Essential tremor   • Excess skin of abdominal wall   • Heart failure ([facility])   • Mechanical complication of genitourinary device   • Morbid obesity ([facility])   • Myocardial infarction ([facility])   • Numbness   • Pneumonia   • Seasonal allergies   • Sleep apnea   • Spondylosis of lumbar spine   • Tingling of skin   • Ventral hernia   • Lymphadenopathy of head and neck   • Swallowed foreign body   • Hyperkalemia   • Benign prostatic hyperplasia with urinary frequency   • Renal mass     Advance Care Planning  Advance Directive is on file.  ACP discussion was held with the patient during this visit. Patient has an advance directive in EMR which is still valid.           Objective    Vitals:    10/14/22 1438   BP: (!) 86/46   BP Location: Left arm  "  Patient Position: Sitting   Cuff Size: Large Adult   Pulse: 61   SpO2: 97%   Weight: 106 kg (233 lb 12.8 oz)   Height: 172.7 cm (68\")     Estimated body mass index is 35.55 kg/m² as calculated from the following:    Height as of this encounter: 172.7 cm (68\").    Weight as of this encounter: 106 kg (233 lb 12.8 oz).    BMI is >= 30 and <35. (Class 1 Obesity). The following options were offered after discussion;: exercise counseling/recommendations and nutrition counseling/recommendations      Does the patient have evidence of cognitive impairment? No    Physical Exam  Vitals reviewed.   Constitutional:       Appearance: Normal appearance. He is well-developed.   HENT:      Head: Normocephalic and atraumatic.      Mouth/Throat:      Pharynx: No oropharyngeal exudate.   Eyes:      Conjunctiva/sclera: Conjunctivae normal.      Pupils: Pupils are equal, round, and reactive to light.   Cardiovascular:      Rate and Rhythm: Normal rate and regular rhythm.      Heart sounds: No murmur heard.    No friction rub. No gallop.   Pulmonary:      Effort: Pulmonary effort is normal.      Breath sounds: Normal breath sounds. No wheezing or rhonchi.   Skin:     General: Skin is warm and dry.   Neurological:      Mental Status: He is alert and oriented to person, place, and time.   Psychiatric:         Mood and Affect: Affect normal.                 HEALTH RISK ASSESSMENT    Smoking Status:  Social History     Tobacco Use   Smoking Status Never   Smokeless Tobacco Never     Alcohol Consumption:  Social History     Substance and Sexual Activity   Alcohol Use Yes    Comment: occasional      Fall Risk Screen:    STEADI Fall Risk Assessment was completed, and patient is at MODERATE risk for falls. Assessment completed on:10/14/2022    Depression Screening:  PHQ-2/PHQ-9 Depression Screening 7/13/2022   Retired PHQ-9 Total Score -   Retired Total Score -   Little Interest or Pleasure in Doing Things 0-->not at all   Feeling Down, " Depressed or Hopeless 0-->not at all   PHQ-9: Brief Depression Severity Measure Score 0       Health Habits and Functional and Cognitive Screening:  Functional & Cognitive Status 10/14/2022   Do you have difficulty preparing food and eating? No   Do you have difficulty bathing yourself, getting dressed or grooming yourself? No   Do you have difficulty using the toilet? No   Do you have difficulty moving around from place to place? No   Do you have trouble with steps or getting out of a bed or a chair? No   Current Diet Well Balanced Diet   Dental Exam Not up to date   Eye Exam Up to date   Exercise (times per week) 0 times per week   Current Exercises Include -   Do you need help using the phone?  No   Are you deaf or do you have serious difficulty hearing?  No   Do you need help with transportation? No   Do you need help shopping? No   Do you need help preparing meals?  No   Do you need help with housework?  No   Do you need help with laundry? No   Do you need help taking your medications? No   Do you need help managing money? No   Do you ever drive or ride in a car without wearing a seat belt? No   Have you felt unusual stress, anger or loneliness in the last month? No   Who do you live with? Child   If you need help, do you have trouble finding someone available to you? No   Have you been bothered in the last four weeks by sexual problems? No   Do you have difficulty concentrating, remembering or making decisions? No       Age-appropriate Screening Schedule:  Refer to the list below for future screening recommendations based on patient's age, sex and/or medical conditions. Orders for these recommended tests are listed in the plan section. The patient has been provided with a written plan.    Health Maintenance   Topic Date Due   • TDAP/TD VACCINES (1 - Tdap) Never done   • ZOSTER VACCINE (2 of 3) 02/26/2019   • INFLUENZA VACCINE  08/01/2022   • HEMOGLOBIN A1C  08/18/2022   • DIABETIC EYE EXAM  08/23/2022   •  URINE MICROALBUMIN  01/10/2023   • LIPID PANEL  02/18/2023              Assessment & Plan   CMS Preventative Services Quick Reference  Risk Factors Identified During Encounter  Fall Risk-High or Moderate  Obesity/Overweight   The above risks/problems have been discussed with the patient.  Follow up actions/plans if indicated are seen below in the Assessment/Plan Section.  Pertinent information has been shared with the patient in the After Visit Summary.    Diagnoses and all orders for this visit:    1. Encounter for subsequent annual wellness visit (AWV) in Medicare patient (Primary)        Follow Up:   Return in about 6 months (around 4/14/2023) for Next scheduled follow up.     An After Visit Summary and PPPS were made available to the patient.

## 2022-10-14 ENCOUNTER — OFFICE VISIT (OUTPATIENT)
Dept: INTERNAL MEDICINE | Facility: CLINIC | Age: 87
End: 2022-10-14

## 2022-10-14 VITALS
SYSTOLIC BLOOD PRESSURE: 86 MMHG | BODY MASS INDEX: 35.43 KG/M2 | HEART RATE: 61 BPM | HEIGHT: 68 IN | WEIGHT: 233.8 LBS | DIASTOLIC BLOOD PRESSURE: 46 MMHG | OXYGEN SATURATION: 97 %

## 2022-10-14 DIAGNOSIS — Z00.00 ENCOUNTER FOR SUBSEQUENT ANNUAL WELLNESS VISIT (AWV) IN MEDICARE PATIENT: Primary | ICD-10-CM

## 2022-10-14 PROCEDURE — 1159F MED LIST DOCD IN RCRD: CPT | Performed by: INTERNAL MEDICINE

## 2022-10-14 PROCEDURE — 1170F FXNL STATUS ASSESSED: CPT | Performed by: INTERNAL MEDICINE

## 2022-10-14 PROCEDURE — G0439 PPPS, SUBSEQ VISIT: HCPCS | Performed by: INTERNAL MEDICINE

## 2022-10-14 NOTE — PATIENT INSTRUCTIONS
Medicare Wellness  Personal Prevention Plan of Service     Date of Office Visit:    Encounter Provider:  Agustina Gutierrez MD  Place of Service:  [facility]  Patient Name: Reddy Castellano  :  1935    As part of the Medicare Wellness portion of your visit today, we are providing you with this personalized preventive plan of services (PPPS). This plan is based upon recommendations of the [place] Preventive Services Task Force (USPSTF) and the Advisory Committee on Immunization Practices (ACIP).    This lists the preventive care services that should be considered, and provides dates of when you are due. Items listed as completed are up-to-date and do not require any further intervention.    Health Maintenance   Topic Date Due    Pneumococcal Vaccine 65+ (1 - PCV) 1941    TDAP/TD VACCINES (1 - Tdap) Never done    ZOSTER VACCINE (2 of 3) 2019    COVID-19 Vaccine (5 - Booster) 2021    ANNUAL WELLNESS VISIT  2022    INFLUENZA VACCINE  2022    HEMOGLOBIN A1C  2022    DIABETIC EYE EXAM  2022    URINE MICROALBUMIN  01/10/2023    LIPID PANEL  2023       No orders of the defined types were placed in this encounter.      Return in about 6 months (around 2023) for Next scheduled follow up.

## 2022-10-26 ENCOUNTER — PATIENT OUTREACH (OUTPATIENT)
Dept: CASE MANAGEMENT | Facility: OTHER | Age: 87
End: 2022-10-26

## 2022-10-26 DIAGNOSIS — F33.1 MAJOR DEPRESSIVE DISORDER, RECURRENT, MODERATE: Primary | ICD-10-CM

## 2022-10-26 DIAGNOSIS — I10 HYPERTENSION, UNSPECIFIED TYPE: ICD-10-CM

## 2022-10-26 DIAGNOSIS — Z79.899 POLYPHARMACY: ICD-10-CM

## 2022-10-26 RX ORDER — LANOLIN ALCOHOL/MO/W.PET/CERES
1000 CREAM (GRAM) TOPICAL DAILY
Qty: 90 TABLET | Refills: 1 | Status: SHIPPED | OUTPATIENT
Start: 2022-10-26

## 2022-10-26 RX ORDER — ERGOCALCIFEROL 1.25 MG/1
50000 CAPSULE ORAL 2 TIMES WEEKLY
Qty: 26 CAPSULE | Refills: 0 | Status: SHIPPED | OUTPATIENT
Start: 2022-10-27

## 2022-10-26 RX ORDER — MONTELUKAST SODIUM 10 MG/1
10 TABLET ORAL NIGHTLY
Qty: 90 TABLET | Refills: 1 | Status: SHIPPED | OUTPATIENT
Start: 2022-10-26 | End: 2025-08-05

## 2022-10-26 NOTE — OUTREACH NOTE
AMBULATORY CASE MANAGEMENT NOTE    Name and Relationship of Patient/Support Person: Reddy Castellano - Self    Pt agreed to keep BP log and I will call in a few weeks for f/u.    Reviewed pcp note.    No upcoming appts.    Refills x3 sent in.    Education Documentation  No documentation found.        BRANDON RAMOS  Ambulatory Case Management    10/26/2022, 12:30 EDT

## 2022-10-28 ENCOUNTER — PATIENT OUTREACH (OUTPATIENT)
Dept: CASE MANAGEMENT | Facility: OTHER | Age: 87
End: 2022-10-28

## 2022-10-28 DIAGNOSIS — F33.1 MAJOR DEPRESSIVE DISORDER, RECURRENT, MODERATE: Primary | ICD-10-CM

## 2022-10-28 DIAGNOSIS — I10 HYPERTENSION, UNSPECIFIED TYPE: ICD-10-CM

## 2022-10-28 DIAGNOSIS — G20 PARKINSON'S DISEASE: ICD-10-CM

## 2022-10-28 PROCEDURE — 99490 CHRNC CARE MGMT STAFF 1ST 20: CPT | Performed by: INTERNAL MEDICINE

## 2022-10-28 NOTE — OUTREACH NOTE
[facility] End of Month Documentation    This Chronic Medical Management Care Plan for Reddy Castellano, 87 y.o. male, has been reviewed; monitored and managed and a new plan of care implemented for the month of October.  A cumulative time of 20  minutes was spent on this patient record this month, including chart review; phone call with patient.    Regarding the patient's problems: has Gout; Type 2 diabetes mellitus with hyperglycemia, without long-term current use of insulin (HCC); Essential hypertension; Other hyperlipidemia; Alzheimer's disease, unspecified (CODE) (HCC); Major depressive disorder, recurrent, moderate (HCC); Scrotal pain; Urge incontinence of urine; Urgency of urination; Balanitis; Candida rash of groin; Abnormal weight loss; Anxiety; Rheumatoid arthritis (HCC); Chronic obstructive pulmonary disease (HCC); Constipation; Depressive disorder; Disease; Disorder of prostate; Disorder of vision; Essential tremor; Excess skin of abdominal wall; Heart failure ([facility]); Mechanical complication of genitourinary device; Morbid obesity ([facility]); Myocardial infarction ([facility]); Numbness; Pneumonia; Seasonal allergies; Sleep apnea; Spondylosis of lumbar spine; Tingling of skin; Ventral hernia; Lymphadenopathy of head and neck; Swallowed foreign body; Hyperkalemia; Benign prostatic hyperplasia with urinary frequency; and Renal mass on their problem list., the following items were addressed: medical records; medications and any changes can be found within the plan section of the note.  A detailed listing of time spent for chronic care management is tracked within each outreach encounter.  Current medications include:  has a current medication list which includes the following prescription(s): albuterol sulfate hfa, allopurinol, amlodipine, aspirin, atorvastatin, brompheniramine-pseudoephedrine-dm, bupropion xl, carboxymethylcellulose, cetirizine, clopidogrel, donepezil, epinephrine, fluticasone, fluticasone-salmeterol,  furosemide, lisinopril, melatonin, memantine, methocarbamol, montelukast, multivitamin with minerals, nystatin, pantoprazole, polyethylene glycol, potassium chloride, propranolol, tiotropium bromide monohydrate, venlafaxine xr, vitamin b-12, and vitamin d. and the patient is reported to be patient is compliant with medication protocol,  Medications are reported to be effective.  Regarding these diagnoses, referrals were made to the following provider(s):  none.  All notes on chart for PCP to review.    The patient was monitored remotely for blood pressure; medications.    The patient's physical needs include:  physical healthcare.     The patient's mental support needs include:  increased support    The patient's cognitive support needs include:  increased support    The patient's psychosocial support needs include:  continued support    The patient's functional needs include: physical healthcare    The patient's environmental needs include:  not applicable    Care Plan overall comments:  No data recorded    Refer to previous outreach notes for more information on the areas listed above.    Monthly Billing Diagnoses  (F33.1) Major depressive disorder, recurrent, moderate (HCC)    (I10) Hypertension, unspecified type    (G20) Parkinson's disease (HCC)    Medications   · Medications have been reconciled    Care Plan progress this month:      Recently Modified Care Plans Updates made since 9/27/2022 12:00 AM    No recently modified care plans.            Instructions   · Patient was provided an electronic copy of care plan  · CCM services were explained and offered and patient has accepted these services.  · Patient has given their written consent to receive CCM services and understands that this includes the authorization of electronic communication of medical information with the other treating providers.  · Patient understands that they may stop CCM services at any time and these changes will be effective at the end  of the calendar month and will effectively revocate the agreement of CCM services.  · Patient understands that only one practitioner can furnish and be paid for CCM services during one calendar month.  Patient also understands that there may be co-payment or deductible fees in association with CCM services.  · Patient will continue with at least monthly follow-up calls with the Ambulatory .    BRANDON RAMOS  Ambulatory Case Management    10/28/2022, 15:04 EDT

## 2022-11-17 ENCOUNTER — TELEPHONE (OUTPATIENT)
Dept: CASE MANAGEMENT | Facility: OTHER | Age: 87
End: 2022-11-17

## 2022-11-17 ENCOUNTER — TELEPHONE (OUTPATIENT)
Dept: UROLOGY | Facility: CLINIC | Age: 87
End: 2022-11-17

## 2022-11-17 DIAGNOSIS — N39.41 URGE INCONTINENCE: Primary | ICD-10-CM

## 2022-11-17 NOTE — TELEPHONE ENCOUNTER
Called patient to get his blood pressure readings, states he has not been consistent taking them every day. He has an arm cuff, and went over some things to do to help with accuracy.   10//80, HR-71  10//79, HR-74  10/15- 142/82, HR-73  10//79, HR-56  10/19- 124/79, HR-75  10/20- 145/89, HR-74    11/1-139/87, HR-60  11/2- 96/65, HR-65  11/3- none  11/4-96/65 11/5- 134/83, HR-58  11/6- 135/81, HR-60    Elham Valderrama RN  Ambulatory Case Management    11/17/2022, 13:18 EST

## 2022-11-17 NOTE — TELEPHONE ENCOUNTER
Dr Cisneros's office called. Pt was there to see them and asked for a prescription for Gemtesa. We had given him samples in August and he states that they are working. They would like us to prescribe this if Dr. Lozano is in agreement with it.

## 2022-11-18 ENCOUNTER — TELEPHONE (OUTPATIENT)
Dept: UROLOGY | Facility: CLINIC | Age: 87
End: 2022-11-18

## 2022-11-29 ENCOUNTER — PATIENT OUTREACH (OUTPATIENT)
Dept: CASE MANAGEMENT | Facility: OTHER | Age: 87
End: 2022-11-29

## 2022-11-29 DIAGNOSIS — F33.1 MAJOR DEPRESSIVE DISORDER, RECURRENT, MODERATE: Primary | ICD-10-CM

## 2022-11-29 DIAGNOSIS — G20 PARKINSON'S DISEASE: ICD-10-CM

## 2022-11-29 DIAGNOSIS — I10 HYPERTENSION, UNSPECIFIED TYPE: ICD-10-CM

## 2022-11-29 NOTE — OUTREACH NOTE
[facility] End of Month Documentation    This Chronic Medical Management Care Plan for Reddy Castellano, 87 y.o. male, has been reviewed; monitored and managed and a new plan of care implemented for the month of November.  A cumulative time of 29  minutes was spent on this patient record this month, including chart review; phone call with patient; electronic communication with primary care provider.    Regarding the patient's problems: has Gout; Type 2 diabetes mellitus with hyperglycemia, without long-term current use of insulin (HCC); Essential hypertension; Other hyperlipidemia; Alzheimer's disease, unspecified (CODE) ([facility]); Major depressive disorder, recurrent, moderate (HCC); Scrotal pain; Urge incontinence of urine; Urgency of urination; Balanitis; Candida rash of groin; Abnormal weight loss; Anxiety; Rheumatoid arthritis (HCC); Chronic obstructive pulmonary disease (HCC); Constipation; Depressive disorder; Disease; Disorder of prostate; Disorder of vision; Essential tremor; Excess skin of abdominal wall; Heart failure ([facility]); Mechanical complication of genitourinary device; Morbid obesity ([facility]); Myocardial infarction ([facility]); Numbness; Pneumonia; Seasonal allergies; Sleep apnea; Spondylosis of lumbar spine; Tingling of skin; Ventral hernia; Lymphadenopathy of head and neck; Swallowed foreign body; Hyperkalemia; Benign prostatic hyperplasia with urinary frequency; and Renal mass on their problem list., the following items were addressed: medical records; medications and any changes can be found within the plan section of the note.  A detailed listing of time spent for chronic care management is tracked within each outreach encounter.  Current medications include:  has a current medication list which includes the following prescription(s): albuterol sulfate hfa, allopurinol, amlodipine, aspirin, atorvastatin, brompheniramine-pseudoephedrine-dm, bupropion xl, carboxymethylcellulose, cetirizine, clopidogrel, donepezil,  epinephrine, fluticasone, fluticasone-salmeterol, furosemide, lisinopril, melatonin, memantine, methocarbamol, montelukast, multivitamin with minerals, nystatin, pantoprazole, polyethylene glycol, potassium chloride, propranolol, tiotropium bromide monohydrate, venlafaxine xr, vibegron, vitamin b-12, and vitamin d. and the patient is reported to be patient is compliant with medication protocol,  Medications are reported to be effective.  Regarding these diagnoses, referrals were made to the following provider(s):  none.  All notes on chart for PCP to review.    The patient was monitored remotely for blood pressure; medications.    The patient's physical needs include:  physical healthcare.     The patient's mental support needs include:  increased support    The patient's cognitive support needs include:  increased support    The patient's psychosocial support needs include:  continued support, pt sees [facility]    The patient's functional needs include: physical healthcare    The patient's environmental needs include:  not applicable    Care Plan overall comments:  No data recorded    Refer to previous outreach notes for more information on the areas listed above.    Monthly Billing Diagnoses  (F33.1) Major depressive disorder, recurrent, moderate (HCC)    (I10) Hypertension, unspecified type    (G20) Parkinson's disease (HCC)    Medications   · Medications have been reconciled    Care Plan progress this month:      Recently Modified Care Plans Updates made since 10/29/2022 12:00 AM    No recently modified care plans.            Instructions   · Patient was provided an electronic copy of care plan  · CCM services were explained and offered and patient has accepted these services.  · Patient has given their written consent to receive CCM services and understands that this includes the authorization of electronic communication of medical information with the other treating providers.  · Patient understands that they may  stop CCM services at any time and these changes will be effective at the end of the calendar month and will effectively revocate the agreement of CCM services.  · Patient understands that only one practitioner can furnish and be paid for CCM services during one calendar month.  Patient also understands that there may be co-payment or deductible fees in association with CCM services.  · Patient will continue with at least monthly follow-up calls with the Ambulatory .    BRANDON RAMOS  Ambulatory Case Management    11/29/2022, 09:15 EST

## 2022-12-13 ENCOUNTER — PATIENT OUTREACH (OUTPATIENT)
Dept: CASE MANAGEMENT | Facility: OTHER | Age: 87
End: 2022-12-13

## 2022-12-13 DIAGNOSIS — I10 HYPERTENSION, UNSPECIFIED TYPE: ICD-10-CM

## 2022-12-13 DIAGNOSIS — R32 URINARY INCONTINENCE, UNSPECIFIED TYPE: ICD-10-CM

## 2022-12-13 DIAGNOSIS — F33.1 MAJOR DEPRESSIVE DISORDER, RECURRENT, MODERATE: Primary | ICD-10-CM

## 2022-12-13 NOTE — OUTREACH NOTE
AMBULATORY CASE MANAGEMENT NOTE    Name and Relationship of Patient/Support Person: Reddy Castellano - Self    CCM Interim Update    Spoke with patient regarding BP log.  Patient admits to forgetting his log and having to restart it for me.  Agreed to restart log today and will f/u next week 12/23/22 for results.    Further stating that his Gemtesa needs refilling.  Call placed to Dr. Lozano office [phone].  Spoke with [facility], refill sent to [facility] 11/17/22.  Patient made aware.    Plan:     Follow up with patient for BP log 12/23/22        Education Documentation  No documentation found.        BORA LEONE  Ambulatory Case Management    12/13/2022, 14:05 EST

## 2022-12-27 ENCOUNTER — TELEPHONE (OUTPATIENT)
Dept: CASE MANAGEMENT | Facility: OTHER | Age: 87
End: 2022-12-27

## 2022-12-28 ENCOUNTER — PATIENT OUTREACH (OUTPATIENT)
Dept: CASE MANAGEMENT | Facility: OTHER | Age: 87
End: 2022-12-28

## 2022-12-28 DIAGNOSIS — R53.1 WEAKNESS GENERALIZED: ICD-10-CM

## 2022-12-28 DIAGNOSIS — R25.1 TREMOR: ICD-10-CM

## 2022-12-28 DIAGNOSIS — G20 PARKINSON'S DISEASE: ICD-10-CM

## 2022-12-28 DIAGNOSIS — I10 HYPERTENSION, UNSPECIFIED TYPE: Primary | ICD-10-CM

## 2022-12-28 NOTE — OUTREACH NOTE
AMBULATORY CASE MANAGEMENT NOTE    Name and Relationship of Patient/Support Person: Reddy Castellano - Self    CCM Interim Update    Spoke with patient today, was unable to provide BP log stating that he has been distracted by dental issues.  Agreed to follow BP over the next week from here on out.  Will f/u next Friday 1/13/23.    Will also f/u with back discomfort and Left great toe redness without c/o pain or discomfort.        Education Documentation  No documentation found.        BORA LEONE  Ambulatory Case Management    12/28/2022, 16:53 EST  
Home

## 2022-12-30 ENCOUNTER — PATIENT OUTREACH (OUTPATIENT)
Dept: CASE MANAGEMENT | Facility: OTHER | Age: 87
End: 2022-12-30

## 2022-12-30 DIAGNOSIS — R32 URINARY INCONTINENCE, UNSPECIFIED TYPE: ICD-10-CM

## 2022-12-30 DIAGNOSIS — R53.1 WEAKNESS GENERALIZED: ICD-10-CM

## 2022-12-30 DIAGNOSIS — R68.89 FORGETFULNESS: ICD-10-CM

## 2022-12-30 DIAGNOSIS — F33.1 MAJOR DEPRESSIVE DISORDER, RECURRENT, MODERATE: ICD-10-CM

## 2022-12-30 DIAGNOSIS — I10 HYPERTENSION, UNSPECIFIED TYPE: ICD-10-CM

## 2022-12-30 DIAGNOSIS — G20 PARKINSON'S DISEASE: Primary | ICD-10-CM

## 2022-12-30 PROCEDURE — 99490 CHRNC CARE MGMT STAFF 1ST 20: CPT | Performed by: INTERNAL MEDICINE

## 2022-12-30 PROCEDURE — 99439 CHRNC CARE MGMT STAF EA ADDL: CPT | Performed by: INTERNAL MEDICINE

## 2022-12-30 NOTE — OUTREACH NOTE
[facility] End of Month Documentation    This Chronic Medical Management Care Plan for Reddy Castellano, 87 y.o. male, has been reviewed; monitored and managed and a new plan of care implemented for the month of December.  A cumulative time of 46  minutes was spent on this patient record this month, including chart review; phone call with patient; electronic communication with primary care provider, Action plan for BP's/ Call to Dr. Lozano regarding Gemtesa refill.    Regarding the patient's problems: has Gout; Type 2 diabetes mellitus with hyperglycemia, without long-term current use of insulin (HCC); Essential hypertension; Other hyperlipidemia; Alzheimer's disease, unspecified (CODE) ([facility]); Major depressive disorder, recurrent, moderate (HCC); Scrotal pain; Urge incontinence of urine; Urgency of urination; Balanitis; Candida rash of groin; Abnormal weight loss; Anxiety; Rheumatoid arthritis (HCC); Chronic obstructive pulmonary disease (HCC); Constipation; Depressive disorder; Disease; Disorder of prostate; Disorder of vision; Essential tremor; Excess skin of abdominal wall; Heart failure ([facility]); Mechanical complication of genitourinary device; Morbid obesity ([facility]); Myocardial infarction ([facility]); Numbness; Pneumonia; Seasonal allergies; Sleep apnea; Spondylosis of lumbar spine; Tingling of skin; Ventral hernia; Lymphadenopathy of head and neck; Swallowed foreign body; Hyperkalemia; Benign prostatic hyperplasia with urinary frequency; and Renal mass on their problem list., the following items were addressed: medical records; medications and any changes can be found within the plan section of the note.  A detailed listing of time spent for chronic care management is tracked within each outreach encounter.  Current medications include:  has a current medication list which includes the following prescription(s): albuterol sulfate hfa, allopurinol, amlodipine, aspirin, atorvastatin, brompheniramine-pseudoephedrine-dm, bupropion  xl, carboxymethylcellulose, cetirizine, clopidogrel, donepezil, epinephrine, fluticasone, fluticasone-salmeterol, furosemide, lisinopril, melatonin, memantine, methocarbamol, montelukast, multivitamin with minerals, nystatin, pantoprazole, polyethylene glycol, potassium chloride, propranolol, tiotropium bromide monohydrate, venlafaxine xr, vibegron, vitamin b-12, and vitamin d. and the patient is reported to be patient is compliant with medication protocol,  Medications are reported to be effective.  Regarding these diagnoses, referrals were made to the following provider(s):  none.  All notes on chart for PCP to review.    The patient was monitored remotely for blood pressure; medications.    The patient's physical needs include:  physical healthcare, memory.     The patient's mental support needs include:  increased support    The patient's cognitive support needs include:  increased support    The patient's psychosocial support needs include:  continued support    The patient's functional needs include: physical healthcare    The patient's environmental needs include:  not applicable, n/a    Care Plan overall comments:  Follow up with action plan for BP monitoring    Refer to previous outreach notes for more information on the areas listed above.    Monthly Billing Diagnoses  (G20) Parkinson's disease (HCC)    (I10) Hypertension, unspecified type    (R53.1) Weakness generalized    (R32) Urinary incontinence, unspecified type    (F33.1) Major depressive disorder, recurrent, moderate (HCC)    (R68.89) Forgetfulness    Medications   · Medications have been reconciled    Care Plan progress this month:      Recently Modified Care Plans Updates made since 11/29/2022 12:00 AM    No recently modified care plans.            Instructions   · Patient was provided an electronic copy of care plan  · CCM services were explained and offered and patient has accepted these services.  · Patient has given their written consent to  receive CCM services and understands that this includes the authorization of electronic communication of medical information with the other treating providers.  · Patient understands that they may stop CCM services at any time and these changes will be effective at the end of the calendar month and will effectively revocate the agreement of CCM services.  · Patient understands that only one practitioner can furnish and be paid for CCM services during one calendar month.  Patient also understands that there may be co-payment or deductible fees in association with CCM services.  · Patient will continue with at least monthly follow-up calls with the Ambulatory .    BORA LEONE  Ambulatory Case Management    12/30/2022, 15:36 EST

## 2023-01-13 ENCOUNTER — PATIENT OUTREACH (OUTPATIENT)
Dept: CASE MANAGEMENT | Facility: OTHER | Age: 88
End: 2023-01-13
Payer: MEDICARE

## 2023-01-13 DIAGNOSIS — G20 PARKINSON'S DISEASE: Primary | ICD-10-CM

## 2023-01-13 DIAGNOSIS — R53.1 WEAKNESS GENERALIZED: ICD-10-CM

## 2023-01-13 DIAGNOSIS — I10 HYPERTENSION, UNSPECIFIED TYPE: ICD-10-CM

## 2023-01-13 PROCEDURE — 99490 CHRNC CARE MGMT STAFF 1ST 20: CPT | Performed by: INTERNAL MEDICINE

## 2023-01-13 NOTE — OUTREACH NOTE
"AMBULATORY CASE MANAGEMENT NOTE    Name and Relationship of Patient/Support Person: Reddy Castellano - Self    CCM Interim Update    Received call from patient regarding \"severe\" back pain without fall.  States back pain is worse when he is sitting but it's always there. - Advised appointment for evaluation of both his back and left great toe, with possible referral to spine specialist.  Message out to [facility] for assistance to schedule appointment for patient.    BP lo/7  109/87  HR  54    124/81  HR  67    100/63  HR  80  1/10  99/63  HR  80    107/70  HR  77    To f/u with appointment.  Follow up x2 weeks             Education Documentation  No documentation found.        BORA LEONE  Ambulatory Case Management    2023, 09:01 EST  "

## 2023-01-31 ENCOUNTER — PATIENT OUTREACH (OUTPATIENT)
Dept: CASE MANAGEMENT | Facility: OTHER | Age: 88
End: 2023-01-31
Payer: MEDICARE

## 2023-01-31 DIAGNOSIS — G20 PARKINSON'S DISEASE: Primary | ICD-10-CM

## 2023-01-31 DIAGNOSIS — I10 HYPERTENSION, UNSPECIFIED TYPE: ICD-10-CM

## 2023-01-31 DIAGNOSIS — R68.89 FORGETFULNESS: ICD-10-CM

## 2023-01-31 DIAGNOSIS — R53.1 WEAKNESS GENERALIZED: ICD-10-CM

## 2023-01-31 NOTE — OUTREACH NOTE
[facility] End of Month Documentation    This Chronic Medical Management Care Plan for Reddy Castellano, 87 y.o. male, has been No data recorded and a new plan of care implemented for the month of No data recorded.  A cumulative time of 23  minutes was spent on this patient record this month, including No data recorded.    Regarding the patient's problems: has Gout; Type 2 diabetes mellitus with hyperglycemia, without long-term current use of insulin (HCC); Essential hypertension; Other hyperlipidemia; Alzheimer's disease, unspecified (CODE) ([facility]); Major depressive disorder, recurrent, moderate (HCC); Scrotal pain; Urge incontinence of urine; Urgency of urination; Balanitis; Candida rash of groin; Abnormal weight loss; Anxiety; Rheumatoid arthritis (HCC); Chronic obstructive pulmonary disease (HCC); Constipation; Depressive disorder; Disease; Disorder of prostate; Disorder of vision; Essential tremor; Excess skin of abdominal wall; Heart failure ([facility]); Mechanical complication of genitourinary device; Morbid obesity ([facility]); Myocardial infarction ([facility]); Numbness; Pneumonia; Seasonal allergies; Sleep apnea; Spondylosis of lumbar spine; Tingling of skin; Ventral hernia; Lymphadenopathy of head and neck; Swallowed foreign body; Hyperkalemia; Benign prostatic hyperplasia with urinary frequency; and Renal mass on their problem list., the following items were addressed: No data recorded and any changes can be found within the plan section of the note.  A detailed listing of time spent for chronic care management is tracked within each outreach encounter.  Current medications include:  has a current medication list which includes the following prescription(s): albuterol sulfate hfa, allopurinol, amlodipine, aspirin, atorvastatin, brompheniramine-pseudoephedrine-dm, bupropion xl, carboxymethylcellulose, cetirizine, clopidogrel, donepezil, epinephrine, fluticasone, fluticasone-salmeterol, furosemide, lisinopril, melatonin, memantine,  methocarbamol, montelukast, multivitamin with minerals, nystatin, pantoprazole, polyethylene glycol, potassium chloride, propranolol, tiotropium bromide monohydrate, venlafaxine xr, vibegron, vitamin b-12, and vitamin d. and the patient is reported to be No data recorded,  Medications are reported to be No data recorded.  Regarding these diagnoses, referrals were made to the following provider(s):  none.  All notes on chart for PCP to review.    The patient was monitored remotely for No data recorded.    The patient's physical needs include:  No data recorded.     The patient's mental support needs include:  No data recorded    The patient's cognitive support needs include:  No data recorded    The patient's psychosocial support needs include:  No data recorded    The patient's functional needs include: No data recorded    The patient's environmental needs include:  No data recorded    Care Plan overall comments:  No data recorded    Refer to previous outreach notes for more information on the areas listed above.    Monthly Billing Diagnoses  (G20) Parkinson's disease (HCC)    (I10) Hypertension, unspecified type    (R53.1) Weakness generalized    (R68.89) Forgetfulness    Medications   · Medications have been reconciled    Care Plan progress this month:      Recently Modified Care Plans Updates made since 12/31/2022 12:00 AM    No recently modified care plans.        Instructions   · Patient was provided an electronic copy of care plan  · CCM services were explained and offered and patient has accepted these services.  · Patient has given their written consent to receive CCM services and understands that this includes the authorization of electronic communication of medical information with the other treating providers.  · Patient understands that they may stop CCM services at any time and these changes will be effective at the end of the calendar month and will effectively revocate the agreement of CCM  services.  · Patient understands that only one practitioner can furnish and be paid for CCM services during one calendar month.  Patient also understands that there may be co-payment or deductible fees in association with CCM services.  · Patient will continue with at least monthly follow-up calls with the Ambulatory .    BORA LEONE  Ambulatory Case Management    1/31/2023, 14:14 EST

## 2023-02-07 ENCOUNTER — TELEPHONE (OUTPATIENT)
Dept: CASE MANAGEMENT | Facility: OTHER | Age: 88
End: 2023-02-07
Payer: MEDICARE

## 2023-02-07 ENCOUNTER — PATIENT OUTREACH (OUTPATIENT)
Dept: CASE MANAGEMENT | Facility: OTHER | Age: 88
End: 2023-02-07
Payer: MEDICARE

## 2023-02-07 DIAGNOSIS — I10 HYPERTENSION, UNSPECIFIED TYPE: ICD-10-CM

## 2023-02-07 DIAGNOSIS — R68.89 FORGETFULNESS: Primary | ICD-10-CM

## 2023-02-07 DIAGNOSIS — M54.6 CHRONIC THORACIC BACK PAIN, UNSPECIFIED BACK PAIN LATERALITY: Primary | ICD-10-CM

## 2023-02-07 DIAGNOSIS — G89.29 CHRONIC THORACIC BACK PAIN, UNSPECIFIED BACK PAIN LATERALITY: Primary | ICD-10-CM

## 2023-02-07 NOTE — OUTREACH NOTE
AMBULATORY CASE MANAGEMENT NOTE    Name and Relationship of Patient/Support Person: Reddy Castellano - Self    CCM Interim Update    Call from patient today, requesting spine referral for constant back discomfort.  - Will place referral.  Have discussed pain management, and use of ice and heat to assist.    Unable to attain bp log, stating he has not remembered to check routinely.  - Agreed to collect blood pressures over the next couple of weeks.    Plan: f/u 2/23/23    BP log   Check on spine referral  Nephrology notes and CT review        Education Documentation  No documentation found.        Melia LEONE  Ambulatory Case Management    2/7/2023, 13:27 EST

## 2023-02-13 ENCOUNTER — HOSPITAL ENCOUNTER (OUTPATIENT)
Dept: MRI IMAGING | Facility: HOSPITAL | Age: 88
Discharge: HOME | End: 2023-02-13
Admitting: UROLOGY
Payer: MEDICARE

## 2023-02-13 DIAGNOSIS — N28.89 RENAL MASS: ICD-10-CM

## 2023-02-13 LAB
CREAT BLDA-MCNC: 1.4 MG/DL
EGFRCR SERPLBLD CKD-EPI 2021: 48.6 ML/MIN/1.73

## 2023-02-13 PROCEDURE — A9577 INJ MULTIHANCE: HCPCS | Performed by: UROLOGY

## 2023-02-13 PROCEDURE — 0 GADOBENATE DIMEGLUMINE 529 MG/ML SOLUTION: Performed by: UROLOGY

## 2023-02-13 PROCEDURE — 74183 MRI ABD W/O CNTR FLWD CNTR: CPT

## 2023-02-13 PROCEDURE — 82565 ASSAY OF CREATININE: CPT

## 2023-02-13 RX ADMIN — GADOBENATE DIMEGLUMINE 20 ML: 529 INJECTION, SOLUTION INTRAVENOUS at 14:53

## 2023-02-14 NOTE — PROGRESS NOTES
Chief Complaint    Urologic complaint    Subjective          Reddy Castellano presents to [facility] UROLOGY  History of Present Illness    86 yo       ED   Urinary incontinence  History of scrotal pain  Renal mass          2/14/2023 MRI abdomen with and without - 1.2 cm lateral upper pole exophytic right renal lesion concerning for neoplasm.  Stable.  Numerous bilateral cysts.   Numerous pancreatic cyst.  Largest lesion pancreatic tail 1.5 cm.  Stable.      Gemtesa 75 mg helping with urgency and urge incontinence.   Patient wears 1 pad daily.    Good stream, no BPH meds      No GH    No straining to void    Bothered by buried penis        Previous      8/22  MRI abdomen with and without - multiple clusters of pancreatic cystic lesions throughout the pancreas.  1.7 cm within the pancreatic tail.  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  Recommend MRCP in 1 year    Small 1.1 x 1.2 cm partially exophytic lesion arising to the lateral pole the right kidney.  Demonstrates apparent internal enhancement.  Suspicious for primary renal neoplasm.      Myrbetriq  -did not help.        8/22   1.7, GFR 38    PVR     8/21   000  2019  000    Previous    10/21 cystoscopy-4 cm prostate, minimal trabeculation, negative otherwise      Bothered by his penis.  Ever since penile implant has been out (Dr. Wolf)  patient has been having some trouble standing dry around his penis he states.  Patient is circumcised    11/20 scrotal ultrasoundstatus post left orchiectomy, large right-sided hydrocele, normal right testicle.    1/23/20 CT abdomen/pelvis without - stable adrenal gland.  Consistent with benign lipid rich adenoma.    L 2.4 cm nodule. No  Nephrolithiasis.  Small stones in the gallbladder.  Small fat-containing umbilical hernia.  Large pannus with multiple loops of small bowel: Extending into the pannus.  No true hernia identified.  No pelvic or inguinal adenopathy.      11/19 creatinine 1.4,  GFR 46    patient has lost 35 lbs - he is trying.    IPP placed several years ago.  He had erosion of some tubing and [facility] [place].     PSA 2.08     Results for orders placed or performed during the hospital encounter of 23   POC Creatinine    Specimen: Blood   Result Value Ref Range    Creatinine 1.40 mg/dL    eGFR 48.6 (L) >60.0 mL/min/1.73             Results for orders placed or performed during the hospital encounter of 23   POC Creatinine    Specimen: Blood   Result Value Ref Range    Creatinine 1.40 mg/dL    eGFR 48.6 (L) >60.0 mL/min/1.73         Past History:  Medical History: has a past medical history of Allergies, Anxiety, Arthritis, Asthma, BPH (benign prostatic hyperplasia), Carpal tunnel syndrome, Colon polyp, Congestive heart failure (CHF) ([facility]), COPD (chronic obstructive pulmonary disease) ([facility]), Dementia ([facility]), Depression, Diabetes mellitus type 2, noninsulin dependent ([facility]), Diverticulitis, Erectile dysfunction of organic origin, Essential hypertension (2019), Excess skin, Fatigue (2019), GERD (gastroesophageal reflux disease), Gout, Heart attack ([facility]), Heart disease, High cholesterol, Hyperlipidemia, Hypertension, Hypogonadism in male, Insomnia, Leg swelling, Memory change (2018), Myocardial infarction ([facility]), Osteoarthritis, Parkinson's disease ([facility]), Prostate disorder, Rectal bleeding, Renal cyst, Seasonal allergies, Sleep apnea, Stroke ([facility]), TIA (transient ischemic attack) (2018), Tremor (2018), and Vitamin D deficiency.   Surgical History: has a past surgical history that includes Appendectomy (); Cardiac catheterization; Carpal tunnel release; Cataract extraction; Circumcision, non- (); Colonoscopy (2014); Cystoscopy; Esophagogastroduodenoscopy (2010); Eye surgery; Cardiac surgery (); Hernia repair (); Total knee arthroplasty (Bilateral, 2009); Laparoscopic gastric banding;  Other surgical history; Orchiectomy (Right, 2010); Penile prosthesis implant; TOENAIL EXCISION; Toe Surgery; and Esophagogastroduodenoscopy (N/A, 11/23/2021).   Family History: family history includes Diabetes in his brother, mother, and son; Heart attack in his father; Prostate cancer in an other family member; Rectal cancer in an other family member.   Social History: reports that he has never smoked. He has never used smokeless tobacco. He reports current alcohol use. He reports that he does not use drugs.  Allergies: Patient has no known allergies.       Current Outpatient Medications:   •  albuterol sulfate  (90 Base) MCG/ACT inhaler, Inhale 2 puffs Every 6 (Six) Hours As Needed., Disp: , Rfl:   •  allopurinol (ZYLOPRIM) 300 MG tablet, Take 0.5 tablets by mouth Daily., Disp: 45 tablet, Rfl: 1  •  amLODIPine (NORVASC) 10 MG tablet, Take 1 tablet by mouth Daily., Disp: 90 tablet, Rfl: 0  •  aspirin 81 MG EC tablet, Take 81 mg by mouth Daily., Disp: , Rfl:   •  atorvastatin (LIPITOR) 10 MG tablet, Take 1 tablet by mouth Daily., Disp: 90 tablet, Rfl: 1  •  brompheniramine-pseudoephedrine-DM 30-2-10 MG/5ML syrup, Take 10 mL by mouth 4 (Four) Times a Day As Needed for Cough., Disp: 473 mL, Rfl: 0  •  buPROPion XL (WELLBUTRIN XL) 150 MG 24 hr tablet, Take 150 mg by mouth Every Morning., Disp: , Rfl:   •  carboxymethylcellulose (REFRESH PLUS) 0.5 % solution, Administer 1 drop to both eyes 4 (Four) Times a Day As Needed., Disp: , Rfl:   •  cetirizine (zyrTEC) 10 MG tablet, Take 1 tablet by mouth Daily for 90 days., Disp: 90 tablet, Rfl: 1  •  clopidogrel (PLAVIX) 75 MG tablet, Take 1 tablet by mouth Daily., Disp: , Rfl:   •  donepezil (ARICEPT) 5 MG tablet, Take 1 tablet by mouth Every Night., Disp: 90 tablet, Rfl: 3  •  EPINEPHrine 0.15 MG/0.15ML solution auto-injector injection, Inject  under the skin into the appropriate area as directed., Disp: , Rfl:   •  fluticasone (FLONASE) 50 MCG/ACT nasal spray, 2  sprays into the nostril(s) as directed by provider Daily., Disp: , Rfl:   •  fluticasone-salmeterol (ADVAIR HFA) 230-21 MCG/ACT inhaler, Inhale 2 puffs Every 6 (Six) Hours., Disp: , Rfl:   •  furosemide (LASIX) 20 MG tablet, Take 1 tablet by mouth Daily., Disp: 90 tablet, Rfl: 1  •  lisinopril (PRINIVIL,ZESTRIL) 10 MG tablet, Take 1 tablet by mouth Daily., Disp: 90 tablet, Rfl: 0  •  melatonin 1 MG tablet, Take 6 mg by mouth At Night As Needed., Disp: , Rfl:   •  memantine (NAMENDA) 10 MG tablet, Take 1 tablet twice a day, Disp: 180 tablet, Rfl: 3  •  methocarbamol (ROBAXIN) 750 MG tablet, Take 1 tablet by mouth 3 (Three) Times a Day As Needed for Muscle Spasms., Disp: 30 tablet, Rfl: 0  •  montelukast (SINGULAIR) 10 MG tablet, Take 1 tablet by mouth Every Night., Disp: 90 tablet, Rfl: 1  •  multivitamin with minerals tablet tablet, Take 1 tablet by mouth Daily., Disp: , Rfl:   •  nystatin (MYCOSTATIN) 715219 UNIT/GM cream, Apply  topically to the appropriate area as directed As Needed (scrotal pain)., Disp: 15 g, Rfl: 5  •  pantoprazole (PROTONIX) 40 MG EC tablet, Take 1 tablet by mouth Daily., Disp: 90 tablet, Rfl: 1  •  polyethylene glycol (GlycoLax) 17 GM/SCOOP powder, Take 17 g by mouth Daily., Disp: 850 g, Rfl: 2  •  potassium chloride (MICRO-K) 10 MEQ CR capsule, Take 20 mEq by mouth 2 (Two) Times a Day., Disp: , Rfl:   •  propranolol (INDERAL) 10 MG tablet, Take 1 tablet by mouth Daily., Disp: 90 tablet, Rfl: 1  •  Tiotropium Bromide Monohydrate (SPIRIVA RESPIMAT) 1.25 MCG/ACT aerosol solution inhaler, Inhale 2 puffs Every 12 (Twelve) Hours., Disp: , Rfl:   •  venlafaxine XR (EFFEXOR-XR) 75 MG 24 hr capsule, Take 75 mg by mouth Daily., Disp: , Rfl:   •  Vibegron 75 MG tablet, Take 1 tablet by mouth Daily for 90 days., Disp: 90 tablet, Rfl: 4  •  vitamin B-12 (CYANOCOBALAMIN) 1000 MCG tablet, Take 1 tablet by mouth Daily., Disp: 90 tablet, Rfl: 1  •  vitamin D (ERGOCALCIFEROL) 1.25 MG (71534 UT) capsule  capsule, Take 1 capsule by mouth 2 (Two) Times a Week., Disp: 26 capsule, Rfl: 0  No current facility-administered medications for this visit.         Results for orders placed or performed during the hospital encounter of 02/13/23   POC Creatinine    Specimen: Blood   Result Value Ref Range    Creatinine 1.40 mg/dL    eGFR 48.6 (L) >60.0 mL/min/1.73        Objective     Vital Signs:   There were no vitals taken for this visit.             Assessment and Plan          Urgency and urge incontinence      Cont Gemtesa 75 mg daily samples.  Refilled today            Renal mass      MRI reviewed with the patient today, renal mass is stable continue to follow-up    Patient also with a pancreatic lesion that the radiologist wants followed, we will see what this look like on his upcoming MRI next yr      Needs MRI abdomen with and without in 2/24.  Patient understands we are ruling out cancer and must follow-up        PVR at f/u

## 2023-02-17 ENCOUNTER — OFFICE VISIT (OUTPATIENT)
Dept: UROLOGY | Facility: CLINIC | Age: 88
End: 2023-02-17
Payer: MEDICARE

## 2023-02-17 VITALS — RESPIRATION RATE: 17 BRPM | WEIGHT: 233 LBS | HEIGHT: 68 IN | BODY MASS INDEX: 35.31 KG/M2

## 2023-02-17 DIAGNOSIS — N39.41 URGE INCONTINENCE: ICD-10-CM

## 2023-02-17 DIAGNOSIS — R39.15 URGENCY OF URINATION: ICD-10-CM

## 2023-02-17 DIAGNOSIS — N28.89 RENAL MASS: Primary | ICD-10-CM

## 2023-02-17 PROCEDURE — 99214 OFFICE O/P EST MOD 30 MIN: CPT | Performed by: UROLOGY

## 2023-02-23 ENCOUNTER — OFFICE VISIT (OUTPATIENT)
Dept: INTERNAL MEDICINE | Facility: CLINIC | Age: 88
End: 2023-02-23
Payer: MEDICARE

## 2023-02-23 ENCOUNTER — TELEPHONE (OUTPATIENT)
Dept: CASE MANAGEMENT | Facility: OTHER | Age: 88
End: 2023-02-23
Payer: MEDICARE

## 2023-02-23 VITALS
DIASTOLIC BLOOD PRESSURE: 66 MMHG | TEMPERATURE: 97.9 F | HEIGHT: 68 IN | WEIGHT: 233.38 LBS | OXYGEN SATURATION: 98 % | SYSTOLIC BLOOD PRESSURE: 114 MMHG | BODY MASS INDEX: 35.37 KG/M2 | HEART RATE: 69 BPM

## 2023-02-23 DIAGNOSIS — I10 ESSENTIAL HYPERTENSION: ICD-10-CM

## 2023-02-23 DIAGNOSIS — E78.49 OTHER HYPERLIPIDEMIA: ICD-10-CM

## 2023-02-23 DIAGNOSIS — E11.65 TYPE 2 DIABETES MELLITUS WITH HYPERGLYCEMIA, WITHOUT LONG-TERM CURRENT USE OF INSULIN: Primary | ICD-10-CM

## 2023-02-23 DIAGNOSIS — N28.89 RENAL MASS: ICD-10-CM

## 2023-02-23 LAB
ALBUMIN SERPL-MCNC: 4 G/DL (ref 3.5–5.2)
ALBUMIN UR-MCNC: 2.3 MG/DL
ALBUMIN/GLOB SERPL: 1.6 G/DL
ALP SERPL-CCNC: 74 U/L (ref 39–117)
ALT SERPL W P-5'-P-CCNC: 9 U/L (ref 1–41)
ANION GAP SERPL CALCULATED.3IONS-SCNC: 9.4 MMOL/L (ref 5–15)
AST SERPL-CCNC: 15 U/L (ref 1–40)
BASOPHILS # BLD AUTO: 0.08 10*3/MM3 (ref 0–0.2)
BASOPHILS NFR BLD AUTO: 1.2 % (ref 0–1.5)
BILIRUB SERPL-MCNC: 0.4 MG/DL (ref 0–1.2)
BUN SERPL-MCNC: 21 MG/DL (ref 8–23)
BUN/CREAT SERPL: 15.7 (ref 7–25)
CALCIUM SPEC-SCNC: 9.2 MG/DL (ref 8.6–10.5)
CHLORIDE SERPL-SCNC: 109 MMOL/L (ref 98–107)
CHOLEST SERPL-MCNC: 240 MG/DL (ref 0–200)
CO2 SERPL-SCNC: 23.6 MMOL/L (ref 22–29)
CREAT SERPL-MCNC: 1.34 MG/DL (ref 0.76–1.27)
DEPRECATED RDW RBC AUTO: 44.4 FL (ref 37–54)
EGFRCR SERPLBLD CKD-EPI 2021: 51.3 ML/MIN/1.73
EOSINOPHIL # BLD AUTO: 0.26 10*3/MM3 (ref 0–0.4)
EOSINOPHIL NFR BLD AUTO: 3.9 % (ref 0.3–6.2)
ERYTHROCYTE [DISTWIDTH] IN BLOOD BY AUTOMATED COUNT: 12.9 % (ref 12.3–15.4)
GLOBULIN UR ELPH-MCNC: 2.5 GM/DL
GLUCOSE SERPL-MCNC: 75 MG/DL (ref 65–99)
HBA1C MFR BLD: 5.7 % (ref 4.8–5.6)
HCT VFR BLD AUTO: 44.4 % (ref 37.5–51)
HDLC SERPL-MCNC: 45 MG/DL (ref 40–60)
HGB BLD-MCNC: 15 G/DL (ref 13–17.7)
IMM GRANULOCYTES # BLD AUTO: 0.03 10*3/MM3 (ref 0–0.05)
IMM GRANULOCYTES NFR BLD AUTO: 0.5 % (ref 0–0.5)
LDLC SERPL CALC-MCNC: 154 MG/DL (ref 0–100)
LDLC/HDLC SERPL: 3.35 {RATIO}
LYMPHOCYTES # BLD AUTO: 2.13 10*3/MM3 (ref 0.7–3.1)
LYMPHOCYTES NFR BLD AUTO: 32.1 % (ref 19.6–45.3)
MCH RBC QN AUTO: 31.4 PG (ref 26.6–33)
MCHC RBC AUTO-ENTMCNC: 33.8 G/DL (ref 31.5–35.7)
MCV RBC AUTO: 93.1 FL (ref 79–97)
MONOCYTES # BLD AUTO: 0.54 10*3/MM3 (ref 0.1–0.9)
MONOCYTES NFR BLD AUTO: 8.1 % (ref 5–12)
NEUTROPHILS NFR BLD AUTO: 3.6 10*3/MM3 (ref 1.7–7)
NEUTROPHILS NFR BLD AUTO: 54.2 % (ref 42.7–76)
NRBC BLD AUTO-RTO: 0 /100 WBC (ref 0–0.2)
PLATELET # BLD AUTO: 178 10*3/MM3 (ref 140–450)
PMV BLD AUTO: 10.9 FL (ref 6–12)
POTASSIUM SERPL-SCNC: 4.8 MMOL/L (ref 3.5–5.2)
PROT SERPL-MCNC: 6.5 G/DL (ref 6–8.5)
RBC # BLD AUTO: 4.77 10*6/MM3 (ref 4.14–5.8)
SODIUM SERPL-SCNC: 142 MMOL/L (ref 136–145)
TRIGL SERPL-MCNC: 221 MG/DL (ref 0–150)
TSH SERPL DL<=0.05 MIU/L-ACNC: 1.28 UIU/ML (ref 0.27–4.2)
VLDLC SERPL-MCNC: 41 MG/DL (ref 5–40)
WBC NRBC COR # BLD: 6.64 10*3/MM3 (ref 3.4–10.8)

## 2023-02-23 PROCEDURE — 99214 OFFICE O/P EST MOD 30 MIN: CPT | Performed by: INTERNAL MEDICINE

## 2023-02-23 PROCEDURE — 80061 LIPID PANEL: CPT | Performed by: INTERNAL MEDICINE

## 2023-02-23 PROCEDURE — 84443 ASSAY THYROID STIM HORMONE: CPT | Performed by: INTERNAL MEDICINE

## 2023-02-23 PROCEDURE — 83036 HEMOGLOBIN GLYCOSYLATED A1C: CPT | Performed by: INTERNAL MEDICINE

## 2023-02-23 PROCEDURE — 85025 COMPLETE CBC W/AUTO DIFF WBC: CPT | Performed by: INTERNAL MEDICINE

## 2023-02-23 PROCEDURE — 80053 COMPREHEN METABOLIC PANEL: CPT | Performed by: INTERNAL MEDICINE

## 2023-02-23 PROCEDURE — 82043 UR ALBUMIN QUANTITATIVE: CPT | Performed by: INTERNAL MEDICINE

## 2023-02-23 NOTE — PROGRESS NOTES
"Chief Complaint  Follow-up (Discuss tests )    Subjective       Reddy Castellano presents to [facility]    HPI   Presenting for follow up. Recently saw urology, Dr. Lozano, to go over repeat MRI for renal mass. MRI also showed pancreatic masses. Both were stable since last scan. Dr. Lozano ordered repeat scan in 1 year.   Patient states that when he was lying on the MRI table, he had a sharp pain in his back. He states that he has similar pain when he is lying in the chair at the dentist office. Pain does not occur at any other time.     Needs follow up blood work.      Objective     Vitals:    02/23/23 1423   BP: 114/66   BP Location: Left arm   Patient Position: Sitting   Cuff Size: Adult   Pulse: 69   Temp: 97.9 °F (36.6 °C)   TempSrc: Temporal   SpO2: 98%   Weight: 106 kg (233 lb 6 oz)   Height: 172.7 cm (68\")      Wt Readings from Last 3 Encounters:   02/23/23 106 kg (233 lb 6 oz)   02/17/23 106 kg (233 lb)   10/14/22 106 kg (233 lb 12.8 oz)      BP Readings from Last 3 Encounters:   02/23/23 114/66   10/14/22 (!) 86/46   08/04/22 133/75        Body mass index is 35.48 kg/m².    Class 2 Severe Obesity (BMI >=35 and <=39.9). Obesity-related health conditions include the following: hypertension, diabetes mellitus and dyslipidemias. Obesity is unchanged. BMI is is above average; BMI management plan is completed. We discussed portion control and increasing exercise.       Physical Exam  Vitals reviewed.   Constitutional:       Appearance: Normal appearance. He is well-developed.   HENT:      Head: Normocephalic and atraumatic.      Mouth/Throat:      Pharynx: No oropharyngeal exudate.   Eyes:      Conjunctiva/sclera: Conjunctivae normal.      Pupils: Pupils are equal, round, and reactive to light.   Cardiovascular:      Rate and Rhythm: Normal rate and regular rhythm.      Heart sounds: No murmur heard.    No friction rub. No gallop.   Pulmonary:      Effort: " Pulmonary effort is normal.      Breath sounds: Normal breath sounds. No wheezing or rhonchi.   Skin:     General: Skin is warm and dry.   Neurological:      Mental Status: He is alert and oriented to person, place, and time.   Psychiatric:         Mood and Affect: Affect normal.          Result Review :   The following data was reviewed by: Agustina Gutierrez MD on 02/23/2023:  CMP    CMP 8/2/22 2/13/23 2/23/23   Glucose   75   BUN   21   Creatinine 1.70 1.40 1.34 (A)   eGFR 38.5 (A) 48.6 (A) 51.3 (A)   Sodium   142   Potassium   4.8   Chloride   109 (A)   Calcium   9.2   Total Protein   6.5   Albumin   4.0   Globulin   2.5   Total Bilirubin   0.4   Alkaline Phosphatase   74   AST (SGOT)   15   ALT (SGPT)   9   Albumin/Globulin Ratio   1.6   BUN/Creatinine Ratio   15.7   Anion Gap   9.4   (A) Abnormal value       Comments are available for some flowsheets but are not being displayed.           CBC    CBC 6/27/22 2/23/23   WBC 7.58 6.64   RBC 4.21 4.77   Hemoglobin 13.4 15.0   Hematocrit 41.5 44.4   MCV 98.6 (A) 93.1   MCH 31.8 31.4   MCHC 32.3 33.8   RDW 14.1 12.9   Platelets 180 178   (A) Abnormal value            Lipid Panel    Lipid Panel 2/23/23   Total Cholesterol 240 (A)   Triglycerides 221 (A)   HDL Cholesterol 45   VLDL Cholesterol 41 (A)   LDL Cholesterol  154 (A)   LDL/HDL Ratio 3.35   (A) Abnormal value            TSH    TSH 2/23/23   TSH 1.280           A1C Last 3 Results    HGBA1C Last 3 Results 2/23/23   Hemoglobin A1C 5.70 (A)   (A) Abnormal value            Microalbumin    Microalbumin 2/23/23   Microalbumin, Urine 2.3               Procedures    Assessment and Plan   Diagnoses and all orders for this visit:    1. Type 2 diabetes mellitus with hyperglycemia, without long-term current use of insulin (HCC) (Primary)  Assessment & Plan:  Checking follow up labs today  Continue current management    Orders:  -     CBC & Differential  -     Comprehensive Metabolic Panel  -     Hemoglobin A1c  -      Lipid Panel  -     MicroAlbumin, Urine, Random - Urine, Random Void  -     TSH    2. Essential hypertension  Assessment & Plan:  Well controlled in clinic today  Continue current management      3. Other hyperlipidemia  Assessment & Plan:  On statin, tolerating well  Continue current management      4. Renal mass  Assessment & Plan:  Being followed by urology  Repeat scan ordered for next year to monitor          Follow Up   Return in about 6 months (around 8/23/2023) for Next scheduled follow up.  Patient was given instructions and counseling regarding his condition or for health maintenance advice. Please see specific information pulled into the AVS if appropriate.

## 2023-02-27 ENCOUNTER — PATIENT OUTREACH (OUTPATIENT)
Dept: CASE MANAGEMENT | Facility: OTHER | Age: 88
End: 2023-02-27
Payer: MEDICARE

## 2023-02-27 DIAGNOSIS — I10 HYPERTENSION, UNSPECIFIED TYPE: ICD-10-CM

## 2023-02-27 DIAGNOSIS — G20 PARKINSON'S DISEASE: ICD-10-CM

## 2023-02-27 DIAGNOSIS — M54.6 CHRONIC THORACIC BACK PAIN, UNSPECIFIED BACK PAIN LATERALITY: ICD-10-CM

## 2023-02-27 DIAGNOSIS — G89.29 CHRONIC THORACIC BACK PAIN, UNSPECIFIED BACK PAIN LATERALITY: ICD-10-CM

## 2023-02-27 DIAGNOSIS — R53.1 WEAKNESS GENERALIZED: ICD-10-CM

## 2023-02-27 DIAGNOSIS — R68.89 FORGETFULNESS: Primary | ICD-10-CM

## 2023-02-27 DIAGNOSIS — R32 URINARY INCONTINENCE, UNSPECIFIED TYPE: ICD-10-CM

## 2023-02-27 NOTE — OUTREACH NOTE
[facility] End of Month Documentation    This Chronic Medical Management Care Plan for Reddy Castellano, 87 y.o. male, has been monitored and managed; reviewed and a new plan of care implemented for the month of February.  A cumulative time of 27  minutes was spent on this patient record this month, including phone call with patient; chart review; electronic communication with primary care provider; electronic communication with other providers, Action plan for BP's.    Regarding the patient's problems: has Gout; Type 2 diabetes mellitus with hyperglycemia, without long-term current use of insulin (HCC); Essential hypertension; Other hyperlipidemia; Alzheimer's disease, unspecified (CODE) ([facility]); Major depressive disorder, recurrent, moderate ([facility]); Scrotal pain; Urge incontinence of urine; Urgency of urination; Balanitis; Candida rash of groin; Abnormal weight loss; Anxiety; Rheumatoid arthritis ([facility]); Chronic obstructive pulmonary disease (HCC); Constipation; Depressive disorder; Disease; Disorder of prostate; Disorder of vision; Essential tremor; Excess skin of abdominal wall; Heart failure ([facility]); Mechanical complication of genitourinary device; Morbid obesity ([facility]); Myocardial infarction ([facility]); Numbness; Pneumonia; Seasonal allergies; Sleep apnea; Spondylosis of lumbar spine; Tingling of skin; Ventral hernia; Lymphadenopathy of head and neck; Swallowed foreign body; Hyperkalemia; Benign prostatic hyperplasia with urinary frequency; and Renal mass on their problem list., the following items were addressed: medical records; medications and any changes can be found within the plan section of the note.  A detailed listing of time spent for chronic care management is tracked within each outreach encounter.  Current medications include:  has a current medication list which includes the following prescription(s): albuterol sulfate hfa, allopurinol, amlodipine, aspirin, atorvastatin, brompheniramine-pseudoephedrine-dm, bupropion  xl, carboxymethylcellulose, cetirizine, clopidogrel, donepezil, epinephrine, fluticasone, fluticasone-salmeterol, furosemide, lisinopril, melatonin, memantine, methocarbamol, montelukast, multivitamin with minerals, nystatin, pantoprazole, polyethylene glycol, potassium chloride, propranolol, tiotropium bromide monohydrate, venlafaxine xr, vibegron, vitamin b-12, and vitamin d. and the patient is reported to be patient is compliant with medication protocol,  Medications are reported to be effective.  Regarding these diagnoses, referrals were made to the following provider(s):Orthopedic.  All notes on chart for PCP to review.    The patient was monitored remotely for blood pressure; medications.    The patient's physical needs include:  physical healthcare, memory.     The patient's mental support needs include:  increased support    The patient's cognitive support needs include:  increased support    The patient's psychosocial support needs include:  continued support    The patient's functional needs include: physical healthcare    The patient's environmental needs include:  not applicable, n/a    Care Plan overall comments:  Follow up with action plan for BP monitoring    Refer to previous outreach notes for more information on the areas listed above.    Monthly Billing Diagnoses  (R68.89) Forgetfulness    (M54.6,  G89.29) Chronic thoracic back pain, unspecified back pain laterality    (I10) Hypertension, unspecified type    (G20) Parkinson's disease (HCC)    (R53.1) Weakness generalized    (R32) Urinary incontinence, unspecified type    Medications   · Medications have been reconciled    Care Plan progress this month:      Recently Modified Care Plans Updates made since 1/27/2023 12:00 AM    No recently modified care plans.        Instructions   · Patient was provided an electronic copy of care plan  · CCM services were explained and offered and patient has accepted these services.  · Patient has given their  written consent to receive CCM services and understands that this includes the authorization of electronic communication of medical information with the other treating providers.  · Patient understands that they may stop CCM services at any time and these changes will be effective at the end of the calendar month and will effectively revocate the agreement of CCM services.  · Patient understands that only one practitioner can furnish and be paid for CCM services during one calendar month.  Patient also understands that there may be co-payment or deductible fees in association with CCM services.  · Patient will continue with at least monthly follow-up calls with the Ambulatory .    Melia LEONE  Ambulatory Case Management    2/27/2023, 15:29 EST

## 2023-03-13 ENCOUNTER — TELEPHONE (OUTPATIENT)
Dept: SURGERY | Facility: CLINIC | Age: 88
End: 2023-03-13
Payer: MEDICARE

## 2023-03-13 NOTE — TELEPHONE ENCOUNTER
Pt called and said he was supposed to receive a prescription for Gemtesa. He said he has not gotten anything. CB# [phone]

## 2023-03-20 ENCOUNTER — TELEPHONE (OUTPATIENT)
Dept: CASE MANAGEMENT | Facility: OTHER | Age: 88
End: 2023-03-20
Payer: MEDICARE

## 2023-03-20 DIAGNOSIS — M47.816 SPONDYLOSIS OF LUMBAR SPINE: Primary | ICD-10-CM

## 2023-03-20 DIAGNOSIS — G89.29 CHRONIC THORACIC BACK PAIN, UNSPECIFIED BACK PAIN LATERALITY: ICD-10-CM

## 2023-03-20 DIAGNOSIS — M54.6 CHRONIC THORACIC BACK PAIN, UNSPECIFIED BACK PAIN LATERALITY: ICD-10-CM

## 2023-03-20 NOTE — TELEPHONE ENCOUNTER
Called for Monthly CCM Call - blood pressure, neur surg referral for back pain, gemtesa approved and remind of appointments coming up.     Left Voicemail     Per Chart, Ortho will not see for Back Pain. Will pend Neuro Surg Referral to PCP.

## 2023-03-21 ENCOUNTER — PATIENT OUTREACH (OUTPATIENT)
Dept: CASE MANAGEMENT | Facility: OTHER | Age: 88
End: 2023-03-21
Payer: MEDICARE

## 2023-03-21 ENCOUNTER — TELEPHONE (OUTPATIENT)
Dept: INTERNAL MEDICINE | Facility: CLINIC | Age: 88
End: 2023-03-21

## 2023-03-21 DIAGNOSIS — R68.89 FORGETFULNESS: ICD-10-CM

## 2023-03-21 DIAGNOSIS — G89.29 CHRONIC THORACIC BACK PAIN, UNSPECIFIED BACK PAIN LATERALITY: Primary | ICD-10-CM

## 2023-03-21 DIAGNOSIS — M54.6 CHRONIC THORACIC BACK PAIN, UNSPECIFIED BACK PAIN LATERALITY: Primary | ICD-10-CM

## 2023-03-21 NOTE — TELEPHONE ENCOUNTER
Hub staff attempted to follow warm transfer process and was unsuccessful     Caller: Reddy Castellano    Relationship to patient: Self    Best call back number: [phone]    Patient is needing: PATIENT IS RETURNING A PHONE CALL STATING WAS CONTACTED BY [facility].

## 2023-03-21 NOTE — OUTREACH NOTE
AMBULATORY CASE MANAGEMENT NOTE    Name and Relationship of Patient/Support Person: Nona Reddy - Self  Self    CCM Interim Update    Spoke with patient and let him know that [facility] has been trying to contact him to schedule new patient appointment - gave office number to call back.     Patient reports he has not been consistently taking his blood pressures recently and will try to take a few times a week.     Gave patient CM contact information.             Dary LIMA RN  Ambulatory Case Management    3/21/2023, 13:00 EDT

## 2023-04-03 ENCOUNTER — TELEPHONE (OUTPATIENT)
Dept: UROLOGY | Facility: CLINIC | Age: 88
End: 2023-04-03
Payer: MEDICARE

## 2023-04-03 NOTE — TELEPHONE ENCOUNTER
Caller: DARVIN JULES    Relationship to patient: CAREN    Best call back number: [phone]    Patient is needing: SAMPLES OF GEMTESA DID WONDERS AND PT WAS WONDERING IF YOU WERE GOING TO WRITE A PRESCRIPTION FOR THIS MED    PLEASE CALL PT TO DISCUSS

## 2023-04-04 NOTE — TELEPHONE ENCOUNTER
Tried calling patient to make him aware we have already sent a prescription in for him, it had to have a PA. That has already been approved. So he should be able to pick it up from his pharmacy.

## 2023-04-05 ENCOUNTER — PATIENT OUTREACH (OUTPATIENT)
Dept: CASE MANAGEMENT | Facility: OTHER | Age: 88
End: 2023-04-05
Payer: MEDICARE

## 2023-04-05 DIAGNOSIS — G30.9 ALZHEIMER'S DISEASE, UNSPECIFIED (CODE): Primary | ICD-10-CM

## 2023-04-05 NOTE — OUTREACH NOTE
AMBULATORY CASE MANAGEMENT NOTE    Name and Relationship of Patient/Support Person: Reddy Castellano - Self    CCM Interim Update    Outreach to Patient     Reminded Patient of Neurosurgery & PCP appointment this month. Patient verbalized understand able to read back dates and times for appointments.     Informed patient that Urology office has been trying to reach patient about Gemtesa prescription - it is ready to be picked up at pharmacy. Patient verbalized understanding.        PLAN: Review Neurosurgery & PCP appointment notes         Education Documentation  medication management, taught by Dary Thornton RN at 4/5/2023 10:43 AM.  Learner: Patient  Readiness: Acceptance  Method: Explanation  Response: Verbalizes Understanding          Dary LIMA  Ambulatory Case Management    4/5/2023, 10:43 EDT

## 2023-04-17 ENCOUNTER — TRANSCRIBE ORDERS (OUTPATIENT)
Dept: ADMINISTRATIVE | Facility: HOSPITAL | Age: 88
End: 2023-04-17
Payer: MEDICARE

## 2023-04-17 ENCOUNTER — LAB (OUTPATIENT)
Dept: LAB | Facility: HOSPITAL | Age: 88
End: 2023-04-17
Payer: MEDICARE

## 2023-04-17 DIAGNOSIS — Z79.899 ENCOUNTER FOR LONG-TERM (CURRENT) USE OF OTHER MEDICATIONS: ICD-10-CM

## 2023-04-17 DIAGNOSIS — M10.9 GOUT, UNSPECIFIED CAUSE, UNSPECIFIED CHRONICITY, UNSPECIFIED SITE: Primary | ICD-10-CM

## 2023-04-17 DIAGNOSIS — M10.9 GOUT, UNSPECIFIED CAUSE, UNSPECIFIED CHRONICITY, UNSPECIFIED SITE: ICD-10-CM

## 2023-04-17 LAB
BUN SERPL-MCNC: 20 MG/DL (ref 8–23)
CREAT SERPL-MCNC: 1.35 MG/DL (ref 0.76–1.27)
EGFRCR SERPLBLD CKD-EPI 2021: 50.8 ML/MIN/1.73
URATE SERPL-MCNC: 7.2 MG/DL (ref 3.4–7)

## 2023-04-17 PROCEDURE — 82565 ASSAY OF CREATININE: CPT

## 2023-04-17 PROCEDURE — 36415 COLL VENOUS BLD VENIPUNCTURE: CPT

## 2023-04-17 PROCEDURE — 84550 ASSAY OF BLOOD/URIC ACID: CPT

## 2023-04-17 PROCEDURE — 84520 ASSAY OF UREA NITROGEN: CPT

## 2023-04-19 ENCOUNTER — HOSPITAL ENCOUNTER (OUTPATIENT)
Dept: GENERAL RADIOLOGY | Facility: HOSPITAL | Age: 88
Discharge: HOME | End: 2023-04-19
Admitting: NURSE PRACTITIONER
Payer: MEDICARE

## 2023-04-19 ENCOUNTER — OFFICE VISIT (OUTPATIENT)
Dept: NEUROSURGERY | Facility: CLINIC | Age: 88
End: 2023-04-19
Payer: MEDICARE

## 2023-04-19 VITALS
WEIGHT: 234 LBS | SYSTOLIC BLOOD PRESSURE: 170 MMHG | HEIGHT: 68 IN | HEART RATE: 62 BPM | DIASTOLIC BLOOD PRESSURE: 83 MMHG | BODY MASS INDEX: 35.46 KG/M2

## 2023-04-19 DIAGNOSIS — M47.817 SPONDYLOSIS OF LUMBOSACRAL REGION WITHOUT MYELOPATHY OR RADICULOPATHY: ICD-10-CM

## 2023-04-19 DIAGNOSIS — M54.50 CHRONIC MIDLINE LOW BACK PAIN WITHOUT SCIATICA: ICD-10-CM

## 2023-04-19 DIAGNOSIS — M54.50 CHRONIC MIDLINE LOW BACK PAIN WITHOUT SCIATICA: Primary | ICD-10-CM

## 2023-04-19 DIAGNOSIS — G89.29 CHRONIC MIDLINE LOW BACK PAIN WITHOUT SCIATICA: Primary | ICD-10-CM

## 2023-04-19 DIAGNOSIS — G89.29 CHRONIC MIDLINE LOW BACK PAIN WITHOUT SCIATICA: ICD-10-CM

## 2023-04-19 PROCEDURE — 72100 X-RAY EXAM L-S SPINE 2/3 VWS: CPT

## 2023-04-19 NOTE — PROGRESS NOTES
"Chief Complaint  Back Pain    Subjective          Reddy Castellano who is a 87 y.o. year old male who presents to [facility] for evaluation of back pain.      The patient complains of pain located in the lumbar spine.  Patients states the pain has been present for several years.  The pain came on gradually.  The pain will come and go. He will have exacerbation of his back pain, typically only lasting a a few seconds.  The pain scale level is severe, though only last a short duration. He will feel as though is spine is being split in two. Pain quickly resolves with repositioning and does not require medication to relieve the pain. This occurs maybe once a week at most.      He is not currently in any pain. The pain does not radiate. .  The pain is Intermittent and described as sharp.  The pain is worse at no particular time of day. Patient states bending, twisting and lifting makes the pain worse.  Patient states rest makes the pain better.    Associated Symptoms Include: Denies numbness and tingling. Denies weakness. He has a history of incontinence.     Conservative Interventions Include: None.    Was this the result of an injury or accident?: No    History of Previous Spinal Surgery?: No    Nicotine use: non-smoker    BMI: Body mass index is 35.58 kg/m².      Review of Systems   Musculoskeletal: Positive for arthralgias and back pain.   All other systems reviewed and are negative.       Objective   Vital Signs:   /83   Pulse 62   Ht 172.7 cm (68\")   Wt 106 kg (234 lb)   BMI 35.58 kg/m²       Physical Exam  Vitals reviewed.   Constitutional:       Appearance: Normal appearance.   Musculoskeletal:      Lumbar back: Tenderness present. Negative right straight leg raise test and negative left straight leg raise test.      Right hip: No tenderness. Normal range of motion.      Left hip: No tenderness. Normal range of motion.   Neurological:      Mental Status: He is " alert and oriented to person, place, and time.      Motor: Motor strength is normal.      Gait: Gait is intact.      Deep Tendon Reflexes:      Reflex Scores:       Patellar reflexes are 0 on the right side and 0 on the left side.       Achilles reflexes are 0 on the right side and 0 on the left side.       Neurologic Exam     Mental Status   Oriented to person, place, and time.   Level of consciousness: alert    Motor Exam   Muscle bulk: normal  Overall muscle tone: normal    Strength   Strength 5/5 throughout.     Sensory Exam   Light touch normal.     Gait, Coordination, and Reflexes     Gait  Gait: normal    Reflexes   Right patellar: 0  Left patellar: 0  Right achilles: 0  Left achilles: 0  Right ankle clonus: absent  Left ankle clonus: absent       Result Review :                 Assessment and Plan    Diagnoses and all orders for this visit:    1. Chronic midline low back pain without sciatica (Primary)  -     XR Spine Lumbar 2 or 3 View; Future    2. Spondylosis of lumbosacral region without myelopathy or radiculopathy  -     XR Spine Lumbar 2 or 3 View; Future    Pt presenting for evaluation of chronic back pain. He has not had any imaging of the lumbar spine. His pain is intermittent and axial in nature. The description of his symptoms and exam findings would not warrant MRI at this time. Will order an XR Lumbar Spine. He can follow up as needed and is aware to call should his pain become more frequent or persistent.       Follow Up   Return if symptoms worsen or fail to improve.  Patient was given instructions and counseling regarding his condition or for health maintenance advice.     -XR Lumbar Spine  -Follow up as needed

## 2023-04-20 ENCOUNTER — TELEPHONE (OUTPATIENT)
Dept: NEUROSURGERY | Facility: CLINIC | Age: 88
End: 2023-04-20
Payer: MEDICARE

## 2023-04-20 ENCOUNTER — TELEPHONE (OUTPATIENT)
Dept: INTERNAL MEDICINE | Facility: CLINIC | Age: 88
End: 2023-04-20
Payer: MEDICARE

## 2023-04-20 NOTE — TELEPHONE ENCOUNTER
XR Lumbar Spine demonstrating advanced arthritis changes.    Patient informed. He asked if there was anything he could take for this. He said he is not have a lot of pain with this, it is occasional and goes away on its own.

## 2023-04-20 NOTE — TELEPHONE ENCOUNTER
Patient called about x ray report he voiced understanding about severe arthritis. Advised to take Advil for pain and gave a list of foods and spiced to add to his diet to help with inflammation. Has apt scheduled in may.

## 2023-04-25 ENCOUNTER — TELEPHONE (OUTPATIENT)
Dept: CASE MANAGEMENT | Facility: OTHER | Age: 88
End: 2023-04-25
Payer: MEDICARE

## 2023-04-25 NOTE — TELEPHONE ENCOUNTER
Called Patient. No Answer. LVM.     Per Chart Review -    Patient no showed PCP appointment on 4/12/23, rescheduled for 5/12/23.     Patient saw Neurosurgery - xray lumbar spine showed advanced arthritis changes, follow up PRN.

## 2023-04-28 ENCOUNTER — PATIENT OUTREACH (OUTPATIENT)
Dept: CASE MANAGEMENT | Facility: OTHER | Age: 88
End: 2023-04-28
Payer: MEDICARE

## 2023-04-28 DIAGNOSIS — G30.9 ALZHEIMER'S DISEASE, UNSPECIFIED (CODE): ICD-10-CM

## 2023-04-28 DIAGNOSIS — G20 PARKINSON'S DISEASE: ICD-10-CM

## 2023-04-28 DIAGNOSIS — I10 HYPERTENSION, UNSPECIFIED TYPE: Primary | ICD-10-CM

## 2023-04-28 NOTE — OUTREACH NOTE
CCM End of Month Documentation    This Chronic Medical Management Care Plan for Reddy Castellano, 87 y.o. male, has been monitored and managed; reviewed and a new plan of care implemented for the month of April.  A cumulative time of 20 minutes was spent on this patient record this month, including chart review; phone call with patient.    Regarding the patient's problems: has Gout; Type 2 diabetes mellitus with hyperglycemia, without long-term current use of insulin; Essential hypertension; Other hyperlipidemia; Alzheimer's disease, unspecified (CODE); Major depressive disorder, recurrent, moderate; Scrotal pain; Urge incontinence of urine; Urgency of urination; Balanitis; Candida rash of groin; Abnormal weight loss; Anxiety; Rheumatoid arthritis; Chronic obstructive pulmonary disease; Constipation; Depressive disorder; Disease; Disorder of prostate; Disorder of vision; Essential tremor; Excess skin of abdominal wall; Heart failure; Mechanical complication of genitourinary device; Morbid obesity; Myocardial infarction; Numbness; Pneumonia; Seasonal allergies; Sleep apnea; Spondylosis of lumbar spine; Tingling of skin; Ventral hernia; Lymphadenopathy of head and neck; Swallowed foreign body; Hyperkalemia; Benign prostatic hyperplasia with urinary frequency; and Renal mass on their problem list., the following items were addressed: medical records; medications and any changes can be found within the plan section of the note.  A detailed listing of time spent for chronic care management is tracked within each outreach encounter.  Current medications include:  has a current medication list which includes the following prescription(s): albuterol sulfate hfa, allopurinol, amlodipine, aspirin, atorvastatin, brompheniramine-pseudoephedrine-dm, bupropion xl, carboxymethylcellulose, cetirizine, clopidogrel, donepezil, epinephrine, fluticasone, fluticasone-salmeterol, furosemide, lisinopril, melatonin, memantine, methocarbamol,  montelukast, multivitamin with minerals, nystatin, pantoprazole, polyethylene glycol, potassium chloride, propranolol, tiotropium bromide monohydrate, venlafaxine xr, vibegron, vitamin b-12, and vitamin d. and the patient is reported to be patient is compliant with medication protocol,  Medications are reported to be effective.  Regarding these diagnoses, referrals were made to the following provider(s): N/A.  All notes on chart for PCP to review.    The patient was monitored remotely for blood pressure; medications.    The patient's physical needs include:  physical healthcare.     The patient's mental support needs include:  continued support    The patient's cognitive support needs include:  health care; continued support    The patient's psychosocial support needs include:  continued support    The patient's functional needs include: physical healthcare    The patient's environmental needs include:  not applicable    Care Plan overall comments:  No data recorded    Refer to previous outreach notes for more information on the areas listed above.    Monthly Billing Diagnoses  (I10) Hypertension, unspecified type    (G20) Parkinson's disease    (G30.9) Alzheimer's disease, unspecified (CODE)    Medications   · Medications have been reconciled    Care Plan progress this month:      Recently Modified Care Plans Updates made since 3/28/2023 12:00 AM    No recently modified care plans.            Instructions   · Patient was provided an electronic copy of care plan  · CCM services were explained and offered and patient has accepted these services.  · Patient has given their written consent to receive CCM services and understands that this includes the authorization of electronic communication of medical information with the other treating providers.  · Patient understands that they may stop CCM services at any time and these changes will be effective at the end of the calendar month and will effectively revocate the  agreement of CCM services.  · Patient understands that only one practitioner can furnish and be paid for CCM services during one calendar month.  Patient also understands that there may be co-payment or deductible fees in association with CCM services.  · Patient will continue with at least monthly follow-up calls with the Ambulatory .    Dary LIMA  Ambulatory Case Management    4/28/2023, 10:51 EDT

## 2023-05-12 ENCOUNTER — OFFICE VISIT (OUTPATIENT)
Dept: INTERNAL MEDICINE | Facility: CLINIC | Age: 88
End: 2023-05-12
Payer: MEDICARE

## 2023-05-12 VITALS
TEMPERATURE: 98.4 F | BODY MASS INDEX: 35.52 KG/M2 | HEIGHT: 68 IN | DIASTOLIC BLOOD PRESSURE: 68 MMHG | OXYGEN SATURATION: 97 % | WEIGHT: 234.38 LBS | RESPIRATION RATE: 18 BRPM | HEART RATE: 82 BPM | SYSTOLIC BLOOD PRESSURE: 124 MMHG

## 2023-05-12 DIAGNOSIS — E11.9 TYPE 2 DIABETES MELLITUS WITHOUT COMPLICATION, WITHOUT LONG-TERM CURRENT USE OF INSULIN: Primary | ICD-10-CM

## 2023-05-12 RX ORDER — AMLODIPINE BESYLATE 10 MG/1
10 TABLET ORAL DAILY
Qty: 90 TABLET | Refills: 1 | Status: SHIPPED | OUTPATIENT
Start: 2023-05-12

## 2023-05-12 RX ORDER — ERGOCALCIFEROL 1.25 MG/1
50000 CAPSULE ORAL 2 TIMES WEEKLY
Qty: 26 CAPSULE | Refills: 0 | Status: SHIPPED | OUTPATIENT
Start: 2023-05-15

## 2023-05-12 RX ORDER — ALLOPURINOL 300 MG/1
150 TABLET ORAL DAILY
Qty: 45 TABLET | Refills: 1 | Status: SHIPPED | OUTPATIENT
Start: 2023-05-12

## 2023-05-12 RX ORDER — LISINOPRIL 10 MG/1
10 TABLET ORAL DAILY
Qty: 90 TABLET | Refills: 1 | Status: SHIPPED | OUTPATIENT
Start: 2023-05-12

## 2023-06-07 PROBLEM — E11.9 TYPE 2 DIABETES MELLITUS WITHOUT COMPLICATION, WITHOUT LONG-TERM CURRENT USE OF INSULIN: Status: ACTIVE | Noted: 2021-06-21

## 2023-06-07 NOTE — ASSESSMENT & PLAN NOTE
Diabetes is unchanged.   Continue current treatment regimen.  Diabetes will be reassessed in 6 months.  Last A1c 5.7%

## 2023-06-07 NOTE — PROGRESS NOTES
"Chief Complaint  Follow-up (Follow up on diabetes and about xray of spine )    Subjective       Reddy Castellano presents to [facility]    HPI   Presenting for follow-up of diabetes.  Previously well controlled on last labs.  Last A1c 5.7%.    Has questions about the x-ray of his spine that was done by neurosurgery.    Objective     Vitals:    05/12/23 0955   BP: 124/68   BP Location: Left arm   Patient Position: Sitting   Cuff Size: Adult   Pulse: 82   Resp: 18   Temp: 98.4 °F (36.9 °C)   TempSrc: Temporal   SpO2: 97%   Weight: 106 kg (234 lb 6 oz)   Height: 172.7 cm (68\")      Wt Readings from Last 3 Encounters:   05/12/23 106 kg (234 lb 6 oz)   04/19/23 106 kg (234 lb)   02/23/23 106 kg (233 lb 6 oz)      BP Readings from Last 3 Encounters:   05/12/23 124/68   04/19/23 170/83   02/23/23 114/66        Body mass index is 35.64 kg/m².             Physical Exam  Vitals reviewed.   Constitutional:       Appearance: Normal appearance. He is well-developed.   HENT:      Head: Normocephalic and atraumatic.      Mouth/Throat:      Pharynx: No oropharyngeal exudate.   Eyes:      Conjunctiva/sclera: Conjunctivae normal.      Pupils: Pupils are equal, round, and reactive to light.   Cardiovascular:      Rate and Rhythm: Normal rate and regular rhythm.      Heart sounds: No murmur heard.    No friction rub. No gallop.   Pulmonary:      Effort: Pulmonary effort is normal.      Breath sounds: Normal breath sounds. No wheezing or rhonchi.   Skin:     General: Skin is warm and dry.   Neurological:      Mental Status: He is alert and oriented to person, place, and time.   Psychiatric:         Mood and Affect: Affect normal.        Result Review :   The following data was reviewed by: Agustina Gutierrez MD on 05/12/2023:  CMP          2/13/2023    14:33 2/23/2023    15:08 4/17/2023    13:59   CMP   Glucose  75     BUN  21  20    Creatinine 1.40  1.34  1.35    EGFR 48.6  51.3  " 50.8    Sodium  142     Potassium  4.8     Chloride  109     Calcium  9.2     Total Protein  6.5     Albumin  4.0     Globulin  2.5     Total Bilirubin  0.4     Alkaline Phosphatase  74     AST (SGOT)  15     ALT (SGPT)  9     Albumin/Globulin Ratio  1.6     BUN/Creatinine Ratio  15.7     Anion Gap  9.4       CBC          6/27/2022    12:07 2/23/2023    15:08   CBC   WBC 7.58  6.64    RBC 4.21  4.77    Hemoglobin 13.4  15.0    Hematocrit 41.5  44.4    MCV 98.6  93.1    MCH 31.8  31.4    MCHC 32.3  33.8    RDW 14.1  12.9    Platelets 180  178      Lipid Panel          2/23/2023    15:08   Lipid Panel   Total Cholesterol 240    Triglycerides 221    HDL Cholesterol 45    VLDL Cholesterol 41    LDL Cholesterol  154    LDL/HDL Ratio 3.35      TSH          2/23/2023    15:08   TSH   TSH 1.280      A1C Last 3 Results          2/23/2023    15:08   HGBA1C Last 3 Results   Hemoglobin A1C 5.70      Microalbumin          2/23/2023    15:08   Microalbumin   Microalbumin, Urine 2.3      Radiologic studies x-ray lumbar spine    Procedures    Assessment and Plan   Diagnoses and all orders for this visit:    1. Type 2 diabetes mellitus without complication, without long-term current use of insulin (Primary)  Assessment & Plan:  Diabetes is unchanged.   Continue current treatment regimen.  Diabetes will be reassessed in 6 months.  Last A1c 5.7%      Other orders  -     allopurinol (ZYLOPRIM) 300 MG tablet; Take 0.5 tablets by mouth Daily.  Dispense: 45 tablet; Refill: 1  -     amLODIPine (NORVASC) 10 MG tablet; Take 1 tablet by mouth Daily.  Dispense: 90 tablet; Refill: 1  -     lisinopril (PRINIVIL,ZESTRIL) 10 MG tablet; Take 1 tablet by mouth Daily.  Dispense: 90 tablet; Refill: 1  -     vitamin D (ERGOCALCIFEROL) 1.25 MG (84932 UT) capsule capsule; Take 1 capsule by mouth 2 (Two) Times a Week.  Dispense: 26 capsule; Refill: 0        I spent 30 minutes caring for Reddy on this date of service. This time includes time spent by me in  the following activities:preparing for the visit, reviewing tests, performing a medically appropriate examination and/or evaluation , counseling and educating the patient/family/caregiver, and documenting information in the medical record  Follow Up   Return in about 6 months (around 11/12/2023) for Next scheduled follow up, Medicare Wellness.  Patient was given instructions and counseling regarding his condition or for health maintenance advice. Please see specific information pulled into the AVS if appropriate.

## 2023-06-08 ENCOUNTER — OFFICE VISIT (OUTPATIENT)
Dept: INTERNAL MEDICINE | Facility: CLINIC | Age: 88
End: 2023-06-08
Payer: MEDICARE

## 2023-06-08 VITALS
HEIGHT: 68 IN | WEIGHT: 233.6 LBS | HEART RATE: 59 BPM | BODY MASS INDEX: 35.4 KG/M2 | DIASTOLIC BLOOD PRESSURE: 72 MMHG | OXYGEN SATURATION: 94 % | SYSTOLIC BLOOD PRESSURE: 128 MMHG | TEMPERATURE: 97.4 F

## 2023-06-08 DIAGNOSIS — I10 ESSENTIAL HYPERTENSION: ICD-10-CM

## 2023-06-08 DIAGNOSIS — E11.9 TYPE 2 DIABETES MELLITUS WITHOUT COMPLICATION, WITHOUT LONG-TERM CURRENT USE OF INSULIN: Primary | ICD-10-CM

## 2023-06-08 DIAGNOSIS — J30.9 ALLERGIC RHINITIS, UNSPECIFIED SEASONALITY, UNSPECIFIED TRIGGER: ICD-10-CM

## 2023-06-08 LAB
ALBUMIN SERPL-MCNC: 3.9 G/DL (ref 3.5–5.2)
ALBUMIN/GLOB SERPL: 1.6 G/DL
ALP SERPL-CCNC: 71 U/L (ref 39–117)
ALT SERPL W P-5'-P-CCNC: 11 U/L (ref 1–41)
ANION GAP SERPL CALCULATED.3IONS-SCNC: 8 MMOL/L (ref 5–15)
AST SERPL-CCNC: 17 U/L (ref 1–40)
BASOPHILS # BLD AUTO: 0.08 10*3/MM3 (ref 0–0.2)
BASOPHILS NFR BLD AUTO: 1.2 % (ref 0–1.5)
BILIRUB SERPL-MCNC: 0.4 MG/DL (ref 0–1.2)
BUN SERPL-MCNC: 26 MG/DL (ref 8–23)
BUN/CREAT SERPL: 16.8 (ref 7–25)
CALCIUM SPEC-SCNC: 8.9 MG/DL (ref 8.6–10.5)
CHLORIDE SERPL-SCNC: 112 MMOL/L (ref 98–107)
CHOLEST SERPL-MCNC: 225 MG/DL (ref 0–200)
CO2 SERPL-SCNC: 25 MMOL/L (ref 22–29)
CREAT SERPL-MCNC: 1.55 MG/DL (ref 0.76–1.27)
DEPRECATED RDW RBC AUTO: 44.2 FL (ref 37–54)
EGFRCR SERPLBLD CKD-EPI 2021: 42.8 ML/MIN/1.73
EOSINOPHIL # BLD AUTO: 0.3 10*3/MM3 (ref 0–0.4)
EOSINOPHIL NFR BLD AUTO: 4.5 % (ref 0.3–6.2)
ERYTHROCYTE [DISTWIDTH] IN BLOOD BY AUTOMATED COUNT: 13.1 % (ref 12.3–15.4)
GLOBULIN UR ELPH-MCNC: 2.5 GM/DL
GLUCOSE SERPL-MCNC: 97 MG/DL (ref 65–99)
HBA1C MFR BLD: 5.7 % (ref 4.8–5.6)
HCT VFR BLD AUTO: 45.9 % (ref 37.5–51)
HDLC SERPL-MCNC: 38 MG/DL (ref 40–60)
HGB BLD-MCNC: 15.4 G/DL (ref 13–17.7)
IMM GRANULOCYTES # BLD AUTO: 0.02 10*3/MM3 (ref 0–0.05)
IMM GRANULOCYTES NFR BLD AUTO: 0.3 % (ref 0–0.5)
LDLC SERPL CALC-MCNC: 153 MG/DL (ref 0–100)
LDLC/HDLC SERPL: 3.96 {RATIO}
LYMPHOCYTES # BLD AUTO: 1.97 10*3/MM3 (ref 0.7–3.1)
LYMPHOCYTES NFR BLD AUTO: 29.2 % (ref 19.6–45.3)
MCH RBC QN AUTO: 30.9 PG (ref 26.6–33)
MCHC RBC AUTO-ENTMCNC: 33.6 G/DL (ref 31.5–35.7)
MCV RBC AUTO: 92.2 FL (ref 79–97)
MONOCYTES # BLD AUTO: 0.45 10*3/MM3 (ref 0.1–0.9)
MONOCYTES NFR BLD AUTO: 6.7 % (ref 5–12)
NEUTROPHILS NFR BLD AUTO: 3.92 10*3/MM3 (ref 1.7–7)
NEUTROPHILS NFR BLD AUTO: 58.1 % (ref 42.7–76)
NRBC BLD AUTO-RTO: 0 /100 WBC (ref 0–0.2)
PLATELET # BLD AUTO: 151 10*3/MM3 (ref 140–450)
PMV BLD AUTO: 11.2 FL (ref 6–12)
POTASSIUM SERPL-SCNC: 5.2 MMOL/L (ref 3.5–5.2)
PROT SERPL-MCNC: 6.4 G/DL (ref 6–8.5)
RBC # BLD AUTO: 4.98 10*6/MM3 (ref 4.14–5.8)
SODIUM SERPL-SCNC: 145 MMOL/L (ref 136–145)
TRIGL SERPL-MCNC: 183 MG/DL (ref 0–150)
TSH SERPL DL<=0.05 MIU/L-ACNC: 1.59 UIU/ML (ref 0.27–4.2)
VLDLC SERPL-MCNC: 34 MG/DL (ref 5–40)
WBC NRBC COR # BLD: 6.74 10*3/MM3 (ref 3.4–10.8)

## 2023-06-08 PROCEDURE — 84443 ASSAY THYROID STIM HORMONE: CPT | Performed by: INTERNAL MEDICINE

## 2023-06-08 PROCEDURE — 83036 HEMOGLOBIN GLYCOSYLATED A1C: CPT | Performed by: INTERNAL MEDICINE

## 2023-06-08 PROCEDURE — 80061 LIPID PANEL: CPT | Performed by: INTERNAL MEDICINE

## 2023-06-08 PROCEDURE — 80053 COMPREHEN METABOLIC PANEL: CPT | Performed by: INTERNAL MEDICINE

## 2023-06-08 PROCEDURE — 85025 COMPLETE CBC W/AUTO DIFF WBC: CPT | Performed by: INTERNAL MEDICINE

## 2023-06-08 RX ORDER — CETIRIZINE HYDROCHLORIDE 10 MG/1
10 TABLET ORAL DAILY
Qty: 90 TABLET | Refills: 1 | Status: SHIPPED | OUTPATIENT
Start: 2023-06-08

## 2023-06-08 NOTE — PROGRESS NOTES
"Chief Complaint  Hyperlipidemia, Hypertension, COPD, Anxiety, Diabetes, and Allergies (Slight cough today. Allergies )    Subjective       Reddy Castellano presents to [facility]    HPI     Here for follow up. Notes allergies have been bad, developed a non-productive cough prior to arrival. Needs refill for Zyrtec. Blood pressures have been good, checks occasionally at home. Does not check his sugars at home. Doing well with medications, no problems taking the medications.     Objective     Vitals:    06/08/23 1404   BP: 128/72   Pulse: 59   Temp: 97.4 °F (36.3 °C)   SpO2: 94%   Weight: 106 kg (233 lb 9.6 oz)   Height: 172.7 cm (68\")      Wt Readings from Last 3 Encounters:   06/08/23 106 kg (233 lb 9.6 oz)   05/12/23 106 kg (234 lb 6 oz)   04/19/23 106 kg (234 lb)      BP Readings from Last 3 Encounters:   06/08/23 128/72   05/12/23 124/68   04/19/23 170/83        Body mass index is 35.52 kg/m².    Physical Exam  Vitals reviewed.   Constitutional:       Appearance: Normal appearance. He is well-developed.   HENT:      Head: Normocephalic and atraumatic.      Mouth/Throat:      Pharynx: No oropharyngeal exudate.   Eyes:      Conjunctiva/sclera: Conjunctivae normal.      Pupils: Pupils are equal, round, and reactive to light.   Cardiovascular:      Rate and Rhythm: Normal rate and regular rhythm.      Heart sounds: No murmur heard.    No friction rub. No gallop.   Pulmonary:      Effort: Pulmonary effort is normal.      Breath sounds: Normal breath sounds. No wheezing or rhonchi.   Skin:     General: Skin is warm and dry.   Neurological:      Mental Status: He is alert and oriented to person, place, and time.   Psychiatric:         Mood and Affect: Affect normal.        Result Review :   The following data was reviewed by: Agustina Gutierrez MD on 06/08/2023:  CMP          2/13/2023    14:33 2/23/2023    15:08 4/17/2023    13:59   CMP   Glucose  75     BUN  21  20  "   Creatinine 1.40  1.34  1.35    EGFR 48.6  51.3  50.8    Sodium  142     Potassium  4.8     Chloride  109     Calcium  9.2     Total Protein  6.5     Albumin  4.0     Globulin  2.5     Total Bilirubin  0.4     Alkaline Phosphatase  74     AST (SGOT)  15     ALT (SGPT)  9     Albumin/Globulin Ratio  1.6     BUN/Creatinine Ratio  15.7     Anion Gap  9.4       CBC          6/27/2022    12:07 2/23/2023    15:08   CBC   WBC 7.58  6.64    RBC 4.21  4.77    Hemoglobin 13.4  15.0    Hematocrit 41.5  44.4    MCV 98.6  93.1    MCH 31.8  31.4    MCHC 32.3  33.8    RDW 14.1  12.9    Platelets 180  178      Lipid Panel          2/23/2023    15:08   Lipid Panel   Total Cholesterol 240    Triglycerides 221    HDL Cholesterol 45    VLDL Cholesterol 41    LDL Cholesterol  154    LDL/HDL Ratio 3.35      TSH          2/23/2023    15:08   TSH   TSH 1.280      A1C Last 3 Results          2/23/2023    15:08   HGBA1C Last 3 Results   Hemoglobin A1C 5.70      Microalbumin          2/23/2023    15:08   Microalbumin   Microalbumin, Urine 2.3          Procedures    Assessment and Plan   Diagnoses and all orders for this visit:    1. Type 2 diabetes mellitus without complication, without long-term current use of insulin (Primary)  Assessment & Plan:  Well controlled on previous labs  Checking follow up labs today    Orders:  -     CBC & Differential  -     Comprehensive Metabolic Panel  -     Hemoglobin A1c  -     Lipid Panel  -     TSH    2. Essential hypertension  Assessment & Plan:  Well controlled in clinic today  Continue current management      3. Allergic rhinitis, unspecified seasonality, unspecified trigger    Other orders  -     cetirizine (zyrTEC) 10 MG tablet; Take 1 tablet by mouth Daily.  Dispense: 90 tablet; Refill: 1          Follow Up   Return in about 6 months (around 12/8/2023) for Next scheduled follow up.  Patient was given instructions and counseling regarding his condition or for health maintenance advice. Please see  specific information pulled into the AVS if appropriate.

## 2023-08-04 ENCOUNTER — HOSPITAL ENCOUNTER (EMERGENCY)
Facility: HOSPITAL | Age: 88
Discharge: HOME | End: 2023-08-04
Attending: EMERGENCY MEDICINE
Payer: MEDICARE

## 2023-08-04 VITALS
OXYGEN SATURATION: 94 % | TEMPERATURE: 98.1 F | DIASTOLIC BLOOD PRESSURE: 81 MMHG | HEIGHT: 68 IN | SYSTOLIC BLOOD PRESSURE: 163 MMHG | BODY MASS INDEX: 34.98 KG/M2 | WEIGHT: 230.82 LBS | HEART RATE: 63 BPM | RESPIRATION RATE: 16 BRPM

## 2023-08-04 DIAGNOSIS — H11.31 SUBCONJUNCTIVAL HEMORRHAGE OF RIGHT EYE: Primary | ICD-10-CM

## 2023-08-04 PROCEDURE — 99282 EMERGENCY DEPT VISIT SF MDM: CPT

## 2023-08-05 NOTE — ED PROVIDER NOTES
Time: 11:12 PM EDT  Date of encounter:  8/4/2023  Independent Historian/Clinical History and Information was obtained by:   Patient    History is limited by: N/A    Chief Complaint: Right eye pain      History of Present Illness:  Patient is a 88 y.o. year old male who presents to the emergency department for evaluation of right eye pain that began 2 hours ago.  Patient denies vision changes.  Patient denies headache or trauma.  Patient denies fever, chest pain, shortness of air.    HPI    Patient Care Team  Primary Care Provider: Agustina Gutierrez MD    Past Medical History:     No Known Allergies  Past Medical History:   Diagnosis Date    Allergies     Anxiety     Arthritis     Asthma     BPH (benign prostatic hyperplasia)     Carpal tunnel syndrome     Colon polyp     Congestive heart failure (CHF)     COPD (chronic obstructive pulmonary disease)     Dementia     Depression     Diabetes mellitus type 2, noninsulin dependent     Diverticulitis     Erectile dysfunction of organic origin     Essential hypertension 11/19/2019    Excess skin     Fatigue 11/09/2019    GERD (gastroesophageal reflux disease)     Gout     Heart attack     Heart disease     High cholesterol     Hyperlipidemia     Hypertension     Hypogonadism in male     Insomnia     Leg swelling     Memory change 08/02/2018    currently, pt is taking namenda. i will pursue a mocha at his follow up     Myocardial infarction     Osteoarthritis     Parkinson's disease     Prostate disorder     Rectal bleeding     Renal cyst     Seasonal allergies     Sleep apnea     Stroke     TIA (transient ischemic attack) 08/02/2018    reportedly, the pt has a history of TIA. This can be discussed further at his follow up. i was asked that he cont. aspirin.    Tremor 08/02/2018    pt was a 3 mo history of a high frequency, low amplitude tremor of both hands that worsens some what with activity. tremor does not interfere with his activities of daily living. he has  no evidence of bradykinesia on exam. pakinsons seems unlikely at this point. potential etiologies would include essential tremor and physiologic tremor. he does note that he started theophylline approx. 3 months ag    Vitamin D deficiency      Past Surgical History:   Procedure Laterality Date    APPENDECTOMY  2015    CARDIAC CATHETERIZATION      CARDIAC SURGERY  2014    CARPAL TUNNEL RELEASE      CATARACT EXTRACTION      CIRCUMCISION  2000    COLONOSCOPY  2014 2017    CYSTOSCOPY      ENDOSCOPY  2010 2017    ENDOSCOPY N/A 11/23/2021    Procedure: ESOPHAGOGASTRODUODENOSCOPY;  Surgeon: Anika Cummins MD;  Location: [facility] MAIN OR;  Service: Gastroenterology;  Laterality: N/A;  gastritis    EYE SURGERY      eye implant    HERNIA REPAIR  2000    LAPAROSCOPIC GASTRIC BANDING      ORCHIECTOMY Right 2010    OTHER SURGICAL HISTORY      metal implants;     PENILE PROSTHESIS IMPLANT      TOE SURGERY      TOENAIL EXCISION      surgical removal of toenail and nail matrix of both feet    TOTAL KNEE ARTHROPLASTY Bilateral 2009 2010     Family History   Problem Relation Age of Onset    Diabetes Mother     Heart attack Father     Diabetes Brother     Rectal cancer Other         grandfather- rectal and colon    Prostate cancer Other     Diabetes Son        Home Medications:  Prior to Admission medications    Medication Sig Start Date End Date Taking? Authorizing Provider   albuterol sulfate  (90 Base) MCG/ACT inhaler Inhale 2 puffs Every 6 (Six) Hours As Needed.    Provider, MD Lorenza   allopurinol (ZYLOPRIM) 300 MG tablet Take 0.5 tablets by mouth Daily. 5/12/23   Agustina Gutierrez MD   amLODIPine (NORVASC) 10 MG tablet Take 1 tablet by mouth Daily. 5/12/23   Agustina Gutierrez MD   aspirin 81 MG EC tablet Take 1 tablet by mouth Daily.    ProviderLorenza MD   atorvastatin (LIPITOR) 10 MG tablet Take 1 tablet by mouth Daily. 9/9/22   Agustina Gutierrez MD    brompheniramine-pseudoephedrine-DM 30-2-10 MG/5ML syrup Take 10 mL by mouth 4 (Four) Times a Day As Needed for Cough. 9/9/22   Agustina Gutierrez MD   buPROPion XL (WELLBUTRIN XL) 150 MG 24 hr tablet Take 1 tablet by mouth Every Morning. 8/4/21   Lorenza Harding MD   carboxymethylcellulose (REFRESH PLUS) 0.5 % solution Administer 1 drop to both eyes 4 (Four) Times a Day As Needed.    Lorenza Harding MD   cetirizine (zyrTEC) 10 MG tablet Take 1 tablet by mouth Daily. 6/8/23   Agustina Gutierrez MD   clopidogrel (PLAVIX) 75 MG tablet Take 1 tablet by mouth Daily.    Lorenza Harding MD   donepezil (ARICEPT) 5 MG tablet Take 1 tablet by mouth Every Night. 8/4/22 8/4/23  Arnav Borja MD   EPINEPHrine 0.15 MG/0.15ML solution auto-injector injection Inject  under the skin into the appropriate area as directed.    Lorenza Harding MD   fluticasone (FLONASE) 50 MCG/ACT nasal spray 2 sprays into the nostril(s) as directed by provider Daily.    Lorenza Harding MD   fluticasone-salmeterol (ADVAIR HFA) 230-21 MCG/ACT inhaler Inhale 2 puffs Every 6 (Six) Hours.    Lorenza Harding MD   furosemide (LASIX) 20 MG tablet Take 1 tablet by mouth Daily. 6/15/22   Agustina Gutierrez MD   lisinopril (PRINIVIL,ZESTRIL) 10 MG tablet Take 1 tablet by mouth Daily. 5/12/23   Agustina Gutierrez MD   melatonin 1 MG tablet Take 6 tablets by mouth At Night As Needed.    Lorenza Harding MD   memantine (NAMENDA) 10 MG tablet Take 1 tablet twice a day 8/4/22   Arnav Borja MD   methocarbamol (ROBAXIN) 750 MG tablet Take 1 tablet by mouth 3 (Three) Times a Day As Needed for Muscle Spasms. 6/27/22   Teresa León APRN   montelukast (SINGULAIR) 10 MG tablet Take 1 tablet by mouth Every Night. 10/26/22 8/5/25  Agustina Gutierrez MD   multivitamin with minerals tablet tablet Take 1 tablet by mouth Daily.    Provider, Historical, MD   nystatin  (MYCOSTATIN) 662023 UNIT/GM cream Apply  topically to the appropriate area as directed As Needed (scrotal pain). 8/31/21   Christopher Lozano MD   pantoprazole (PROTONIX) 40 MG EC tablet Take 1 tablet by mouth Daily. 9/16/22   Agustina Gutierrez MD   polyethylene glycol (GlycoLax) 17 GM/SCOOP powder Take 17 g by mouth Daily. 12/27/21   Agustina Gutierrez MD   potassium chloride (MICRO-K) 10 MEQ CR capsule Take 2 capsules by mouth 2 (Two) Times a Day.    Provider, MD Lorenza   propranolol (INDERAL) 10 MG tablet Take 1 tablet by mouth Daily. 6/15/22   Agustina Gutierrez MD   Tiotropium Bromide Monohydrate (SPIRIVA RESPIMAT) 1.25 MCG/ACT aerosol solution inhaler Inhale 2 puffs Every 12 (Twelve) Hours.    Provider, MD Lorenza   venlafaxine XR (EFFEXOR-XR) 75 MG 24 hr capsule Take 1 capsule by mouth Daily. 5/20/21   Emergency, Nurse Epic, RN   vitamin B-12 (CYANOCOBALAMIN) 1000 MCG tablet Take 1 tablet by mouth Daily. 10/26/22   Agustina Gutierrez MD   vitamin D (ERGOCALCIFEROL) 1.25 MG (49229 UT) capsule capsule Take 1 capsule by mouth 2 (Two) Times a Week. 5/15/23   Agustina Gutierrez MD        Social History:   Social History     Tobacco Use    Smoking status: Never    Smokeless tobacco: Never   Vaping Use    Vaping Use: Never used   Substance Use Topics    Alcohol use: Yes     Comment: occasional     Drug use: Never         Review of Systems:  Review of Systems   Constitutional:  Negative for chills and fever.   HENT:  Negative for congestion, rhinorrhea and sore throat.    Eyes:  Positive for pain (Right) and itching (Right). Negative for visual disturbance.   Respiratory:  Negative for apnea, cough, chest tightness and shortness of breath.    Cardiovascular:  Negative for chest pain and palpitations.   Gastrointestinal:  Negative for abdominal pain, diarrhea, nausea and vomiting.   Genitourinary:  Negative for difficulty urinating and dysuria.   Musculoskeletal:   "Negative for joint swelling and myalgias.   Skin:  Negative for color change.   Neurological:  Negative for seizures and headaches.   Psychiatric/Behavioral: Negative.     All other systems reviewed and are negative.     Physical Exam:  /90   Pulse 60   Temp 98.1 øF (36.7 øC) (Oral)   Resp 16   Ht 172.7 cm (68\")   Wt 105 kg (230 lb 13.2 oz)   SpO2 95%   BMI 35.10 kg/mý     Physical Exam  Vitals and nursing note reviewed.   Constitutional:       General: He is not in acute distress.     Appearance: Normal appearance. He is not toxic-appearing.   HENT:      Head: Normocephalic and atraumatic.      Jaw: There is normal jaw occlusion.   Eyes:      General: Lids are normal.      Extraocular Movements: Extraocular movements intact.      Pupils: Pupils are equal, round, and reactive to light.      Comments: Subconjunctival hemorrhage present to the right eye.  Tonometer used pressure was 20.  No foreign body present upon fluorescein stain.   Cardiovascular:      Rate and Rhythm: Normal rate and regular rhythm.      Pulses: Normal pulses.      Heart sounds: Normal heart sounds.   Pulmonary:      Effort: Pulmonary effort is normal. No respiratory distress.      Breath sounds: Normal breath sounds. No wheezing or rhonchi.   Abdominal:      General: Abdomen is flat.      Palpations: Abdomen is soft.      Tenderness: There is no abdominal tenderness. There is no guarding or rebound.   Musculoskeletal:         General: Normal range of motion.      Cervical back: Normal range of motion and neck supple.      Right lower leg: No edema.      Left lower leg: No edema.   Skin:     General: Skin is warm and dry.   Neurological:      Mental Status: He is alert and oriented to person, place, and time. Mental status is at baseline.   Psychiatric:         Mood and Affect: Mood normal.                Procedures:  Procedures      Medical Decision Making:      Comorbidities that affect care:    Asthma, COPD    External Notes " reviewed:    Previous Clinic Note: 6/8/2023 for hyperlipidemia      The following orders were placed and all results were independently analyzed by me:  No orders of the defined types were placed in this encounter.      Medications Given in the Emergency Department:  Medications - No data to display     ED Course:         Labs:    Lab Results (last 24 hours)       ** No results found for the last 24 hours. **             Imaging:    No Radiology Exams Resulted Within Past 24 Hours      Differential Diagnosis and Discussion:    Eye Pain/Blurred Vision: Differential diagnosis includes but is not limited to dacryocystitis, hordeolum, chalazion, periorbital cellulitis, cavernous sinus thrombosis, blepharitis, and glaucoma.        MDM     Patient is not experiencing visual changes at this point nor headache.  I instructed the patient if he had vision changes or developed a sharp headache to please return to the ER.  I informed the patient of no foreign body and normal pressure in the eye.  I instructed the patient to follow-up with his primary care provider in a couple days as needed.  Patient states he wants to go home and agrees with plan of care at this time.      Patient Care Considerations:    CT HEAD: I considered ordering a noncontrast CT of the head, however on experiencing a headache or visual changes.      Consultants/Shared Management Plan:    None    Social Determinants of Health:    Patient is independent, reliable, and has access to care.       Disposition and Care Coordination:    Discharged: The patient is suitable and stable for discharge with no need for consideration of observation or admission.    I have explained the patient's condition, diagnoses and treatment plan based on the information available to me at this time. I have answered questions and addressed any concerns. The patient has a good  understanding of the patient's diagnosis, condition, and treatment plan as can be expected at this point.  The vital signs have been stable. The patient's condition is stable and appropriate for discharge from the emergency department.      The patient will pursue further outpatient evaluation with the primary care physician or other designated or consulting physician as outlined in the discharge instructions. They are agreeable to this plan of care and follow-up instructions have been explained in detail. The patient has received these instructions in written format and have expressed an understanding of the discharge instructions. The patient is aware that any significant change in condition or worsening of symptoms should prompt an immediate return to this or the closest emergency department or call to 911.  I have explained discharge medications and the need for follow up with the patient/caretakers. This was also printed in the discharge instructions. Patient was discharged with the following medications and follow up:      Medication List      No changes were made to your prescriptions during this visit.      Agustina Gutierrez MD  [address] [place] [id number] [phone]    Call in 2 days  As needed       Final diagnoses:   Subconjunctival hemorrhage of right eye        ED Disposition       ED Disposition   Discharge    Condition   Stable    Comment   --               This medical record created using voice recognition software.             Jossue Nolasco PA-C  08/04/23 1294

## 2023-08-05 NOTE — ED TRIAGE NOTES
"Patient arrived to ED with complaints of right eye itching recently and now bloodshot.      Patient denies any pain in right eye or blurred vision.     \"It just feels tight, like pressure.\"     Patient said he didn't know it was red. States his son noticed and asked what was wrong.  Patient states he could have been rubbing it due to itching.   "

## 2023-08-08 ENCOUNTER — TELEPHONE (OUTPATIENT)
Dept: INTERNAL MEDICINE | Facility: CLINIC | Age: 88
End: 2023-08-08
Payer: MEDICARE

## 2023-08-08 NOTE — TELEPHONE ENCOUNTER
Provider: DAVE JARRETT  Caller: DARVIN MCCANN  Relationship to Patient: PATIENT  Pharmacy:   Phone Number: [phone]   Reason for Call: PATIENT WENT TO [facility] ON 8/5/23 FOR INFLAMMED RIGHT EYE. DON'T WANT TO MAKE AN APPOINTMENT UNLESS DR. JARRETT WANTS HIM TO.    PLEASE CALL AND ADVISE

## 2023-08-09 ENCOUNTER — TELEPHONE (OUTPATIENT)
Dept: INTERNAL MEDICINE | Facility: CLINIC | Age: 88
End: 2023-08-09
Payer: MEDICARE

## 2023-08-09 NOTE — TELEPHONE ENCOUNTER
I called the patients son back and let him know that he could get some over the counter dry eye drops for the patient to see if they help. I let him know if they didn't help to let us know and we can see about making him an appointment to get seen in office to evaluate. He stated he understood and had no further questions or concerns at this time.

## 2023-08-09 NOTE — TELEPHONE ENCOUNTER
Caller: YODIT PATHAK    Relationship: Emergency Contact    Best call back number: [phone]     What medication are you requesting: EYE DROPS    What are your current symptoms: DRY EYES    How long have you been experiencing symptoms: TODAY    Have you had these symptoms before:    [x] Yes  [] No    Have you been treated for these symptoms before:   [x] Yes  [] No    If a prescription is needed, what is your preferred pharmacy and phone number: [facility] - [place], [place] - [address] [facility] [phone] [facility] [phone] FX     Additional notes: PATIENT'S SON CALLED STATING THAT THE PATIENT'S EYES ARE FEELING BETTER BUT NOW THEY ARE DRY. HE STATES THAT THEY WOULD EITHER LIKE TO KNOW WHAT THEY CAN GET OVER THE COUNTER OR FOR DR JARRETT TO CALL SOMETHING IN IF SHE THINKS THAT IS BETTER.

## 2023-08-10 NOTE — TELEPHONE ENCOUNTER
As long as he is doing better and does not have any further concerns, he does not need to schedule appointment

## 2023-08-14 NOTE — TELEPHONE ENCOUNTER
I called and let the patient know. They understood and had no further questions or concerns at this time.

## 2023-08-16 NOTE — TELEPHONE ENCOUNTER
Caller: Reddy Castellano    Relationship: Self    Best call back number: [phone]     Requested Prescriptions:   Requested Prescriptions     Pending Prescriptions Disp Refills    furosemide (LASIX) 20 MG tablet 90 tablet 1     Sig: Take 1 tablet by mouth Daily.    cetirizine (zyrTEC) 10 MG tablet 90 tablet 1     Sig: Take 1 tablet by mouth Daily.    propranolol (INDERAL) 10 MG tablet 90 tablet 1     Sig: Take 1 tablet by mouth Daily.        Pharmacy where request should be sent:  [facility]    Last office visit with prescribing clinician: 6/8/2023   Last telemedicine visit with prescribing clinician: Visit date not found   Next office visit with prescribing clinician: Visit date not found     Does the patient have less than a 3 day supply:  [x] Yes  [] No    Geena Turner   08/16/23 12:22 EDT

## 2023-08-17 RX ORDER — CETIRIZINE HYDROCHLORIDE 10 MG/1
10 TABLET ORAL DAILY
Qty: 90 TABLET | Refills: 1 | Status: SHIPPED | OUTPATIENT
Start: 2023-08-17

## 2023-08-17 RX ORDER — FUROSEMIDE 20 MG/1
20 TABLET ORAL DAILY
Qty: 90 TABLET | Refills: 1 | Status: SHIPPED | OUTPATIENT
Start: 2023-08-17

## 2023-08-17 RX ORDER — PROPRANOLOL HYDROCHLORIDE 10 MG/1
10 TABLET ORAL DAILY
Qty: 90 TABLET | Refills: 1 | Status: SHIPPED | OUTPATIENT
Start: 2023-08-17

## 2023-08-28 ENCOUNTER — OFFICE VISIT (OUTPATIENT)
Dept: NEUROLOGY | Facility: CLINIC | Age: 88
End: 2023-08-28
Payer: MEDICARE

## 2023-08-28 VITALS
DIASTOLIC BLOOD PRESSURE: 61 MMHG | WEIGHT: 231 LBS | HEIGHT: 68 IN | BODY MASS INDEX: 35.01 KG/M2 | SYSTOLIC BLOOD PRESSURE: 127 MMHG | HEART RATE: 78 BPM

## 2023-08-28 DIAGNOSIS — G31.84 MILD COGNITIVE IMPAIRMENT: Primary | ICD-10-CM

## 2023-08-28 PROBLEM — G30.9 ALZHEIMER'S DISEASE, UNSPECIFIED (CODE): Status: RESOLVED | Noted: 2021-06-21 | Resolved: 2023-08-28

## 2023-08-28 PROCEDURE — 99213 OFFICE O/P EST LOW 20 MIN: CPT | Performed by: PSYCHIATRY & NEUROLOGY

## 2023-08-28 RX ORDER — MEMANTINE HYDROCHLORIDE 10 MG/1
TABLET ORAL
Qty: 180 TABLET | Refills: 3 | Status: SHIPPED | OUTPATIENT
Start: 2023-08-28

## 2023-08-28 NOTE — ASSESSMENT & PLAN NOTE
He was diagnosed by a neurologist in [place] to have Alzheimer's dementia however I do not think he has Alzheimer's disease.  He has mild cognitive impairment.  I will take him off the diagnosis of Alzheimer's disease and put him a small cognitive impairment.  He is to continue taking memantine 10 mg twice a day.  I will discontinue Donepezil.  I will see him again in 1 years time for follow-up.

## 2023-08-28 NOTE — PROGRESS NOTES
"Chief Complaint  Follow-up and Med Refill (Donepezil/memantine.)    Subjective          Reddy Castellano is a 88 y.o. male who presents to [facility]  History of Present Illness  88-year-old man here for follow-up of his memory problems.  He is living with his son at this time.  He states that his son  from his wife of over 30 years and when he is daughter graduated from high school he moved back with him.  He states that his son was in terrible shape and that he had to buy him new cancer because over thousand dollars.  He states that he drove here today.  Is independent with activities of daily living.  He continues to take memantine 10 mg twice a day.    Objective   Vital Signs:   /61   Pulse 78   Ht 172.7 cm (67.99\")   Wt 105 kg (231 lb)   BMI 35.13 kg/mý     Physical Exam   Is alert, fluent, phasic, follows commands well.  Mini-Mental Status score is 29 out of 30.  He missed the season.  He said he was fall instead of summer although he told me it was still very hot.  Proctoring is perfect.  Spacing is normal and clock drawing is normal.  He can tell me recent events without difficulty.  He did have a difficult time telling me So's name however when I told him José Miguel he was able to tell me right away it was José Miguel Rizzo.  He states that he hates Trump.  Heart is regular rhythm normal in rate.        Assessment and Plan  There are no diagnoses linked to this encounter.     Total time spent with the patient and coordinating patient care was 25 minutes.    Follow Up  No follow-ups on file.  Patient was given instructions and counseling regarding his condition or for health maintenance advice. Please see specific information pulled into the AVS if appropriate.       "

## 2023-10-19 ENCOUNTER — TRANSCRIBE ORDERS (OUTPATIENT)
Dept: ADMINISTRATIVE | Facility: HOSPITAL | Age: 88
End: 2023-10-19
Payer: MEDICARE

## 2023-10-19 ENCOUNTER — LAB (OUTPATIENT)
Dept: LAB | Facility: HOSPITAL | Age: 88
End: 2023-10-19
Payer: MEDICARE

## 2023-10-19 DIAGNOSIS — Z79.899 ENCOUNTER FOR LONG-TERM (CURRENT) USE OF OTHER MEDICATIONS: ICD-10-CM

## 2023-10-19 DIAGNOSIS — M10.9 GOUT, UNSPECIFIED CAUSE, UNSPECIFIED CHRONICITY, UNSPECIFIED SITE: Primary | ICD-10-CM

## 2023-10-19 DIAGNOSIS — M10.9 GOUT, UNSPECIFIED CAUSE, UNSPECIFIED CHRONICITY, UNSPECIFIED SITE: ICD-10-CM

## 2023-10-19 LAB
BUN SERPL-MCNC: 20 MG/DL (ref 8–23)
CREAT SERPL-MCNC: 1.51 MG/DL (ref 0.76–1.27)
EGFRCR SERPLBLD CKD-EPI 2021: 44.1 ML/MIN/1.73
URATE SERPL-MCNC: 6.8 MG/DL (ref 3.4–7)

## 2023-10-19 PROCEDURE — 36415 COLL VENOUS BLD VENIPUNCTURE: CPT

## 2023-10-19 PROCEDURE — 84520 ASSAY OF UREA NITROGEN: CPT

## 2023-10-19 PROCEDURE — 84550 ASSAY OF BLOOD/URIC ACID: CPT

## 2023-10-19 PROCEDURE — 82565 ASSAY OF CREATININE: CPT

## 2023-10-20 ENCOUNTER — OFFICE VISIT (OUTPATIENT)
Dept: INTERNAL MEDICINE | Facility: CLINIC | Age: 88
End: 2023-10-20
Payer: MEDICARE

## 2023-10-20 VITALS
TEMPERATURE: 97.6 F | SYSTOLIC BLOOD PRESSURE: 138 MMHG | RESPIRATION RATE: 18 BRPM | BODY MASS INDEX: 36.07 KG/M2 | WEIGHT: 238 LBS | HEART RATE: 75 BPM | OXYGEN SATURATION: 96 % | HEIGHT: 68 IN | DIASTOLIC BLOOD PRESSURE: 82 MMHG

## 2023-10-20 DIAGNOSIS — L29.9 SCALP ITCH: ICD-10-CM
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
## 2023-10-20 DIAGNOSIS — S81.801A WOUND OF RIGHT LOWER EXTREMITY, INITIAL ENCOUNTER: Primary | ICD-10-CM

## 2023-10-20 PROCEDURE — 87205 SMEAR GRAM STAIN: CPT | Performed by: NURSE PRACTITIONER

## 2023-10-20 PROCEDURE — 87070 CULTURE OTHR SPECIMN AEROBIC: CPT | Performed by: NURSE PRACTITIONER

## 2023-10-20 RX ORDER — GINSENG 100 MG
1 CAPSULE ORAL 2 TIMES DAILY
Qty: 28 G | Refills: 0 | Status: SHIPPED | OUTPATIENT
Start: 2023-10-20

## 2023-10-20 RX ORDER — CEPHALEXIN 500 MG/1
500 CAPSULE ORAL 4 TIMES DAILY
Qty: 28 CAPSULE | Refills: 0 | Status: SHIPPED | OUTPATIENT
Start: 2023-10-20 | End: 2023-10-27

## 2023-10-20 NOTE — ASSESSMENT & PLAN NOTE
Exam without concern. He will continue OTC treatment. Discussed the importance of getting his dog treated for fleas to prevent recurrence.

## 2023-10-20 NOTE — PROGRESS NOTES
"Chief Complaint  wound (Oozing wounds on legs below knees from dog scratches per [facility] )    Subjective         Reddy Castellano presents to [facility]  HPI     Patient reports he was evaluated by his rheumatologist yesterday and scheduled for an appointment today due to wound on his leg. Patient states one week ago his dog jumped up on his leg and scratched him on both shins. He has been treating with topical antibiotic ointment at home. Patient states that the wounds continue to improve but his rheumatologist felt he should be seen. Denies fever, warmth, streaking. He thinks he may be up to date on his Tdap.     Patient thinks he has lice or fleas from his dog, recently started an over the counter kit and the itching has improved.    Objective     Vitals:    10/20/23 0802   BP: 138/82   BP Location: Left arm   Patient Position: Sitting   Cuff Size: Adult   Pulse: 75   Resp: 18   Temp: 97.6 °F (36.4 °C)   TempSrc: Temporal   SpO2: 96%   Weight: 108 kg (238 lb)   Height: 172.7 cm (67.99\")      Body mass index is 36.2 kg/m².    Wt Readings from Last 3 Encounters:   10/20/23 108 kg (238 lb)   08/28/23 105 kg (231 lb)   08/04/23 105 kg (230 lb 13.2 oz)     BP Readings from Last 3 Encounters:   10/20/23 138/82   08/28/23 127/61   08/04/23 163/81                  Physical Exam  Constitutional:       Appearance: Normal appearance.   HENT:      Head: Normocephalic and atraumatic.      Nose: Nose normal.      Mouth/Throat:      Mouth: Mucous membranes are moist.      Pharynx: Oropharynx is clear.   Eyes:      Extraocular Movements: Extraocular movements intact.      Conjunctiva/sclera: Conjunctivae normal.      Pupils: Pupils are equal, round, and reactive to light.   Cardiovascular:      Rate and Rhythm: Normal rate and regular rhythm.      Heart sounds: Normal heart sounds.   Pulmonary:      Effort: Pulmonary effort is normal.      Breath sounds: Normal breath " sounds.   Skin:     General: Skin is warm and dry.          Neurological:      General: No focal deficit present.      Mental Status: He is alert and oriented to person, place, and time.   Psychiatric:         Mood and Affect: Mood normal.         Behavior: Behavior normal.         Thought Content: Thought content normal.          Result Review :   The following data was reviewed by: MALCOLM Huston on 10/20/2023:      Procedures    Assessment and Plan   Diagnoses and all orders for this visit:    1. Wound of right lower extremity, initial encounter (Primary)  Assessment & Plan:  Keflex and topical antibiotic to pharmacy. Wound culture collected, will adjust antibiotics if needed. Keep site clean and dry. Provided patient with nonadherent gauze and Coban in clinic today. Updated Tdap. He will monitor closely for worsening pain, erythema, discharge, warmth, streaking, fever and seek medical attention immediately with concerns.    Orders:  -     Tdap Vaccine Greater Than or Equal To 6yo IM  -     Wound Culture - Wound, Leg, Right    2. Dog scratch  -     Tdap Vaccine Greater Than or Equal To 6yo IM  -     Wound Culture - Wound, Leg, Right    3. Scalp itch  Assessment & Plan:  Exam without concern. He will continue OTC treatment. Discussed the importance of getting his dog treated for fleas to prevent recurrence.       Other orders  -     cephalexin (Keflex) 500 MG capsule; Take 1 capsule by mouth 4 (Four) Times a Day for 7 days.  Dispense: 28 capsule; Refill: 0  -     bacitracin 500 UNIT/GM ointment; Apply 1 application  topically to the appropriate area as directed 2 (Two) Times a Day.  Dispense: 28 g; Refill: 0          Follow Up   Return if symptoms worsen or fail to improve.  Patient was given instructions and counseling regarding his condition or for health maintenance advice. Please see specific information pulled into the AVS if appropriate.

## 2023-10-20 NOTE — ASSESSMENT & PLAN NOTE
Keflex and topical antibiotic to pharmacy. Wound culture collected, will adjust antibiotics if needed. Keep site clean and dry. Provided patient with nonadherent gauze and Coban in clinic today. Updated Tdap. He will monitor closely for worsening pain, erythema, discharge, warmth, streaking, fever and seek medical attention immediately with concerns.

## 2023-10-22 LAB
BACTERIA SPEC AEROBE CULT: NORMAL
GRAM STN SPEC: NORMAL
GRAM STN SPEC: NORMAL

## 2023-11-20 DIAGNOSIS — K21.9 GASTROESOPHAGEAL REFLUX DISEASE, UNSPECIFIED WHETHER ESOPHAGITIS PRESENT: ICD-10-CM

## 2023-11-20 RX ORDER — PROPRANOLOL HYDROCHLORIDE 10 MG/1
10 TABLET ORAL DAILY
Qty: 90 TABLET | Refills: 1 | Status: SHIPPED | OUTPATIENT
Start: 2023-11-20

## 2023-11-20 RX ORDER — FUROSEMIDE 20 MG/1
20 TABLET ORAL DAILY
Qty: 90 TABLET | Refills: 1 | Status: SHIPPED | OUTPATIENT
Start: 2023-11-20

## 2023-11-20 NOTE — TELEPHONE ENCOUNTER
Caller: Reddy Castellano    Relationship: Self    Best call back number: [phone]     Requested Prescriptions:   Requested Prescriptions     Pending Prescriptions Disp Refills    propranolol (INDERAL) 10 MG tablet 90 tablet 1     Sig: Take 1 tablet by mouth Daily.    furosemide (LASIX) 20 MG tablet 90 tablet 1     Sig: Take 1 tablet by mouth Daily.        Pharmacy where request should be sent: [place] [phone] [phone]      Last office visit with prescribing clinician: 6/8/2023   Last telemedicine visit with prescribing clinician: Visit date not found   Next office visit with prescribing clinician: 11/22/2023     Additional details provided by patient: LESS THAN THREE DAY SUPPLY.     Does the patient have less than a 3 day supply:  [x] Yes  [] No    Would you like a call back once the refill request has been completed: [x] Yes [] No    If the office needs to give you a call back, can they leave a voicemail: [x] Yes [] No    Geena Longo Rep   11/20/23 12:39 EST

## 2023-11-22 ENCOUNTER — OFFICE VISIT (OUTPATIENT)
Dept: INTERNAL MEDICINE | Facility: CLINIC | Age: 88
End: 2023-11-22
Payer: MEDICARE

## 2023-11-22 VITALS
RESPIRATION RATE: 20 BRPM | DIASTOLIC BLOOD PRESSURE: 64 MMHG | BODY MASS INDEX: 37.44 KG/M2 | OXYGEN SATURATION: 96 % | WEIGHT: 247 LBS | SYSTOLIC BLOOD PRESSURE: 112 MMHG | HEIGHT: 68 IN | TEMPERATURE: 97.3 F | HEART RATE: 57 BPM

## 2023-11-22 DIAGNOSIS — L03.114 CELLULITIS OF LEFT FOREARM: Primary | ICD-10-CM

## 2023-11-22 PROCEDURE — 1160F RVW MEDS BY RX/DR IN RCRD: CPT | Performed by: INTERNAL MEDICINE

## 2023-11-22 PROCEDURE — 99213 OFFICE O/P EST LOW 20 MIN: CPT | Performed by: INTERNAL MEDICINE

## 2023-11-22 PROCEDURE — 1159F MED LIST DOCD IN RCRD: CPT | Performed by: INTERNAL MEDICINE

## 2023-11-22 RX ORDER — SULFAMETHOXAZOLE AND TRIMETHOPRIM 800; 160 MG/1; MG/1
1 TABLET ORAL 2 TIMES DAILY
Qty: 14 TABLET | Refills: 0 | Status: SHIPPED | OUTPATIENT
Start: 2023-11-22 | End: 2023-11-29

## 2023-11-22 RX ORDER — SULFAMETHOXAZOLE AND TRIMETHOPRIM 800; 160 MG/1; MG/1
1 TABLET ORAL 2 TIMES DAILY
Qty: 14 TABLET | Refills: 0 | Status: SHIPPED | OUTPATIENT
Start: 2023-11-22 | End: 2023-11-22 | Stop reason: SDUPTHER

## 2023-11-22 NOTE — ASSESSMENT & PLAN NOTE
Will treat with oral Bactrim. Continue abx ointment and bandage.   May use Sarna lotion for itching.

## 2023-11-22 NOTE — PROGRESS NOTES
"Chief Complaint  Hand Injury (Left hand wrist area that has puss coming out of it for almost a week. Also arms and head have been itching and heavy feeling in his arms )    Subjective       Reddy Castellano presents to [facility]    Hand Injury        Draining wound on left forearm x 1 week. No known injury. Has been applying abx ointment and keeping covered with a bandage.     Also having itching of arms bilaterally. No visible rash.       Objective     Vitals:    11/22/23 1147   BP: 112/64   BP Location: Left arm   Patient Position: Sitting   Cuff Size: Adult   Pulse: 57   Resp: 20   Temp: 97.3 °F (36.3 °C)   TempSrc: Temporal   SpO2: 96%   Weight: 112 kg (247 lb)   Height: 172.7 cm (67.99\")      Wt Readings from Last 3 Encounters:   11/22/23 112 kg (247 lb)   10/20/23 108 kg (238 lb)   08/28/23 105 kg (231 lb)      BP Readings from Last 3 Encounters:   11/22/23 112/64   10/20/23 138/82   08/28/23 127/61        Body mass index is 37.57 kg/m².             Physical Exam  Vitals reviewed.   Constitutional:       Appearance: Normal appearance. He is well-developed.   HENT:      Head: Normocephalic and atraumatic.      Mouth/Throat:      Pharynx: No oropharyngeal exudate.   Eyes:      Conjunctiva/sclera: Conjunctivae normal.      Pupils: Pupils are equal, round, and reactive to light.   Neck:      Thyroid: No thyromegaly or thyroid tenderness.   Cardiovascular:      Rate and Rhythm: Normal rate and regular rhythm.      Heart sounds: No murmur heard.     No friction rub. No gallop.   Pulmonary:      Effort: Pulmonary effort is normal.      Breath sounds: Normal breath sounds. No wheezing or rhonchi.   Lymphadenopathy:      Cervical: No cervical adenopathy.   Skin:     General: Skin is warm and dry.      Comments: Open wound on left forearm with surrounding cellulitis.    Neurological:      Mental Status: He is alert and oriented to person, place, and time.   Psychiatric:    "      Mood and Affect: Affect normal.          Result Review :   The following data was reviewed by: Agustina Gutierrez MD on 11/22/2023:  CMP          4/17/2023    13:59 6/8/2023    14:59 10/19/2023    12:12   CMP   Glucose  97     BUN 20  26  20    Creatinine 1.35  1.55  1.51    EGFR 50.8  42.8  44.1    Sodium  145     Potassium  5.2     Chloride  112     Calcium  8.9     Total Protein  6.4     Albumin  3.9     Globulin  2.5     Total Bilirubin  0.4     Alkaline Phosphatase  71     AST (SGOT)  17     ALT (SGPT)  11     Albumin/Globulin Ratio  1.6     BUN/Creatinine Ratio  16.8     Anion Gap  8.0       CBC          2/23/2023    15:08 6/8/2023    14:59   CBC   WBC 6.64  6.74    RBC 4.77  4.98    Hemoglobin 15.0  15.4    Hematocrit 44.4  45.9    MCV 93.1  92.2    MCH 31.4  30.9    MCHC 33.8  33.6    RDW 12.9  13.1    Platelets 178  151      TSH          2/23/2023    15:08 6/8/2023    14:59   TSH   TSH 1.280  1.590          Procedures    Assessment and Plan   Diagnoses and all orders for this visit:    1. Cellulitis of left forearm (Primary)  Assessment & Plan:  Will treat with oral Bactrim. Continue abx ointment and bandage.   May use Sarna lotion for itching.       Other orders  -     Discontinue: sulfamethoxazole-trimethoprim (Bactrim DS) 800-160 MG per tablet; Take 1 tablet by mouth 2 (Two) Times a Day for 7 days.  Dispense: 14 tablet; Refill: 0  -     sulfamethoxazole-trimethoprim (Bactrim DS) 800-160 MG per tablet; Take 1 tablet by mouth 2 (Two) Times a Day for 7 days.  Dispense: 14 tablet; Refill: 0          Follow Up   Return if symptoms worsen or fail to improve.  Patient was given instructions and counseling regarding his condition or for health maintenance advice. Please see specific information pulled into the AVS if appropriate.

## 2023-12-21 ENCOUNTER — APPOINTMENT (OUTPATIENT)
Dept: GENERAL RADIOLOGY | Facility: HOSPITAL | Age: 88
End: 2023-12-21
Payer: MEDICARE

## 2023-12-21 ENCOUNTER — OFFICE VISIT (OUTPATIENT)
Dept: INTERNAL MEDICINE | Facility: CLINIC | Age: 88
End: 2023-12-21
Payer: MEDICARE

## 2023-12-21 ENCOUNTER — HOSPITAL ENCOUNTER (EMERGENCY)
Facility: HOSPITAL | Age: 88
Discharge: HOME | End: 2023-12-21
Attending: EMERGENCY MEDICINE
Payer: MEDICARE

## 2023-12-21 VITALS
WEIGHT: 239.2 LBS | SYSTOLIC BLOOD PRESSURE: 118 MMHG | OXYGEN SATURATION: 95 % | RESPIRATION RATE: 20 BRPM | DIASTOLIC BLOOD PRESSURE: 63 MMHG | HEART RATE: 52 BPM | BODY MASS INDEX: 36.38 KG/M2 | TEMPERATURE: 98.1 F

## 2023-12-21 VITALS
HEART RATE: 78 BPM | DIASTOLIC BLOOD PRESSURE: 76 MMHG | BODY MASS INDEX: 36.49 KG/M2 | HEIGHT: 68 IN | OXYGEN SATURATION: 88 % | WEIGHT: 240.8 LBS | TEMPERATURE: 98.1 F | SYSTOLIC BLOOD PRESSURE: 122 MMHG

## 2023-12-21 DIAGNOSIS — R29.898 LEG WEAKNESS, BILATERAL: Primary | ICD-10-CM

## 2023-12-21 DIAGNOSIS — Z00.00 ENCOUNTER FOR SUBSEQUENT ANNUAL WELLNESS VISIT (AWV) IN MEDICARE PATIENT: Primary | ICD-10-CM

## 2023-12-21 DIAGNOSIS — R06.02 SHORTNESS OF BREATH: ICD-10-CM

## 2023-12-21 LAB
ALBUMIN SERPL-MCNC: 3.6 G/DL (ref 3.5–5.2)
ALBUMIN/GLOB SERPL: 1.2 G/DL
ALP SERPL-CCNC: 73 U/L (ref 39–117)
ALT SERPL W P-5'-P-CCNC: 7 U/L (ref 1–41)
ANION GAP SERPL CALCULATED.3IONS-SCNC: 11.7 MMOL/L (ref 5–15)
AST SERPL-CCNC: 13 U/L (ref 1–40)
BASOPHILS # BLD AUTO: 0.06 10*3/MM3 (ref 0–0.2)
BASOPHILS NFR BLD AUTO: 0.9 % (ref 0–1.5)
BILIRUB SERPL-MCNC: 0.4 MG/DL (ref 0–1.2)
BUN SERPL-MCNC: 22 MG/DL (ref 8–23)
BUN/CREAT SERPL: 15.9 (ref 7–25)
CALCIUM SPEC-SCNC: 9 MG/DL (ref 8.6–10.5)
CHLORIDE SERPL-SCNC: 109 MMOL/L (ref 98–107)
CO2 SERPL-SCNC: 21.3 MMOL/L (ref 22–29)
CREAT SERPL-MCNC: 1.38 MG/DL (ref 0.76–1.27)
DEPRECATED RDW RBC AUTO: 45.2 FL (ref 37–54)
EGFRCR SERPLBLD CKD-EPI 2021: 49.2 ML/MIN/1.73
EOSINOPHIL # BLD AUTO: 0.22 10*3/MM3 (ref 0–0.4)
EOSINOPHIL NFR BLD AUTO: 3.4 % (ref 0.3–6.2)
ERYTHROCYTE [DISTWIDTH] IN BLOOD BY AUTOMATED COUNT: 13.2 % (ref 12.3–15.4)
GLOBULIN UR ELPH-MCNC: 3 GM/DL
GLUCOSE SERPL-MCNC: 145 MG/DL (ref 65–99)
HCT VFR BLD AUTO: 48.9 % (ref 37.5–51)
HGB BLD-MCNC: 16.3 G/DL (ref 13–17.7)
HOLD SPECIMEN: NORMAL
HOLD SPECIMEN: NORMAL
IMM GRANULOCYTES # BLD AUTO: 0.03 10*3/MM3 (ref 0–0.05)
IMM GRANULOCYTES NFR BLD AUTO: 0.5 % (ref 0–0.5)
LYMPHOCYTES # BLD AUTO: 2.1 10*3/MM3 (ref 0.7–3.1)
LYMPHOCYTES NFR BLD AUTO: 32.9 % (ref 19.6–45.3)
MAGNESIUM SERPL-MCNC: 1.7 MG/DL (ref 1.6–2.4)
MCH RBC QN AUTO: 31 PG (ref 26.6–33)
MCHC RBC AUTO-ENTMCNC: 33.3 G/DL (ref 31.5–35.7)
MCV RBC AUTO: 93.1 FL (ref 79–97)
MONOCYTES # BLD AUTO: 0.4 10*3/MM3 (ref 0.1–0.9)
MONOCYTES NFR BLD AUTO: 6.3 % (ref 5–12)
NEUTROPHILS NFR BLD AUTO: 3.57 10*3/MM3 (ref 1.7–7)
NEUTROPHILS NFR BLD AUTO: 56 % (ref 42.7–76)
NRBC BLD AUTO-RTO: 0 /100 WBC (ref 0–0.2)
NT-PROBNP SERPL-MCNC: 1046 PG/ML (ref 0–1800)
PLATELET # BLD AUTO: 142 10*3/MM3 (ref 140–450)
PMV BLD AUTO: 10.5 FL (ref 6–12)
POTASSIUM SERPL-SCNC: 4.4 MMOL/L (ref 3.5–5.2)
PROT SERPL-MCNC: 6.6 G/DL (ref 6–8.5)
QT INTERVAL: 426 MS
QTC INTERVAL: 456 MS
RBC # BLD AUTO: 5.25 10*6/MM3 (ref 4.14–5.8)
SODIUM SERPL-SCNC: 142 MMOL/L (ref 136–145)
TROPONIN T SERPL HS-MCNC: 35 NG/L
TSH SERPL DL<=0.05 MIU/L-ACNC: 1.68 UIU/ML (ref 0.27–4.2)
WBC NRBC COR # BLD AUTO: 6.38 10*3/MM3 (ref 3.4–10.8)
WHOLE BLOOD HOLD COAG: NORMAL
WHOLE BLOOD HOLD SPECIMEN: NORMAL

## 2023-12-21 PROCEDURE — 84443 ASSAY THYROID STIM HORMONE: CPT

## 2023-12-21 PROCEDURE — 83735 ASSAY OF MAGNESIUM: CPT

## 2023-12-21 PROCEDURE — 71045 X-RAY EXAM CHEST 1 VIEW: CPT

## 2023-12-21 PROCEDURE — 93005 ELECTROCARDIOGRAM TRACING: CPT | Performed by: EMERGENCY MEDICINE

## 2023-12-21 PROCEDURE — 93005 ELECTROCARDIOGRAM TRACING: CPT

## 2023-12-21 PROCEDURE — 83880 ASSAY OF NATRIURETIC PEPTIDE: CPT

## 2023-12-21 PROCEDURE — 80053 COMPREHEN METABOLIC PANEL: CPT

## 2023-12-21 PROCEDURE — 85025 COMPLETE CBC W/AUTO DIFF WBC: CPT

## 2023-12-21 PROCEDURE — G0439 PPPS, SUBSEQ VISIT: HCPCS | Performed by: INTERNAL MEDICINE

## 2023-12-21 PROCEDURE — 99284 EMERGENCY DEPT VISIT MOD MDM: CPT

## 2023-12-21 PROCEDURE — 84484 ASSAY OF TROPONIN QUANT: CPT

## 2023-12-21 RX ORDER — SODIUM CHLORIDE 0.9 % (FLUSH) 0.9 %
10 SYRINGE (ML) INJECTION AS NEEDED
Status: DISCONTINUED | OUTPATIENT
Start: 2023-12-21 | End: 2023-12-21 | Stop reason: HOSPADM

## 2023-12-21 NOTE — ED PROVIDER NOTES
Time: 4:24 PM EST  Date of encounter:  12/21/2023  Independent Historian/Clinical History and Information was obtained by:   Patient    History is limited by: N/A    Chief Complaint   Patient presents with    Shortness of Breath         History of Present Illness:  Patient is a 88 y.o. year old male who presents to the emergency department for evaluation of hypoxia.  Patient states that he was sent here by his PCP for low oxygen readings.  He states that he was at his PCPs office for routine follow-up and they told him to come here to the emergency department.  Patient is denying chest pain or shortness of breath.  Patient did state he felt more tired than usual with walking to the appointment.  In triage patient was noted to be bradycardic with normal oxygen saturation of 97%.  (Provider in triage, Herman Baker PA-C)    Patient is 88-year-old gentleman who presents with complaints of some shortness of breath as well as some leg weakness.  States he went to his primary care doctor today for routine follow-up states he just kind of felt weak in his legs as well as felt little short of breath.  States that last about 30 minutes and says it resolved.  Was sent from PCP with low oxygen saturation.  Patient does have a history of COPD, CHF, diabetes, MI.    Patient Care Team  Primary Care Provider: Agustina Gutierrez MD    Past Medical History:     No Known Allergies  Past Medical History:   Diagnosis Date    Allergies     Anxiety     Arthritis     Asthma     BPH (benign prostatic hyperplasia)     Carpal tunnel syndrome     Colon polyp     Congestive heart failure (CHF)     COPD (chronic obstructive pulmonary disease)     Dementia     Depression     Diabetes mellitus type 2, noninsulin dependent     Diverticulitis     Erectile dysfunction of organic origin     Essential hypertension 11/19/2019    Excess skin     Fatigue 11/09/2019    GERD (gastroesophageal reflux disease)     Gout     Heart attack     Heart  disease     High cholesterol     Hyperlipidemia     Hypertension     Hypogonadism in male     Insomnia     Leg swelling     Memory change 08/02/2018    currently, pt is taking namenda. i will pursue a mocha at his follow up     Myocardial infarction     Osteoarthritis     Parkinson's disease     Prostate disorder     Rectal bleeding     Renal cyst     Seasonal allergies     Sleep apnea     Stroke     TIA (transient ischemic attack) 08/02/2018    reportedly, the pt has a history of TIA. This can be discussed further at his follow up. i was asked that he cont. aspirin.    Tremor 08/02/2018    pt was a 3 mo history of a high frequency, low amplitude tremor of both hands that worsens some what with activity. tremor does not interfere with his activities of daily living. he has no evidence of bradykinesia on exam. pakinsons seems unlikely at this point. potential etiologies would include essential tremor and physiologic tremor. he does note that he started theophylline approx. 3 months ag    Vitamin D deficiency      Past Surgical History:   Procedure Laterality Date    APPENDECTOMY  2015    CARDIAC CATHETERIZATION      CARDIAC SURGERY  2014    CARPAL TUNNEL RELEASE      CATARACT EXTRACTION      CIRCUMCISION  2000    COLONOSCOPY  2014 2017    CYSTOSCOPY      ENDOSCOPY  2010 2017    ENDOSCOPY N/A 11/23/2021    Procedure: ESOPHAGOGASTRODUODENOSCOPY;  Surgeon: Anika Cummins MD;  Location: [facility] OR;  Service: Gastroenterology;  Laterality: N/A;  gastritis    EYE SURGERY      eye implant    HERNIA REPAIR  2000    LAPAROSCOPIC GASTRIC BANDING      ORCHIECTOMY Right 2010    OTHER SURGICAL HISTORY      metal implants;     PENILE PROSTHESIS IMPLANT      TOE SURGERY      TOENAIL EXCISION      surgical removal of toenail and nail matrix of both feet    TOTAL KNEE ARTHROPLASTY Bilateral 2009 2010     Family History   Problem Relation Age of Onset    Diabetes Mother     Heart attack Father     Diabetes Brother      Rectal cancer Other         grandfather- rectal and colon    Prostate cancer Other     Diabetes Son        Home Medications:  Prior to Admission medications    Medication Sig Start Date End Date Taking? Authorizing Provider   allopurinol (ZYLOPRIM) 300 MG tablet Take 0.5 tablets by mouth Daily. 5/12/23   Agustina Gutierrez MD   amLODIPine (NORVASC) 10 MG tablet Take 1 tablet by mouth Daily. 5/12/23   Agustina Gutierrez MD   atorvastatin (LIPITOR) 10 MG tablet Take 1 tablet by mouth Daily. 9/9/22   Agustina Gutierrez MD   bacitracin 500 UNIT/GM ointment Apply 1 application  topically to the appropriate area as directed 2 (Two) Times a Day.  Patient not taking: Reported on 11/22/2023 10/20/23   Amalia Castillo APRN   buPROPion XL (WELLBUTRIN XL) 150 MG 24 hr tablet Take 1 tablet by mouth Every Morning. 8/4/21   Lorenza Harding MD   carboxymethylcellulose (REFRESH PLUS) 0.5 % solution Administer 1 drop to both eyes 4 (Four) Times a Day As Needed.    Lorenza Harding MD   cetirizine (zyrTEC) 10 MG tablet Take 1 tablet by mouth Daily. 8/17/23   Agustina Gutierrez MD   clopidogrel (PLAVIX) 75 MG tablet Take 1 tablet by mouth Daily.    Lorenza Harding MD   EPINEPHrine 0.15 MG/0.15ML solution auto-injector injection Inject  under the skin into the appropriate area as directed.    Lorenza Harding MD   fluticasone (FLONASE) 50 MCG/ACT nasal spray 2 sprays into the nostril(s) as directed by provider Daily.    Lorenza Harding MD   fluticasone-salmeterol (ADVAIR HFA) 230-21 MCG/ACT inhaler Inhale 2 puffs Every 6 (Six) Hours.    Lorenza Harding MD   furosemide (LASIX) 20 MG tablet Take 1 tablet by mouth Daily. 11/20/23   Agustina Gutierrez MD   lisinopril (PRINIVIL,ZESTRIL) 10 MG tablet Take 1 tablet by mouth Daily. 5/12/23   Agustina Gutierrez MD   melatonin 1 MG tablet Take 6 tablets by mouth At Night As Needed.    Lorenza aHrding MD    memantine (NAMENDA) 10 MG tablet Take 1 tablet twice a day 8/28/23   Arnav Borja MD   montelukast (SINGULAIR) 10 MG tablet Take 1 tablet by mouth Every Night. 10/26/22 8/5/25  Agustina Gutierrez MD   nystatin (MYCOSTATIN) 818458 UNIT/GM cream Apply  topically to the appropriate area as directed As Needed (scrotal pain).  Patient not taking: Reported on 10/20/2023 8/31/21   Christopher Lozano MD   pantoprazole (PROTONIX) 40 MG EC tablet Take 1 tablet by mouth Daily. 9/16/22   Agustina Gutierrez MD   potassium chloride (MICRO-K) 10 MEQ CR capsule Take 2 capsules by mouth 2 (Two) Times a Day.  Patient not taking: Reported on 11/22/2023    Lorenza Harding MD   propranolol (INDERAL) 10 MG tablet Take 1 tablet by mouth Daily. 11/20/23   Agustina Gutierrez MD   Tiotropium Bromide Monohydrate (SPIRIVA RESPIMAT) 1.25 MCG/ACT aerosol solution inhaler Inhale 2 puffs Every 12 (Twelve) Hours.    ProviderLorenza MD   venlafaxine XR (EFFEXOR-XR) 75 MG 24 hr capsule Take 1 capsule by mouth Daily. 5/20/21   Emergency, Nurse Pancho, RN   vitamin D (ERGOCALCIFEROL) 1.25 MG (68531 UT) capsule capsule Take 1 capsule by mouth 2 (Two) Times a Week. 5/15/23   Agustina Gutierrez MD        Social History:   Social History     Tobacco Use    Smoking status: Never     Passive exposure: Never    Smokeless tobacco: Never   Vaping Use    Vaping Use: Never used   Substance Use Topics    Alcohol use: Yes     Comment: occasional     Drug use: Never         Review of Systems:  Review of Systems   Respiratory:  Positive for shortness of breath.    Cardiovascular:  Negative for chest pain.   Neurological:  Positive for weakness.        Physical Exam:  /63   Pulse 52   Temp 98.1 °F (36.7 °C) (Oral)   Resp 20   Wt 109 kg (239 lb 3.2 oz)   SpO2 95%   BMI 36.38 kg/m²         Physical Exam  Vitals and nursing note reviewed.   Constitutional:       Appearance: Normal appearance.   HENT:       Head: Normocephalic and atraumatic.   Eyes:      General: No scleral icterus.     Extraocular Movements: Extraocular movements intact.      Conjunctiva/sclera: Conjunctivae normal.   Cardiovascular:      Rate and Rhythm: Regular rhythm. Bradycardia present.      Heart sounds: Normal heart sounds.   Pulmonary:      Effort: Pulmonary effort is normal.      Breath sounds: Normal breath sounds.   Abdominal:      General: There is no distension.      Palpations: Abdomen is soft.      Tenderness: There is no abdominal tenderness.   Musculoskeletal:         General: Normal range of motion.      Cervical back: Normal range of motion.   Skin:     General: Skin is warm.      Coloration: Skin is not cyanotic.      Findings: No rash.   Neurological:      General: No focal deficit present.      Mental Status: He is alert and oriented to person, place, and time.   Psychiatric:         Attention and Perception: Attention and perception normal.         Mood and Affect: Mood normal.                      Procedures:  Procedures      Medical Decision Making:      Comorbidities that affect care:    COPD, Coronary Artery Disease, Diabetes    External Notes reviewed:    Reviewed urgent care note from 11/24/2023, reviewed internal medicine note from today      The following orders were placed and all results were independently analyzed by me:  Orders Placed This Encounter   Procedures    XR Chest 1 View    [place] Draw    Comprehensive Metabolic Panel    Magnesium    Single High Sensitivity Troponin T    TSH    CBC Auto Differential    BNP    NPO Diet NPO Type: Strict NPO    Undress & Gown    Continuous Pulse Oximetry    Oxygen Therapy- Nasal Cannula; Titrate 1-6 LPM Per SpO2; 90 - 95%    ECG 12 Lead ED Triage Standing Order; Dysrhythmia    Insert Peripheral IV    CBC & Differential    Green Top (Gel)    Lavender Top    Gold Top - SST    Light Blue Top       Medications Given in the Emergency Department:  Medications   sodium chloride  0.9 % flush 10 mL (has no administration in time range)        ED Course:    The patient was initially evaluated in the triage area where orders were placed. The patient was later dispositioned by Castro Meeks MD.      The patient was advised to stay for completion of workup which includes but is not limited to communication of labs and radiological results, reassessment and plan. The patient was advised that leaving prior to disposition by a provider could result in critical findings that are not communicated to the patient.     ED Course as of 12/21/23 1948   Thu Dec 21, 2023   1625 PROVIDER IN TRIAGE  Patient was evaluated by me in triage, Herman Baker PA-C.  Orders were placed and patient is currently awaiting final results and disposition.  [MD]   1947 EKG interpreted by me  Time: 1631  Heart rate 69  Sinus, PVCs, nonspecific ST changes, incomplete right bundle branch [MA]      ED Course User Index  [MA] Castro Meeks MD  [MD] Herman Baker PA-C       Labs:    Lab Results (last 24 hours)       Procedure Component Value Units Date/Time    CBC & Differential [[id number]]  (Normal) Collected: 12/21/23 1743    Specimen: Blood Updated: 12/21/23 1750    Narrative:      The following orders were created for panel order CBC & Differential.  Procedure                               Abnormality         Status                     ---------                               -----------         ------                     CBC Auto Differential[[id number]]        Normal              Final result                 Please view results for these tests on the individual orders.    Comprehensive Metabolic Panel [[id number]]  (Abnormal) Collected: 12/21/23 1743    Specimen: Blood Updated: 12/21/23 1812     Glucose 145 mg/dL      BUN 22 mg/dL      Creatinine 1.38 mg/dL      Sodium 142 mmol/L      Potassium 4.4 mmol/L      Comment: Slight hemolysis detected by analyzer. Result may be falsely elevated.        Chloride 109  mmol/L      CO2 21.3 mmol/L      Calcium 9.0 mg/dL      Total Protein 6.6 g/dL      Albumin 3.6 g/dL      ALT (SGPT) 7 U/L      AST (SGOT) 13 U/L      Alkaline Phosphatase 73 U/L      Total Bilirubin 0.4 mg/dL      Globulin 3.0 gm/dL      A/G Ratio 1.2 g/dL      BUN/Creatinine Ratio 15.9     Anion Gap 11.7 mmol/L      eGFR 49.2 mL/min/1.73     Narrative:      GFR Normal >60  Chronic Kidney Disease <60  Kidney Failure <15    The GFR formula is only valid for adults with stable renal function between ages 18 and 70.    Magnesium [[id number]]  (Normal) Collected: 12/21/23 1743    Specimen: Blood Updated: 12/21/23 1812     Magnesium 1.7 mg/dL     Single High Sensitivity Troponin T [[id number]]  (Abnormal) Collected: 12/21/23 1743    Specimen: Blood Updated: 12/21/23 1818     HS Troponin T 35 ng/L     Narrative:      High Sensitive Troponin T Reference Range:  <14.0 ng/L- Negative Female for AMI  <22.0 ng/L- Negative Male for AMI  >=14 - Abnormal Female indicating possible myocardial injury.  >=22 - Abnormal Male indicating possible myocardial injury.   Clinicians would have to utilize clinical acumen, EKG, Troponin, and serial changes to determine if it is an Acute Myocardial Infarction or myocardial injury due to an underlying chronic condition.         TSH [[id number]]  (Normal) Collected: 12/21/23 1743    Specimen: Blood Updated: 12/21/23 1818     TSH 1.680 uIU/mL     CBC Auto Differential [[id number]]  (Normal) Collected: 12/21/23 1743    Specimen: Blood Updated: 12/21/23 1750     WBC 6.38 10*3/mm3      RBC 5.25 10*6/mm3      Hemoglobin 16.3 g/dL      Hematocrit 48.9 %      MCV 93.1 fL      MCH 31.0 pg      MCHC 33.3 g/dL      RDW 13.2 %      RDW-SD 45.2 fl      MPV 10.5 fL      Platelets 142 10*3/mm3      Neutrophil % 56.0 %      Lymphocyte % 32.9 %      Monocyte % 6.3 %      Eosinophil % 3.4 %      Basophil % 0.9 %      Immature Grans % 0.5 %      Neutrophils, Absolute 3.57 10*3/mm3      Lymphocytes, Absolute 2.10  10*3/mm3      Monocytes, Absolute 0.40 10*3/mm3      Eosinophils, Absolute 0.22 10*3/mm3      Basophils, Absolute 0.06 10*3/mm3      Immature Grans, Absolute 0.03 10*3/mm3      nRBC 0.0 /100 WBC     BNP [[id number]]  (Normal) Collected: 12/21/23 1743    Specimen: Blood Updated: 12/21/23 1818     proBNP 1,046.0 pg/mL     Narrative:      This assay is used as an aid in the diagnosis of individuals suspected of having heart failure. It can be used as an aid in the diagnosis of acute decompensated heart failure (ADHF) in patients presenting with signs and symptoms of ADHF to the emergency department (ED). In addition, NT-proBNP of <300 pg/mL indicates ADHF is not likely.    Age Range Result Interpretation  NT-proBNP Concentration (pg/mL:      <50             Positive            >450                   Gray                 300-450                    Negative             <300    50-75           Positive            >900                  Gray                300-900                  Negative            <300      >75             Positive            >1800                  Gray                300-1800                  Negative            <300             Imaging:    XR Chest 1 View    Result Date: 12/21/2023  PROCEDURE: XR CHEST 1 VW  COMPARISON: [facility], CR, XR CHEST 2 VW, 6/18/2022, 20:08.  [facility], CT, CT CHEST WO CONTRAST DIAGNOSTIC, 6/19/2022, 1:33.  INDICATIONS: Dysrhythmia Triage Protocol  FINDINGS:  Lungs are adequately expanded and appear clear.  No pneumothorax or large pleural effusion is seen.  Cardiac silhouette remains mildly enlarged.        No acute cardiopulmonary abnormality is identified.       BI CANALES MD       Electronically Signed and Approved By: BI CANALES MD on 12/21/2023 at 16:56                Differential Diagnosis and Discussion:      Dyspnea: Differential diagnosis includes but is not limited to metabolic acidosis, neurological disorders, psychogenic,  asthma, pneumothorax, upper airway obstruction, COPD, pneumonia, noncardiogenic pulmonary edema, interstitial lung disease, anemia, congestive heart failure, and pulmonary embolism    All labs were reviewed and interpreted by me.  All X-rays impressions were independently interpreted by me.  EKG was interpreted by me.    MDM     Amount and/or Complexity of Data Reviewed  Clinical lab tests: reviewed  Tests in the radiology section of CPT®: reviewed  Tests in the medicine section of CPT®: reviewed       Patient is a 88-year-old gentleman who states that he just got a little weak in his legs as well as lower shortness of breath when he went to his primary care doctor for routine follow-up.  He says that last about 30 minutes and then resolved.  Here he has been fine without any significant symptoms.  He states he feels back to his baseline.  Labs and imaging are unremarkable.  Discussed the need to follow-up with primary care doctor as an outpatient.  Ventura SOTOMAYOR.          Patient Care Considerations:      CT scan of the chest    Consultants/Shared Management Plan:    None    Social Determinants of Health:    Patient is independent, reliable, and has access to care.       Disposition and Care Coordination:    Discharged: The patient is suitable and stable for discharge with no need for consideration of observation or admission.    I have explained the patient´s condition, diagnoses and treatment plan based on the information available to me at this time. I have answered questions and addressed any concerns. The patient has a good  understanding of the patient´s diagnosis, condition, and treatment plan as can be expected at this point. The vital signs have been stable. The patient´s condition is stable and appropriate for discharge from the emergency department.      The patient will pursue further outpatient evaluation with the primary care physician or other designated or consulting physician as outlined in the discharge  instructions. They are agreeable to this plan of care and follow-up instructions have been explained in detail. The patient has received these instructions in written format and have expressed an understanding of the discharge instructions. The patient is aware that any significant change in condition or worsening of symptoms should prompt an immediate return to this or the closest emergency department or call to 911.      Final diagnoses:   Leg weakness, bilateral   Shortness of breath        ED Disposition       ED Disposition   Discharge    Condition   Stable    Comment   --               This medical record created using voice recognition software.             Castro Meeks MD  12/21/23 1950

## 2023-12-21 NOTE — ED TRIAGE NOTES
Pt reports while he was at his MD office visit he had a episode of feeling short of breathe which has subsided at this time.

## 2023-12-21 NOTE — PROGRESS NOTES
The ABCs of the Annual Wellness Visit  Subsequent Medicare Wellness Visit    Subjective    Reddy Castellano is a 88 y.o. male who presents for a Subsequent Medicare Wellness Visit.    The following portions of the patient's history were reviewed and   updated as appropriate: allergies, current medications, past family history, past medical history, past social history, past surgical history, and problem list.    Compared to one year ago, the patient feels his physical   health is worse.    Compared to one year ago, the patient feels his mental   health is worse.    Recent Hospitalizations:  He was not admitted to the hospital during the last year.       Current Medical Providers:  Patient Care Team:  Agustina Gutierrez MD as PCP - General (Pediatrics)  Christopher Lozano MD as Consulting Physician (Urology)  Arnav Borja MD as Consulting Physician (Neurology)    Outpatient Medications Prior to Visit   Medication Sig Dispense Refill    allopurinol (ZYLOPRIM) 300 MG tablet Take 0.5 tablets by mouth Daily. 45 tablet 1    amLODIPine (NORVASC) 10 MG tablet Take 1 tablet by mouth Daily. 90 tablet 1    atorvastatin (LIPITOR) 10 MG tablet Take 1 tablet by mouth Daily. 90 tablet 1    bacitracin 500 UNIT/GM ointment Apply 1 application  topically to the appropriate area as directed 2 (Two) Times a Day. (Patient not taking: Reported on 11/22/2023) 28 g 0    buPROPion XL (WELLBUTRIN XL) 150 MG 24 hr tablet Take 1 tablet by mouth Every Morning.      carboxymethylcellulose (REFRESH PLUS) 0.5 % solution Administer 1 drop to both eyes 4 (Four) Times a Day As Needed.      cetirizine (zyrTEC) 10 MG tablet Take 1 tablet by mouth Daily. 90 tablet 1    clopidogrel (PLAVIX) 75 MG tablet Take 1 tablet by mouth Daily.      EPINEPHrine 0.15 MG/0.15ML solution auto-injector injection Inject  under the skin into the appropriate area as directed.      fluticasone (FLONASE) 50 MCG/ACT nasal spray 2 sprays into the nostril(s) as  directed by provider Daily.      fluticasone-salmeterol (ADVAIR HFA) 230-21 MCG/ACT inhaler Inhale 2 puffs Every 6 (Six) Hours.      furosemide (LASIX) 20 MG tablet Take 1 tablet by mouth Daily. 90 tablet 1    lisinopril (PRINIVIL,ZESTRIL) 10 MG tablet Take 1 tablet by mouth Daily. 90 tablet 1    melatonin 1 MG tablet Take 6 tablets by mouth At Night As Needed.      memantine (NAMENDA) 10 MG tablet Take 1 tablet twice a day 180 tablet 3    montelukast (SINGULAIR) 10 MG tablet Take 1 tablet by mouth Every Night. 90 tablet 1    nystatin (MYCOSTATIN) 967968 UNIT/GM cream Apply  topically to the appropriate area as directed As Needed (scrotal pain). (Patient not taking: Reported on 10/20/2023) 15 g 5    pantoprazole (PROTONIX) 40 MG EC tablet Take 1 tablet by mouth Daily. 90 tablet 1    potassium chloride (MICRO-K) 10 MEQ CR capsule Take 2 capsules by mouth 2 (Two) Times a Day. (Patient not taking: Reported on 11/22/2023)      propranolol (INDERAL) 10 MG tablet Take 1 tablet by mouth Daily. 90 tablet 1    Tiotropium Bromide Monohydrate (SPIRIVA RESPIMAT) 1.25 MCG/ACT aerosol solution inhaler Inhale 2 puffs Every 12 (Twelve) Hours.      venlafaxine XR (EFFEXOR-XR) 75 MG 24 hr capsule Take 1 capsule by mouth Daily.      vitamin D (ERGOCALCIFEROL) 1.25 MG (14845 UT) capsule capsule Take 1 capsule by mouth 2 (Two) Times a Week. 26 capsule 0     No facility-administered medications prior to visit.       No opioid medication identified on active medication list. I have reviewed chart for other potential  high risk medication/s and harmful drug interactions in the elderly.        Aspirin is not on active medication list.  Aspirin use is not indicated based on review of current medical condition/s. Risk of harm outweighs potential benefits.  .    Patient Active Problem List   Diagnosis    Gout    Type 2 diabetes mellitus without complication, without long-term current use of insulin    Essential hypertension    Other  "hyperlipidemia    Major depressive disorder, recurrent, moderate    Scrotal pain    Urge incontinence of urine    Urgency of urination    Balanitis    Candida rash of groin    Abnormal weight loss    Anxiety    Rheumatoid arthritis    Chronic obstructive pulmonary disease    Constipation    Depressive disorder    Disease    Disorder of prostate    Disorder of vision    Essential tremor    Excess skin of abdominal wall    Heart failure    Mechanical complication of genitourinary device    Morbid obesity    Myocardial infarction    Numbness    Pneumonia    Seasonal allergies    Sleep apnea    Spondylosis of lumbar spine    Tingling of skin    Ventral hernia    Lymphadenopathy of head and neck    Swallowed foreign body    Hyperkalemia    Benign prostatic hyperplasia with urinary frequency    Renal mass    Allergic rhinitis    Mild cognitive impairment    Wound of right leg    Scalp itch    Cellulitis of left forearm     Advance Care Planning   Advance Care Planning     Advance Directive is on file.  ACP discussion was held with the patient during this visit. Patient has an advance directive in EMR which is still valid.      Objective    There were no vitals filed for this visit.  Estimated body mass index is 37.57 kg/m² as calculated from the following:    Height as of 23: 172.7 cm (67.99\").    Weight as of 23: 112 kg (247 lb).           Does the patient have evidence of cognitive impairment? No          HEALTH RISK ASSESSMENT    Smoking Status:  Social History     Tobacco Use   Smoking Status Never    Passive exposure: Never   Smokeless Tobacco Never     Alcohol Consumption:  Social History     Substance and Sexual Activity   Alcohol Use Yes    Comment: occasional      Fall Risk Screen:    STEADI Fall Risk Assessment was completed, and patient is at MODERATE risk for falls. Assessment completed on:2023    Depression Screenin/23/2023     2:34 PM   PHQ-2/PHQ-9 Depression Screening   Little " Interest or Pleasure in Doing Things 0-->not at all   Feeling Down, Depressed or Hopeless 0-->not at all   PHQ-9: Brief Depression Severity Measure Score 0       Health Habits and Functional and Cognitive Screening:      10/14/2022     2:00 PM   Functional & Cognitive Status   Do you have difficulty preparing food and eating? No   Do you have difficulty bathing yourself, getting dressed or grooming yourself? No   Do you have difficulty using the toilet? No   Do you have difficulty moving around from place to place? No   Do you have trouble with steps or getting out of a bed or a chair? No   Current Diet Well Balanced Diet   Dental Exam Not up to date   Eye Exam Up to date   Exercise (times per week) 0 times per week   Do you need help using the phone?  No   Are you deaf or do you have serious difficulty hearing?  No   Do you need help to go to places out of walking distance? No   Do you need help shopping? No   Do you need help preparing meals?  No   Do you need help with housework?  No   Do you need help with laundry? No   Do you need help taking your medications? No   Do you need help managing money? No   Do you ever drive or ride in a car without wearing a seat belt? No   Have you felt unusual stress, anger or loneliness in the last month? No   Who do you live with? Child   If you need help, do you have trouble finding someone available to you? No   Have you been bothered in the last four weeks by sexual problems? No   Do you have difficulty concentrating, remembering or making decisions? No       Age-appropriate Screening Schedule:  Refer to the list below for future screening recommendations based on patient's age, sex and/or medical conditions. Orders for these recommended tests are listed in the plan section. The patient has been provided with a written plan.    Health Maintenance   Topic Date Due    ZOSTER VACCINE (2 of 3) 02/26/2019    DIABETIC EYE EXAM  08/23/2022    INFLUENZA VACCINE  08/01/2023     COVID-19 Vaccine (9 - 2023-24 season) 09/01/2023    ANNUAL WELLNESS VISIT  10/14/2023    HEMOGLOBIN A1C  12/08/2023    URINE MICROALBUMIN  02/23/2024    BMI FOLLOWUP  02/23/2024    LIPID PANEL  06/08/2024    TDAP/TD VACCINES (3 - Td or Tdap) 10/20/2033    Pneumococcal Vaccine 65+  Completed                  CMS Preventative Services Quick Reference  Risk Factors Identified During Encounter  None Identified  The above risks/problems have been discussed with the patient.  Pertinent information has been shared with the patient in the After Visit Summary.  An After Visit Summary and PPPS were made available to the patient.    Follow Up:   Next Medicare Wellness visit to be scheduled in 1 year.

## 2023-12-21 NOTE — PROGRESS NOTES
"The ABCs of the Annual Wellness Visit  Subsequent Medicare Wellness Visit    Subjective    {Wrapup  Review (Popup)  Advance Care Planning  Labs  CC  Problem List  Visit Diagnosis  Medications  Result Review  Imaging  [facility]  [facility]  [employer]  SnapShot  Encounters  Notes  Media  Procedures :23}  Reddy Castellano is a 88 y.o. male who presents for a Subsequent Medicare Wellness Visit.    The following portions of the patient's history were reviewed and   updated as appropriate: {history reviewed:20406::\"allergies\",\"current medications\",\"past family history\",\"past medical history\",\"past social history\",\"past surgical history\",\"problem list\"}.    Compared to one year ago, the patient feels his physical   health is {better worse same:13417}.    Compared to one year ago, the patient feels his mental   health is {better worse same:69678}.    Recent Hospitalizations:  {Hospital Admission Status in the last 365 days:55361}      Current Medical Providers:  Patient Care Team:  Agustina Gutierrez MD as PCP - General (Pediatrics)  Christopher Lozano MD as Consulting Physician (Urology)  Arnav Borja MD as Consulting Physician (Neurology)    Outpatient Medications Prior to Visit   Medication Sig Dispense Refill    allopurinol (ZYLOPRIM) 300 MG tablet Take 0.5 tablets by mouth Daily. 45 tablet 1    amLODIPine (NORVASC) 10 MG tablet Take 1 tablet by mouth Daily. 90 tablet 1    atorvastatin (LIPITOR) 10 MG tablet Take 1 tablet by mouth Daily. 90 tablet 1    bacitracin 500 UNIT/GM ointment Apply 1 application  topically to the appropriate area as directed 2 (Two) Times a Day. (Patient not taking: Reported on 11/22/2023) 28 g 0    buPROPion XL (WELLBUTRIN XL) 150 MG 24 hr tablet Take 1 tablet by mouth Every Morning.      carboxymethylcellulose (REFRESH PLUS) 0.5 % solution Administer 1 drop to both eyes 4 (Four) Times a Day As Needed.      cetirizine (zyrTEC) 10 MG tablet " Take 1 tablet by mouth Daily. 90 tablet 1    clopidogrel (PLAVIX) 75 MG tablet Take 1 tablet by mouth Daily.      EPINEPHrine 0.15 MG/0.15ML solution auto-injector injection Inject  under the skin into the appropriate area as directed.      fluticasone (FLONASE) 50 MCG/ACT nasal spray 2 sprays into the nostril(s) as directed by provider Daily.      fluticasone-salmeterol (ADVAIR HFA) 230-21 MCG/ACT inhaler Inhale 2 puffs Every 6 (Six) Hours.      furosemide (LASIX) 20 MG tablet Take 1 tablet by mouth Daily. 90 tablet 1    lisinopril (PRINIVIL,ZESTRIL) 10 MG tablet Take 1 tablet by mouth Daily. 90 tablet 1    melatonin 1 MG tablet Take 6 tablets by mouth At Night As Needed.      memantine (NAMENDA) 10 MG tablet Take 1 tablet twice a day 180 tablet 3    montelukast (SINGULAIR) 10 MG tablet Take 1 tablet by mouth Every Night. 90 tablet 1    nystatin (MYCOSTATIN) 171881 UNIT/GM cream Apply  topically to the appropriate area as directed As Needed (scrotal pain). (Patient not taking: Reported on 10/20/2023) 15 g 5    pantoprazole (PROTONIX) 40 MG EC tablet Take 1 tablet by mouth Daily. 90 tablet 1    potassium chloride (MICRO-K) 10 MEQ CR capsule Take 2 capsules by mouth 2 (Two) Times a Day. (Patient not taking: Reported on 11/22/2023)      propranolol (INDERAL) 10 MG tablet Take 1 tablet by mouth Daily. 90 tablet 1    Tiotropium Bromide Monohydrate (SPIRIVA RESPIMAT) 1.25 MCG/ACT aerosol solution inhaler Inhale 2 puffs Every 12 (Twelve) Hours.      venlafaxine XR (EFFEXOR-XR) 75 MG 24 hr capsule Take 1 capsule by mouth Daily.      vitamin D (ERGOCALCIFEROL) 1.25 MG (27344 UT) capsule capsule Take 1 capsule by mouth 2 (Two) Times a Week. 26 capsule 0     No facility-administered medications prior to visit.       No opioid medication identified on active medication list. I have reviewed chart for other potential  high risk medication/s and harmful drug interactions in the elderly.        Aspirin is not on active medication  "list.  {ASPIRIN NOT ON MEDICATION LIST INDICATED/NOT INDICATED:05394}.    Patient Active Problem List   Diagnosis    Gout    Type 2 diabetes mellitus without complication, without long-term current use of insulin    Essential hypertension    Other hyperlipidemia    Major depressive disorder, recurrent, moderate    Scrotal pain    Urge incontinence of urine    Urgency of urination    Balanitis    Candida rash of groin    Abnormal weight loss    Anxiety    Rheumatoid arthritis    Chronic obstructive pulmonary disease    Constipation    Depressive disorder    Disease    Disorder of prostate    Disorder of vision    Essential tremor    Excess skin of abdominal wall    Heart failure    Mechanical complication of genitourinary device    Morbid obesity    Myocardial infarction    Numbness    Pneumonia    Seasonal allergies    Sleep apnea    Spondylosis of lumbar spine    Tingling of skin    Ventral hernia    Lymphadenopathy of head and neck    Swallowed foreign body    Hyperkalemia    Benign prostatic hyperplasia with urinary frequency    Renal mass    Allergic rhinitis    Mild cognitive impairment    Wound of right leg    Scalp itch    Cellulitis of left forearm     Advance Care Planning   Advance Care Planning     Advance Directive is on file.  {ACP Discussion, Advance Directive in EMR:12321}     Objective    There were no vitals filed for this visit.  Estimated body mass index is 37.57 kg/m² as calculated from the following:    Height as of 11/22/23: 172.7 cm (67.99\").    Weight as of 11/24/23: 112 kg (247 lb).           Does the patient have evidence of cognitive impairment?   {Yes/No:19710}            HEALTH RISK ASSESSMENT    Smoking Status:  Social History     Tobacco Use   Smoking Status Never    Passive exposure: Never   Smokeless Tobacco Never     Alcohol Consumption:  Social History     Substance and Sexual Activity   Alcohol Use Yes    Comment: occasional      Fall Risk Screen:    STEADI Fall Risk Assessment " was completed, and patient is at MODERATE risk for falls. Assessment completed on:2023    Depression Screenin/23/2023     2:34 PM   PHQ-2/PHQ-9 Depression Screening   Little Interest or Pleasure in Doing Things 0-->not at all   Feeling Down, Depressed or Hopeless 0-->not at all   PHQ-9: Brief Depression Severity Measure Score 0       Health Habits and Functional and Cognitive Screening:      10/14/2022     2:00 PM   Functional & Cognitive Status   Do you have difficulty preparing food and eating? No   Do you have difficulty bathing yourself, getting dressed or grooming yourself? No   Do you have difficulty using the toilet? No   Do you have difficulty moving around from place to place? No   Do you have trouble with steps or getting out of a bed or a chair? No   Current Diet Well Balanced Diet   Dental Exam Not up to date   Eye Exam Up to date   Exercise (times per week) 0 times per week   Do you need help using the phone?  No   Are you deaf or do you have serious difficulty hearing?  No   Do you need help to go to places out of walking distance? No   Do you need help shopping? No   Do you need help preparing meals?  No   Do you need help with housework?  No   Do you need help with laundry? No   Do you need help taking your medications? No   Do you need help managing money? No   Do you ever drive or ride in a car without wearing a seat belt? No   Have you felt unusual stress, anger or loneliness in the last month? No   Who do you live with? Child   If you need help, do you have trouble finding someone available to you? No   Have you been bothered in the last four weeks by sexual problems? No   Do you have difficulty concentrating, remembering or making decisions? No       Age-appropriate Screening Schedule:  Refer to the list below for future screening recommendations based on patient's age, sex and/or medical conditions. Orders for these recommended tests are listed in the plan section. The patient has  been provided with a written plan.    Health Maintenance   Topic Date Due    ZOSTER VACCINE (2 of 3) 02/26/2019    DIABETIC EYE EXAM  08/23/2022    INFLUENZA VACCINE  08/01/2023    COVID-19 Vaccine (9 - 2023-24 season) 09/01/2023    ANNUAL WELLNESS VISIT  10/14/2023    HEMOGLOBIN A1C  12/08/2023    URINE MICROALBUMIN  02/23/2024    BMI FOLLOWUP  02/23/2024    LIPID PANEL  06/08/2024    TDAP/TD VACCINES (3 - Td or Tdap) 10/20/2033    Pneumococcal Vaccine 65+  Completed                  CMS Preventative Services Quick Reference  Risk Factors Identified During Encounter:    {Medicare Wellness Risk Factors:22568}    The above risks/problems have been discussed with the patient.  Pertinent information has been shared with the patient in the After Visit Summary.    There are no diagnoses linked to this encounter.    Follow Up:   Next Medicare Wellness visit to be scheduled in 1 year.      An After Visit Summary and PPPS were made available to the patient.

## 2024-01-01 LAB
QT INTERVAL: 426 MS
QTC INTERVAL: 456 MS

## 2024-02-16 ENCOUNTER — TELEPHONE (OUTPATIENT)
Dept: UROLOGY | Facility: CLINIC | Age: 89
End: 2024-02-16
Payer: MEDICARE

## 2024-03-02 ENCOUNTER — APPOINTMENT (OUTPATIENT)
Dept: GENERAL RADIOLOGY | Facility: HOSPITAL | Age: 89
End: 2024-03-02
Payer: MEDICARE

## 2024-03-02 ENCOUNTER — APPOINTMENT (OUTPATIENT)
Dept: CT IMAGING | Facility: HOSPITAL | Age: 89
End: 2024-03-02
Payer: MEDICARE

## 2024-03-02 ENCOUNTER — HOSPITAL ENCOUNTER (EMERGENCY)
Facility: HOSPITAL | Age: 89
Discharge: HOME | End: 2024-03-02
Attending: EMERGENCY MEDICINE
Payer: MEDICARE

## 2024-03-02 VITALS
DIASTOLIC BLOOD PRESSURE: 78 MMHG | WEIGHT: 241.84 LBS | HEART RATE: 78 BPM | SYSTOLIC BLOOD PRESSURE: 136 MMHG | RESPIRATION RATE: 18 BRPM | OXYGEN SATURATION: 94 % | TEMPERATURE: 97.8 F | BODY MASS INDEX: 36.65 KG/M2 | HEIGHT: 68 IN

## 2024-03-02 DIAGNOSIS — S09.90XA INJURY OF HEAD, INITIAL ENCOUNTER: ICD-10-CM

## 2024-03-02 DIAGNOSIS — M25.511 ACUTE PAIN OF RIGHT SHOULDER: Primary | ICD-10-CM

## 2024-03-02 PROCEDURE — 73030 X-RAY EXAM OF SHOULDER: CPT

## 2024-03-02 PROCEDURE — 70450 CT HEAD/BRAIN W/O DYE: CPT

## 2024-03-02 PROCEDURE — 99284 EMERGENCY DEPT VISIT MOD MDM: CPT

## 2024-03-02 RX ORDER — MINERAL OIL/HYDROPHIL PETROLAT
1 OINTMENT (GRAM) TOPICAL DAILY PRN
Qty: 50 G | Refills: 0 | Status: SHIPPED | OUTPATIENT
Start: 2024-03-02 | End: 2024-04-01

## 2024-03-02 NOTE — ED PROVIDER NOTES
Time: 12:56 PM EST  Date of encounter:  3/2/2024  Independent Historian/Clinical History and Information was obtained by:   Patient    History is limited by: N/A    Chief Complaint: Fall      History of Present Illness:  Patient is a 88 y.o. year old male who presents to the emergency department for evaluation of his breathing.  Patient reports he tripped and fell hitting his head and right shoulder.  Complains of pain in the right shoulder denies any head or neck pain.  Patient denies any dizziness or weakness prior to the fall.  Denies any other injury sustained during the fall.  Denies hip or leg pain.     HPI    Patient Care Team  Primary Care Provider: Agustina Gutierrez MD    Past Medical History:     No Known Allergies  Past Medical History:   Diagnosis Date    Allergies     Anxiety     Arthritis     Asthma     BPH (benign prostatic hyperplasia)     Carpal tunnel syndrome     Colon polyp     Congestive heart failure (CHF)     COPD (chronic obstructive pulmonary disease)     Dementia     Depression     Diabetes mellitus type 2, noninsulin dependent     Diverticulitis     Erectile dysfunction of organic origin     Essential hypertension 11/19/2019    Excess skin     Fatigue 11/09/2019    GERD (gastroesophageal reflux disease)     Gout     Heart attack     Heart disease     High cholesterol     Hyperlipidemia     Hypertension     Hypogonadism in male     Insomnia     Leg swelling     Memory change 08/02/2018    currently, pt is taking namenda. i will pursue a mocha at his follow up     Myocardial infarction     Osteoarthritis     Parkinson's disease     Prostate disorder     Rectal bleeding     Renal cyst     Seasonal allergies     Sleep apnea     Stroke     TIA (transient ischemic attack) 08/02/2018    reportedly, the pt has a history of TIA. This can be discussed further at his follow up. i was asked that he cont. aspirin.    Tremor 08/02/2018    pt was a 3 mo history of a high frequency, low amplitude  tremor of both hands that worsens some what with activity. tremor does not interfere with his activities of daily living. he has no evidence of bradykinesia on exam. pakinsons seems unlikely at this point. potential etiologies would include essential tremor and physiologic tremor. he does note that he started theophylline approx. 3 months ag    Vitamin D deficiency      Past Surgical History:   Procedure Laterality Date    APPENDECTOMY  2015    CARDIAC CATHETERIZATION      CARDIAC SURGERY  2014    CARPAL TUNNEL RELEASE      CATARACT EXTRACTION      CIRCUMCISION  2000    COLONOSCOPY  2014 2017    CYSTOSCOPY      ENDOSCOPY  2010 2017    ENDOSCOPY N/A 11/23/2021    Procedure: ESOPHAGOGASTRODUODENOSCOPY;  Surgeon: Anika Cummins MD;  Location: [facility] MAIN OR;  Service: Gastroenterology;  Laterality: N/A;  gastritis    EYE SURGERY      eye implant    HERNIA REPAIR  2000    LAPAROSCOPIC GASTRIC BANDING      ORCHIECTOMY Right 2010    OTHER SURGICAL HISTORY      metal implants;     PENILE PROSTHESIS IMPLANT      TOE SURGERY      TOENAIL EXCISION      surgical removal of toenail and nail matrix of both feet    TOTAL KNEE ARTHROPLASTY Bilateral 2009 2010     Family History   Problem Relation Age of Onset    Diabetes Mother     Heart attack Father     Diabetes Brother     Rectal cancer Other         grandfather- rectal and colon    Prostate cancer Other     Diabetes Son        Home Medications:  Prior to Admission medications    Medication Sig Start Date End Date Taking? Authorizing Provider   allopurinol (ZYLOPRIM) 300 MG tablet Take 0.5 tablets by mouth Daily. 5/12/23   Agustina Gutierrez MD   amLODIPine (NORVASC) 10 MG tablet Take 1 tablet by mouth Daily. 5/12/23   Agustina Gutierrez MD   atorvastatin (LIPITOR) 10 MG tablet Take 1 tablet by mouth Daily. 9/9/22   Agustina Gutierrez MD   bacitracin 500 UNIT/GM ointment Apply 1 application  topically to the appropriate area as directed 2 (Two)  Times a Day.  Patient not taking: Reported on 11/22/2023 10/20/23   Amalia Castillo APRN   buPROPion XL (WELLBUTRIN XL) 150 MG 24 hr tablet Take 1 tablet by mouth Every Morning. 8/4/21   Lorenza Harding MD   carboxymethylcellulose (REFRESH PLUS) 0.5 % solution Administer 1 drop to both eyes 4 (Four) Times a Day As Needed.    Lorenza Harding MD   cetirizine (zyrTEC) 10 MG tablet Take 1 tablet by mouth Daily. 8/17/23   Agustina Gutierrez MD   clopidogrel (PLAVIX) 75 MG tablet Take 1 tablet by mouth Daily.    Lorenza Harding MD   EPINEPHrine 0.15 MG/0.15ML solution auto-injector injection Inject  under the skin into the appropriate area as directed.    Lorenza Harding MD   fluticasone (FLONASE) 50 MCG/ACT nasal spray 2 sprays into the nostril(s) as directed by provider Daily.    Lorenza Harding MD   fluticasone-salmeterol (ADVAIR HFA) 230-21 MCG/ACT inhaler Inhale 2 puffs Every 6 (Six) Hours.    Lorenza Harding MD   furosemide (LASIX) 20 MG tablet Take 1 tablet by mouth Daily. 11/20/23   Agustina Gutierrez MD   lisinopril (PRINIVIL,ZESTRIL) 10 MG tablet Take 1 tablet by mouth Daily. 5/12/23   Agustina Gutierrez MD   melatonin 1 MG tablet Take 6 tablets by mouth At Night As Needed.    Lorenza Harding MD   memantine (NAMENDA) 10 MG tablet Take 1 tablet twice a day 8/28/23   Arnav Borja MD   montelukast (SINGULAIR) 10 MG tablet Take 1 tablet by mouth Every Night. 10/26/22 8/5/25  Agustina Gutierrez MD   nystatin (MYCOSTATIN) 838670 UNIT/GM cream Apply  topically to the appropriate area as directed As Needed (scrotal pain).  Patient not taking: Reported on 10/20/2023 8/31/21   Christopher Lozano MD   pantoprazole (PROTONIX) 40 MG EC tablet Take 1 tablet by mouth Daily. 9/16/22   Agustina Gutierrez MD   potassium chloride (MICRO-K) 10 MEQ CR capsule Take 2 capsules by mouth 2 (Two) Times a Day.  Patient not taking: Reported on  "11/22/2023    Provider, MD Lorenza   propranolol (INDERAL) 10 MG tablet Take 1 tablet by mouth Daily. 11/20/23   Agustina Gutierrez MD   Tiotropium Bromide Monohydrate (SPIRIVA RESPIMAT) 1.25 MCG/ACT aerosol solution inhaler Inhale 2 puffs Every 12 (Twelve) Hours.    Provider, MD Lorenza   venlafaxine XR (EFFEXOR-XR) 75 MG 24 hr capsule Take 1 capsule by mouth Daily. 5/20/21   Emergency, Nurse Pancho, RN   vitamin D (ERGOCALCIFEROL) 1.25 MG (77625 UT) capsule capsule Take 1 capsule by mouth 2 (Two) Times a Week. 5/15/23   Agustina Gutierrez MD        Social History:   Social History     Tobacco Use    Smoking status: Never     Passive exposure: Never    Smokeless tobacco: Never   Vaping Use    Vaping status: Never Used   Substance Use Topics    Alcohol use: Yes     Comment: occasional     Drug use: Never         Review of Systems:  Review of Systems   Constitutional:  Negative for chills, fatigue and fever.   HENT:  Negative for ear pain, rhinorrhea and sore throat.    Eyes:  Negative for visual disturbance.   Respiratory:  Negative for cough and shortness of breath.    Cardiovascular:  Negative for chest pain.   Gastrointestinal:  Negative for abdominal pain, diarrhea and vomiting.   Genitourinary:  Negative for difficulty urinating.   Musculoskeletal:  Negative for arthralgias (Right shoulder), back pain and myalgias.   Skin:  Negative for rash.   Neurological:  Negative for light-headedness and headaches.   Hematological:  Negative for adenopathy.   Psychiatric/Behavioral: Negative.          Physical Exam:  /78 (BP Location: Left arm, Patient Position: Lying)   Pulse 78   Temp 97.8 °F (36.6 °C) (Oral)   Resp 18   Ht 172.7 cm (68\")   Wt 110 kg (241 lb 13.5 oz)   SpO2 94%   BMI 36.77 kg/m²     Physical Exam  Vitals and nursing note reviewed.   Constitutional:       General: He is not in acute distress.     Appearance: Normal appearance. He is not toxic-appearing.   HENT:      " Head: Normocephalic and atraumatic.      Nose: Nose normal.      Mouth/Throat:      Mouth: Mucous membranes are moist.   Eyes:      Conjunctiva/sclera: Conjunctivae normal.   Cardiovascular:      Rate and Rhythm: Normal rate.      Pulses: Normal pulses.      Heart sounds: Normal heart sounds.   Pulmonary:      Effort: Pulmonary effort is normal.      Breath sounds: Normal breath sounds.   Abdominal:      General: Bowel sounds are normal.      Palpations: Abdomen is soft.      Tenderness: There is no abdominal tenderness.   Musculoskeletal:         General: Tenderness (Right shoulder) present.      Cervical back: Normal range of motion.      Comments: Limited range of motion right shoulder.   Skin:     General: Skin is warm and dry.      Comments: Dry peeling skin to bilateral feet.   Neurological:      General: No focal deficit present.      Mental Status: He is alert and oriented to person, place, and time.   Psychiatric:         Mood and Affect: Mood normal.         Behavior: Behavior normal.         Thought Content: Thought content normal.         Judgment: Judgment normal.                  Procedures:  Procedures      Medical Decision Making:      Comorbidities that affect care:    Asthma, Diabetes, Hypertension    External Notes reviewed:    None      The following orders were placed and all results were independently analyzed by me:  Orders Placed This Encounter   Procedures    XR Shoulder 2+ View Right    CT Head Without Contrast       Medications Given in the Emergency Department:  Medications - No data to display     ED Course:         Labs:    Lab Results (last 24 hours)       ** No results found for the last 24 hours. **             Imaging:    CT Head Without Contrast    Result Date: 3/2/2024  PROCEDURE: CT HEAD WO CONTRAST  COMPARISON:  [facility], CT, HEAD W/O CONTRAST, 6/27/2020, 23:50. INDICATIONS: fall with head injury  PROTOCOL:   Standard imaging protocol performed    RADIATION:    DLP: 1018.2 mGy*cm   MA and/or KV was adjusted to minimize radiation dose.     TECHNIQUE: After obtaining the patient's consent, CT images were obtained without non-ionic intravenous contrast material.  FINDINGS:  No skull fracture.  No blood in the visualized paranasal sinuses/middle ear cavities.  Intracranially, no hemorrhage, edema, or mass effect.  CSF spaces are age-appropriate       No evidence of intracranial injury     CHRISTEL GERONIMO MD       Electronically Signed and Approved By: CHRISTEL GERONIMO MD on 3/02/2024 at 13:40             XR Shoulder 2+ View Right    Result Date: 3/2/2024  PROCEDURE: XR SHOULDER 2+ VW RIGHT  COMPARISON: [facility], CR, SHOULDER >OR= 2V RT, 6/01/2017, 11:12.  INDICATIONS: pain  FINDINGS:  No acute fracture or dislocation is noted.  Mild-to-moderate degenerative changes are noted at the AC joint degenerative changes are noted at the greater tuberosity at the attachment of the rotator cuff.  Spurring noted along the undersurface of the acromion.  Glenohumeral joint demonstrates mild degenerative change.  Soft tissues are unremarkable.  Limited imaging of the chest is unremarkable        1. Degenerative changes of the right shoulder which have slightly progressed from the comparison.  No acute osseous abnormality appreciated      DIONISIO CARVER MD       Electronically Signed and Approved By: DIONISIO CARVER MD on 3/02/2024 at 10:08                Differential Diagnosis and Discussion:    Orthopedic Injuries: Differential diagnosis includes but is not limited to fractures, soft tissue injuries, dislocations, contusions, ligamentous injuries, tendon injuries, nerve injuries, compartment syndrome, bursitis, and vascular injuries.    All X-rays impressions were independently interpreted by me.  CT scan radiology impression was interpreted by me.    MDM  Number of Diagnoses or Management Options  Acute pain of right shoulder  Injury of head, initial encounter  Diagnosis  management comments: I have explained the patient´s condition, diagnoses and treatment plan based on the information available to me at this time. I have answered questions and addressed any concerns. The patient has a good  understanding of the patient´s diagnosis, condition, and treatment plan as can be expected at this point. The vital signs have been stable. The patient´s condition is stable and appropriate for discharge from the emergency department.      The patient will pursue further outpatient evaluation with the primary care physician or other designated or consulting physician as outlined in the discharge instructions. They are agreeable to this plan of care and follow-up instructions have been explained in detail. The patient has received these instructions in written format and has expressed an understanding of the discharge instructions. The patient is aware that any significant change in condition or worsening of symptoms should prompt an immediate return to this or the closest emergency department or call to 911.                   Patient Care Considerations:    NARCOTICS: I considered prescribing opiate pain medication as an outpatient, however patient did not require pain control in the ED.      Consultants/Shared Management Plan:    None    Social Determinants of Health:    Patient is independent, reliable, and has access to care.       Disposition and Care Coordination:    Discharged: The patient is suitable and stable for discharge with no need for consideration of admission.    I have explained the patient´s condition, diagnoses and treatment plan based on the information available to me at this time. I have answered questions and addressed any concerns. The patient has a good  understanding of the patient´s diagnosis, condition, and treatment plan as can be expected at this point. The vital signs have been stable. The patient´s condition is stable and appropriate for discharge from the  emergency department.      The patient will pursue further outpatient evaluation with the primary care physician or other designated or consulting physician as outlined in the discharge instructions. They are agreeable to this plan of care and follow-up instructions have been explained in detail. The patient has received these instructions in written format and has expressed an understanding of the discharge instructions. The patient is aware that any significant change in condition or worsening of symptoms should prompt an immediate return to this or the closest emergency department or call to 1.  I have explained discharge medications and the need for follow up with the patient/caretakers. This was also printed in the discharge instructions. Patient was discharged with the following medications and follow up:      Medication List        New Prescriptions      Mineral Oil-Hydrophil Petrolat ointment  Apply 1 Application topically Daily As Needed (dry skin) for up to 30 days.               Where to Get Your Medications        These medications were sent to [facility] - [place], [place] - [address] [place] [address] [phone]  - [phone]   [address] [facility] [address]      Phone: [phone]   Mineral Oil-Hydrophil Petrolat ointment      Agustina Gutierrez MD  [address] [place], [place] [id number] [phone]    Go to   As needed       Final diagnoses:   Acute pain of right shoulder   Injury of head, initial encounter        ED Disposition       ED Disposition   Discharge    Condition   Stable    Comment   --               This medical record created using voice recognition software.             Rebekah Hammonds, APRN  03/02/24 [id number]

## 2024-03-02 NOTE — DISCHARGE INSTRUCTIONS
Please follow-up with your primary care provider as needed.  You have been prescribed ointment for dry skin on your feet.  X-ray and CT scan were negative for acute injury today.  If your shoulder continues to bother you follow-up with your primary care provider for further testing.  Return to the ED if you develop headache, nausea, vomiting, chest pain or shortness of breath.

## 2024-03-05 ENCOUNTER — TELEPHONE (OUTPATIENT)
Dept: INTERNAL MEDICINE | Facility: CLINIC | Age: 89
End: 2024-03-05
Payer: MEDICARE

## 2024-03-05 NOTE — TELEPHONE ENCOUNTER
HUB STAFF ATTEMPTED TO FOLLOW WARM TRANSFER PROCESS AND WAS UNSUCCESSFUL.    Caller: Reddy Castellano    Relationship to patient: Self    Best call back number: [phone]     Chief complaint: COUGH, RIGHT ARM PAIN     Type of visit: HOSPITAL FOLLOW UP    Requested date: 3/5/24     Additional notes:CALLER WAS IN [facility] EMERGENCY ROOM AND NEEDING FOLLOW UP

## 2024-03-07 ENCOUNTER — TELEPHONE (OUTPATIENT)
Dept: INTERNAL MEDICINE | Facility: CLINIC | Age: 89
End: 2024-03-07

## 2024-03-07 ENCOUNTER — OFFICE VISIT (OUTPATIENT)
Dept: INTERNAL MEDICINE | Facility: CLINIC | Age: 89
End: 2024-03-07
Payer: MEDICARE

## 2024-03-07 VITALS
OXYGEN SATURATION: 98 % | DIASTOLIC BLOOD PRESSURE: 78 MMHG | TEMPERATURE: 97.3 F | HEART RATE: 68 BPM | HEIGHT: 68 IN | RESPIRATION RATE: 18 BRPM | SYSTOLIC BLOOD PRESSURE: 136 MMHG | WEIGHT: 247 LBS | BODY MASS INDEX: 37.44 KG/M2

## 2024-03-07 DIAGNOSIS — M25.511 ACUTE PAIN OF RIGHT SHOULDER: ICD-10-CM

## 2024-03-07 DIAGNOSIS — E11.9 TYPE 2 DIABETES MELLITUS WITHOUT COMPLICATION, WITHOUT LONG-TERM CURRENT USE OF INSULIN: Primary | ICD-10-CM

## 2024-03-07 LAB
ALBUMIN SERPL-MCNC: 3.7 G/DL (ref 3.5–5.2)
ALBUMIN UR-MCNC: 11.5 MG/DL
ALBUMIN/GLOB SERPL: 1.2 G/DL
ALP SERPL-CCNC: 69 U/L (ref 39–117)
ALT SERPL W P-5'-P-CCNC: 7 U/L (ref 1–41)
ANION GAP SERPL CALCULATED.3IONS-SCNC: 12.5 MMOL/L (ref 5–15)
AST SERPL-CCNC: 14 U/L (ref 1–40)
BASOPHILS # BLD AUTO: 0.07 10*3/MM3 (ref 0–0.2)
BASOPHILS NFR BLD AUTO: 1.1 % (ref 0–1.5)
BILIRUB SERPL-MCNC: 0.5 MG/DL (ref 0–1.2)
BUN SERPL-MCNC: 27 MG/DL (ref 8–23)
BUN/CREAT SERPL: 19 (ref 7–25)
CALCIUM SPEC-SCNC: 9.1 MG/DL (ref 8.6–10.5)
CHLORIDE SERPL-SCNC: 110 MMOL/L (ref 98–107)
CHOLEST SERPL-MCNC: 251 MG/DL (ref 0–200)
CO2 SERPL-SCNC: 22.5 MMOL/L (ref 22–29)
CREAT SERPL-MCNC: 1.42 MG/DL (ref 0.76–1.27)
DEPRECATED RDW RBC AUTO: 44.9 FL (ref 37–54)
EGFRCR SERPLBLD CKD-EPI 2021: 47.5 ML/MIN/1.73
EOSINOPHIL # BLD AUTO: 0.22 10*3/MM3 (ref 0–0.4)
EOSINOPHIL NFR BLD AUTO: 3.6 % (ref 0.3–6.2)
ERYTHROCYTE [DISTWIDTH] IN BLOOD BY AUTOMATED COUNT: 13.1 % (ref 12.3–15.4)
GLOBULIN UR ELPH-MCNC: 3 GM/DL
GLUCOSE SERPL-MCNC: 104 MG/DL (ref 65–99)
HBA1C MFR BLD: 5.7 % (ref 4.8–5.6)
HCT VFR BLD AUTO: 48.5 % (ref 37.5–51)
HDLC SERPL-MCNC: 37 MG/DL (ref 40–60)
HGB BLD-MCNC: 16.3 G/DL (ref 13–17.7)
IMM GRANULOCYTES # BLD AUTO: 0.03 10*3/MM3 (ref 0–0.05)
IMM GRANULOCYTES NFR BLD AUTO: 0.5 % (ref 0–0.5)
LDLC SERPL CALC-MCNC: 184 MG/DL (ref 0–100)
LDLC/HDLC SERPL: 4.9 {RATIO}
LYMPHOCYTES # BLD AUTO: 1.72 10*3/MM3 (ref 0.7–3.1)
LYMPHOCYTES NFR BLD AUTO: 27.8 % (ref 19.6–45.3)
MCH RBC QN AUTO: 31.2 PG (ref 26.6–33)
MCHC RBC AUTO-ENTMCNC: 33.6 G/DL (ref 31.5–35.7)
MCV RBC AUTO: 92.9 FL (ref 79–97)
MONOCYTES # BLD AUTO: 0.45 10*3/MM3 (ref 0.1–0.9)
MONOCYTES NFR BLD AUTO: 7.3 % (ref 5–12)
NEUTROPHILS NFR BLD AUTO: 3.7 10*3/MM3 (ref 1.7–7)
NEUTROPHILS NFR BLD AUTO: 59.7 % (ref 42.7–76)
NRBC BLD AUTO-RTO: 0 /100 WBC (ref 0–0.2)
PLATELET # BLD AUTO: 161 10*3/MM3 (ref 140–450)
PMV BLD AUTO: 10.9 FL (ref 6–12)
POTASSIUM SERPL-SCNC: 4.7 MMOL/L (ref 3.5–5.2)
PROT SERPL-MCNC: 6.7 G/DL (ref 6–8.5)
RBC # BLD AUTO: 5.22 10*6/MM3 (ref 4.14–5.8)
SODIUM SERPL-SCNC: 145 MMOL/L (ref 136–145)
TRIGL SERPL-MCNC: 163 MG/DL (ref 0–150)
TSH SERPL DL<=0.05 MIU/L-ACNC: 1.88 UIU/ML (ref 0.27–4.2)
VLDLC SERPL-MCNC: 30 MG/DL (ref 5–40)
WBC NRBC COR # BLD AUTO: 6.19 10*3/MM3 (ref 3.4–10.8)

## 2024-03-07 PROCEDURE — 83036 HEMOGLOBIN GLYCOSYLATED A1C: CPT | Performed by: INTERNAL MEDICINE

## 2024-03-07 PROCEDURE — 80061 LIPID PANEL: CPT | Performed by: INTERNAL MEDICINE

## 2024-03-07 PROCEDURE — 82043 UR ALBUMIN QUANTITATIVE: CPT | Performed by: INTERNAL MEDICINE

## 2024-03-07 PROCEDURE — 80053 COMPREHEN METABOLIC PANEL: CPT | Performed by: INTERNAL MEDICINE

## 2024-03-07 PROCEDURE — 85025 COMPLETE CBC W/AUTO DIFF WBC: CPT | Performed by: INTERNAL MEDICINE

## 2024-03-07 PROCEDURE — 84443 ASSAY THYROID STIM HORMONE: CPT | Performed by: INTERNAL MEDICINE

## 2024-03-07 RX ORDER — FLUTICASONE PROPIONATE AND SALMETEROL XINAFOATE 230; 21 UG/1; UG/1
2 AEROSOL, METERED RESPIRATORY (INHALATION)
Qty: 12 G | Refills: 6 | Status: SHIPPED | OUTPATIENT
Start: 2024-03-07

## 2024-03-07 RX ORDER — FLUTICASONE PROPIONATE 50 MCG
2 SPRAY, SUSPENSION (ML) NASAL DAILY
Qty: 16 G | Refills: 12 | Status: SHIPPED | OUTPATIENT
Start: 2024-03-07

## 2024-03-07 NOTE — PROGRESS NOTES
"Chief Complaint  Follow-up (Ed follow up for cough, pain in right shoulder, head injury, right arm and shoulder is still hurting to try to raise it past shoulder height )    Subjective     Reddy Castellano is a 88 y.o. male who presents to [facility] for follow up after ER visit on 3/2 for a fall. He has continued to have right shoulder pain and is not able to lift his arm past horizontal.       Objective   Vital Signs:   Vitals:    03/07/24 1000   BP: 136/78   BP Location: Left arm   Patient Position: Sitting   Cuff Size: Adult   Pulse: 68   Resp: 18   Temp: 97.3 °F (36.3 °C)   TempSrc: Temporal   SpO2: 98%   Weight: 112 kg (247 lb)   Height: 172.7 cm (68\")     Body mass index is 37.56 kg/m².    Wt Readings from Last 3 Encounters:   03/07/24 112 kg (247 lb)   03/02/24 110 kg (241 lb 13.5 oz)   12/21/23 109 kg (239 lb 3.2 oz)     BP Readings from Last 3 Encounters:   03/07/24 136/78   03/02/24 136/78   12/21/23 118/63       Health Maintenance   Topic Date Due    RSV Vaccine - Adults (1 - 1-dose 60+ series) Never done    ZOSTER VACCINE (2 of 3) 02/26/2019    DIABETIC EYE EXAM  08/23/2022    COVID-19 Vaccine (9 - 2023-24 season) 09/01/2023    HEMOGLOBIN A1C  12/08/2023    URINE MICROALBUMIN  02/23/2024    INFLUENZA VACCINE  03/31/2024 (Originally 8/1/2023)    LIPID PANEL  06/08/2024    ANNUAL WELLNESS VISIT  12/21/2024    BMI FOLLOWUP  03/07/2025    TDAP/TD VACCINES (3 - Td or Tdap) 10/20/2033    Pneumococcal Vaccine 65+  Completed       Physical Exam  Vitals reviewed.   Constitutional:       Appearance: Normal appearance. He is well-developed.   HENT:      Head: Normocephalic and atraumatic.      Mouth/Throat:      Pharynx: No oropharyngeal exudate.   Eyes:      Conjunctiva/sclera: Conjunctivae normal.      Pupils: Pupils are equal, round, and reactive to light.   Neck:      Thyroid: No thyromegaly or thyroid tenderness.   Cardiovascular:      Rate and Rhythm: Normal rate " and regular rhythm.      Heart sounds: No murmur heard.     No friction rub. No gallop.   Pulmonary:      Effort: Pulmonary effort is normal.      Breath sounds: Normal breath sounds. No wheezing or rhonchi.   Musculoskeletal:      Right shoulder: Decreased range of motion.   Lymphadenopathy:      Cervical: No cervical adenopathy.   Skin:     General: Skin is warm and dry.   Neurological:      Mental Status: He is alert and oriented to person, place, and time.   Psychiatric:         Mood and Affect: Affect normal.          Result Review :  The following data was reviewed by: Agustina Gutierrez MD on 2024:    Data reviewed : ER notes and imaging 3/2/24      Procedures          Assessment & Plan  Type 2 diabetes mellitus without complication, without long-term current use of insulin  Follow up labs ordered today  Acute pain of right shoulder  Recommend continued symptomatic management for now as he is less than 1 week out from his fall.   If shoulder pain continues, can consider further imaging or PT.     Orders Placed This Encounter   Procedures    Comprehensive Metabolic Panel    Hemoglobin A1c    Lipid Panel    MicroAlbumin, Urine, Random - Urine, Random Void    TSH    CBC Auto Differential    CBC & Differential     New Medications Ordered This Visit   Medications    fluticasone-salmeterol (ADVAIR HFA) 230-21 MCG/ACT inhaler     Sig: Inhale 2 puffs Every 6 (Six) Hours.     Dispense:  12 g     Refill:  6    fluticasone (FLONASE) 50 MCG/ACT nasal spray     Si sprays into the nostril(s) as directed by provider Daily.     Dispense:  16 g     Refill:  12    Tiotropium Bromide Monohydrate (SPIRIVA RESPIMAT) 1.25 MCG/ACT aerosol solution inhaler     Sig: Inhale 2 puffs Every 12 (Twelve) Hours.     Dispense:  4 g     Refill:  6            FOLLOW UP  Return for Keep previously scheduled follow up appointment.  Patient was given instructions and counseling regarding his condition or for health maintenance  advice. Please see specific information pulled into the AVS if appropriate.       Agustina Gutierrez MD  03/07/24  11:19 EST    CURRENT & DISCONTINUED MEDICATIONS  Current Outpatient Medications   Medication Instructions    allopurinol (ZYLOPRIM) 150 mg, Oral, Daily    amLODIPine (NORVASC) 10 mg, Oral, Daily    atorvastatin (LIPITOR) 10 mg, Oral, Daily    bacitracin 0.9 g, Topical, 2 Times Daily    buPROPion XL (WELLBUTRIN XL) 150 mg, Oral, Every Morning    carboxymethylcellulose (REFRESH PLUS) 0.5 % solution 1 drop, 4 Times Daily PRN    cetirizine (ZYRTEC) 10 mg, Oral, Daily    clopidogrel (PLAVIX) 75 MG tablet 1 tablet, Oral, Daily    EPINEPHrine 0.15 MG/0.15ML solution auto-injector injection Inject  under the skin into the appropriate area as directed.    fluticasone (FLONASE) 50 MCG/ACT nasal spray 2 sprays, Nasal, Daily    fluticasone-salmeterol (ADVAIR HFA) 230-21 MCG/ACT inhaler 2 puffs, Inhalation, Every 6 Hours - RT    furosemide (LASIX) 20 mg, Oral, Daily    lisinopril (PRINIVIL,ZESTRIL) 10 mg, Oral, Daily    melatonin 6 mg, Oral, Nightly PRN    memantine (NAMENDA) 10 MG tablet Take 1 tablet twice a day    Mineral Oil-Hydrophil Petrolat ointment 1 Application, Apply externally, Daily PRN    montelukast (SINGULAIR) 10 mg, Oral, Nightly    nystatin (MYCOSTATIN) 331201 UNIT/GM cream Topical, As Needed    pantoprazole (PROTONIX) 40 mg, Oral, Daily    potassium chloride (MICRO-K) 10 MEQ CR capsule 20 mEq, 2 Times Daily    propranolol (INDERAL) 10 mg, Oral, Daily    Tiotropium Bromide Monohydrate (SPIRIVA RESPIMAT) 1.25 MCG/ACT aerosol solution inhaler 2 puffs, Inhalation, Every 12 Hours - RT    venlafaxine XR (EFFEXOR-XR) 75 mg, Oral, Daily    vitamin D (ERGOCALCIFEROL) 50,000 Units, Oral, 2 Times Weekly       Medications Discontinued During This Encounter   Medication Reason    Tiotropium Bromide Monohydrate (SPIRIVA RESPIMAT) 1.25 MCG/ACT aerosol solution inhaler Reorder    fluticasone-salmeterol  (ADVAIR HFA) 230-21 MCG/ACT inhaler Reorder    fluticasone (FLONASE) 50 MCG/ACT nasal spray Reorder

## 2024-03-07 NOTE — TELEPHONE ENCOUNTER
Jf from [facility] called stating the advair max dose is 2 puffs BID, please advise if ok to change

## 2024-03-13 ENCOUNTER — TELEPHONE (OUTPATIENT)
Dept: INTERNAL MEDICINE | Facility: CLINIC | Age: 89
End: 2024-03-13
Payer: MEDICARE

## 2024-03-13 NOTE — TELEPHONE ENCOUNTER
----- Message from Agustina Gutierrez MD sent at 3/11/2024  9:17 AM EDT -----  A1C stable  Kidney function stable. Liver and electrolytes normal  Cholesterol panel with increase in LDL ( bad cholesterol). Continue your statin medication. You are on a pretty low dose. Did you have trouble tolerating a higher dose in the past?  Thyroid level normal  Blood counts normal

## 2024-03-14 ENCOUNTER — TELEPHONE (OUTPATIENT)
Dept: UROLOGY | Facility: CLINIC | Age: 89
End: 2024-03-14
Payer: MEDICARE

## 2024-03-14 NOTE — TELEPHONE ENCOUNTER
----- Message from Merrill Ortega RN sent at 3/13/2024 12:43 PM EDT -----  Appt needs to be moved out at least a week after his MRI on 3/22 please

## 2024-03-18 ENCOUNTER — TELEPHONE (OUTPATIENT)
Dept: UROLOGY | Facility: CLINIC | Age: 89
End: 2024-03-18
Payer: MEDICARE

## 2024-03-18 NOTE — TELEPHONE ENCOUNTER
----- Message from Merrill Ortega RN sent at 3/18/2024  7:22 AM EDT -----  Pt appt needs to be moved out to after MRI that is scheduled on 3/22. Second week of April is fine. Thanks

## 2024-03-18 NOTE — TELEPHONE ENCOUNTER
2ND CALL - CALLED PT TO MOVE APPOINTMENT ON 3/19 TO AFTER MRI SCHEDULED ON 3/22.    NO ANSWER, LMOM

## 2024-03-19 NOTE — TELEPHONE ENCOUNTER
3RD CALL - CALLED PT TO MOVE APPOINTMENT ON 3/19 TO AFTER MRI SCHEDULED ON 3/22.    SPOKE W/ PT AND R/S HIS APPT    Future Appointments         Provider Department Center    3/22/2024 5:30 PM [facility] 1 [facility] MRI [facility]    3/29/2024 10:00 AM Christopher Lozano MD [facility] UROLOGY [facility]    6/7/2024 11:00 AM Agustina Gutierrez MD [facility] INTERNAL MEDICINE & PEDIATRICS [facility]    8/28/2024 2:30 PM (Arrive by 2:15 PM) Arnav Borja MD [facility] NEUROLOGY & NEUROSURGERY [facility]          ALSO INFORMED PT I WOULD CALL MARLA MONSIVAIS TO INFORM HIM OF APPOINTMENT CHANGES. NO ANSWER, LMOM. OK FOR HUB TO RELAY MESSAGE.

## 2024-03-22 ENCOUNTER — HOSPITAL ENCOUNTER (OUTPATIENT)
Dept: MRI IMAGING | Facility: HOSPITAL | Age: 89
Discharge: HOME | End: 2024-03-22
Payer: MEDICARE

## 2024-03-22 DIAGNOSIS — N28.89 RENAL MASS: ICD-10-CM

## 2024-03-22 PROCEDURE — 74183 MRI ABD W/O CNTR FLWD CNTR: CPT

## 2024-03-22 PROCEDURE — 0 GADOBENATE DIMEGLUMINE 529 MG/ML SOLUTION: Performed by: UROLOGY

## 2024-03-22 PROCEDURE — A9577 INJ MULTIHANCE: HCPCS | Performed by: UROLOGY

## 2024-03-22 RX ADMIN — GADOBENATE DIMEGLUMINE 20 ML: 529 INJECTION, SOLUTION INTRAVENOUS at 19:00

## 2024-03-25 ENCOUNTER — TELEPHONE (OUTPATIENT)
Dept: INTERNAL MEDICINE | Facility: CLINIC | Age: 89
End: 2024-03-25
Payer: MEDICARE

## 2024-03-25 NOTE — TELEPHONE ENCOUNTER
Caller: Reddy Castellano    Relationship: Self    Best call back number: [phone]     Caller requesting test results: SELF    What test was performed: MRI    When was the test performed: 3.22.24    Where was the test performed: [facility]    Additional notes:

## 2024-04-10 NOTE — PROGRESS NOTES
Chief Complaint    Urologic complaint    Subjective          Reddy Castellano presents to [facility] UROLOGY  History of Present Illness    89 yo       ED   Urinary incontinence  History of scrotal pain  Renal mass        Voiding okay.  He is having some urgency and urge incontinence.  On Gemtesa daily.  1 pad daily      No GH//dysuria    3/25/2024 MRI abdomen with and without - motion degradation.  Within limitations grossly stable solid enhancing right upper pole renal neoplasm 1.4 cm.  Other Bosniak 1 and 2 renal cyst without change.  Grossly stable numerous cystic lesions throughout the pancreas.    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      PVR    4/24   000  8/21 000 2019 000    Results for orders placed or performed in visit on 04/12/24   Bladder Scan   Result Value Ref Range    Volume 0ML    POC Urinalysis Dipstick, Automated    Specimen: Urine   Result Value Ref Range    Color Yellow Yellow, Straw, Dark Yellow, Jeri    Clarity, UA Clear Clear    Specific Gravity  1.025 1.005 - 1.030    pH, Urine 6.0 5.0 - 8.0    Leukocytes Negative Negative    Nitrite, UA Negative Negative    Protein, POC Trace (A) Negative mg/dL    Glucose, UA Negative Negative mg/dL    Ketones, UA Negative Negative    Urobilinogen, UA Normal Normal, 0.2 E.U./dL    Bilirubin Negative Negative    Blood, UA Negative Negative    Lot Number [id number]     Expiration Date 22,025          Previous        2/14/2023 MRI abdomen with and without - 1.2 cm lateral upper pole exophytic right renal lesion concerning for neoplasm.  Stable.  Numerous bilateral cysts.   Numerous pancreatic cyst.  Largest lesion pancreatic tail 1.5 cm.  Stable.      8/22  MRI abdomen with and without - multiple clusters of pancreatic cystic lesions throughout the pancreas.  1.7 cm within the pancreatic tail.  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  Recommend MRCP in 1 year    Small 1.1 x 1.2 cm partially exophytic lesion  arising to the lateral pole the right kidney.  Demonstrates apparent internal enhancement.  Suspicious for primary renal neoplasm.      Myrbetriq  -did not help.      10/21 cystoscopy-4 cm prostate, minimal trabeculation, negative otherwise      Bothered by his penis.  Ever since penile implant has been out (Dr. Wolf)  patient has been having some trouble standing dry around his penis he states.  Patient is circumcised    11/20 scrotal ultrasoundstatus post left orchiectomy, large right-sided hydrocele, normal right testicle.    1/23/20 CT abdomen/pelvis without - stable adrenal gland.  Consistent with benign lipid rich adenoma.    L 2.4 cm nodule. No  Nephrolithiasis.  Small stones in the gallbladder.  Small fat-containing umbilical hernia.  Large pannus with multiple loops of small bowel: Extending into the pannus.  No true hernia identified.  No pelvic or inguinal adenopathy.      11/19 creatinine 1.4, GFR 46    patient has lost 35 lbs - he is trying.    IPP placed several years ago.  He had erosion of some tubing and [facility] [place].    12/20 PSA 2.08                     Past History:  Medical History: has a past medical history of Allergies, Anxiety, Arthritis, Asthma, BPH (benign prostatic hyperplasia), Carpal tunnel syndrome, Colon polyp, Congestive heart failure (CHF), COPD (chronic obstructive pulmonary disease), Dementia, Depression, Diabetes mellitus type 2, noninsulin dependent, Diverticulitis, Erectile dysfunction of organic origin, Essential hypertension (11/19/2019), Excess skin, Fatigue (11/09/2019), GERD (gastroesophageal reflux disease), Gout, Heart attack, Heart disease, High cholesterol, Hyperlipidemia, Hypertension, Hypogonadism in male, Insomnia, Leg swelling, Memory change (08/02/2018), Myocardial infarction, Osteoarthritis, Parkinson's disease, Prostate disorder, Rectal bleeding, Renal cyst, Seasonal allergies, Sleep apnea, Stroke, TIA (transient ischemic attack)  (2018), Tremor (2018), and Vitamin D deficiency.   Surgical History: has a past surgical history that includes Appendectomy (); Cardiac catheterization; Carpal tunnel release; Cataract extraction; Circumcision, non- (); Colonoscopy (2014); Cystoscopy; Esophagogastroduodenoscopy (2010); Eye surgery; Cardiac surgery (); Hernia repair (); Total knee arthroplasty (Bilateral, 2009); Laparoscopic gastric banding; Other surgical history; Orchiectomy (Right, ); Penile prosthesis implant; TOENAIL EXCISION; Toe Surgery; and Esophagogastroduodenoscopy (N/A, 2021).   Family History: family history includes Diabetes in his brother, mother, and son; Heart attack in his father; Prostate cancer in an other family member; Rectal cancer in an other family member.   Social History: reports that he has never smoked. He has never been exposed to tobacco smoke. He has never used smokeless tobacco. He reports current alcohol use. He reports that he does not use drugs.  Allergies: Patient has no known allergies.       Current Outpatient Medications:     allopurinol (ZYLOPRIM) 300 MG tablet, Take 0.5 tablets by mouth Daily., Disp: 45 tablet, Rfl: 1    amLODIPine (NORVASC) 10 MG tablet, Take 1 tablet by mouth Daily., Disp: 90 tablet, Rfl: 1    atorvastatin (LIPITOR) 10 MG tablet, Take 1 tablet by mouth Daily., Disp: 90 tablet, Rfl: 1    bacitracin 500 UNIT/GM ointment, Apply 1 application  topically to the appropriate area as directed 2 (Two) Times a Day. (Patient not taking: Reported on 2023), Disp: 28 g, Rfl: 0    buPROPion XL (WELLBUTRIN XL) 150 MG 24 hr tablet, Take 1 tablet by mouth Every Morning., Disp: , Rfl:     carboxymethylcellulose (REFRESH PLUS) 0.5 % solution, Administer 1 drop to both eyes 4 (Four) Times a Day As Needed. (Patient not taking: Reported on 3/7/2024), Disp: , Rfl:     cetirizine (zyrTEC) 10 MG tablet, Take 1 tablet by mouth Daily., Disp: 90 tablet,  Rfl: 1    clopidogrel (PLAVIX) 75 MG tablet, Take 1 tablet by mouth Daily., Disp: , Rfl:     EPINEPHrine 0.15 MG/0.15ML solution auto-injector injection, Inject  under the skin into the appropriate area as directed. (Patient not taking: Reported on 3/7/2024), Disp: , Rfl:     fluticasone (FLONASE) 50 MCG/ACT nasal spray, 2 sprays into the nostril(s) as directed by provider Daily., Disp: 16 g, Rfl: 12    fluticasone-salmeterol (ADVAIR HFA) 230-21 MCG/ACT inhaler, Inhale 2 puffs Every 6 (Six) Hours., Disp: 12 g, Rfl: 6    furosemide (LASIX) 20 MG tablet, Take 1 tablet by mouth Daily., Disp: 90 tablet, Rfl: 1    lisinopril (PRINIVIL,ZESTRIL) 10 MG tablet, Take 1 tablet by mouth Daily., Disp: 90 tablet, Rfl: 1    melatonin 1 MG tablet, Take 6 tablets by mouth At Night As Needed., Disp: , Rfl:     memantine (NAMENDA) 10 MG tablet, Take 1 tablet twice a day, Disp: 180 tablet, Rfl: 3    montelukast (SINGULAIR) 10 MG tablet, Take 1 tablet by mouth Every Night., Disp: 90 tablet, Rfl: 1    nystatin (MYCOSTATIN) 967679 UNIT/GM cream, Apply  topically to the appropriate area as directed As Needed (scrotal pain). (Patient not taking: Reported on 10/20/2023), Disp: 15 g, Rfl: 5    pantoprazole (PROTONIX) 40 MG EC tablet, Take 1 tablet by mouth Daily. (Patient not taking: Reported on 3/7/2024), Disp: 90 tablet, Rfl: 1    potassium chloride (MICRO-K) 10 MEQ CR capsule, Take 2 capsules by mouth 2 (Two) Times a Day. (Patient not taking: Reported on 11/22/2023), Disp: , Rfl:     propranolol (INDERAL) 10 MG tablet, Take 1 tablet by mouth Daily., Disp: 90 tablet, Rfl: 1    Tiotropium Bromide Monohydrate (SPIRIVA RESPIMAT) 1.25 MCG/ACT aerosol solution inhaler, Inhale 2 puffs Every 12 (Twelve) Hours., Disp: 4 g, Rfl: 6    venlafaxine XR (EFFEXOR-XR) 75 MG 24 hr capsule, Take 1 capsule by mouth Daily., Disp: , Rfl:     vitamin D (ERGOCALCIFEROL) 1.25 MG (53854 UT) capsule capsule, Take 1 capsule by mouth 2 (Two) Times a Week. (Patient not  taking: Reported on 3/7/2024), Disp: 26 capsule, Rfl: 0              Bladder Scan interpretation 04/12/2024    Estimation of residual urine via BVI 3000 Verathon Bladder Scan  MA/nurse performing: Merrill FONTAINE  Residual Urine:  ml  Indication: Renal mass    Urge incontinence of urine   Position: Supine  Examination: Incremental scanning of the suprapubic area using 2.0 MHz transducer using copious amounts of acoustic gel.   Findings: An anechoic area was demonstrated which represented the bladder, with measurement of residual urine as noted. I inspected this myself. In that the residual urine was stable or insignificant, refer to plan for treatment and plan necessary at this time.           Objective     Vital Signs:   There were no vitals taken for this visit.             Assessment and Plan          Urgency and urge incontinence      Cont Gemtesa 75 mg daily samples.  Refilled today      Follow-up in 1 year with nurse practitioner for med refill          Renal mass      MRI reviewed with the patient today, renal mass is stable continue to follow-up    Lesion has not changed in 1 year, we will plan on repeat imaging in a couple years.  Patient understands that there is about an 80% chance this could be a renal cell carcinoma because of his age and the size of the lesion in the low growth rate likely will never need any treatment    Needs MRI abdomen with and without in 3/26.  Patient understands we are ruling out cancer and must follow-up    Continue  active surveillance secondary to his age    PVR at f/u

## 2024-04-11 ENCOUNTER — TELEPHONE (OUTPATIENT)
Dept: INTERNAL MEDICINE | Facility: CLINIC | Age: 89
End: 2024-04-11
Payer: MEDICARE

## 2024-04-11 RX ORDER — ATORVASTATIN CALCIUM 20 MG/1
20 TABLET, FILM COATED ORAL DAILY
Qty: 90 TABLET | Refills: 1 | Status: SHIPPED | OUTPATIENT
Start: 2024-04-11

## 2024-04-11 NOTE — TELEPHONE ENCOUNTER
I called and left a voicemail for patient to call the office back.     RELAY:  Per Dr Gutierrez:  She will send in prescription for a slightly higher dose of his atorvastatin. Hopefully this will help get the LDL level down closer to goal. If they have any questions they can contact the office. Thank you.

## 2024-04-11 NOTE — TELEPHONE ENCOUNTER
----- Message from Agustina Gutierrez MD sent at 4/11/2024 10:55 AM EDT -----  Will send in prescription for a slightly higher dose of his atorvastatin. Hopefully this will help get the LDL level down closer to goal.

## 2024-04-12 ENCOUNTER — OFFICE VISIT (OUTPATIENT)
Dept: UROLOGY | Facility: CLINIC | Age: 89
End: 2024-04-12
Payer: MEDICARE

## 2024-04-12 VITALS — RESPIRATION RATE: 19 BRPM | WEIGHT: 242.8 LBS | HEIGHT: 68 IN | BODY MASS INDEX: 36.8 KG/M2

## 2024-04-12 DIAGNOSIS — N39.41 URGE INCONTINENCE OF URINE: ICD-10-CM

## 2024-04-12 DIAGNOSIS — N28.89 RENAL MASS: Primary | ICD-10-CM

## 2024-04-12 LAB
BILIRUB BLD-MCNC: NEGATIVE MG/DL
CLARITY, POC: CLEAR
COLOR UR: YELLOW
EXPIRATION DATE: ABNORMAL
GLUCOSE UR STRIP-MCNC: NEGATIVE MG/DL
KETONES UR QL: NEGATIVE
LEUKOCYTE EST, POC: NEGATIVE
Lab: ABNORMAL
NITRITE UR-MCNC: NEGATIVE MG/ML
PH UR: 6 [PH] (ref 5–8)
PROT UR STRIP-MCNC: ABNORMAL MG/DL
RBC # UR STRIP: NEGATIVE /UL
SP GR UR: 1.02 (ref 1–1.03)
SPECIMEN VOL 24H UR: NORMAL L
UROBILINOGEN UR QL: NORMAL

## 2024-04-15 ENCOUNTER — TELEPHONE (OUTPATIENT)
Dept: UROLOGY | Facility: CLINIC | Age: 89
End: 2024-04-15
Payer: MEDICARE

## 2024-04-16 NOTE — TELEPHONE ENCOUNTER
LVM asking pt to call back..    He was seen in the office Friday 04/12/24 and Dr Lozano reviewed the MRI. It is stable from previous. Dr Lozano will plan on rechecking the lesion in March 2026. He will still follow up in 1 year for med refill.      Hub/answering service okay to relay above information to pt.

## 2024-05-02 ENCOUNTER — TELEPHONE (OUTPATIENT)
Dept: INTERNAL MEDICINE | Facility: CLINIC | Age: 89
End: 2024-05-02
Payer: MEDICARE

## 2024-05-02 NOTE — TELEPHONE ENCOUNTER
Caller: Reddy Castellano    Relationship: Self    Best call back number: [phone]     What test was performed: MRI ABDOMEN    When was the test performed: 3/22/24    Where was the test performed: [facility]    Additional notes: DR. MANPREET ROBINS, UROLOGY IS ORDERING PROVIDER. I WARM TRANSFERRED PATIENT TO THAT OFFICE TO INQUIRE THE SAME.

## 2024-06-17 ENCOUNTER — HOSPITAL ENCOUNTER (EMERGENCY)
Facility: HOSPITAL | Age: 89
Discharge: HOME | End: 2024-06-17
Attending: EMERGENCY MEDICINE | Admitting: EMERGENCY MEDICINE
Payer: MEDICARE

## 2024-06-17 VITALS
HEART RATE: 71 BPM | SYSTOLIC BLOOD PRESSURE: 169 MMHG | RESPIRATION RATE: 17 BRPM | TEMPERATURE: 97.9 F | BODY MASS INDEX: 36.22 KG/M2 | HEIGHT: 68 IN | OXYGEN SATURATION: 96 % | DIASTOLIC BLOOD PRESSURE: 93 MMHG | WEIGHT: 238.98 LBS

## 2024-06-17 DIAGNOSIS — R60.0 PEDAL EDEMA: ICD-10-CM

## 2024-06-17 DIAGNOSIS — I50.9 CONGESTIVE HEART FAILURE, UNSPECIFIED HF CHRONICITY, UNSPECIFIED HEART FAILURE TYPE: Primary | ICD-10-CM

## 2024-06-17 LAB
ALBUMIN SERPL-MCNC: 3.8 G/DL (ref 3.5–5.2)
ALBUMIN/GLOB SERPL: 1.7 G/DL
ALP SERPL-CCNC: 63 U/L (ref 39–117)
ALT SERPL W P-5'-P-CCNC: 9 U/L (ref 1–41)
ANION GAP SERPL CALCULATED.3IONS-SCNC: 10.7 MMOL/L (ref 5–15)
AST SERPL-CCNC: 14 U/L (ref 1–40)
BASOPHILS # BLD AUTO: 0.1 10*3/MM3 (ref 0–0.2)
BASOPHILS NFR BLD AUTO: 1.5 % (ref 0–1.5)
BILIRUB SERPL-MCNC: 0.8 MG/DL (ref 0–1.2)
BUN SERPL-MCNC: 21 MG/DL (ref 8–23)
BUN/CREAT SERPL: 17.9 (ref 7–25)
CALCIUM SPEC-SCNC: 8.7 MG/DL (ref 8.6–10.5)
CHLORIDE SERPL-SCNC: 111 MMOL/L (ref 98–107)
CO2 SERPL-SCNC: 20.3 MMOL/L (ref 22–29)
CREAT SERPL-MCNC: 1.17 MG/DL (ref 0.76–1.27)
DEPRECATED RDW RBC AUTO: 47.8 FL (ref 37–54)
EGFRCR SERPLBLD CKD-EPI 2021: 59.6 ML/MIN/1.73
EOSINOPHIL # BLD AUTO: 0.18 10*3/MM3 (ref 0–0.4)
EOSINOPHIL NFR BLD AUTO: 2.7 % (ref 0.3–6.2)
ERYTHROCYTE [DISTWIDTH] IN BLOOD BY AUTOMATED COUNT: 13.5 % (ref 12.3–15.4)
GLOBULIN UR ELPH-MCNC: 2.2 GM/DL
GLUCOSE SERPL-MCNC: 89 MG/DL (ref 65–99)
HCT VFR BLD AUTO: 48.3 % (ref 37.5–51)
HGB BLD-MCNC: 15.7 G/DL (ref 13–17.7)
HOLD SPECIMEN: NORMAL
HOLD SPECIMEN: NORMAL
IMM GRANULOCYTES # BLD AUTO: 0.02 10*3/MM3 (ref 0–0.05)
IMM GRANULOCYTES NFR BLD AUTO: 0.3 % (ref 0–0.5)
LYMPHOCYTES # BLD AUTO: 2.21 10*3/MM3 (ref 0.7–3.1)
LYMPHOCYTES NFR BLD AUTO: 32.9 % (ref 19.6–45.3)
MCH RBC QN AUTO: 31.2 PG (ref 26.6–33)
MCHC RBC AUTO-ENTMCNC: 32.5 G/DL (ref 31.5–35.7)
MCV RBC AUTO: 95.8 FL (ref 79–97)
MONOCYTES # BLD AUTO: 0.43 10*3/MM3 (ref 0.1–0.9)
MONOCYTES NFR BLD AUTO: 6.4 % (ref 5–12)
NEUTROPHILS NFR BLD AUTO: 3.78 10*3/MM3 (ref 1.7–7)
NEUTROPHILS NFR BLD AUTO: 56.2 % (ref 42.7–76)
NRBC BLD AUTO-RTO: 0 /100 WBC (ref 0–0.2)
NT-PROBNP SERPL-MCNC: 837.5 PG/ML (ref 0–1800)
PLATELET # BLD AUTO: 137 10*3/MM3 (ref 140–450)
PMV BLD AUTO: 10.9 FL (ref 6–12)
POTASSIUM SERPL-SCNC: 4.4 MMOL/L (ref 3.5–5.2)
PROT SERPL-MCNC: 6 G/DL (ref 6–8.5)
RBC # BLD AUTO: 5.04 10*6/MM3 (ref 4.14–5.8)
RBC MORPH BLD: NORMAL
SMALL PLATELETS BLD QL SMEAR: NORMAL
SODIUM SERPL-SCNC: 142 MMOL/L (ref 136–145)
WBC MORPH BLD: NORMAL
WBC NRBC COR # BLD AUTO: 6.72 10*3/MM3 (ref 3.4–10.8)
WHOLE BLOOD HOLD COAG: NORMAL
WHOLE BLOOD HOLD SPECIMEN: NORMAL

## 2024-06-17 PROCEDURE — 85007 BL SMEAR W/DIFF WBC COUNT: CPT | Performed by: EMERGENCY MEDICINE

## 2024-06-17 PROCEDURE — 85025 COMPLETE CBC W/AUTO DIFF WBC: CPT | Performed by: EMERGENCY MEDICINE

## 2024-06-17 PROCEDURE — 96374 THER/PROPH/DIAG INJ IV PUSH: CPT

## 2024-06-17 PROCEDURE — 25010000002 FUROSEMIDE PER 20 MG: Performed by: EMERGENCY MEDICINE

## 2024-06-17 PROCEDURE — 80053 COMPREHEN METABOLIC PANEL: CPT | Performed by: EMERGENCY MEDICINE

## 2024-06-17 PROCEDURE — 99283 EMERGENCY DEPT VISIT LOW MDM: CPT

## 2024-06-17 PROCEDURE — 83880 ASSAY OF NATRIURETIC PEPTIDE: CPT | Performed by: EMERGENCY MEDICINE

## 2024-06-17 PROCEDURE — 36415 COLL VENOUS BLD VENIPUNCTURE: CPT | Performed by: EMERGENCY MEDICINE

## 2024-06-17 RX ORDER — FUROSEMIDE 10 MG/ML
40 INJECTION INTRAMUSCULAR; INTRAVENOUS ONCE
Status: COMPLETED | OUTPATIENT
Start: 2024-06-17 | End: 2024-06-17

## 2024-06-17 RX ORDER — SODIUM CHLORIDE 0.9 % (FLUSH) 0.9 %
10 SYRINGE (ML) INJECTION AS NEEDED
Status: DISCONTINUED | OUTPATIENT
Start: 2024-06-17 | End: 2024-06-17 | Stop reason: HOSPADM

## 2024-06-17 RX ADMIN — FUROSEMIDE 40 MG: 10 INJECTION, SOLUTION INTRAMUSCULAR; INTRAVENOUS at 18:18

## 2024-06-17 NOTE — ED PROVIDER NOTES
Time: 5:48 PM EDT  Date of encounter:  6/17/2024  Independent Historian/Clinical History and Information was obtained by:   Patient    History is limited by: N/A    Chief Complaint: Lower extremity edema      History of Present Illness:  Patient is a 89 y.o. year old male who presents to the emergency department for evaluation of lower extremity edema.  Patient denies any pain associated.  He has no symptoms aside from lower extremity edema that he feels is new.  He denies dyspnea or chest pain, abdominal pain, headache or any other complaints.  He is prescribed Lasix for CHF but it sounds like there may be some noncompliance issues.    HPI    Patient Care Team  Primary Care Provider: Agustina Gutierrez MD    Past Medical History:     No Known Allergies  Past Medical History:   Diagnosis Date    Allergies     Anxiety     Arthritis     Asthma     BPH (benign prostatic hyperplasia)     Carpal tunnel syndrome     Colon polyp     Congestive heart failure (CHF)     COPD (chronic obstructive pulmonary disease)     Dementia     Depression     Diabetes mellitus type 2, noninsulin dependent     Diverticulitis     Erectile dysfunction of organic origin     Essential hypertension 11/19/2019    Excess skin     Fatigue 11/09/2019    GERD (gastroesophageal reflux disease)     Gout     Heart attack     Heart disease     High cholesterol     Hyperlipidemia     Hypertension     Hypogonadism in male     Insomnia     Leg swelling     Memory change 08/02/2018    currently, pt is taking namenda. i will pursue a mocha at his follow up     Myocardial infarction     Osteoarthritis     Parkinson's disease     Prostate disorder     Rectal bleeding     Renal cyst     Seasonal allergies     Sleep apnea     Stroke     TIA (transient ischemic attack) 08/02/2018    reportedly, the pt has a history of TIA. This can be discussed further at his follow up. i was asked that he cont. aspirin.    Tremor 08/02/2018    pt was a 3 mo history of a  high frequency, low amplitude tremor of both hands that worsens some what with activity. tremor does not interfere with his activities of daily living. he has no evidence of bradykinesia on exam. pakinsons seems unlikely at this point. potential etiologies would include essential tremor and physiologic tremor. he does note that he started theophylline approx. 3 months ag    Vitamin D deficiency      Past Surgical History:   Procedure Laterality Date    APPENDECTOMY  2015    CARDIAC CATHETERIZATION      CARDIAC SURGERY  2014    CARPAL TUNNEL RELEASE      CATARACT EXTRACTION      CIRCUMCISION  2000    COLONOSCOPY  2014 2017    CYSTOSCOPY      ENDOSCOPY  2010 2017    ENDOSCOPY N/A 11/23/2021    Procedure: ESOPHAGOGASTRODUODENOSCOPY;  Surgeon: Anika Cummins MD;  Location: [facility] MAIN OR;  Service: Gastroenterology;  Laterality: N/A;  gastritis    EYE SURGERY      eye implant    HERNIA REPAIR  2000    LAPAROSCOPIC GASTRIC BANDING      ORCHIECTOMY Right 2010    OTHER SURGICAL HISTORY      metal implants;     PENILE PROSTHESIS IMPLANT      TOE SURGERY      TOENAIL EXCISION      surgical removal of toenail and nail matrix of both feet    TOTAL KNEE ARTHROPLASTY Bilateral 2009 2010     Family History   Problem Relation Age of Onset    Diabetes Mother     Heart attack Father     Diabetes Brother     Rectal cancer Other         grandfather- rectal and colon    Prostate cancer Other     Diabetes Son        Home Medications:  Prior to Admission medications    Medication Sig Start Date End Date Taking? Authorizing Provider   allopurinol (ZYLOPRIM) 300 MG tablet Take 0.5 tablets by mouth Daily. 5/12/23   Agustina Gutierrez MD   amLODIPine (NORVASC) 10 MG tablet Take 1 tablet by mouth Daily. 5/12/23   Agustina Gutierrez MD   atorvastatin (LIPITOR) 20 MG tablet Take 1 tablet by mouth Daily. 4/11/24   Agustina Gutierrez MD   bacitracin 500 UNIT/GM ointment Apply 1 application  topically to the  appropriate area as directed 2 (Two) Times a Day.  Patient not taking: Reported on 11/22/2023 10/20/23   Amalia Castillo APRN   buPROPion XL (WELLBUTRIN XL) 150 MG 24 hr tablet Take 1 tablet by mouth Every Morning. 8/4/21   Lorenza Harding MD   carboxymethylcellulose (REFRESH PLUS) 0.5 % solution Administer 1 drop to both eyes 4 (Four) Times a Day As Needed.  Patient not taking: Reported on 3/7/2024    Lorenza Harding MD   cetirizine (zyrTEC) 10 MG tablet Take 1 tablet by mouth Daily. 8/17/23   Agustina Gutierrez MD   clopidogrel (PLAVIX) 75 MG tablet Take 1 tablet by mouth Daily.    Lorenza Harding MD   EPINEPHrine 0.15 MG/0.15ML solution auto-injector injection Inject  under the skin into the appropriate area as directed.  Patient not taking: Reported on 3/7/2024    Lorenza Harding MD   fluticasone (FLONASE) 50 MCG/ACT nasal spray 2 sprays into the nostril(s) as directed by provider Daily. 3/7/24   Agustina Gutierrez MD   fluticasone-salmeterol (ADVAIR HFA) 230-21 MCG/ACT inhaler Inhale 2 puffs Every 6 (Six) Hours. 3/7/24   Agustina Gutierrez MD   furosemide (LASIX) 20 MG tablet Take 1 tablet by mouth Daily. 11/20/23   Agustina Gutierrez MD   lisinopril (PRINIVIL,ZESTRIL) 10 MG tablet Take 1 tablet by mouth Daily. 5/12/23   Agustina Gutierrez MD   melatonin 1 MG tablet Take 6 tablets by mouth At Night As Needed.    Lorenza Harding MD   memantine (NAMENDA) 10 MG tablet Take 1 tablet twice a day 8/28/23   Arnav Borja MD   montelukast (SINGULAIR) 10 MG tablet Take 1 tablet by mouth Every Night. 10/26/22 8/5/25  Agustina Gutierrez MD   nystatin (MYCOSTATIN) 128140 UNIT/GM cream Apply  topically to the appropriate area as directed As Needed (scrotal pain).  Patient not taking: Reported on 10/20/2023 8/31/21   Christopher Lozano MD   pantoprazole (PROTONIX) 40 MG EC tablet Take 1 tablet by mouth Daily.  Patient not taking:  Reported on 3/7/2024 9/16/22   Agustina Gutierrez MD   potassium chloride (MICRO-K) 10 MEQ CR capsule Take 2 capsules by mouth 2 (Two) Times a Day.  Patient not taking: Reported on 11/22/2023    ProviderLorenza MD   propranolol (INDERAL) 10 MG tablet Take 1 tablet by mouth Daily. 11/20/23   Agustina Gutierrez MD   Tiotropium Bromide Monohydrate (SPIRIVA RESPIMAT) 1.25 MCG/ACT aerosol solution inhaler Inhale 2 puffs Every 12 (Twelve) Hours. 3/7/24   Agustina Gutierrez MD   venlafaxine XR (EFFEXOR-XR) 75 MG 24 hr capsule Take 1 capsule by mouth Daily. 5/20/21   Emergency, Nurse Pancho, RN   Vibegron 75 MG tablet Take 1 tablet by mouth Daily. 4/12/24   Christopher Lozano MD   vitamin D (ERGOCALCIFEROL) 1.25 MG (62063 UT) capsule capsule Take 1 capsule by mouth 2 (Two) Times a Week.  Patient not taking: Reported on 3/7/2024 5/15/23   Agustina Gutierrez MD        Social History:   Social History     Tobacco Use    Smoking status: Never     Passive exposure: Never    Smokeless tobacco: Never   Vaping Use    Vaping status: Never Used   Substance Use Topics    Alcohol use: Yes     Comment: occasional     Drug use: Never         Review of Systems:  Review of Systems   Constitutional:  Negative for chills and fever.   HENT:  Negative for congestion, rhinorrhea and sore throat.    Eyes:  Negative for photophobia.   Respiratory:  Negative for apnea, cough, chest tightness and shortness of breath.    Cardiovascular:  Positive for leg swelling. Negative for chest pain and palpitations.   Gastrointestinal:  Negative for abdominal pain, diarrhea, nausea and vomiting.   Endocrine: Negative.    Genitourinary:  Negative for difficulty urinating and dysuria.   Musculoskeletal:  Negative for back pain, joint swelling and myalgias.   Skin:  Negative for color change and wound.   Allergic/Immunologic: Negative.    Neurological:  Negative for seizures and headaches.   Psychiatric/Behavioral: Negative.    "  All other systems reviewed and are negative.       Physical Exam:  /85 (BP Location: Left arm, Patient Position: Sitting)   Pulse 60   Temp 98.2 °F (36.8 °C) (Oral)   Resp 19   Ht 172.7 cm (68\")   Wt 108 kg (238 lb 15.7 oz)   SpO2 95%   BMI 36.34 kg/m²     Physical Exam  Vitals and nursing note reviewed.   Constitutional:       General: He is awake.      Appearance: Normal appearance.   HENT:      Head: Normocephalic and atraumatic.      Nose: Nose normal.      Mouth/Throat:      Mouth: Mucous membranes are moist.   Eyes:      Extraocular Movements: Extraocular movements intact.      Pupils: Pupils are equal, round, and reactive to light.   Cardiovascular:      Rate and Rhythm: Normal rate and regular rhythm.      Heart sounds: Normal heart sounds.      Comments: Normal bilateral dorsalis pedis pulses with no concern for acute arterial occlusion to either lower extremity.  Pulmonary:      Effort: Pulmonary effort is normal. No respiratory distress.      Breath sounds: Normal breath sounds. No wheezing, rhonchi or rales.   Abdominal:      General: Bowel sounds are normal.      Palpations: Abdomen is soft.      Tenderness: There is no abdominal tenderness. There is no guarding or rebound.      Comments: No rigidity   Musculoskeletal:         General: No tenderness. Normal range of motion.      Cervical back: Normal range of motion and neck supple.      Right lower leg: Edema present.      Left lower leg: Edema present.      Comments: No evidence of cellulitis, abscess.   Skin:     General: Skin is warm and dry.      Coloration: Skin is not jaundiced.   Neurological:      General: No focal deficit present.      Mental Status: He is alert. Mental status is at baseline.   Psychiatric:         Mood and Affect: Mood normal.                  Procedures:  Procedures      Medical Decision Making:      Comorbidities that affect care:    COPD, CHF, hypertension, dementia, diabetes    External Notes " reviewed:    Previous Clinic Note: Urology office visit 4/12/2024.  Description: Renal mass      The following orders were placed and all results were independently analyzed by me:  Orders Placed This Encounter   Procedures    Comprehensive Metabolic Panel    BNP    Hester Draw    CBC Auto Differential    Scan Slide    Insert peripheral IV    CBC & Differential    Green Top (Gel)    Lavender Top    Gold Top - SST    Light Blue Top       Medications Given in the Emergency Department:  Medications   sodium chloride 0.9 % flush 10 mL (has no administration in time range)   furosemide (LASIX) injection 40 mg (has no administration in time range)        ED Course:         Labs:    Lab Results (last 24 hours)       Procedure Component Value Units Date/Time    CBC & Differential [[id number]]  (Abnormal) Collected: 06/17/24 1639    Specimen: Blood from Hand, Left Updated: 06/17/24 1721    Narrative:      The following orders were created for panel order CBC & Differential.  Procedure                               Abnormality         Status                     ---------                               -----------         ------                     CBC Auto Differential[[id number]]        Abnormal            Final result               Scan Slide[[id number]]                                       Final result                 Please view results for these tests on the individual orders.    Comprehensive Metabolic Panel [[id number]]  (Abnormal) Collected: 06/17/24 1639    Specimen: Blood from Hand, Left Updated: 06/17/24 1722     Glucose 89 mg/dL      BUN 21 mg/dL      Creatinine 1.17 mg/dL      Sodium 142 mmol/L      Potassium 4.4 mmol/L      Chloride 111 mmol/L      CO2 20.3 mmol/L      Calcium 8.7 mg/dL      Total Protein 6.0 g/dL      Albumin 3.8 g/dL      ALT (SGPT) 9 U/L      AST (SGOT) 14 U/L      Alkaline Phosphatase 63 U/L      Total Bilirubin 0.8 mg/dL      Globulin 2.2 gm/dL      A/G Ratio 1.7 g/dL      BUN/Creatinine Ratio  17.9     Anion Gap 10.7 mmol/L      eGFR 59.6 mL/min/1.73     Narrative:      GFR Normal >60  Chronic Kidney Disease <60  Kidney Failure <15    The GFR formula is only valid for adults with stable renal function between ages 18 and 70.    BNP [[id number]]  (Normal) Collected: 06/17/24 1639    Specimen: Blood from Hand, Left Updated: 06/17/24 1717     proBNP 837.5 pg/mL     Narrative:      This assay is used as an aid in the diagnosis of individuals suspected of having heart failure. It can be used as an aid in the diagnosis of acute decompensated heart failure (ADHF) in patients presenting with signs and symptoms of ADHF to the emergency department (ED). In addition, NT-proBNP of <300 pg/mL indicates ADHF is not likely.    Age Range Result Interpretation  NT-proBNP Concentration (pg/mL:      <50             Positive            >450                   Gray                 300-450                    Negative             <300    50-75           Positive            >900                  Gray                300-900                  Negative            <300      >75             Positive            >1800                  Gray                300-1800                  Negative            <300    CBC Auto Differential [[id number]]  (Abnormal) Collected: 06/17/24 1639    Specimen: Blood from Hand, Left Updated: 06/17/24 1721     WBC 6.72 10*3/mm3      RBC 5.04 10*6/mm3      Hemoglobin 15.7 g/dL      Hematocrit 48.3 %      MCV 95.8 fL      MCH 31.2 pg      MCHC 32.5 g/dL      RDW 13.5 %      RDW-SD 47.8 fl      MPV 10.9 fL      Platelets 137 10*3/mm3      Neutrophil % 56.2 %      Lymphocyte % 32.9 %      Monocyte % 6.4 %      Eosinophil % 2.7 %      Basophil % 1.5 %      Immature Grans % 0.3 %      Neutrophils, Absolute 3.78 10*3/mm3      Lymphocytes, Absolute 2.21 10*3/mm3      Monocytes, Absolute 0.43 10*3/mm3      Eosinophils, Absolute 0.18 10*3/mm3      Basophils, Absolute 0.10 10*3/mm3      Immature Grans, Absolute 0.02  10*3/mm3      nRBC 0.0 /100 WBC     Scan Slide [[id number]] Collected: 06/17/24 1639    Specimen: Blood from Hand, Left Updated: 06/17/24 1721     RBC Morphology Normal     WBC Morphology Normal     Platelet Estimate Decreased     Comment: No clumping seen                Imaging:    No Radiology Exams Resulted Within Past 24 Hours      Differential Diagnosis and Discussion:    Edema: Based on the patient's history, signs, and symptoms, the differential diagnosis includes but is not limited to heart failure, kidney disease, liver disease, venous insufficiency, lymphedema, pregnancy, medications, allergic reactions.    All labs were reviewed and interpreted by me.    [facility]               Patient Care Considerations:    CHEST X-RAY: I considered ordering a chest x-ray however the patient is not dyspneic or hypoxic.  He has no complaints aside from leg edema alone.      Consultants/Shared Management Plan:    None    Social Determinants of Health:    Patient has presented with family members who are responsible, reliable and will ensure follow up care.      Disposition and Care Coordination:    Discharged: The patient is suitable and stable for discharge with no need for consideration of admission.    I have explained the patient´s condition, diagnoses and treatment plan based on the information available to me at this time. I have answered questions and addressed any concerns. The patient has a good  understanding of the patient´s diagnosis, condition, and treatment plan as can be expected at this point. The vital signs have been stable. The patient´s condition is stable and appropriate for discharge from the emergency department.      The patient will pursue further outpatient evaluation with the primary care physician or other designated or consulting physician as outlined in the discharge instructions. They are agreeable to this plan of care and follow-up instructions have been explained in detail. The patient has  received these instructions in written format and has expressed an understanding of the discharge instructions. The patient is aware that any significant change in condition or worsening of symptoms should prompt an immediate return to this or the closest emergency department or call to 911.    Final diagnoses:   Congestive heart failure, unspecified HF chronicity, unspecified heart failure type   Pedal edema        ED Disposition       ED Disposition   Discharge    Condition   Stable    Comment   --               This medical record created using voice recognition software.             Lior Isaacs MD  06/17/24 1481

## 2024-06-17 NOTE — ED NOTES
PT STATES HE HAS TOO MANY MEDICATIONS TO KNOW ALL THE NAMES. PT STATES HE JUST NOTICED THE COLOR CHANGE TODAY ON HIS LOWER LEGS. PT IS SUPPOSED TO TAKE A DIURETIC BUT DOES NOT.

## 2024-06-17 NOTE — DISCHARGE INSTRUCTIONS
Take your furosemide/Lasix as prescribed daily.  Return to the emergency department for fever, expanding redness on your legs or any difficulty breathing/chest pain.  Today your blood work is very reassuring and your oxygen levels are normal.

## 2024-06-19 ENCOUNTER — PATIENT OUTREACH (OUTPATIENT)
Dept: CASE MANAGEMENT | Facility: OTHER | Age: 89
End: 2024-06-19
Payer: MEDICARE

## 2024-06-19 DIAGNOSIS — E11.9 TYPE 2 DIABETES MELLITUS WITHOUT COMPLICATION, WITHOUT LONG-TERM CURRENT USE OF INSULIN: Primary | ICD-10-CM

## 2024-06-19 DIAGNOSIS — I10 ESSENTIAL HYPERTENSION: ICD-10-CM

## 2024-06-19 RX ORDER — AMLODIPINE BESYLATE 10 MG/1
10 TABLET ORAL DAILY
Qty: 90 TABLET | Refills: 1 | Status: SHIPPED | OUTPATIENT
Start: 2024-06-19

## 2024-06-19 RX ORDER — FUROSEMIDE 20 MG/1
20 TABLET ORAL DAILY
Qty: 90 TABLET | Refills: 1 | Status: SHIPPED | OUTPATIENT
Start: 2024-06-19

## 2024-06-19 RX ORDER — CETIRIZINE HYDROCHLORIDE 10 MG/1
10 TABLET ORAL DAILY
Qty: 90 TABLET | Refills: 1 | Status: SHIPPED | OUTPATIENT
Start: 2024-06-19

## 2024-06-19 NOTE — OUTREACH NOTE
AMBULATORY CASE MANAGEMENT NOTE    Names and Relationships of Patient/Support Persons: Caller: Reddy Castellano; Relationship: Self -     CCM Interim Update    Called and spoke with patient and his son, Shekhar, to discuss CCM program. They give verbal informed consent for CCM program. They are aware that with the program we will set goals and care plans to target medication adherence. They are aware that there may be a copay with the program and that they can withdrawal from the program at any time.    I spoke with patient at length then son later in the conversation. Patient and the son both state there is no POA in place at this time. They are discussing going to [employer] and getting that done for the future. Patient is very pleasant to speak with. He is knowledgeable in some areas of his health and medications but he is easily distracted and loses track of where the conversation is going. Patient was attempting to write notes down regarding our conversation.    Patient admits that he is non-compliant with his medications. He is currently out of Lasix, Amlodipine and Zyrtec. There may be others but he wasn't sure. Patient also has not been using his inhalers.    I spoke with the patient and the son about needing to get a system down to ensure there is medication compliance. Patient agreed to bring all medications and a pill organizer to the office to see me on 6/24 after his appointment with his PCP.    Patient states he feels good other than both legs are very swollen and discolored. He was seen in the ER for this on 6/17. At that point his bp was 157/85, pulse 60, resp 19, O2 sats 95% and he weighed 238lbs.    Patient states he does not weigh himself at home. I had patient weight while we were on the phone and he weighed 234lbs. I told him that ideally he needs to weigh every day, at the same time, wearing something similar. He verbalized understanding but teaching will need to be reinforced.    Care  Coordination    Electronic communication with PCP.    Electronic communication with PCP MA.    Attempted to call pharmacy but there are closed today due to it being Jun[place]eenth.    Adult Patient Profile  Questions/Answers      Flowsheet Row Most Recent Value   Symptoms/Conditions Managed at Home behavioral health, cardiovascular, diabetes, type 2, obesity, respiratory, neurological, genitourinary, musculoskeletal   Barriers to Managing Health understanding health advice, medication, inability to prepare   Cardiovascular Symptoms/Conditions heart failure, myocardial infarction, hypertension, high blood cholesterol   Cardiovascular Management Strategies medication therapy, routine screening   Cardiovascular Self-Management Outcome 2 (bad)   Cardiovascular Comment increased bilateral leg swelling-recent ER visit 6/17   Diabetes Management Strategies routine screenings   Diabetes Self-Management Outcome 5 (very good)   Diabetes Comment most recent A1c 5.2   Genitourinary Symptoms/Conditions frequency, other (see comments)  [renal cyst]   Genitourinary Management Strategies medication therapy   Genitourinary Self-Management Outcome 3 (uncertain)   Genitourinary Comment sees urology   Musculoskeletal Symptoms/Conditions other (see comments)  [gout]   Musculoskeletal Management Strategies routine screening, medication therapy   Musculoskeletal Self-Management Outcome 3 (uncertain)   Neurological Symptoms/Conditions dementia, Parkinson's disease   Neurological Management Strategies medication therapy   Neurological Self-Management Outcome 3 (uncertain)   Obesity Symptoms/Conditions BMI 35-39.9: obesity class II   Obesity Management Strategies routine screening   Obesity Self-Management Outcome 3 (uncertain)   Respiratory Symptoms/Conditions asthma, COPD, cough, seasonal allergies   Respiratory Management Strategies routine screening, medication therapy   Respiratory Self-Management Outcome 3 (uncertain)   Behavioral Health  Symptoms/Conditions anxiety, depression   Behavioral Management Strategies counseling, medication therapy   Behavioral Health Self-Management Outcome 3 (uncertain)   Taking Medications Not Prescribed no   Missed Doses of Prescribed Medications During Past Week yes   Taken Prescribed Medications at Different Time or Schedule During Past Week no   Taken More or Less Medication Than Prescribed yes   Barriers to Taking Medication as Prescribed don't know what it is for, forget to take it   Current Living Arrangements home        SDOH updated and reviewed with the patient during this program:  Financial Resource Strain: Low Risk  (6/19/2024)    Overall Financial Resource Strain (CARDIA)     Difficulty of Paying Living Expenses: Not hard at all      --     Food Insecurity: No Food Insecurity (6/19/2024)    Hunger Vital Sign     Worried About Running Out of Food in the Last Year: Never true     Ran Out of Food in the Last Year: Never true      --     Housing Stability: Unknown (6/19/2024)    Housing Stability Vital Sign     Unable to Pay for Housing in the Last Year: No     Homeless in the Last Year: No      --     Transportation Needs: No Transportation Needs (6/19/2024)    PRAPARE - Transportation     Lack of Transportation (Medical): No     Lack of Transportation (Non-Medical): No       Education Documentation  VTE Symptoms, taught by Megha Burno RN at 6/19/2024  3:19 PM.  Learner: Family, Patient  Readiness: Nonacceptance  Method: Explanation  Response: Needs Reinforcement, No Evidence of Learning    Unresolved/Worsening Symptoms, taught by Megha Bruno RN at 6/19/2024  3:19 PM.  Learner: Family, Patient  Readiness: Nonacceptance  Method: Explanation  Response: Needs Reinforcement, No Evidence of Learning    Weight Monitoring, taught by Megha Bruno RN at 6/19/2024  3:19 PM.  Learner: Family, Patient  Readiness: Nonacceptance  Method: Explanation  Response: Needs Reinforcement, No Evidence of  Learning    Medication Management, taught by Megha Bruno RN at 6/19/2024  3:19 PM.  Learner: Family, Patient  Readiness: Nonacceptance  Method: Explanation  Response: Needs Reinforcement, No Evidence of Learning    Obesity, taught by Megha Bruno RN at 6/19/2024  3:19 PM.  Learner: Family, Patient  Readiness: Nonacceptance  Method: Explanation  Response: Needs Reinforcement, No Evidence of Learning    Hypertension, taught by Megha Bruno RN at 6/19/2024  3:19 PM.  Learner: Family, Patient  Readiness: Nonacceptance  Method: Explanation  Response: Needs Reinforcement, No Evidence of Learning    High Blood Cholesterol, taught by Megha Bruno RN at 6/19/2024  3:19 PM.  Learner: Family, Patient  Readiness: Nonacceptance  Method: Explanation  Response: Needs Reinforcement, No Evidence of Learning    Diabetes, taught by Megha Bruno RN at 6/19/2024  3:19 PM.  Learner: Family, Patient  Readiness: Nonacceptance  Method: Explanation  Response: Needs Reinforcement, No Evidence of Learning    Signs/Symptoms, taught by Megha Bruno RN at 6/19/2024  3:19 PM.  Learner: Family, Patient  Readiness: Nonacceptance  Method: Explanation  Response: Needs Reinforcement, No Evidence of Learning    Description, taught by Megha Bruno RN at 6/19/2024  3:19 PM.  Learner: Family, Patient  Readiness: Nonacceptance  Method: Explanation  Response: Needs Reinforcement, No Evidence of Learning    Causes, taught by Megha Bruno RN at 6/19/2024  3:19 PM.  Learner: Family, Patient  Readiness: Nonacceptance  Method: Explanation  Response: Needs Reinforcement, No Evidence of Learning          Megah CARTAGENA  Ambulatory Case Management    6/19/2024, 15:20 EDT

## 2024-06-24 ENCOUNTER — OFFICE VISIT (OUTPATIENT)
Dept: INTERNAL MEDICINE | Facility: CLINIC | Age: 89
End: 2024-06-24
Payer: MEDICARE

## 2024-06-24 VITALS
TEMPERATURE: 97.8 F | BODY MASS INDEX: 36.21 KG/M2 | HEART RATE: 77 BPM | DIASTOLIC BLOOD PRESSURE: 86 MMHG | SYSTOLIC BLOOD PRESSURE: 134 MMHG | HEIGHT: 68 IN | WEIGHT: 238.9 LBS | OXYGEN SATURATION: 95 %

## 2024-06-24 DIAGNOSIS — I50.9 CONGESTIVE HEART FAILURE, UNSPECIFIED HF CHRONICITY, UNSPECIFIED HEART FAILURE TYPE: Primary | ICD-10-CM

## 2024-06-24 PROCEDURE — 1125F AMNT PAIN NOTED PAIN PRSNT: CPT | Performed by: NURSE PRACTITIONER

## 2024-06-24 PROCEDURE — 1159F MED LIST DOCD IN RCRD: CPT | Performed by: NURSE PRACTITIONER

## 2024-06-24 PROCEDURE — 99213 OFFICE O/P EST LOW 20 MIN: CPT | Performed by: NURSE PRACTITIONER

## 2024-06-24 PROCEDURE — 1160F RVW MEDS BY RX/DR IN RCRD: CPT | Performed by: NURSE PRACTITIONER

## 2024-06-24 NOTE — PROGRESS NOTES
"Chief Complaint  Hospital Follow Up Visit (Hospital f/u)    Subjective      Reddy Castellano is a 89 y.o. male who presents to [facility]     Patient evaluated in the ER for lower extremity edema without other associated symptoms.  Patient denied dyspnea, chest pain, shortness of breath.  Patient was reportedly not taking his Lasix at time of visit.  Patient reports since discharge (6 days ago) his swelling has improved.  Admits that he has not been wearing compression socks, does sit with his legs elevated into his recliner.  Patient reports he has been talking with a chronic  about getting a pill planner to help compliance with his medications.  He lives with his son and feels that he has a good support system.  He has no further concerns today.    Objective   Vital Signs:   Vitals:    06/24/24 0923   BP: 134/86   BP Location: Left arm   Patient Position: Sitting   Cuff Size: Large Adult   Pulse: 77   Temp: 97.8 °F (36.6 °C)   TempSrc: Temporal   SpO2: 95%   Weight: 108 kg (238 lb 14.4 oz)   Height: 172.7 cm (67.99\")     Body mass index is 36.33 kg/m².    Wt Readings from Last 3 Encounters:   06/24/24 108 kg (238 lb 14.4 oz)   06/17/24 108 kg (238 lb 15.7 oz)   04/12/24 110 kg (242 lb 12.8 oz)     BP Readings from Last 3 Encounters:   06/24/24 134/86   06/17/24 169/93   03/07/24 136/78       Health Maintenance   Topic Date Due    RSV Vaccine - Adults (1 - 1-dose 60+ series) Never done    ZOSTER VACCINE (2 of 3) 02/26/2019    DIABETIC EYE EXAM  08/23/2022    COVID-19 Vaccine (9 - 2023-24 season) 09/01/2023    HEMOGLOBIN A1C  09/07/2024    INFLUENZA VACCINE  08/01/2024    ANNUAL WELLNESS VISIT  12/21/2024    LIPID PANEL  03/07/2025    URINE MICROALBUMIN  03/07/2025    BMI FOLLOWUP  03/07/2025    TDAP/TD VACCINES (3 - Td or Tdap) 10/20/2033    Pneumococcal Vaccine 65+  Completed       Physical Exam  Constitutional:       Appearance: Normal appearance.   HENT: "      Head: Normocephalic and atraumatic.      Nose: Nose normal.      Mouth/Throat:      Mouth: Mucous membranes are moist.      Pharynx: Oropharynx is clear.   Eyes:      Extraocular Movements: Extraocular movements intact.      Conjunctiva/sclera: Conjunctivae normal.      Pupils: Pupils are equal, round, and reactive to light.   Neck:      Thyroid: No thyroid mass, thyromegaly or thyroid tenderness.   Cardiovascular:      Rate and Rhythm: Normal rate and regular rhythm.      Heart sounds: Normal heart sounds.   Pulmonary:      Effort: Pulmonary effort is normal.      Breath sounds: Normal breath sounds.   Skin:     General: Skin is warm and dry.   Neurological:      General: No focal deficit present.      Mental Status: He is alert and oriented to person, place, and time.   Psychiatric:         Mood and Affect: Mood normal.         Behavior: Behavior normal.         Thought Content: Thought content normal.          Result Review :  The following data was reviewed by: MALCOLM Huston on 06/24/2024:         Procedures          Assessment & Plan  Congestive heart failure, unspecified HF chronicity, unspecified heart failure type  Continue Lasix and propranolol.  Discussed the importance of compression stockings and elevation of the lower extremities.  He will follow up with CCM to discuss pill planner and other interventions to help with medication compliance.  Could consider repeat echo and/or referral to cardiology in the future.  Patient will follow up with PCP, Dr. Gutierrez, as scheduled.  He should seek medical attention immediately with persistent shortness of breath, lower extremity edema, orthopnea.                 FOLLOW UP  Return for Next scheduled follow up or sooner if concerns arise.  Patient was given instructions and counseling regarding his condition or for health maintenance advice. Please see specific information pulled into the AVS if appropriate.       MALCOLM Huston  06/24/24  10:56  EDT    CURRENT & DISCONTINUED MEDICATIONS  Current Outpatient Medications   Medication Instructions    allopurinol (ZYLOPRIM) 150 mg, Oral, Daily    amLODIPine (NORVASC) 10 mg, Oral, Daily    atorvastatin (LIPITOR) 20 mg, Oral, Daily    buPROPion XL (WELLBUTRIN XL) 150 mg, Oral, Every Morning    cetirizine (ZYRTEC) 10 mg, Oral, Daily    clopidogrel (PLAVIX) 75 MG tablet 1 tablet, Oral, Daily    EPINEPHrine 0.15 MG/0.15ML solution auto-injector injection Subcutaneous    fluticasone (FLONASE) 50 MCG/ACT nasal spray 2 sprays, Nasal, Daily    fluticasone-salmeterol (ADVAIR HFA) 230-21 MCG/ACT inhaler 2 puffs, Inhalation, Every 6 Hours - RT    furosemide (LASIX) 20 mg, Oral, Daily    lisinopril (PRINIVIL,ZESTRIL) 10 mg, Oral, Daily    melatonin 6 mg, Oral, Nightly PRN    memantine (NAMENDA) 10 MG tablet Take 1 tablet twice a day    montelukast (SINGULAIR) 10 mg, Oral, Nightly    propranolol (INDERAL) 10 mg, Oral, Daily    venlafaxine XR (EFFEXOR-XR) 75 mg, Oral, Daily    Vibegron 75 mg, Oral, Daily       Medications Discontinued During This Encounter   Medication Reason    bacitracin 500 UNIT/GM ointment     carboxymethylcellulose (REFRESH PLUS) 0.5 % solution     nystatin (MYCOSTATIN) 616349 UNIT/GM cream     pantoprazole (PROTONIX) 40 MG EC tablet     potassium chloride (MICRO-K) 10 MEQ CR capsule     Tiotropium Bromide Monohydrate (SPIRIVA RESPIMAT) 1.25 MCG/ACT aerosol solution inhaler     vitamin D (ERGOCALCIFEROL) 1.25 MG (94422 UT) capsule capsule

## 2024-06-27 ENCOUNTER — PATIENT OUTREACH (OUTPATIENT)
Dept: CASE MANAGEMENT | Facility: OTHER | Age: 89
End: 2024-06-27
Payer: MEDICARE

## 2024-06-27 DIAGNOSIS — E11.9 TYPE 2 DIABETES MELLITUS WITHOUT COMPLICATION, WITHOUT LONG-TERM CURRENT USE OF INSULIN: Primary | ICD-10-CM

## 2024-06-27 DIAGNOSIS — I10 ESSENTIAL HYPERTENSION: ICD-10-CM

## 2024-06-27 NOTE — OUTREACH NOTE
[facility] End of Month Documentation    This Chronic Medical Management Care Plan for Reddy Castellano, 89 y.o. male, has been a new plan of care implemented; established and a new plan of care implemented for the month of June.  A cumulative time of 91  minutes was spent on this patient record this month, including phone call with patient; phone call with relative; electronic communication with primary care provider; chart review.    Regarding the patient's problems: has Gout; Type 2 diabetes mellitus without complication, without long-term current use of insulin; Essential hypertension; Other hyperlipidemia; Major depressive disorder, recurrent, moderate; Scrotal pain; Urge incontinence of urine; Urgency of urination; Balanitis; Candida rash of groin; Abnormal weight loss; Anxiety; Rheumatoid arthritis; Chronic obstructive pulmonary disease; Constipation; Depressive disorder; Disease; Disorder of prostate; Disorder of vision; Essential tremor; Excess skin of abdominal wall; Heart failure; Mechanical complication of genitourinary device; Morbid obesity; Myocardial infarction; Numbness; Pneumonia; Seasonal allergies; Sleep apnea; Spondylosis of lumbar spine; Tingling of skin; Ventral hernia; Lymphadenopathy of head and neck; Swallowed foreign body; Hyperkalemia; Benign prostatic hyperplasia with urinary frequency; Renal mass; Allergic rhinitis; Mild cognitive impairment; Wound of right leg; Scalp itch; and Cellulitis of left forearm on their problem list., the following items were addressed: medical records; medications and any changes can be found within the plan section of the note.  A detailed listing of time spent for chronic care management is tracked within each outreach encounter.  Current medications include:  has a current medication list which includes the following prescription(s): allopurinol, amlodipine, atorvastatin, bupropion xl, cetirizine, clopidogrel, epinephrine, fluticasone, fluticasone-salmeterol,  furosemide, lisinopril, melatonin, memantine, montelukast, propranolol, venlafaxine xr, and vibegron. and the patient is reported to be patient is noncompliant with medication protocol,  Medications are reported to be non-effective in controlling symptoms and changes have been made to the medication protocol.  Regarding these diagnoses, referrals were made to the following provider(s):  N/A.  All notes on chart for PCP to review.    The patient was monitored remotely for medications; weight; mood & behavior.    The patient's physical needs include:  help taking medications as prescribed; physical healthcare; medication education.     The patient's mental support needs include:  continued support, seeing behavioral health    The patient's cognitive support needs include:  medication; increased support; coordination of community providers; health care    The patient's psychosocial support needs include:  medication management or adherence; need for increased support; coordination of community providers    The patient's functional needs include: physical healthcare; medication education    The patient's environmental needs include:  not applicable    Care Plan overall comments:  Established    Refer to previous outreach notes for more information on the areas listed above.    Monthly Billing Diagnoses  (E11.9) Type 2 diabetes mellitus without complication, without long-term current use of insulin    (I10) Essential hypertension    Medications   Medications have been reconciled    Care Plan progress this month:      Recently Modified Care Plans Updates made since 5/27/2024 12:00 AM       Wellness (Adult)           Problem Priority Last Modified     Medication Adherence (Wellness) --  6/19/2024  3:13 PM by Megha Bruno, RN              Goal Recent Progress Last Modified     Medication Adherence Maintained --  6/19/2024  3:13 PM by Megha Bruno, RN     Evidence-based guidance:   Develop a complete and accurate  medication list including those prescribed and over-the-counter, those taken only occasionally and those not taken by mouth such as injections, inhalers, ointments or creams and drops.   Review all medications to determine if patient or caregiver knows why the medications are given and if taken as prescribed.   Complete or review a medication adherence assessment including barriers to medication adherence.   Arrange and encourage counseling and medication review by pharmacist.   Assess barriers to medication adherence.   Manage poor understanding or health literacy by using easy to understand language, teach-back, visual aids and teaching only 2 or 3 points at a time.   Assess presence of side effects; provide suggestions to manage or reduce side effects.   Consult with provider and/or pharmacist regarding substitute medication, changing dose, simplification of regimen or safe discontinuation of some medications.   Encourage the use of medication reminders such as clock or cell phone alarm, color coding, pillboxes for am/pm and days of the week, pharmacy refill reminder, auto-refill system or mail-order option.   Assist with resources when cost is a barrier; refer to prescription assistance programs; confirm that generics are prescribed whenever possible; consider 90-day prescriptions to reduce copay cost; synchronize refills.   Provide help to complete medication assistance applications or health insurance forms as needed.   Complete a follow-up call 2 to 3 weeks after medication self-management plan developed; assess adherence and understanding, as well as listen to patient or caregiver concerns; amend plan as needed.   Provide frequent follow-up providing motivation, encouragement and support when medication nonadherence is identified.    Notes:            Task Due Date Last Modified     Optimize Medication Use --  6/19/2024  3:13 PM by Megha Bruno RN     Care Management Activities:      - barriers to  medication adherence identified  - medication list compiled  - medication list reviewed  - medication-adherence assessment completed  - medication reminder use encouraged  - understanding of current medications assessed      Notes:                   Heart Failure (Adult)           Problem Priority Last Modified     Symptom Exacerbation (Heart Failure) --  6/19/2024  3:13 PM by Megha Bruno RN              Goal Recent Progress Last Modified     Symptom Exacerbation Prevented or Minimized --  6/19/2024  3:13 PM by Megha Bruno RN     Evidence-based guidance:   Perform or review cognitive and/or health literacy screening.   Assess understanding of adherence and barriers to treatment plan, as well as lifestyle changes; develop strategies to address barriers.   Establish a [id number] early intervention process to communicate with primary care provider when signs/symptoms worsen.   Facilitate timely posthospital discharge or emergency department treatment that includes intensive follow-up via telephone calls, home visit, telehealth monitoring and care at multidisciplinary heart failure clinic.   Adjust frequency and intensity of follow-up based on presentation, number of emergency department visits, hospital admissions and frequency and severity of symptom exacerbation.   Facilitate timely visit, usually within 1 week, with primary care provider following hospital discharge.   Collaborate with clinical pharmacist to address adverse drug reactions, drug interactions, subtherapeutic dosage, patient and family education.   Regularly screen for presence of depressive symptoms using a validated tool; consider pharmacologic therapy and/or referral for cognitive behavioral therapy when present.   Refer to community-based services, such as a heart failure support group, community health worker or peer support program.   Review immunization status; arrange receipt of needed vaccinations.   Prepare patient for  home oxygen use based on signs/symptoms.    Notes:            Task Due Date Last Modified     Identify and Minimize Risk of Heart Failure Exacerbation --  6/19/2024  3:13 PM by Megha Bruno RN     Care Management Activities:      - barriers to treatment reviewed and addressed  - medication-adherence assessment completed      Notes:              Problem Priority Last Modified     Disease Progression (Heart Failure) --  6/19/2024  3:13 PM by Megha Bruno RN              Goal Recent Progress Last Modified     Comorbidities Identified and Managed --  6/19/2024  3:13 PM by Megha Bruno RN     Evidence-based guidance:   Assess and address signs/symptoms of comorbidity, including dyslipidemia, diabetes, iron deficiency, gout, arthritis, dysrhythmia, hypertension, cachexia, coronary artery disease, kidney dysfunction and lung disease.   Prepare patient for laboratory and diagnostic exams based on risk and presentation.   Prepare for use of pharmacologic therapy that may include statin, angiotensin converting enzyme (ACE) inhibitor, angiotensin receptor blocker (ARB), beta-blocker, digoxin, antidysrhythmic, diuretic or omega-3 fatty acid.   Monitor side effects and anticipate need for periodic adjustments.   Prepare patient for potential invasive treatment, such as implantable cardioverter-defibrillator, cardiac resynchronization therapy or heart transplant as disease progresses.    Notes:            Task Due Date Last Modified     Identify and Manage Comorbidities --  6/19/2024  3:13 PM by Megha Bruno RN     Care Management Activities:      - not discussed during this outreach      Notes:            Goal Recent Progress Last Modified     Health Optimized --  6/19/2024  3:13 PM by Megha Bruno RN     Evidence-based guidance:   Use brief intervention, such as 5 A's (Ask, Advise, Assess, Assist, Arrange) to encourage smoking cessation; refer to smoking cessation program, if ready for more intensive  intervention.   Perform or refer to a registered dietitian for a nutrition assessment and nutrition-focused physical exam.    Identify potential micronutrient deficiencies, such as iron, vitamin D and thiamin.   Assess need for potential diet and fluid modification, such as reduced sodium or fluid intake.   Minimize unnecessary dietary restrictions to increase oral intake. Note: Sodium restriction should be individualized to the patient and clinical status.   Facilitate home monitoring of weight.    Notes:            Task Due Date Last Modified     Optimize Health --  6/19/2024  3:13 PM by Megha Bruno RN     Care Management Activities:      - home monitoring of blood pressure encouraged  - home monitoring of weight gain or loss encouraged  - weight gain or loss reviewed and trended      Notes:              Problem Priority Last Modified     Activity Tolerance (Heart Failure) --  6/19/2024  3:13 PM by Megha Bruno RN              Goal Recent Progress Last Modified     Activity Tolerance Optimized --  6/19/2024  3:13 PM by Megha Bruno RN     Evidence-based guidance:   Promote daily physical activity that improves functional ability, cognition and quality of life.   Encourage reduction in sedentary time.    Encourage optimal, safe functional mobility and self-care performance based on ability and tolerance.    Promote breathing and energy conservation techniques, such as pursed-lip breathing, preplanning and pacing of activity, balancing activity and rest.   Encourage participation in cardiac rehabilitation services.    Notes:            Task Due Date Last Modified     Maintain Strength and Functional Ability --  6/19/2024  3:13 PM by Megah Bruno RN     Care Management Activities:      - not discussed during this outreach      Notes:              Problem Priority Last Modified     Obstructive Sleep Apnea (Heart Failure) --  6/19/2024  3:13 PM by Megha Bruno RN              Goal Recent Progress  Last Modified     Effective Breathing Pattern During Sleep --  6/19/2024  3:13 PM by Megha Bruno, RN     Evidence-based guidance:   Use a validated screening tool to identify risk for obstructive sleep apnea (WILMER).   Encourage a nonsupine sleep position, such as side-lying, head of bed elevated, multiple pillows, to prevent airway collapse.   Encourage the use of sleep hygiene techniques.   Anticipate a referral for sleep study (polysomnography).   If WILMER is identified, prepare patient for treatment options, such as nighttime oxygen therapy and CPAP (continuous positive airway pressure) or BiPAP (bilevel positive airway pressure).    Notes:            Task Due Date Last Modified     Monitor and Manage Obstructive Sleep Apnea (WILMER) --  6/19/2024  3:13 PM by Megha Bruno, RN     Care Management Activities:      - not discussed during this outreach      Notes:                        Current Specialty Plan of Care Status signed by both patient and provider    Instructions   Patient was provided an electronic copy of care plan  CCM services were explained and offered and patient has accepted these services.  Patient has given their written consent to receive CCM services and understands that this includes the authorization of electronic communication of medical information with the other treating providers.  Patient understands that they may stop CCM services at any time and these changes will be effective at the end of the calendar month and will effectively revocate the agreement of CCM services.  Patient understands that only one practitioner can furnish and be paid for CCM services during one calendar month.  Patient also understands that there may be co-payment or deductible fees in association with CCM services.  Patient will continue with at least monthly follow-up calls with the Ambulatory .    Megha CARTAGENA  Ambulatory Case Management    6/27/2024, 09:14 EDT

## 2024-07-02 ENCOUNTER — TELEPHONE (OUTPATIENT)
Dept: CASE MANAGEMENT | Facility: OTHER | Age: 89
End: 2024-07-02
Payer: MEDICARE

## 2024-07-05 ENCOUNTER — TELEPHONE (OUTPATIENT)
Dept: CASE MANAGEMENT | Facility: OTHER | Age: 89
End: 2024-07-05
Payer: MEDICARE

## 2024-07-05 NOTE — TELEPHONE ENCOUNTER
Attempted to call patient. He states he is unable to talk right now. He will call me back later.

## 2024-07-10 ENCOUNTER — OFFICE VISIT (OUTPATIENT)
Dept: INTERNAL MEDICINE | Facility: CLINIC | Age: 89
End: 2024-07-10
Payer: MEDICARE

## 2024-07-10 ENCOUNTER — PATIENT OUTREACH (OUTPATIENT)
Dept: CASE MANAGEMENT | Facility: OTHER | Age: 89
End: 2024-07-10
Payer: MEDICARE

## 2024-07-10 VITALS
HEIGHT: 68 IN | HEART RATE: 75 BPM | BODY MASS INDEX: 36.56 KG/M2 | SYSTOLIC BLOOD PRESSURE: 150 MMHG | WEIGHT: 241.2 LBS | TEMPERATURE: 97.9 F | OXYGEN SATURATION: 95 % | DIASTOLIC BLOOD PRESSURE: 76 MMHG | RESPIRATION RATE: 14 BRPM

## 2024-07-10 DIAGNOSIS — E78.49 OTHER HYPERLIPIDEMIA: ICD-10-CM

## 2024-07-10 DIAGNOSIS — R29.6 FALLS FREQUENTLY: ICD-10-CM

## 2024-07-10 DIAGNOSIS — E11.9 TYPE 2 DIABETES MELLITUS WITHOUT COMPLICATION, WITHOUT LONG-TERM CURRENT USE OF INSULIN: Primary | ICD-10-CM

## 2024-07-10 DIAGNOSIS — F41.9 ANXIETY: ICD-10-CM

## 2024-07-10 DIAGNOSIS — E11.9 TYPE 2 DIABETES MELLITUS WITHOUT COMPLICATION, WITHOUT LONG-TERM CURRENT USE OF INSULIN: ICD-10-CM

## 2024-07-10 DIAGNOSIS — I10 ESSENTIAL HYPERTENSION: ICD-10-CM

## 2024-07-10 DIAGNOSIS — R29.898 WEAKNESS OF BOTH LOWER EXTREMITIES: ICD-10-CM

## 2024-07-10 DIAGNOSIS — F33.1 MAJOR DEPRESSIVE DISORDER, RECURRENT, MODERATE: ICD-10-CM

## 2024-07-10 DIAGNOSIS — I10 ESSENTIAL HYPERTENSION: Primary | ICD-10-CM

## 2024-07-10 DIAGNOSIS — Z13.29 THYROID DISORDER SCREEN: ICD-10-CM

## 2024-07-10 DIAGNOSIS — I50.9 HEART FAILURE, UNSPECIFIED HF CHRONICITY, UNSPECIFIED HEART FAILURE TYPE: ICD-10-CM

## 2024-07-10 LAB
ALBUMIN SERPL-MCNC: 3.8 G/DL (ref 3.5–5.2)
ALBUMIN/GLOB SERPL: 1.4 G/DL
ALP SERPL-CCNC: 68 U/L (ref 39–117)
ALT SERPL W P-5'-P-CCNC: 10 U/L (ref 1–41)
ANION GAP SERPL CALCULATED.3IONS-SCNC: 11.1 MMOL/L (ref 5–15)
AST SERPL-CCNC: 15 U/L (ref 1–40)
BASOPHILS # BLD AUTO: 0.08 10*3/MM3 (ref 0–0.2)
BASOPHILS NFR BLD AUTO: 1.3 % (ref 0–1.5)
BILIRUB SERPL-MCNC: 0.5 MG/DL (ref 0–1.2)
BUN SERPL-MCNC: 25 MG/DL (ref 8–23)
BUN/CREAT SERPL: 16.2 (ref 7–25)
CALCIUM SPEC-SCNC: 9 MG/DL (ref 8.6–10.5)
CHLORIDE SERPL-SCNC: 112 MMOL/L (ref 98–107)
CHOLEST SERPL-MCNC: 265 MG/DL (ref 0–200)
CO2 SERPL-SCNC: 21.9 MMOL/L (ref 22–29)
CREAT SERPL-MCNC: 1.54 MG/DL (ref 0.76–1.27)
DEPRECATED RDW RBC AUTO: 46.9 FL (ref 37–54)
EGFRCR SERPLBLD CKD-EPI 2021: 42.9 ML/MIN/1.73
EOSINOPHIL # BLD AUTO: 0.19 10*3/MM3 (ref 0–0.4)
EOSINOPHIL NFR BLD AUTO: 3 % (ref 0.3–6.2)
ERYTHROCYTE [DISTWIDTH] IN BLOOD BY AUTOMATED COUNT: 13.5 % (ref 12.3–15.4)
GLOBULIN UR ELPH-MCNC: 2.7 GM/DL
GLUCOSE SERPL-MCNC: 96 MG/DL (ref 65–99)
HBA1C MFR BLD: 5.5 % (ref 4.8–5.6)
HCT VFR BLD AUTO: 48.9 % (ref 37.5–51)
HDLC SERPL-MCNC: 41 MG/DL (ref 40–60)
HGB BLD-MCNC: 16.3 G/DL (ref 13–17.7)
IMM GRANULOCYTES # BLD AUTO: 0.03 10*3/MM3 (ref 0–0.05)
IMM GRANULOCYTES NFR BLD AUTO: 0.5 % (ref 0–0.5)
LDLC SERPL CALC-MCNC: 180 MG/DL (ref 0–100)
LDLC/HDLC SERPL: 4.35 {RATIO}
LYMPHOCYTES # BLD AUTO: 1.89 10*3/MM3 (ref 0.7–3.1)
LYMPHOCYTES NFR BLD AUTO: 29.9 % (ref 19.6–45.3)
MCH RBC QN AUTO: 31.4 PG (ref 26.6–33)
MCHC RBC AUTO-ENTMCNC: 33.3 G/DL (ref 31.5–35.7)
MCV RBC AUTO: 94.2 FL (ref 79–97)
MONOCYTES # BLD AUTO: 0.43 10*3/MM3 (ref 0.1–0.9)
MONOCYTES NFR BLD AUTO: 6.8 % (ref 5–12)
NEUTROPHILS NFR BLD AUTO: 3.71 10*3/MM3 (ref 1.7–7)
NEUTROPHILS NFR BLD AUTO: 58.5 % (ref 42.7–76)
NRBC BLD AUTO-RTO: 0 /100 WBC (ref 0–0.2)
PLATELET # BLD AUTO: 146 10*3/MM3 (ref 140–450)
PMV BLD AUTO: 11 FL (ref 6–12)
POTASSIUM SERPL-SCNC: 4.7 MMOL/L (ref 3.5–5.2)
PROT SERPL-MCNC: 6.5 G/DL (ref 6–8.5)
RBC # BLD AUTO: 5.19 10*6/MM3 (ref 4.14–5.8)
SODIUM SERPL-SCNC: 145 MMOL/L (ref 136–145)
TRIGL SERPL-MCNC: 229 MG/DL (ref 0–150)
TSH SERPL DL<=0.05 MIU/L-ACNC: 1.79 UIU/ML (ref 0.27–4.2)
VLDLC SERPL-MCNC: 44 MG/DL (ref 5–40)
WBC NRBC COR # BLD AUTO: 6.33 10*3/MM3 (ref 3.4–10.8)

## 2024-07-10 PROCEDURE — 1125F AMNT PAIN NOTED PAIN PRSNT: CPT | Performed by: NURSE PRACTITIONER

## 2024-07-10 PROCEDURE — 84443 ASSAY THYROID STIM HORMONE: CPT | Performed by: NURSE PRACTITIONER

## 2024-07-10 PROCEDURE — 80053 COMPREHEN METABOLIC PANEL: CPT | Performed by: NURSE PRACTITIONER

## 2024-07-10 PROCEDURE — 85025 COMPLETE CBC W/AUTO DIFF WBC: CPT | Performed by: NURSE PRACTITIONER

## 2024-07-10 PROCEDURE — 80061 LIPID PANEL: CPT | Performed by: NURSE PRACTITIONER

## 2024-07-10 PROCEDURE — 99214 OFFICE O/P EST MOD 30 MIN: CPT | Performed by: NURSE PRACTITIONER

## 2024-07-10 PROCEDURE — G2211 COMPLEX E/M VISIT ADD ON: HCPCS | Performed by: NURSE PRACTITIONER

## 2024-07-10 PROCEDURE — 83036 HEMOGLOBIN GLYCOSYLATED A1C: CPT | Performed by: NURSE PRACTITIONER

## 2024-07-10 NOTE — PROGRESS NOTES
"Chief Complaint  Follow-up (3 month follow-up)    Subjective        Reddy Castellano presents to [facility] for 3-month follow-up.  Patient unknown to me, was unable to get in soon enough with his primary care provider.  Patient was just recently seen by another provider in our office for a emergency room follow-up for heart failure.  That was on 2024.  His ER visit was 2024.  His labs at the time were consistent with heart failure.  He has significantly improved since that time.  He does have chronic care manager on board to help with medication compliance and interventions.  She was able to meet with him today before our visit, they did clarify his medications, he brought all medicines in with him today.  He did have several  medications which were appropriately discarded, and we updated his medication list to ensure that he had medication refills at his pharmacy.  Overall, patient does not have any significant concerns or complaints today.  He is willing to start taking medication with better compliance, he admits that he has had poor compliance with medications in the past.    Objective   Vital Signs:   /76 (BP Location: Left arm, Patient Position: Sitting, Cuff Size: Adult)   Pulse 75   Temp 97.9 °F (36.6 °C) (Temporal)   Resp 14   Ht 172.7 cm (67.99\")   Wt 109 kg (241 lb 3.2 oz)   SpO2 95%   BMI 36.68 kg/m²     Physical Exam  Vitals and nursing note reviewed.   Constitutional:       Appearance: Normal appearance. He is obese.   Cardiovascular:      Rate and Rhythm: Normal rate.   Pulmonary:      Effort: Pulmonary effort is normal.   Neurological:      Mental Status: He is alert.   Psychiatric:         Mood and Affect: Mood normal.        Result Review :  {The following data was reviewed by MALCOLM Kyle on 07/10/24                Diagnoses and all orders for this visit:    1. Essential hypertension (Primary)  -     CBC & Differential  -     " Comprehensive Metabolic Panel    2. Other hyperlipidemia  -     Lipid Panel    3. Type 2 diabetes mellitus without complication, without long-term current use of insulin  -     CBC & Differential  -     Comprehensive Metabolic Panel  -     Hemoglobin A1c    4. Major depressive disorder, recurrent, moderate    5. Anxiety    6. Heart failure, unspecified HF chronicity, unspecified heart failure type    7. Thyroid disorder screen  -     TSH    8. Weakness of both lower extremities  -     Ambulatory Referral to Physical Therapy    9. Falls frequently  -     Ambulatory Referral to Physical Therapy    Overall, patient reports doing quite well.  He does report some lower extremity weakness, he has had a few falls.  He has been reluctant to use a cane or a walker.  He was open to going to physical therapy to work on strengthening left lower extremities and gait.  Referral placed today.  I would encourage him to remove all tripping hazards in the home.  To be very cautious when out and about.  I would encourage him to use a cane at a minimum, but reluctant to do so at this time.  His other chronic conditions seem to be well-controlled.  He is due for labs today, ordered and completed today.  Will follow-up with him additionally once these results are available, we will have staff contact him.  We will make sure he gets a follow-up scheduled with his PCP in 3 months.  He is obviously able to be seen sooner if needed.      Follow Up   Return in about 3 months (around 10/10/2024) for Recheck.  Patient was given instructions and counseling regarding his condition or for health maintenance advice. Please see specific information pulled into the AVS if appropriate.     MALCOLM Kyle  7/10/2024  This note was electronically signed.

## 2024-07-10 NOTE — OUTREACH NOTE
AMBULATORY CASE MANAGEMENT NOTE    Names and Relationships of Patient/Support Persons: Contact: Reddy Castellano; Relationship: Self -     [facility] Interim Update    I spoke with patient on the phone today. He has an appointment in the office today to see the provider. He has agreed to come in 30 minutes early and bring his medication containers so we can go over them all in detail.    I have printed a medication list for patient with documentation as to what each medication is treating.    Care Coordination    Electronic communication with provider    [facility] Interim Update    I met with patient face to face in the office. Patient brought all of his medications with him in a bag for me to go through. Probably 2/3 of these medications were either  or patient is no longer prescribed to take. Patient was agreeable for me to discard of these medications. Patient will be leaving the office with amlodipine 10mg, atorvastatin 20mg, cetirizine 10mg, flonase, advair HFA and furosemide 20mg.    The following medications were sent to [facility] pharmacy on 2024:  Allopurinol 300mg, atorvastatin 20mg, bupropion XL 150mg, clopidogrel 75mg, advair HFA, lisinipril 10mg, melatonin 1mg, memantine 10mg, montelukast 10mg, venlafaxine XR 75mg and vibegron 75mg    Patient is aware and is agreeable to pick these medications up from the pharmacy. I have discussed with the patient about setting up a time to come back into the office so I can assist him with placing them into his pillbox appropriately.    My ultimate goal for the patient would be to have his medications pre-packaged and mailed to his home but I may run into boundaries due to insurance coverage and cost.    Care Coordination    Spoke with provider face to face    Phone call to pharmacy    Phone call to insurance company    Adult Patient Profile  Questions/Answers      Flowsheet Row Most Recent Value   Symptoms/Conditions Managed at Home behavioral health,  cardiovascular, diabetes, type 2, genitourinary, neurological, respiratory   Barriers to Managing Health understanding health advice, other (see comments)  [memory]   Cardiovascular Symptoms/Conditions hypertension, high blood cholesterol, heart failure   Cardiovascular Management Strategies routine screening, medication therapy, fluid modification, weight management   Cardiovascular Self-Management Outcome 2 (bad)   Cardiovascular Comment patient states he has recently gained 4 pounds   Diabetes Management Strategies routine screenings, weight management   A1C Target below 7   Last A1C Result 5.7   Diabetes Self-Management Outcome 5 (very good)   Diabetes Comment cooper recent A1C was 5.7-patient is due for another A1C drawn   Genitourinary Symptoms/Conditions frequency, incontinence   Genitourinary Management Strategies medication therapy   Genitourinary Self-Management Outcome 4 (good)   Genitourinary Comment sees urology   Musculoskeletal Symptoms/Conditions other (see comments)  [gout]   Musculoskeletal Management Strategies routine screening, medication therapy   Musculoskeletal Self-Management Outcome 4 (good)   Neurological Symptoms/Conditions other (see comments)  [appears to has some memory changes]   Neurological Management Strategies routine screening, medication therapy   Neurological Self-Management Outcome 3 (uncertain)   Neurological Comment prescribed Namenda   Obesity Symptoms/Conditions BMI 35-39.9: obesity class II   Obesity Management Strategies routine screening, weight management   Obesity Self-Management Outcome 3 (uncertain)   Respiratory Symptoms/Conditions COPD, asthma   Respiratory Management Strategies routine screening, medication therapy   Respiratory Self-Management Outcome 4 (good)   Behavioral Health Symptoms/Conditions anxiety, depression   Behavioral Management Strategies medication therapy, counseling   Behavioral Health Self-Management Outcome 4 (good)   Behavioral Health Comment  prescribed Effexor XR and Wellbutrin XL   Taking Medications Not Prescribed no   Missed Doses of Prescribed Medications During Past Week yes   Taken Prescribed Medications at Different Time or Schedule During Past Week no   Taken More or Less Medication Than Prescribed yes   Barriers to Taking Medication as Prescribed forget to take it  [forgot to  medications on 6/24/2024 from pharmacy]            Education Documentation  Medication Management, taught by Megha Bruno, RN at 7/10/2024  3:56 PM.  Learner: Patient  Readiness: Acceptance  Method: Explanation  Response: Verbalizes Understanding, Needs Reinforcement    medication management, taught by Megha Bruno, RN at 7/10/2024  3:56 PM.  Learner: Patient  Readiness: Acceptance  Method: Explanation  Response: Verbalizes Understanding, Needs Reinforcement          Megha CARTAGENA  Ambulatory Case Management    7/10/2024, 15:59 EDT

## 2024-07-11 ENCOUNTER — TELEPHONE (OUTPATIENT)
Dept: CASE MANAGEMENT | Facility: OTHER | Age: 89
End: 2024-07-11
Payer: MEDICARE

## 2024-07-11 ENCOUNTER — PATIENT OUTREACH (OUTPATIENT)
Dept: CASE MANAGEMENT | Facility: OTHER | Age: 89
End: 2024-07-11
Payer: MEDICARE

## 2024-07-11 DIAGNOSIS — E11.9 TYPE 2 DIABETES MELLITUS WITHOUT COMPLICATION, WITHOUT LONG-TERM CURRENT USE OF INSULIN: Primary | ICD-10-CM

## 2024-07-11 DIAGNOSIS — Z91.148 MEDICATION NON-COMPLIANCE DUE TO EXCESSIVE PILL BURDEN: ICD-10-CM

## 2024-07-11 DIAGNOSIS — R29.898 WEAKNESS OF BOTH LOWER EXTREMITIES: Primary | ICD-10-CM

## 2024-07-11 DIAGNOSIS — I10 ESSENTIAL HYPERTENSION: ICD-10-CM

## 2024-07-11 DIAGNOSIS — R29.6 FALLS FREQUENTLY: ICD-10-CM

## 2024-07-11 NOTE — TELEPHONE ENCOUNTER
I pended an order for Home Health for PT, medication adherence/education and memory care with [facility]. I have spoken with Elle Whipple from [facility] already.    If agreeable please sign.    Thank you,  MINAL Cleveland

## 2024-07-11 NOTE — OUTREACH NOTE
AMBULATORY CASE MANAGEMENT NOTE    Names and Relationships of Patient/Support Persons: Contact: Elle Whipple-[facility]; Relationship:  -       Care Coordination    Electronic communication with MALCOLM Kyle    I spoke with Elle Whipple with [facility] regarding this patient.    Order pended to MALCOLM Kyle for     Megha CARTAGENA  Ambulatory Case Management    7/11/2024, 09:51 EDT

## 2024-07-12 ENCOUNTER — PATIENT OUTREACH (OUTPATIENT)
Dept: CASE MANAGEMENT | Facility: OTHER | Age: 89
End: 2024-07-12
Payer: MEDICARE

## 2024-07-12 ENCOUNTER — TELEPHONE (OUTPATIENT)
Dept: CASE MANAGEMENT | Facility: OTHER | Age: 89
End: 2024-07-12
Payer: MEDICARE

## 2024-07-12 DIAGNOSIS — I10 ESSENTIAL HYPERTENSION: Primary | ICD-10-CM

## 2024-07-12 DIAGNOSIS — N39.41 URGE INCONTINENCE OF URINE: ICD-10-CM

## 2024-07-12 DIAGNOSIS — Z91.148 MEDICATION NON-COMPLIANCE DUE TO EXCESSIVE PILL BURDEN: ICD-10-CM

## 2024-07-12 NOTE — OUTREACH NOTE
AMBULATORY CASE MANAGEMENT NOTE    Names and Relationships of Patient/Support Persons: Contact: Reddy Castellano; Relationship: Self  Contact: [facility]; Relationship: Other -     [facility] Interim Update    I spoke with patient on the phone to inform him of his lab results, as instructed to by MALCOLM German. Patient verbalized understanding.    I spoke to the patient, his son(Shekhar) and Elkin (OT from [facility]) about medication management. Elkin continued to go through patient's medications and medication list provided. Patient had more  and discontinued medications. Elkin will dispose of them, with patient's permission. Elkin states [facility] will also have nursing come to patient's house to work with patient on med management. I spoke to the son in detail, regarding assisting his father with his med management. The son is agreeable to this. It has been agreed upon that between Elkin,  nursing and the son, that the patient's weekly pill box will get filled to aid in a easier process for the patient to take his medications. By patient's admission, he has only been taking melatonin and allopurinol, both of which were .    I have stressed the importance to the patient and the son of the patient taking all of his medications as prescribed. Everyone verbalized understanding.    Patient and son are aware that missing medications will be sent to the pharmacy and will need to be picked up.    Care Coordination    I spoke with the patient's pharmacy on the phone    I sent electronic communication to provider, Antonio FOWLER regarding specific orders for [facility]     I sent electronic communication to PCP regarding refills    Education Documentation  Provider Follow-Up, taught by Megha Bruno RN at 2024 12:41 PM.  Learner: Family, Patient  Readiness: Acceptance  Method: Explanation  Response: Verbalizes Understanding, Needs Reinforcement    Medication Management, taught by  Megha Bruno, RN at 7/12/2024 12:41 PM.  Learner: Family, Patient  Readiness: Acceptance  Method: Explanation  Response: Verbalizes Understanding, Needs Reinforcement    Provider Follow-Up, taught by Megha Bruno, RN at 7/12/2024 12:41 PM.  Learner: Family, Patient  Readiness: Acceptance  Method: Explanation  Response: Verbalizes Understanding, Needs Reinforcement    Medication Management, taught by Megha Bruno, RN at 7/12/2024 12:41 PM.  Learner: Family, Patient  Readiness: Acceptance  Method: Explanation  Response: Verbalizes Understanding, Needs Reinforcement      Megha CARTAGENA  Ambulatory Case Management    7/12/2024, 12:41 EDT

## 2024-07-12 NOTE — TELEPHONE ENCOUNTER
This patient was seen in the office by MALCOLM Kyle on 7/10. Prior to that visit, I met with the patient and he brought all of his medications in with him. The majority of those medications were either  or they were discontinued. I sent him home with only his current medications which were not . By the patient's admission, he has only been taking the melatonin and the allopurinal. [facility] Most of his prescriptions have .     I have pended multiple medication refill orders to get the patient all the medications he needs and hopefully get him back on track with taking his medications. Please sign, if agreeable.    [facility] is now seeing the patient. They will provide OT, PT and nursing to work with him on medication administration, adherence and education. I have spoken with the son who lives with him as well. He has assured  me he will assist the patient also. Patients medications are being set up in a pill box to make things easier for him. I will continue to follow up with him.    Thank you,  MINAL [place]

## 2024-07-12 NOTE — TELEPHONE ENCOUNTER
I spoke with Abhilash OT today on the phone while he was in the patient's home. They need specific orders for the following:    OT 1 week 5 for memory care  PT  Nursing for medication management    Also, I informed patient of his lab results. Patient verbalized understanding.    Thank you,  MINAL [place]

## 2024-07-15 ENCOUNTER — TELEPHONE (OUTPATIENT)
Dept: INTERNAL MEDICINE | Facility: CLINIC | Age: 89
End: 2024-07-15
Payer: MEDICARE

## 2024-07-15 RX ORDER — CLOPIDOGREL BISULFATE 75 MG/1
75 TABLET ORAL DAILY
Qty: 30 TABLET | Refills: 3 | Status: SHIPPED | OUTPATIENT
Start: 2024-07-15

## 2024-07-15 RX ORDER — BUPROPION HYDROCHLORIDE 150 MG/1
150 TABLET ORAL EVERY MORNING
Qty: 30 TABLET | Refills: 3 | Status: SHIPPED | OUTPATIENT
Start: 2024-07-15

## 2024-07-15 RX ORDER — VENLAFAXINE HYDROCHLORIDE 75 MG/1
75 CAPSULE, EXTENDED RELEASE ORAL DAILY
Qty: 30 CAPSULE | Refills: 3 | Status: SHIPPED | OUTPATIENT
Start: 2024-07-15

## 2024-07-15 RX ORDER — EPINEPHRINE 0.15 MG/.15ML
0.15 INJECTION SUBCUTANEOUS ONCE
Qty: 0.15 ML | Refills: 0 | Status: SHIPPED | OUTPATIENT
Start: 2024-07-15 | End: 2024-07-15

## 2024-07-15 RX ORDER — PROPRANOLOL HYDROCHLORIDE 10 MG/1
10 TABLET ORAL DAILY
Qty: 90 TABLET | Refills: 1 | Status: SHIPPED | OUTPATIENT
Start: 2024-07-15

## 2024-07-15 RX ORDER — UREA 10 %
6 LOTION (ML) TOPICAL NIGHTLY PRN
Qty: 30 TABLET | Refills: 3 | Status: SHIPPED | OUTPATIENT
Start: 2024-07-15

## 2024-07-15 RX ORDER — LISINOPRIL 10 MG/1
10 TABLET ORAL DAILY
Qty: 90 TABLET | Refills: 1 | Status: SHIPPED | OUTPATIENT
Start: 2024-07-15

## 2024-07-15 RX ORDER — MONTELUKAST SODIUM 10 MG/1
10 TABLET ORAL NIGHTLY
Qty: 90 TABLET | Refills: 1 | Status: SHIPPED | OUTPATIENT
Start: 2024-07-15 | End: 2027-04-25

## 2024-07-15 RX ORDER — ALLOPURINOL 300 MG/1
150 TABLET ORAL DAILY
Qty: 45 TABLET | Refills: 1 | Status: SHIPPED | OUTPATIENT
Start: 2024-07-15

## 2024-07-15 RX ORDER — MEMANTINE HYDROCHLORIDE 10 MG/1
TABLET ORAL
Qty: 180 TABLET | Refills: 3 | Status: SHIPPED | OUTPATIENT
Start: 2024-07-15

## 2024-07-15 NOTE — TELEPHONE ENCOUNTER
I called the pharmacy and relayed the dosage correction. They stated they understood and had no further questions at this time.

## 2024-07-15 NOTE — TELEPHONE ENCOUNTER
Caller: [facility] - [facility] [address] [phone] [facility] [phone]     Relationship: Pharmacy    What was the call regarding: [facility] STATES THE EPIPEN PRESCRIPTION THAT WAS SENT FOR THE PATIENT WAS A CHILDRENS DOSAGE, AND THEY JUST WANTED TO MAKE SURE THAT WAS CORRECT BEFORE FILLING IT.

## 2024-07-17 ENCOUNTER — PATIENT OUTREACH (OUTPATIENT)
Dept: CASE MANAGEMENT | Facility: OTHER | Age: 89
End: 2024-07-17
Payer: MEDICARE

## 2024-07-17 DIAGNOSIS — Z91.148 MEDICATION NON-COMPLIANCE DUE TO EXCESSIVE PILL BURDEN: ICD-10-CM

## 2024-07-17 DIAGNOSIS — I10 ESSENTIAL HYPERTENSION: Primary | ICD-10-CM

## 2024-07-17 DIAGNOSIS — E11.9 TYPE 2 DIABETES MELLITUS WITHOUT COMPLICATION, WITHOUT LONG-TERM CURRENT USE OF INSULIN: ICD-10-CM

## 2024-07-17 NOTE — OUTREACH NOTE
"AMBULATORY CASE MANAGEMENT NOTE    Names and Relationships of Patient/Support Persons: Contact: Elkin-Abhilash ; Relationship:   Contact: [facility]; Relationship: Other  Contact: Reddy Castellano; Relationship: Self -     CCM Interim Update    Elkin OT from [facility], called me from patient's house. Patient had not yet gotten medication refills from the pharmacy. Patient states \"they wouldn't let me have them\". Elkin states the patient's pill box was set up last week. He states patient took his medications on Friday, Saturday and Sunday but not Monday or Tuesday of this week (today is Wednesday). Patient states he was a little overwhelmed this week with all the people coming in and out of the house. 2 different PT came to visit him this week to begin services. I explained to the patient that it won't always be this hectic for him. Once services are completely set up, there will be a schedule. Patient verbalized understanding and is agreeable to these services.    Elkin states that PT will be working with him once a week for 7 weeks. Nursing for medication management has not been out yet but will be. Once nursing begins, OT will most likely stop services.    Elkin, the patient and I discussed setting alarms on the phone as reminders to take medications. Patient is agreeable to this. Elkin will assist with this.      Care Coordination    [facility] at [facility] called. Spoke to pharmacist. Prescriptions are ready to be picked up.    Called patient back to inform him is medications are ready to be picked up.      Megha CARTAGENA  Ambulatory Case Management    7/17/2024, 11:19 EDT  "

## 2024-07-24 ENCOUNTER — TELEPHONE (OUTPATIENT)
Dept: INTERNAL MEDICINE | Facility: CLINIC | Age: 89
End: 2024-07-24
Payer: MEDICARE

## 2024-07-24 NOTE — TELEPHONE ENCOUNTER
Abhilash called and stated that pt told them that he's is having a hard time swallowing his medications. Pt did say that he does take medications with applesauce and it does help. Pt is just concern and would like a call back from the nurse.

## 2024-07-25 ENCOUNTER — PATIENT OUTREACH (OUTPATIENT)
Dept: CASE MANAGEMENT | Facility: OTHER | Age: 89
End: 2024-07-25
Payer: MEDICARE

## 2024-07-25 DIAGNOSIS — I10 ESSENTIAL HYPERTENSION: Primary | ICD-10-CM

## 2024-07-25 NOTE — OUTREACH NOTE
"AMBULATORY CASE MANAGEMENT NOTE    Names and Relationships of Patient/Support Persons: Contact: Reddy Castellano; Relationship: Self -     CCM Interim Update    PCP asked me to reach out to patient due to him calling with the complaint of difficulty swallowing. I called and spoke with the patient on the phone.    Patient states he has been having difficulty swallowing \"for a long time\" but has forgotten to say anything about it to PCP. He was having difficulty swallowing his medications but now swallows them with applesauce to rectify the issue. He states he cuts his food into small pieces to aid in better swallowing. He denies having difficulty swallowing liquids.    Care Coordination    Electronic communication with PCP    Megha CARTAGENA  Ambulatory Case Management    7/25/2024, 13:01 EDT  "

## 2024-07-26 ENCOUNTER — PATIENT OUTREACH (OUTPATIENT)
Dept: CASE MANAGEMENT | Facility: OTHER | Age: 89
End: 2024-07-26
Payer: MEDICARE

## 2024-07-26 ENCOUNTER — TELEPHONE (OUTPATIENT)
Dept: CASE MANAGEMENT | Facility: OTHER | Age: 89
End: 2024-07-26
Payer: MEDICARE

## 2024-07-26 DIAGNOSIS — E11.9 TYPE 2 DIABETES MELLITUS WITHOUT COMPLICATION, WITHOUT LONG-TERM CURRENT USE OF INSULIN: Primary | ICD-10-CM

## 2024-07-26 DIAGNOSIS — R13.10 DYSPHAGIA, UNSPECIFIED TYPE: Primary | ICD-10-CM

## 2024-07-26 NOTE — TELEPHONE ENCOUNTER
I have already given a verbal order this morning for this patient to receive speech therapy evaluation from [facility]. Please sign pended order.    Thank you,  MINAL [place]

## 2024-07-26 NOTE — OUTREACH NOTE
AMBULATORY CASE MANAGEMENT NOTE    Names and Relationships of Patient/Support Persons: Contact: [facility]; Relationship:  -     Care Coordination    Per telephone message with Dr. Gutierrez, a verbal order for Speech evaluation due to difficulty swallowing was given to Matilda with [facility]. She stated that someone will evaluated patient next week.    Electronic communication with Dr. Gutierrez.    Patient notified.    Megha CARTAGENA  Ambulatory Case Management    7/26/2024, 08:18 EDT

## 2024-07-26 NOTE — TELEPHONE ENCOUNTER
Attempted to call patient to notify him of home speech evaluation next week. Unable to leave a message.

## 2024-07-27 ENCOUNTER — APPOINTMENT (OUTPATIENT)
Dept: CT IMAGING | Facility: HOSPITAL | Age: 89
End: 2024-07-27
Payer: MEDICARE

## 2024-07-27 ENCOUNTER — APPOINTMENT (OUTPATIENT)
Dept: GENERAL RADIOLOGY | Facility: HOSPITAL | Age: 89
End: 2024-07-27
Payer: MEDICARE

## 2024-07-27 ENCOUNTER — HOSPITAL ENCOUNTER (OUTPATIENT)
Facility: HOSPITAL | Age: 89
Setting detail: OBSERVATION
Discharge: HOME HEALTH | End: 2024-07-29
Attending: EMERGENCY MEDICINE | Admitting: STUDENT IN AN ORGANIZED HEALTH CARE EDUCATION/TRAINING PROGRAM
Payer: MEDICARE

## 2024-07-27 DIAGNOSIS — R42 DIZZINESS: ICD-10-CM

## 2024-07-27 DIAGNOSIS — R53.83 LETHARGIC: ICD-10-CM

## 2024-07-27 DIAGNOSIS — T88.7XXA MEDICATION SIDE EFFECT: ICD-10-CM

## 2024-07-27 DIAGNOSIS — Z78.9 DECREASED ACTIVITIES OF DAILY LIVING (ADL): ICD-10-CM

## 2024-07-27 DIAGNOSIS — R47.81 SLURRED SPEECH: Primary | ICD-10-CM

## 2024-07-27 DIAGNOSIS — R26.2 DIFFICULTY IN WALKING: ICD-10-CM

## 2024-07-27 PROBLEM — R47.1 DYSARTHRIA: Status: ACTIVE | Noted: 2024-07-27

## 2024-07-27 LAB
ALBUMIN SERPL-MCNC: 4 G/DL (ref 3.5–5.2)
ALBUMIN/GLOB SERPL: 1.7 G/DL
ALP SERPL-CCNC: 70 U/L (ref 39–117)
ALT SERPL W P-5'-P-CCNC: 10 U/L (ref 1–41)
ANION GAP SERPL CALCULATED.3IONS-SCNC: 10.8 MMOL/L (ref 5–15)
AST SERPL-CCNC: 14 U/L (ref 1–40)
BASOPHILS # BLD AUTO: 0.1 10*3/MM3 (ref 0–0.2)
BASOPHILS NFR BLD AUTO: 1.4 % (ref 0–1.5)
BILIRUB SERPL-MCNC: 0.6 MG/DL (ref 0–1.2)
BILIRUB UR QL STRIP: NEGATIVE
BUN SERPL-MCNC: 37 MG/DL (ref 8–23)
BUN/CREAT SERPL: 20.2 (ref 7–25)
CALCIUM SPEC-SCNC: 9.1 MG/DL (ref 8.6–10.5)
CHLORIDE SERPL-SCNC: 109 MMOL/L (ref 98–107)
CLARITY UR: CLEAR
CO2 SERPL-SCNC: 26.2 MMOL/L (ref 22–29)
COLOR UR: YELLOW
CREAT SERPL-MCNC: 1.83 MG/DL (ref 0.76–1.27)
DEPRECATED RDW RBC AUTO: 47 FL (ref 37–54)
EGFRCR SERPLBLD CKD-EPI 2021: 34.8 ML/MIN/1.73
EOSINOPHIL # BLD AUTO: 0.25 10*3/MM3 (ref 0–0.4)
EOSINOPHIL NFR BLD AUTO: 3.4 % (ref 0.3–6.2)
ERYTHROCYTE [DISTWIDTH] IN BLOOD BY AUTOMATED COUNT: 13.2 % (ref 12.3–15.4)
GLOBULIN UR ELPH-MCNC: 2.4 GM/DL
GLUCOSE SERPL-MCNC: 101 MG/DL (ref 65–99)
GLUCOSE UR STRIP-MCNC: NEGATIVE MG/DL
HCT VFR BLD AUTO: 49.4 % (ref 37.5–51)
HGB BLD-MCNC: 16.2 G/DL (ref 13–17.7)
HGB UR QL STRIP.AUTO: NEGATIVE
HOLD SPECIMEN: NORMAL
HOLD SPECIMEN: NORMAL
IMM GRANULOCYTES # BLD AUTO: 0.05 10*3/MM3 (ref 0–0.05)
IMM GRANULOCYTES NFR BLD AUTO: 0.7 % (ref 0–0.5)
KETONES UR QL STRIP: NEGATIVE
LEUKOCYTE ESTERASE UR QL STRIP.AUTO: NEGATIVE
LYMPHOCYTES # BLD AUTO: 1.89 10*3/MM3 (ref 0.7–3.1)
LYMPHOCYTES NFR BLD AUTO: 25.5 % (ref 19.6–45.3)
MAGNESIUM SERPL-MCNC: 1.9 MG/DL (ref 1.6–2.4)
MCH RBC QN AUTO: 31.2 PG (ref 26.6–33)
MCHC RBC AUTO-ENTMCNC: 32.8 G/DL (ref 31.5–35.7)
MCV RBC AUTO: 95.2 FL (ref 79–97)
MONOCYTES # BLD AUTO: 0.43 10*3/MM3 (ref 0.1–0.9)
MONOCYTES NFR BLD AUTO: 5.8 % (ref 5–12)
NEUTROPHILS NFR BLD AUTO: 4.68 10*3/MM3 (ref 1.7–7)
NEUTROPHILS NFR BLD AUTO: 63.2 % (ref 42.7–76)
NITRITE UR QL STRIP: NEGATIVE
NRBC BLD AUTO-RTO: 0 /100 WBC (ref 0–0.2)
PH UR STRIP.AUTO: 5.5 [PH] (ref 5–8)
PLATELET # BLD AUTO: 178 10*3/MM3 (ref 140–450)
PMV BLD AUTO: 10.4 FL (ref 6–12)
POTASSIUM SERPL-SCNC: 4.4 MMOL/L (ref 3.5–5.2)
PROT SERPL-MCNC: 6.4 G/DL (ref 6–8.5)
PROT UR QL STRIP: NEGATIVE
RBC # BLD AUTO: 5.19 10*6/MM3 (ref 4.14–5.8)
SODIUM SERPL-SCNC: 146 MMOL/L (ref 136–145)
SP GR UR STRIP: 1.03 (ref 1–1.03)
TROPONIN T SERPL HS-MCNC: 35 NG/L
TROPONIN T SERPL HS-MCNC: 36 NG/L
TSH SERPL DL<=0.05 MIU/L-ACNC: 1.7 UIU/ML (ref 0.27–4.2)
UROBILINOGEN UR QL STRIP: NORMAL
WBC NRBC COR # BLD AUTO: 7.4 10*3/MM3 (ref 3.4–10.8)
WHOLE BLOOD HOLD COAG: NORMAL
WHOLE BLOOD HOLD SPECIMEN: NORMAL

## 2024-07-27 PROCEDURE — 93005 ELECTROCARDIOGRAM TRACING: CPT | Performed by: EMERGENCY MEDICINE

## 2024-07-27 PROCEDURE — 25810000003 LACTATED RINGERS PER 1000 ML: Performed by: STUDENT IN AN ORGANIZED HEALTH CARE EDUCATION/TRAINING PROGRAM

## 2024-07-27 PROCEDURE — 85025 COMPLETE CBC W/AUTO DIFF WBC: CPT

## 2024-07-27 PROCEDURE — 84484 ASSAY OF TROPONIN QUANT: CPT | Performed by: STUDENT IN AN ORGANIZED HEALTH CARE EDUCATION/TRAINING PROGRAM

## 2024-07-27 PROCEDURE — 81003 URINALYSIS AUTO W/O SCOPE: CPT | Performed by: STUDENT IN AN ORGANIZED HEALTH CARE EDUCATION/TRAINING PROGRAM

## 2024-07-27 PROCEDURE — G0378 HOSPITAL OBSERVATION PER HR: HCPCS

## 2024-07-27 PROCEDURE — 82607 VITAMIN B-12: CPT | Performed by: STUDENT IN AN ORGANIZED HEALTH CARE EDUCATION/TRAINING PROGRAM

## 2024-07-27 PROCEDURE — 71045 X-RAY EXAM CHEST 1 VIEW: CPT

## 2024-07-27 PROCEDURE — 84484 ASSAY OF TROPONIN QUANT: CPT | Performed by: EMERGENCY MEDICINE

## 2024-07-27 PROCEDURE — 99285 EMERGENCY DEPT VISIT HI MDM: CPT

## 2024-07-27 PROCEDURE — 84443 ASSAY THYROID STIM HORMONE: CPT | Performed by: STUDENT IN AN ORGANIZED HEALTH CARE EDUCATION/TRAINING PROGRAM

## 2024-07-27 PROCEDURE — 83735 ASSAY OF MAGNESIUM: CPT | Performed by: EMERGENCY MEDICINE

## 2024-07-27 PROCEDURE — 99222 1ST HOSP IP/OBS MODERATE 55: CPT | Performed by: STUDENT IN AN ORGANIZED HEALTH CARE EDUCATION/TRAINING PROGRAM

## 2024-07-27 PROCEDURE — 25510000001 IOPAMIDOL PER 1 ML: Performed by: STUDENT IN AN ORGANIZED HEALTH CARE EDUCATION/TRAINING PROGRAM

## 2024-07-27 PROCEDURE — 70498 CT ANGIOGRAPHY NECK: CPT

## 2024-07-27 PROCEDURE — 80053 COMPREHEN METABOLIC PANEL: CPT | Performed by: EMERGENCY MEDICINE

## 2024-07-27 PROCEDURE — 70496 CT ANGIOGRAPHY HEAD: CPT

## 2024-07-27 PROCEDURE — 93005 ELECTROCARDIOGRAM TRACING: CPT

## 2024-07-27 PROCEDURE — 70450 CT HEAD/BRAIN W/O DYE: CPT

## 2024-07-27 RX ORDER — AMOXICILLIN 250 MG
2 CAPSULE ORAL 2 TIMES DAILY PRN
Status: DISCONTINUED | OUTPATIENT
Start: 2024-07-27 | End: 2024-07-29 | Stop reason: HOSPADM

## 2024-07-27 RX ORDER — SODIUM CHLORIDE, SODIUM LACTATE, POTASSIUM CHLORIDE, CALCIUM CHLORIDE 600; 310; 30; 20 MG/100ML; MG/100ML; MG/100ML; MG/100ML
75 INJECTION, SOLUTION INTRAVENOUS CONTINUOUS
Status: ACTIVE | OUTPATIENT
Start: 2024-07-27 | End: 2024-07-28

## 2024-07-27 RX ORDER — SODIUM CHLORIDE 0.9 % (FLUSH) 0.9 %
10 SYRINGE (ML) INJECTION EVERY 12 HOURS SCHEDULED
Status: DISCONTINUED | OUTPATIENT
Start: 2024-07-27 | End: 2024-07-29 | Stop reason: HOSPADM

## 2024-07-27 RX ORDER — BISACODYL 10 MG
10 SUPPOSITORY, RECTAL RECTAL DAILY PRN
Status: DISCONTINUED | OUTPATIENT
Start: 2024-07-27 | End: 2024-07-29 | Stop reason: HOSPADM

## 2024-07-27 RX ORDER — ACETAMINOPHEN 325 MG/1
650 TABLET ORAL EVERY 6 HOURS PRN
Status: DISCONTINUED | OUTPATIENT
Start: 2024-07-27 | End: 2024-07-29 | Stop reason: HOSPADM

## 2024-07-27 RX ORDER — HEPARIN SODIUM 5000 [USP'U]/ML
5000 INJECTION, SOLUTION INTRAVENOUS; SUBCUTANEOUS EVERY 12 HOURS SCHEDULED
Status: DISCONTINUED | OUTPATIENT
Start: 2024-07-28 | End: 2024-07-29 | Stop reason: HOSPADM

## 2024-07-27 RX ORDER — SODIUM CHLORIDE 0.9 % (FLUSH) 0.9 %
10 SYRINGE (ML) INJECTION AS NEEDED
Status: DISCONTINUED | OUTPATIENT
Start: 2024-07-27 | End: 2024-07-29 | Stop reason: HOSPADM

## 2024-07-27 RX ORDER — BISACODYL 5 MG/1
5 TABLET, DELAYED RELEASE ORAL DAILY PRN
Status: DISCONTINUED | OUTPATIENT
Start: 2024-07-27 | End: 2024-07-29 | Stop reason: HOSPADM

## 2024-07-27 RX ORDER — NITROGLYCERIN 0.4 MG/1
0.4 TABLET SUBLINGUAL
Status: DISCONTINUED | OUTPATIENT
Start: 2024-07-27 | End: 2024-07-29 | Stop reason: HOSPADM

## 2024-07-27 RX ORDER — POLYETHYLENE GLYCOL 3350 17 G/17G
17 POWDER, FOR SOLUTION ORAL DAILY PRN
Status: DISCONTINUED | OUTPATIENT
Start: 2024-07-27 | End: 2024-07-29 | Stop reason: HOSPADM

## 2024-07-27 RX ORDER — ALUMINA, MAGNESIA, AND SIMETHICONE 2400; 2400; 240 MG/30ML; MG/30ML; MG/30ML
15 SUSPENSION ORAL EVERY 6 HOURS PRN
Status: DISCONTINUED | OUTPATIENT
Start: 2024-07-27 | End: 2024-07-29 | Stop reason: HOSPADM

## 2024-07-27 RX ORDER — SODIUM CHLORIDE 9 MG/ML
40 INJECTION, SOLUTION INTRAVENOUS AS NEEDED
Status: DISCONTINUED | OUTPATIENT
Start: 2024-07-27 | End: 2024-07-29 | Stop reason: HOSPADM

## 2024-07-27 RX ADMIN — IOPAMIDOL 100 ML: 755 INJECTION, SOLUTION INTRAVENOUS at 18:00

## 2024-07-27 RX ADMIN — Medication 10 ML: at 21:19

## 2024-07-27 RX ADMIN — SODIUM CHLORIDE, POTASSIUM CHLORIDE, SODIUM LACTATE AND CALCIUM CHLORIDE 75 ML/HR: 600; 310; 30; 20 INJECTION, SOLUTION INTRAVENOUS at 21:19

## 2024-07-27 NOTE — H&P
[facility]   HOSPITALIST HISTORY AND PHYSICAL  Date: 2024   Patient Name: Reddy Castellano  : 1935  MRN: [id number]  Primary Care Physician:  Agustina Gutierrez MD  Date of admission: 2024    Subjective   Subjective     Chief Complaint: Slurred speech    HPI:    Reddy Castellano is a 89 y.o. male past medical history of hypertension, COPD not on oxygen, anxiety/depression, TIAs, GERD, CHF with preserved ejection fraction, mild cognitive impairment who presents to the ER due to a few days of slurred speech.  Patient is relatively poor historian and does not remember key details.  History supplemented by discussion with son who is at bedside and is patient's caretaker.  Appears recently patient has transition to home health care and has had some medication adjustments although the son is not clear on what exactly has been done.  In the last week he has had intermittent episodes where his speech slurs and he seems to doze off unexpectedly.  No clear bowel or bladder incontinence.  No seizure activity reported however has become more frequent and the son wanted to have it evaluated prompting him to come into the ER today    Upon arrival patient is hemodynamically stable.  Lab workup revealed an acute kidney with creatinine 1.83 up from a baseline around 1.5.  No leukocytosis.  Urinalysis is pending troponin is mildly elevated.  EKG personally reviewed showed sinus rhythm with PVCs and nonspecific ST-T wave changes in the inferior leads (unchanged from EKG from 2023).  Patient had noncontrast head CT that was unremarkable and CT angio was ordered by ER to evaluate for any carotid artery disease to explain possible TIA/CVA.  Hospitalist was then contacted for admission.  The time exam patient is resting comfortably and does not really have slurred speech at this time.  Denies having any headache.  No nausea vomiting.  He is oriented and has not had any focal weakness according to the son  at bedside.      Personal History     Past Medical History:  Past Medical History:   Diagnosis Date    Allergies     Anxiety     Arthritis     Asthma     BPH (benign prostatic hyperplasia)     Carpal tunnel syndrome     Colon polyp     Congestive heart failure (CHF)     COPD (chronic obstructive pulmonary disease)     Dementia     Depression     Diabetes mellitus type 2, noninsulin dependent     Diverticulitis     Erectile dysfunction of organic origin     Essential hypertension 11/19/2019    Excess skin     Fatigue 11/09/2019    GERD (gastroesophageal reflux disease)     Gout     Heart attack     Heart disease     High cholesterol     Hyperlipidemia     Hypertension     Hypogonadism in male     Insomnia     Leg swelling     Memory change 08/02/2018    currently, pt is taking namenda. i will pursue a mocha at his follow up     Myocardial infarction     Osteoarthritis     Parkinson's disease     Prostate disorder     Rectal bleeding     Renal cyst     Seasonal allergies     Sleep apnea     Stroke     TIA (transient ischemic attack) 08/02/2018    reportedly, the pt has a history of TIA. This can be discussed further at his follow up. i was asked that he cont. aspirin.    Tremor 08/02/2018    pt was a 3 mo history of a high frequency, low amplitude tremor of both hands that worsens some what with activity. tremor does not interfere with his activities of daily living. he has no evidence of bradykinesia on exam. pakinsons seems unlikely at this point. potential etiologies would include essential tremor and physiologic tremor. he does note that he started theophylline approx. 3 months ag    Vitamin D deficiency          Past Surgical History:  Past Surgical History:   Procedure Laterality Date    APPENDECTOMY  2015    CARDIAC CATHETERIZATION      CARDIAC SURGERY  2014    CARPAL TUNNEL RELEASE      CATARACT EXTRACTION      CIRCUMCISION  2000    COLONOSCOPY  2014 2017    CYSTOSCOPY      ENDOSCOPY  2010 2017    ENDOSCOPY  N/A 11/23/2021    Procedure: ESOPHAGOGASTRODUODENOSCOPY;  Surgeon: Anika Cummins MD;  Location: [facility] MAIN OR;  Service: Gastroenterology;  Laterality: N/A;  gastritis    EYE SURGERY      eye implant    HERNIA REPAIR  2000    LAPAROSCOPIC GASTRIC BANDING      ORCHIECTOMY Right 2010    OTHER SURGICAL HISTORY      metal implants;     PENILE PROSTHESIS IMPLANT      TOE SURGERY      TOENAIL EXCISION      surgical removal of toenail and nail matrix of both feet    TOTAL KNEE ARTHROPLASTY Bilateral 2009 2010         Family History:   Reviewed and noncontributory except as mentioned in HPI    Social History:   Social Determinants of Health     Tobacco Use: Low Risk  (7/10/2024)    Patient History     Smoking Tobacco Use: Never     Smokeless Tobacco Use: Never     Passive Exposure: Never   Alcohol Use: Not At Risk (10/6/2021)    AUDIT-C     Frequency of Alcohol Consumption: 2-4 times a month     Average Number of Drinks: 1 or 2     Frequency of Binge Drinking: Never   Financial Resource Strain: Low Risk  (6/19/2024)    Overall Financial Resource Strain (CARDIA)     Difficulty of Paying Living Expenses: Not hard at all   Food Insecurity: No Food Insecurity (6/19/2024)    Hunger Vital Sign     Worried About Running Out of Food in the Last Year: Never true     Ran Out of Food in the Last Year: Never true   Transportation Needs: No Transportation Needs (6/19/2024)    PRAPARE - Transportation     Lack of Transportation (Medical): No     Lack of Transportation (Non-Medical): No   Physical Activity: Inactive (10/6/2021)    Exercise Vital Sign     Days of Exercise per Week: 0 days     Minutes of Exercise per Session: 0 min   Stress: Stress Concern Present (10/6/2021)    [place] [employer] - Occupational Stress Questionnaire     Feeling of Stress : Very much   Social Connections: Unknown (10/10/2023)    Family and Community Support     Help with Day-to-Day Activities: Not on file     Lonely or  Isolated: Not on file   Interpersonal Safety: Not At Risk (6/17/2024)    Abuse Screen     Unsafe at Home or Work/School: no     Feels Threatened by Someone?: no     Does Anyone Keep You from Contacting Others or Doint Things Outside the Home?: no     Physical Sign of Abuse Present: no   Depression: Not at risk (3/7/2024)    PHQ-2     PHQ-2 Score: 0   Housing Stability: Unknown (6/19/2024)    Housing Stability Vital Sign     Unable to Pay for Housing in the Last Year: No     Number of Times Moved in the Last Year: Not on file     Homeless in the Last Year: No   Utilities: Not on file   Health Literacy: Unknown (10/10/2023)    Education     Help with school or training?: Not on file     Preferred Language: Not on file   Employment: Unknown (10/10/2023)    Employment     Do you want help finding or keeping work or a job?: Not on file   Disabilities: Unknown (10/10/2023)    Disabilities     Concentrating, Remembering, or Making Decisions Difficulty: Not on file     Doing Errands Independently Difficulty: Not on file         Home Medications:  Vibegron, allopurinol, amLODIPine, atorvastatin, buPROPion XL, cetirizine, clopidogrel, fluticasone, fluticasone-salmeterol, furosemide, lisinopril, melatonin, memantine, montelukast, propranolol, and venlafaxine XR    Allergies:  No Known Allergies    Review of Systems   All systems were reviewed and negative except for: Slurred speech    Objective   Objective     Vitals:   Temp:  [97.5 °F (36.4 °C)] 97.5 °F (36.4 °C)  Heart Rate:  [59] 59  Resp:  [16] 16  BP: (140)/(66) 140/66    Physical Exam    Constitutional: Awake, alert, no acute distress   Eyes: Pupils equal, sclerae anicteric, no conjunctival injection   HENT: NCAT, mucous membranes moist   Neck: Supple, no thyromegaly, no lymphadenopathy, trachea midline   Respiratory: Clear to auscultation bilaterally, nonlabored respirations    Cardiovascular: RRR, no murmurs, rubs, or gallops, palpable pedal pulses  bilaterally   Gastrointestinal: Positive bowel sounds, soft, nontender, nondistended   Musculoskeletal: No bilateral ankle edema, no clubbing or cyanosis to extremities   Psychiatric: Appropriate affect, cooperative   Neurologic: Oriented x 3, strength symmetric in all extremities, Cranial Nerves grossly intact to confrontation, speech clear   Skin: No rashes     Result Review    Result Review:  I have personally reviewed the results from the time of this admission to 7/27/2024 18:04 EDT and agree with these findings:  [x]  Laboratory  [x]  Microbiology  [x]  Radiology  [x]  EKG/Telemetry   [x]  Cardiology/Vascular   []  Pathology  [x]  Old records  []  Other:      Assessment & Plan   Assessment / Plan     Assessment/Plan:   Transient dysarthria, rule out CVA versus TIA versus medication side effect  Acute kidney injury likely secondary to poor p.o. intake and nephrotoxic meds  Hypernatremia  Elevated troponin likely secondary to delayed renal clearance and type II MI due to demand ischemia  Overactive bladder  Depression/anxiety  Allergies  COPD not on oxygen  Mild cognitive impairment  Hyperlipidemia  History of TIA     Plan  - Admit to hospital service for observation  - Etiology of symptoms does not seem to be stroke in nature however will need to rule out with MRI of the brain.  CT angios are pending after ordering in the ER.  Will also check a TSH and B12.  PT/OT eval.  Need to have a full review of his medications and see which ones are new, per external prescription it does seem the most recently started medications include venlafaxine and propranolol.  Need to consider possible bradycardia leading to symptoms?  - Continue hydration for DAISY, hold nephrotoxic meds  - Repeat additional troponin.  Serial EKGs.  No concern for acute cardiac ischemia at this time  - Clarify all other chronic home meds and resume as appropriate      Discussed with ER Physician and Nurse    All labs/imaging studies were personally  reviewed and findings are as noted above      DVT Prophylaxis: Heparin    CODE STATUS:    Level Of Support Discussed With: Health Care Surrogate  Code Status (Patient has no pulse and is not breathing): CPR (Attempt to Resuscitate)  Medical Interventions (Patient has pulse or is breathing): Full Support      Admission Status:  I believe this patient meets observation status.    Electronically signed by Siva Mortensen MD, 07/27/24, 5:58 PM EDT.

## 2024-07-27 NOTE — ED PROVIDER NOTES
Time: 4:12 PM EDT  Date of encounter:  7/27/2024  Independent Historian/Clinical History and Information was obtained by:   Patient    History is limited by: N/A    Chief Complaint: Slurred speech, dizziness since yesterday morning        History of Present Illness:  Patient is a 89 y.o. year old diabetic male with history of previous TIA/stroke who presents to the emergency department for evaluation of acute slurred speech and dizziness starting yesterday morning (greater than 24 hours prior to arrival).    His son showed up to his bedside and spoke with me later on and said this is active been going on gradually worsening the whole week, in the setting of home health nursing administering his daily medications.    Patient is also noted to be quite sedated and somnolent and falls asleep multiple times during exam.    He denies any weakness in his arms or legs.    He denies any new medications.  No recent head injuries or any fevers or illness or cough or vomiting or diarrhea or chest pain or dyspnea or abdominal pain reported.        HPI    Patient Care Team  Primary Care Provider: Agustina Gutierrez MD    Past Medical History:     No Known Allergies  Past Medical History:   Diagnosis Date    Allergies     Anxiety     Arthritis     Asthma     BPH (benign prostatic hyperplasia)     Carpal tunnel syndrome     Colon polyp     Congestive heart failure (CHF)     COPD (chronic obstructive pulmonary disease)     Dementia     Depression     Diabetes mellitus type 2, noninsulin dependent     Diverticulitis     Erectile dysfunction of organic origin     Essential hypertension 11/19/2019    Excess skin     Fatigue 11/09/2019    GERD (gastroesophageal reflux disease)     Gout     Heart attack     Heart disease     High cholesterol     Hyperlipidemia     Hypertension     Hypogonadism in male     Insomnia     Leg swelling     Memory change 08/02/2018    currently, pt is taking namenda. i will pursue a mocha at his follow  up     Myocardial infarction     Osteoarthritis     Parkinson's disease     Prostate disorder     Rectal bleeding     Renal cyst     Seasonal allergies     Sleep apnea     Stroke     TIA (transient ischemic attack) 08/02/2018    reportedly, the pt has a history of TIA. This can be discussed further at his follow up. i was asked that he cont. aspirin.    Tremor 08/02/2018    pt was a 3 mo history of a high frequency, low amplitude tremor of both hands that worsens some what with activity. tremor does not interfere with his activities of daily living. he has no evidence of bradykinesia on exam. pakinsons seems unlikely at this point. potential etiologies would include essential tremor and physiologic tremor. he does note that he started theophylline approx. 3 months ag    Vitamin D deficiency      Past Surgical History:   Procedure Laterality Date    APPENDECTOMY  2015    CARDIAC CATHETERIZATION      CARDIAC SURGERY  2014    CARPAL TUNNEL RELEASE      CATARACT EXTRACTION      CIRCUMCISION  2000    COLONOSCOPY  2014 2017    CYSTOSCOPY      ENDOSCOPY  2010 2017    ENDOSCOPY N/A 11/23/2021    Procedure: ESOPHAGOGASTRODUODENOSCOPY;  Surgeon: Anika Cummins MD;  Location: [facility] MAIN OR;  Service: Gastroenterology;  Laterality: N/A;  gastritis    EYE SURGERY      eye implant    HERNIA REPAIR  2000    LAPAROSCOPIC GASTRIC BANDING      ORCHIECTOMY Right 2010    OTHER SURGICAL HISTORY      metal implants;     PENILE PROSTHESIS IMPLANT      TOE SURGERY      TOENAIL EXCISION      surgical removal of toenail and nail matrix of both feet    TOTAL KNEE ARTHROPLASTY Bilateral 2009 2010     Family History   Problem Relation Age of Onset    Diabetes Mother     Heart attack Father     Diabetes Brother     Rectal cancer Other         grandfather- rectal and colon    Prostate cancer Other     Diabetes Son        Home Medications:  Prior to Admission medications    Medication Sig Start Date End Date Taking? Authorizing Provider    allopurinol (ZYLOPRIM) 300 MG tablet Take 0.5 tablets by mouth Daily. 7/15/24   Agustina Gutierrez MD   amLODIPine (NORVASC) 10 MG tablet Take 1 tablet by mouth Daily. 6/19/24   Agustina Gutierrez MD   atorvastatin (LIPITOR) 20 MG tablet Take 1 tablet by mouth Daily. 4/11/24   Agustina Gutierrez MD   buPROPion XL (WELLBUTRIN XL) 150 MG 24 hr tablet Take 1 tablet by mouth Every Morning. 7/15/24   Agustina Gutierrez MD   cetirizine (zyrTEC) 10 MG tablet Take 1 tablet by mouth Daily. 6/19/24   Agustina Gutierrez MD   clopidogrel (PLAVIX) 75 MG tablet Take 1 tablet by mouth Daily. 7/15/24   Agustina Gutierrez MD   fluticasone (FLONASE) 50 MCG/ACT nasal spray 2 sprays into the nostril(s) as directed by provider Daily.  Patient not taking: Reported on 6/24/2024 3/7/24   Agustina Gutierrez MD   fluticasone-salmeterol (ADVAIR HFA) 230-21 MCG/ACT inhaler Inhale 2 puffs Every 6 (Six) Hours. 3/7/24   Agustina Gutierrez MD   furosemide (LASIX) 20 MG tablet Take 1 tablet by mouth Daily. 6/19/24   Agustina Gutierrez MD   lisinopril (PRINIVIL,ZESTRIL) 10 MG tablet Take 1 tablet by mouth Daily. 7/15/24   Agustina Gutierrez MD   melatonin 1 MG tablet Take 6 tablets by mouth At Night As Needed for Sleep. 7/15/24   Agustina Gutierrez MD   memantine (NAMENDA) 10 MG tablet Take 1 tablet twice a day 7/15/24   Agustina Gutierrez MD   montelukast (SINGULAIR) 10 MG tablet Take 1 tablet by mouth Every Night. 7/15/24 4/25/27  Agustina Gutierrez MD   propranolol (INDERAL) 10 MG tablet Take 1 tablet by mouth Daily. 7/15/24   Agustina Gutierrez MD   venlafaxine XR (EFFEXOR-XR) 75 MG 24 hr capsule Take 1 capsule by mouth Daily. 7/15/24   Agustina Gutierrez MD   Vibegron 75 MG tablet Take 1 tablet by mouth Daily. 7/15/24   Agustina Gutierrez MD        Social History:   Social History     Tobacco Use    Smoking status:  "Never     Passive exposure: Never    Smokeless tobacco: Never   Vaping Use    Vaping status: Never Used   Substance Use Topics    Alcohol use: Yes     Comment: occasional     Drug use: Never         Review of Systems:  Review of Systems   I performed a 10 point review of systems which was all negative, except for the positives found in the HPI above.  Physical Exam:  /66 (BP Location: Left arm, Patient Position: Sitting)   Pulse 59   Temp 97.5 °F (36.4 °C) (Oral)   Resp 16   Ht 172.7 cm (68\")   Wt 106 kg (233 lb 14.5 oz)   SpO2 93%   BMI 35.57 kg/m²     Physical Exam   General: Awake and somnolent or lethargic, and in no obvious distress, seems, somnolent but arousable, speech is slurred/mumbling, dry mouth    HEENT: Head normocephalic atraumatic, eyes PERRLA EOMI, nose normal, oropharynx normal.  Mucous membranes look very dry, dehydrated.    Neck: Supple full range of motion, no meningismus, no lymphadenopathy    Heart: Regular rate and rhythm, no murmurs or rubs, 2+ radial pulses bilaterally    Lungs: Clear to auscultation bilaterally without wheezes or crackles, no respiratory distress    Abdomen: Soft, nontender, nondistended, no rebound or guarding    Skin: Warm, dry, no rash    Musculoskeletal: Normal range of motion, no lower extremity edema    Neurologic: Somnolent but arousable, oriented x3, slurred speech noted, no facial droop, normal finger-nose-finger, no motor deficits no sensory deficits    Psychiatric: Mood appears stable, no psychosis          Procedures:  Procedures      Medical Decision Making:      Comorbidities that affect care:    Dementia, previous TIA/stroke, Diabetes    External Notes reviewed:    None      The following orders were placed and all results were independently analyzed by me:  Orders Placed This Encounter   Procedures    XR Chest 1 View    CT Head Without Contrast    CT Angiogram Head    CT Angiogram Neck    [place] Draw    Comprehensive Metabolic Panel    Single " High Sensitivity Troponin T    Magnesium    Urinalysis With Microscopic If Indicated (No Culture) - Urine, Clean Catch    CBC Auto Differential    NPO Diet NPO Type: Strict NPO    Undress & Gown    Continuous Pulse Oximetry    Vital Signs    Orthostatic Blood Pressure    Hospitalist (on-call MD unless specified)    Oxygen Therapy- Nasal Cannula; Titrate 1-6 LPM Per SpO2; 90 - 95%    POC Glucose Once    ECG 12 Lead ED Triage Standing Order; Weak / Dizzy / AMS    Insert Peripheral IV    Fall Precautions    CBC & Differential    Green Top (Gel)    Lavender Top    Gold Top - SST    Light Blue Top       Medications Given in the Emergency Department:  Medications   sodium chloride 0.9 % flush 10 mL (has no administration in time range)        ED Course:    ED Course as of 07/27/24 1730   Sat Jul 27, 2024   1644 EKG: I interpreted his twelve-lead EKG as sinus rhythm at 63 bpm with 1 PVC, normal P waves, 1 PVC noted, QRS notable for low voltage, IVCD, inferior Q waves, normal ST segments but there are T wave inversions in the anterior leads. [VS]      ED Course User Index  [VS] Aníbal Bunch MD       Labs:    Lab Results (last 24 hours)       Procedure Component Value Units Date/Time    CBC & Differential [[id number]]  (Abnormal) Collected: 07/27/24 1531    Specimen: Blood Updated: 07/27/24 1557    Narrative:      The following orders were created for panel order CBC & Differential.  Procedure                               Abnormality         Status                     ---------                               -----------         ------                     CBC Auto Differential[[id number]]        Abnormal            Final result                 Please view results for these tests on the individual orders.    Comprehensive Metabolic Panel [[id number]]  (Abnormal) Collected: 07/27/24 1531    Specimen: Blood Updated: 07/27/24 1614     Glucose 101 mg/dL      BUN 37 mg/dL      Creatinine 1.83 mg/dL      Sodium 146 mmol/L       Potassium 4.4 mmol/L      Chloride 109 mmol/L      CO2 26.2 mmol/L      Calcium 9.1 mg/dL      Total Protein 6.4 g/dL      Albumin 4.0 g/dL      ALT (SGPT) 10 U/L      AST (SGOT) 14 U/L      Alkaline Phosphatase 70 U/L      Total Bilirubin 0.6 mg/dL      Globulin 2.4 gm/dL      A/G Ratio 1.7 g/dL      BUN/Creatinine Ratio 20.2     Anion Gap 10.8 mmol/L      eGFR 34.8 mL/min/1.73     Narrative:      GFR Normal >60  Chronic Kidney Disease <60  Kidney Failure <15    The GFR formula is only valid for adults with stable renal function between ages 18 and 70.    Single High Sensitivity Troponin T [[id number]]  (Abnormal) Collected: 07/27/24 1531    Specimen: Blood Updated: 07/27/24 1618     HS Troponin T 35 ng/L     Narrative:      High Sensitive Troponin T Reference Range:  <14.0 ng/L- Negative Female for AMI  <22.0 ng/L- Negative Male for AMI  >=14 - Abnormal Female indicating possible myocardial injury.  >=22 - Abnormal Male indicating possible myocardial injury.   Clinicians would have to utilize clinical acumen, EKG, Troponin, and serial changes to determine if it is an Acute Myocardial Infarction or myocardial injury due to an underlying chronic condition.         Magnesium [[id number]]  (Normal) Collected: 07/27/24 1531    Specimen: Blood Updated: 07/27/24 1614     Magnesium 1.9 mg/dL     CBC Auto Differential [[id number]]  (Abnormal) Collected: 07/27/24 1531    Specimen: Blood Updated: 07/27/24 1557     WBC 7.40 10*3/mm3      RBC 5.19 10*6/mm3      Hemoglobin 16.2 g/dL      Hematocrit 49.4 %      MCV 95.2 fL      MCH 31.2 pg      MCHC 32.8 g/dL      RDW 13.2 %      RDW-SD 47.0 fl      MPV 10.4 fL      Platelets 178 10*3/mm3      Neutrophil % 63.2 %      Lymphocyte % 25.5 %      Monocyte % 5.8 %      Eosinophil % 3.4 %      Basophil % 1.4 %      Immature Grans % 0.7 %      Neutrophils, Absolute 4.68 10*3/mm3      Lymphocytes, Absolute 1.89 10*3/mm3      Monocytes, Absolute 0.43 10*3/mm3      Eosinophils, Absolute 0.25  10*3/mm3      Basophils, Absolute 0.10 10*3/mm3      Immature Grans, Absolute 0.05 10*3/mm3      nRBC 0.0 /100 WBC              Imaging:    CT Head Without Contrast    Result Date: 7/27/2024  CT HEAD WO CONTRAST Date of Exam: 7/27/2024 5:06 PM EDT Indication: eval TIA. Comparison: 3/2/2024 CT Technique: Axial CT images were obtained of the head without contrast administration.  Reconstructed coronal and sagittal images were also obtained. Automated exposure control and iterative construction methods were used. Findings: There is no evidence of acute intracranial hemorrhage, mass, midline shift, loss of gray-white matter differentiation, or extra-axial fluid collection there is mild global parenchymal volume loss with ex vacuo dilation of the ventricular system. The basal cisterns are patent. Dental amalgam. No evidence of fracture or suspicious osseous lesion. The paranasal sinuses and mastoid air cells are well aerated. Bilateral ocular surgery. Included soft tissues show no acute abnormality.     Impression: No acute intracranial abnormality. Electronically Signed: Aden Lawler MD  7/27/2024 5:20 PM EDT  Workstation ID: [id number]    XR Chest 1 View    Result Date: 7/27/2024  XR CHEST 1 VW Date of Exam: 7/27/2024 3:15 PM EDT Indication: Weak/Dizzy/AMS triage protocol Comparison: 12/21/2023 radiographs Findings: Unchanged enlarged cardiac silhouette. There are low lung volumes with crowding of the perihilar vasculature. Left basilar airspace opacities which favor atelectasis. No pleural effusion or pneumothorax. Osseous structures are unremarkable..     Impression: Low lung volumes with left basilar airspace opacities which favor atelectasis. Underlying infection is not excluded. Electronically Signed: Aden Lawler MD  7/27/2024 3:30 PM EDT  Workstation ID: [id number]       Differential Diagnosis and Discussion:    Stroke: Differential diagnosis in this patient with signs of possible ischemic stroke include TIA or  ischemic stroke, hemorrhagic stroke, hypoglycemic episode, toxic or metabolic encephalopathy, paresthesias.    All labs were reviewed and interpreted by me.  All X-rays impressions were independently interpreted by me.  EKG was interpreted by me.  CT scan radiology impression was interpreted by me.    MDM     Amount and/or Complexity of Data Reviewed  Clinical lab tests: reviewed  Tests in the radiology section of CPT®: reviewed  Tests in the medicine section of CPT®: reviewed  Decide to obtain previous medical records or to obtain history from someone other than the patient: yes         This patient is a 89-year-old male presenting with acute slurred speech and dizziness since yesterday, greater than 24 hours prior to arrival.    He does not meet criteria for stroke alert or TNKase or endovascular intervention given his delayed presentation greater than 24 hours.    He does have slurred speech but also mucous membranes look quite dry and he is somewhat somnolent here.    I considered possibility of stroke versus potentially medication side effect even accidental antihistamine toxicity possible.    I am giving him small IV fluid bolus and we will get CT of the head and CTA head and neck to evaluate further.    He may need to be admitted for further workup for possible acute ischemic stroke versus medication side effect from Namenda, or even mild anticholinergic syndrome from over-the-counter antihistamine use..                  Patient Care Considerations:          Consultants/Shared Management Plan:    Hospitalist: I have discussed the case with the admitting hospitalist who agrees to accept the patient for admission.    Social Determinants of Health:    Patient has presented with family members who are responsible, reliable and will ensure follow up care.      Disposition and Care Coordination:    Admit:   Through independent evaluation of the patient's history, physical, and imperical data, the patient meets  criteria for inpatient admission to the hospital.        Final diagnoses:   Slurred speech   Lethargic   Dizziness   Medication side effect        ED Disposition       ED Disposition   Decision to Admit    Condition   --    Comment   --               This medical record created using voice recognition software.             Aníbal Bunch MD  07/27/24 3558

## 2024-07-28 ENCOUNTER — APPOINTMENT (OUTPATIENT)
Dept: MRI IMAGING | Facility: HOSPITAL | Age: 89
End: 2024-07-28
Payer: MEDICARE

## 2024-07-28 ENCOUNTER — APPOINTMENT (OUTPATIENT)
Dept: CARDIOLOGY | Facility: HOSPITAL | Age: 89
End: 2024-07-28
Payer: MEDICARE

## 2024-07-28 LAB
ANION GAP SERPL CALCULATED.3IONS-SCNC: 11 MMOL/L (ref 5–15)
BASOPHILS # BLD AUTO: 0.12 10*3/MM3 (ref 0–0.2)
BASOPHILS NFR BLD AUTO: 1.6 % (ref 0–1.5)
BUN SERPL-MCNC: 29 MG/DL (ref 8–23)
BUN/CREAT SERPL: 19.9 (ref 7–25)
CALCIUM SPEC-SCNC: 9 MG/DL (ref 8.6–10.5)
CHLORIDE SERPL-SCNC: 108 MMOL/L (ref 98–107)
CO2 SERPL-SCNC: 24 MMOL/L (ref 22–29)
CREAT SERPL-MCNC: 1.46 MG/DL (ref 0.76–1.27)
DEPRECATED RDW RBC AUTO: 46.1 FL (ref 37–54)
EGFRCR SERPLBLD CKD-EPI 2021: 45.7 ML/MIN/1.73
EOSINOPHIL # BLD AUTO: 0.27 10*3/MM3 (ref 0–0.4)
EOSINOPHIL NFR BLD AUTO: 3.5 % (ref 0.3–6.2)
ERYTHROCYTE [DISTWIDTH] IN BLOOD BY AUTOMATED COUNT: 13 % (ref 12.3–15.4)
GEN 5 2HR TROPONIN T REFLEX: 32 NG/L
GLUCOSE SERPL-MCNC: 91 MG/DL (ref 65–99)
HCT VFR BLD AUTO: 51.2 % (ref 37.5–51)
HGB BLD-MCNC: 16.5 G/DL (ref 13–17.7)
IMM GRANULOCYTES # BLD AUTO: 0.05 10*3/MM3 (ref 0–0.05)
IMM GRANULOCYTES NFR BLD AUTO: 0.7 % (ref 0–0.5)
LYMPHOCYTES # BLD AUTO: 2.18 10*3/MM3 (ref 0.7–3.1)
LYMPHOCYTES NFR BLD AUTO: 28.6 % (ref 19.6–45.3)
MAGNESIUM SERPL-MCNC: 2 MG/DL (ref 1.6–2.4)
MCH RBC QN AUTO: 31 PG (ref 26.6–33)
MCHC RBC AUTO-ENTMCNC: 32.2 G/DL (ref 31.5–35.7)
MCV RBC AUTO: 96.2 FL (ref 79–97)
MONOCYTES # BLD AUTO: 0.48 10*3/MM3 (ref 0.1–0.9)
MONOCYTES NFR BLD AUTO: 6.3 % (ref 5–12)
NEUTROPHILS NFR BLD AUTO: 4.52 10*3/MM3 (ref 1.7–7)
NEUTROPHILS NFR BLD AUTO: 59.3 % (ref 42.7–76)
NRBC BLD AUTO-RTO: 0 /100 WBC (ref 0–0.2)
PLATELET # BLD AUTO: 163 10*3/MM3 (ref 140–450)
PMV BLD AUTO: 10.7 FL (ref 6–12)
POTASSIUM SERPL-SCNC: 4.3 MMOL/L (ref 3.5–5.2)
PROCALCITONIN SERPL-MCNC: 0.06 NG/ML (ref 0–0.25)
RBC # BLD AUTO: 5.32 10*6/MM3 (ref 4.14–5.8)
SODIUM SERPL-SCNC: 143 MMOL/L (ref 136–145)
TROPONIN T DELTA: -4 NG/L
VIT B12 BLD-MCNC: 370 PG/ML (ref 211–946)
WBC NRBC COR # BLD AUTO: 7.62 10*3/MM3 (ref 3.4–10.8)

## 2024-07-28 PROCEDURE — 84484 ASSAY OF TROPONIN QUANT: CPT | Performed by: STUDENT IN AN ORGANIZED HEALTH CARE EDUCATION/TRAINING PROGRAM

## 2024-07-28 PROCEDURE — 25010000002 SULFUR HEXAFLUORIDE MICROSPH 60.7-25 MG RECONSTITUTED SUSPENSION: Performed by: INTERNAL MEDICINE

## 2024-07-28 PROCEDURE — 25010000002 HEPARIN (PORCINE) PER 1000 UNITS: Performed by: STUDENT IN AN ORGANIZED HEALTH CARE EDUCATION/TRAINING PROGRAM

## 2024-07-28 PROCEDURE — 97116 GAIT TRAINING THERAPY: CPT

## 2024-07-28 PROCEDURE — 94640 AIRWAY INHALATION TREATMENT: CPT

## 2024-07-28 PROCEDURE — G0378 HOSPITAL OBSERVATION PER HR: HCPCS

## 2024-07-28 PROCEDURE — 84145 PROCALCITONIN (PCT): CPT | Performed by: PHYSICIAN ASSISTANT

## 2024-07-28 PROCEDURE — 99232 SBSQ HOSP IP/OBS MODERATE 35: CPT | Performed by: INTERNAL MEDICINE

## 2024-07-28 PROCEDURE — 96372 THER/PROPH/DIAG INJ SC/IM: CPT

## 2024-07-28 PROCEDURE — 70551 MRI BRAIN STEM W/O DYE: CPT

## 2024-07-28 PROCEDURE — 25010000002 ONDANSETRON PER 1 MG: Performed by: PHYSICIAN ASSISTANT

## 2024-07-28 PROCEDURE — 80048 BASIC METABOLIC PNL TOTAL CA: CPT | Performed by: STUDENT IN AN ORGANIZED HEALTH CARE EDUCATION/TRAINING PROGRAM

## 2024-07-28 PROCEDURE — 83735 ASSAY OF MAGNESIUM: CPT | Performed by: STUDENT IN AN ORGANIZED HEALTH CARE EDUCATION/TRAINING PROGRAM

## 2024-07-28 PROCEDURE — 94760 N-INVAS EAR/PLS OXIMETRY 1: CPT

## 2024-07-28 PROCEDURE — 93306 TTE W/DOPPLER COMPLETE: CPT

## 2024-07-28 PROCEDURE — 97161 PT EVAL LOW COMPLEX 20 MIN: CPT

## 2024-07-28 PROCEDURE — 85025 COMPLETE CBC W/AUTO DIFF WBC: CPT | Performed by: STUDENT IN AN ORGANIZED HEALTH CARE EDUCATION/TRAINING PROGRAM

## 2024-07-28 PROCEDURE — 94799 UNLISTED PULMONARY SVC/PX: CPT

## 2024-07-28 PROCEDURE — 96374 THER/PROPH/DIAG INJ IV PUSH: CPT

## 2024-07-28 RX ORDER — PROPRANOLOL HYDROCHLORIDE 10 MG/1
5 TABLET ORAL 2 TIMES DAILY
Status: DISCONTINUED | OUTPATIENT
Start: 2024-07-28 | End: 2024-07-29 | Stop reason: HOSPADM

## 2024-07-28 RX ORDER — ONDANSETRON 2 MG/ML
4 INJECTION INTRAMUSCULAR; INTRAVENOUS EVERY 4 HOURS PRN
Status: DISCONTINUED | OUTPATIENT
Start: 2024-07-28 | End: 2024-07-29 | Stop reason: HOSPADM

## 2024-07-28 RX ORDER — PROPRANOLOL HYDROCHLORIDE 10 MG/1
10 TABLET ORAL 2 TIMES DAILY
Status: DISCONTINUED | OUTPATIENT
Start: 2024-07-28 | End: 2024-07-28

## 2024-07-28 RX ORDER — CLOPIDOGREL BISULFATE 75 MG/1
75 TABLET ORAL DAILY
Status: DISCONTINUED | OUTPATIENT
Start: 2024-07-28 | End: 2024-07-29 | Stop reason: HOSPADM

## 2024-07-28 RX ORDER — AMLODIPINE BESYLATE 10 MG/1
10 TABLET ORAL DAILY
Status: DISCONTINUED | OUTPATIENT
Start: 2024-07-28 | End: 2024-07-29 | Stop reason: HOSPADM

## 2024-07-28 RX ORDER — BUPROPION HYDROCHLORIDE 150 MG/1
150 TABLET ORAL EVERY MORNING
Status: DISCONTINUED | OUTPATIENT
Start: 2024-07-28 | End: 2024-07-29 | Stop reason: HOSPADM

## 2024-07-28 RX ORDER — LISINOPRIL 10 MG/1
10 TABLET ORAL DAILY
Status: DISCONTINUED | OUTPATIENT
Start: 2024-07-28 | End: 2024-07-29 | Stop reason: HOSPADM

## 2024-07-28 RX ORDER — BUDESONIDE AND FORMOTEROL FUMARATE DIHYDRATE 160; 4.5 UG/1; UG/1
2 AEROSOL RESPIRATORY (INHALATION)
Status: DISCONTINUED | OUTPATIENT
Start: 2024-07-28 | End: 2024-07-29 | Stop reason: HOSPADM

## 2024-07-28 RX ORDER — OXYBUTYNIN CHLORIDE 5 MG/1
10 TABLET, EXTENDED RELEASE ORAL DAILY
Status: DISCONTINUED | OUTPATIENT
Start: 2024-07-28 | End: 2024-07-29 | Stop reason: HOSPADM

## 2024-07-28 RX ORDER — ALLOPURINOL 300 MG/1
150 TABLET ORAL DAILY
Status: DISCONTINUED | OUTPATIENT
Start: 2024-07-28 | End: 2024-07-29 | Stop reason: HOSPADM

## 2024-07-28 RX ORDER — MONTELUKAST SODIUM 10 MG/1
10 TABLET ORAL NIGHTLY
Status: DISCONTINUED | OUTPATIENT
Start: 2024-07-28 | End: 2024-07-29 | Stop reason: HOSPADM

## 2024-07-28 RX ORDER — ATORVASTATIN CALCIUM 20 MG/1
20 TABLET, FILM COATED ORAL DAILY
Status: DISCONTINUED | OUTPATIENT
Start: 2024-07-28 | End: 2024-07-29 | Stop reason: HOSPADM

## 2024-07-28 RX ORDER — CETIRIZINE HYDROCHLORIDE 10 MG/1
10 TABLET ORAL DAILY
Status: DISCONTINUED | OUTPATIENT
Start: 2024-07-28 | End: 2024-07-29 | Stop reason: HOSPADM

## 2024-07-28 RX ADMIN — ACETAMINOPHEN 650 MG: 325 TABLET ORAL at 20:57

## 2024-07-28 RX ADMIN — HEPARIN SODIUM 5000 UNITS: 5000 INJECTION INTRAVENOUS; SUBCUTANEOUS at 20:57

## 2024-07-28 RX ADMIN — BUDESONIDE AND FORMOTEROL FUMARATE DIHYDRATE 2 PUFF: 160; 4.5 AEROSOL RESPIRATORY (INHALATION) at 18:50

## 2024-07-28 RX ADMIN — ONDANSETRON 4 MG: 2 INJECTION INTRAMUSCULAR; INTRAVENOUS at 21:40

## 2024-07-28 RX ADMIN — Medication 10 ML: at 20:57

## 2024-07-28 RX ADMIN — BUPROPION HYDROCHLORIDE 150 MG: 150 TABLET, EXTENDED RELEASE ORAL at 08:29

## 2024-07-28 RX ADMIN — HEPARIN SODIUM 5000 UNITS: 5000 INJECTION INTRAVENOUS; SUBCUTANEOUS at 08:29

## 2024-07-28 RX ADMIN — SULFUR HEXAFLUORIDE 2 ML: KIT at 12:03

## 2024-07-28 RX ADMIN — OXYBUTYNIN CHLORIDE 10 MG: 5 TABLET, EXTENDED RELEASE ORAL at 08:29

## 2024-07-28 RX ADMIN — PROPRANOLOL HYDROCHLORIDE 10 MG: 10 TABLET ORAL at 12:09

## 2024-07-28 RX ADMIN — ATORVASTATIN CALCIUM 20 MG: 20 TABLET, FILM COATED ORAL at 08:29

## 2024-07-28 RX ADMIN — Medication 10 ML: at 08:29

## 2024-07-28 RX ADMIN — ALLOPURINOL 150 MG: 300 TABLET ORAL at 08:29

## 2024-07-28 RX ADMIN — PROPRANOLOL HYDROCHLORIDE 5 MG: 10 TABLET ORAL at 20:57

## 2024-07-28 RX ADMIN — CETIRIZINE HYDROCHLORIDE 10 MG: 10 TABLET, FILM COATED ORAL at 08:29

## 2024-07-28 RX ADMIN — CLOPIDOGREL BISULFATE 75 MG: 75 TABLET ORAL at 08:29

## 2024-07-28 RX ADMIN — MONTELUKAST 10 MG: 10 TABLET, FILM COATED ORAL at 20:57

## 2024-07-28 NOTE — PROGRESS NOTES
[facility]   Hospitalist Progress Note       Patient Name: Reddy Castellano  : 1935  MRN: [id number]  Primary Care Physician: Agustina Gutierrez MD  Date of admission: 2024  Today's Date: 2024  Room / Bed:   [facility]/  Subjective   Chief Complaint: Slurred speech     HPI:         Reddy Castellano is a 89 y.o. male past medical history of hypertension, COPD not on oxygen, anxiety/depression, TIAs, GERD, CHF with preserved ejection fraction, mild cognitive impairment who presents to the ER due to a few days of slurred speech.  Patient is relatively poor historian and does not remember key details.  History supplemented by discussion with son who is at bedside and is patient's caretaker.  Appears recently patient has transition to home health care and has had some medication adjustments although the son is not clear on what exactly has been done.  In the last week he has had intermittent episodes where his speech slurs and he seems to doze off unexpectedly.  No clear bowel or bladder incontinence.  No seizure activity reported however has become more frequent and the son wanted to have it evaluated prompting him to come into the ER today     Upon arrival patient is hemodynamically stable.  Lab workup revealed an acute kidney with creatinine 1.83 up from a baseline around 1.5.  No leukocytosis.  Urinalysis is pending troponin is mildly elevated.  EKG personally reviewed showed sinus rhythm with PVCs and nonspecific ST-T wave changes in the inferior leads (unchanged from EKG from 2023).  Patient had noncontrast head CT that was unremarkable and CT angio was ordered by ER to evaluate for any carotid artery disease to explain possible TIA/CVA.  Hospitalist was then contacted for admission.  The time exam patient is resting comfortably and does not really have slurred speech at this time.  Denies having any headache.  No nausea vomiting.  He is oriented and has not had any focal weakness  according to the son at bedside.    Interval Followup: 7/28/2024    Resting comfortably in bed.  No family at bedside.  Patient alert and conversational.  No focal deficits noted currently.  No significant dysarthria, aphasia, or confusion.  MRI brain pending  Echo pending  Monitor with normal sinus rhythm.  Occasional PAC/PVCs.  Troponins flat 32-36  Creatinine better: 1.8 ---> 1.4        REVIEW OF SYSTEMS:   AMS, slurred speech  Objective   Temp:  [97.3 °F (36.3 °C)-97.7 °F (36.5 °C)] 97.7 °F (36.5 °C)  Heart Rate:  [57-80] 73  Resp:  [16-19] 18  BP: (107-180)/(59-80) 139/78  PHYSICAL EXAM   CON: WN. WD. NAD.  Pleasant demeanor.  Obese.  NECK:  No thyromegaly. No stridor.   RESP:  CTA. No wheezes. No crackles.   CV:  Rhythm regular. Rate WNL. No murmur noted.  No edema.  GI:  Soft and nontender. Nondistended.    EXT: Peripheral pulses intact.  No cyanosis.  PSYCH:  Alert. Oriented. Normal affect and mood.  NEURO:  No dysarthria or aphasia.   SKIN: No chronic venous stasis changes or varicosities.  No cellulitis  Results from last 7 days   Lab Units 07/28/24  0455 07/27/24  1531   WBC 10*3/mm3 7.62 7.40   HEMOGLOBIN g/dL 16.5 16.2   HEMATOCRIT % 51.2* 49.4   PLATELETS 10*3/mm3 163 178     Results from last 7 days   Lab Units 07/28/24  0455 07/27/24  1531   SODIUM mmol/L 143 146*   POTASSIUM mmol/L 4.3 4.4   CO2 mmol/L 24.0 26.2   CHLORIDE mmol/L 108* 109*   ANION GAP mmol/L 11.0 10.8   BUN mg/dL 29* 37*   CREATININE mg/dL 1.46* 1.83*   GLUCOSE mg/dL 91 101*         COMPLEXITY OF DATA / DECISION MAKING     []  Moderate: One acute illness or mild exacerbation of chronic or 2 stable chronic or tx side effects   []  High:  Severe acute illness or severe exacerbation of chronic - potential for major debility / life threatening         I have personally reviewed the results from the time of this admission to 7/28/2024 10:38 EDT:  []  Laboratory:  []  Microbiology: []  Radiology:  []  Telemetry:   []  Cardiology/Vascular:   []  Pathology:  []  Prior external records:  []  Independent historian provided additional details:      []  Discussed case with specialists:    []  Independent interpretation of ECG/Imaging etc:             []  Moderate: Rx management, low risk surgery, suboptimal social situation   []  High:  Rx with close monitoring for toxicity, mod-high risk surgery, DNR decision, Comfort initiated, IV pain meds    Assessment / Plan   Assessment:    AMS, dysarthria  DAISY  Hypernatremia  Anxiety/depression  COPD  Obesity  Alzheimer's dementia  Essential tremor (on propranolol  PreDM? (A1c 5.5 range x years)  HTN  Hx TIA  Hx CAD/MI  Hx gout  History of poor compliance with home medications     Plan:    Monitored bed  MRI brain ordered ..... Pending  Echo ordered ..... Pending  Gentle IV fluid hydration.  Monitor volume status.  Maintenance and as needed bronchodilator nebs  Holding home amlodipine and lisinopril.  Monitor BP trend.  Continue home Namenda  Continue home Plavix  Continue home statin  Check TSH .... WNL  Check B12.  PT and OT  Heparin for DVT prophylaxis  Discussed plan with RN.  VTE Prophylaxis:  Pharmacologic VTE prophylaxis orders are present.      CODE STATUS:      Level Of Support Discussed With: Health Care Surrogate  Code Status (Patient has no pulse and is not breathing): CPR (Attempt to Resuscitate)  Medical Interventions (Patient has pulse or is breathing): Full Support       Electronically signed by BERNARDINO Delarosa, 07/28/24, 10:38 AM EDT.    Patient independently seen and evaluated, agree with assessment and plan, above documentation reflects plan put forth during bedside rounds.  More than 51% of the time of this patient encounter was performed by me.    Interval history:  No acute events overnight, patient symptoms seem to have resolved    GEN: No acute distress  HEENT: Moist mucous membranes  LUNGS: Equal chest rise bilaterally  CARDIAC: Regular rate and rhythm  NEURO: Moving all 4 extremities  spontaneously  SKIN: No obvious breakdown    Plan:  Agree with assessment plan as above  MRI brain ordered and pending  Echocardiogram pending  Patient tolerated IV hydration, euvolemic currently  Continue bronchodilators  Hold home antihypertensives, resume as needed, permissive hypertension for now  Continue Namenda, Plavix, statin  PT/OT  Heparin for DVT prophylaxis  CBC, CMP reviewed  Repeat CBC, CMP, mag and Phos in a.m.      Electronically signed by Rommel Pederson MD, 7/28/2024, 12:09 EDT.

## 2024-07-28 NOTE — THERAPY EVALUATION
Acute Care - Physical Therapy Initial Evaluation   [facility]     Patient Name: Reddy Castellano  : 1935  MRN: [id number]  Today's Date: 2024   Onset of Illness/Injury or Date of Surgery: 24  Visit Dx:     ICD-10-CM ICD-9-CM   1. Slurred speech  R47.81 784.59   2. Lethargic  R53.83 780.79   3. Dizziness  R42 780.4   4. Medication side effect  T88.7XXA 995.20   5. Difficulty in walking  R26.2 719.7     Patient Active Problem List   Diagnosis    Gout    Type 2 diabetes mellitus without complication, without long-term current use of insulin    Essential hypertension    Other hyperlipidemia    Major depressive disorder, recurrent, moderate    Scrotal pain    Urge incontinence of urine    Urgency of urination    Balanitis    Candida rash of groin    Abnormal weight loss    Anxiety    Rheumatoid arthritis    Chronic obstructive pulmonary disease    Constipation    Depressive disorder    Disease    Disorder of prostate    Disorder of vision    Essential tremor    Excess skin of abdominal wall    Heart failure    Mechanical complication of genitourinary device    Morbid obesity    Myocardial infarction    Numbness    Pneumonia    Seasonal allergies    Sleep apnea    Spondylosis of lumbar spine    Tingling of skin    Ventral hernia    Lymphadenopathy of head and neck    Swallowed foreign body    Hyperkalemia    Benign prostatic hyperplasia with urinary frequency    Renal mass    Allergic rhinitis    Mild cognitive impairment    Wound of right leg    Scalp itch    Cellulitis of left forearm    Dysarthria     Past Medical History:   Diagnosis Date    Allergies     Anxiety     Arthritis     Asthma     BPH (benign prostatic hyperplasia)     Carpal tunnel syndrome     Colon polyp     Congestive heart failure (CHF)     COPD (chronic obstructive pulmonary disease)     Dementia     Depression     Diabetes mellitus type 2, noninsulin dependent     Diverticulitis     Erectile dysfunction of organic origin     Essential  hypertension 11/19/2019    Excess skin     Fatigue 11/09/2019    GERD (gastroesophageal reflux disease)     Gout     Heart attack     Heart disease     High cholesterol     Hyperlipidemia     Hypertension     Hypogonadism in male     Insomnia     Leg swelling     Memory change 08/02/2018    currently, pt is taking namenda. i will pursue a mocha at his follow up     Myocardial infarction     Osteoarthritis     Parkinson's disease     Prostate disorder     Rectal bleeding     Renal cyst     Seasonal allergies     Sleep apnea     Stroke     TIA (transient ischemic attack) 08/02/2018    reportedly, the pt has a history of TIA. This can be discussed further at his follow up. i was asked that he cont. aspirin.    Tremor 08/02/2018    pt was a 3 mo history of a high frequency, low amplitude tremor of both hands that worsens some what with activity. tremor does not interfere with his activities of daily living. he has no evidence of bradykinesia on exam. pakinsons seems unlikely at this point. potential etiologies would include essential tremor and physiologic tremor. he does note that he started theophylline approx. 3 months ag    Vitamin D deficiency      Past Surgical History:   Procedure Laterality Date    APPENDECTOMY  2015    CARDIAC CATHETERIZATION      CARDIAC SURGERY  2014    CARPAL TUNNEL RELEASE      CATARACT EXTRACTION      CIRCUMCISION  2000    COLONOSCOPY  2014 2017    CYSTOSCOPY      ENDOSCOPY  2010 2017    ENDOSCOPY N/A 11/23/2021    Procedure: ESOPHAGOGASTRODUODENOSCOPY;  Surgeon: Anika Cummins MD;  [facility] MA[facility];  Service: Gastroenterology;  Laterality: N/A;  gastritis    EYE SURGERY      eye implant    HERNIA REPAIR  2000    LAPAROSCOPIC GASTRIC BANDING      ORCHIECTOMY Right 2010    OTHER SURGICAL HISTORY      metal implants;     PENILE PROSTHESIS IMPLANT      TOE SURGERY      TOENAIL EXCISION      surgical removal of toenail and nail matrix of both feet    TOTAL KNEE ARTHROPLASTY  Bilateral 2009 2010     PT Assessment (Last 12 Hours)       PT Evaluation and Treatment       Row Name 07/28/24 1402          Physical Therapy Time and Intention    Subjective Information complains of;fatigue  -DW     Document Type evaluation  -DW     Mode of Treatment individual therapy;physical therapy  -DW     Patient Effort good  -DW     Symptoms Noted During/After Treatment fatigue  -DW       Row Name 07/28/24 1402          General Information    Patient Profile Reviewed yes  -DW     Onset of Illness/Injury or Date of Surgery 07/27/24  -DW     Referring Physician Rommel Pederson  -DW     Patient Observations alert  -DW     Prior Level of Function independent:;all household mobility;community mobility;ADL's  -DW     Risks Reviewed patient:  -DW     Benefits Reviewed patient:;improve function;increase independence;increase strength;increase balance  -DW     Barriers to Rehab none identified  -DW       Row Name 07/28/24 1402          Previous Level of Function/Home Environm    BADLs, Premorbid Functional Level independent  -DW     IADLs, Premorbid Functional Level independent  -DW     Bed Mobility, Premorbid Functional Level independent  -DW     Transfers, Premorbid Functional Level independent  -DW     Household Ambulation, Premorbid Functional Level independent  -DW     Stairs, Premorbid Functional Level independent  -DW     Community Ambulation, Premorbid Functional Level independent  -DW       Row Name 07/28/24 1402          Living Environment    Current Living Arrangements home  -DW     People in Home child(david), adult  -DW     Primary Care Provided by self  -DW       Row Name 07/28/24 1402          Home Use of Assistive/Adaptive Equipment    Equipment Currently Used at Home none  -DW       Row Name 07/28/24 1402          Cognition    Affect/Mental Status (Cognition) WNL  -DW     Orientation Status (Cognition) oriented x 3  -DW     Follows Commands (Cognition) WNL  -DW     Cognitive Function WNL  -DW        Row Name 07/28/24 1402          Range of Motion Comprehensive    General Range of Motion no range of motion deficits identified  -[facility] Name 07/28/24 1402          Strength Comprehensive (MMT)    General Manual Muscle Testing (MMT) Assessment lower extremity strength deficits identified  -     Comment, General Manual Muscle Testing (MMT) Assessment 4/5 MMT BLE  -[facility] Name 07/28/24 1402          Bed Mobility    Bed Mobility bed mobility (all) activities  -     All Activities, [place] (Bed Mobility) minimum assist (75% patient effort)  -     Bed Mobility, Safety Issues decreased use of arms for pushing/pulling;decreased use of legs for bridging/pushing  -     Assistive Device (Bed Mobility) bed rails;draw sheet;head of bed elevated  -[facility] Name 07/28/24 1402          Transfers    Transfers sit-stand transfer;stand-sit transfer  -DW       Row Name 07/28/24 1402          Sit-Stand Transfer    Sit-Stand [place] (Transfers) contact guard  -     Assistive Device (Sit-Stand Transfers) walker, front-wheeled  -DW       Row Name 07/28/24 1402          Stand-Sit Transfer    Stand-Sit [place] (Transfers) contact guard  -     Assistive Device (Stand-Sit Transfers) walker, front-wheeled  -DW       Row Name 07/28/24 1402          Gait/Stairs (Locomotion)    Gait/Stairs Locomotion gait/ambulation independence;gait/ambulation assistive device;distance ambulated  -     [place] Level (Gait) contact guard  -     Assistive Device (Gait) walker, front-wheeled  -     Distance in Feet (Gait) 75  -DW       Row Name 07/28/24 1402          Safety Issues, Functional Mobility    Impairments Affecting Function (Mobility) balance;endurance/activity tolerance;strength  -DW       Row Name 07/28/24 1402          Balance    Balance Assessment standing dynamic balance  -     Dynamic Standing Balance contact guard  -     Position/Device Used, Standing Balance walker, front-wheeled  -        Row Name 07/28/24 1402          Plan of Care Review    Plan of Care Reviewed With patient  -     Progress improving  -     Outcome Evaluation Pt presents with decresaed functional mobility secondary to current hospitalization. Skilled PT intervention is needed to address noted deficits in order to restore to PLOF.  -       Row Name 07/28/24 1402          Positioning and Restraints    Pre-Treatment Position in bed  -DW     Post Treatment Position bed  -DW     In Bed call light within reach;encouraged to call for assist;exit alarm on  -       Row Name 07/28/24 1402          Therapy Assessment/Plan (PT)    PT Diagnosis (PT) Difficulty in walking  -DW     Rehab Potential (PT) good, to achieve stated therapy goals  -     Criteria for Skilled Interventions Met (PT) yes;skilled treatment is necessary;meets criteria  -DW     Therapy Frequency (PT) daily  -     Problem List (PT) problems related to;balance;strength  -     Activity Limitations Related to Problem List (PT) unable to ambulate safely;unable to transfer safely;BADLs not performed adequately or safely;IADLs not performed adequately or safely  -       Row Name 07/28/24 1402          PT Evaluation Complexity    History, PT Evaluation Complexity no personal factors and/or comorbidities  -     Examination of Body Systems (PT Eval Complexity) 1-2 elements  -DW     Clinical Presentation (PT Evaluation Complexity) stable  -DW     Clinical Decision Making (PT Evaluation Complexity) low complexity  -DW     Overall Complexity (PT Evaluation Complexity) low complexity  -       Row Name 07/28/24 1402          Therapy Plan Review/Discharge Plan (PT)    Therapy Plan Review (PT) evaluation/treatment results reviewed  -       Row Name 07/28/24 1402          Physical Therapy Goals    Bed Mobility Goal Selection (PT) bed mobility, PT goal 1  -DW     Transfer Goal Selection (PT) transfer, PT goal 1  -DW     Gait Training Goal Selection (PT) gait training,  PT goal 1  -DW       Row Name 07/28/24 1402          Bed Mobility Goal 1 (PT)    Activity/Assistive Device (Bed Mobility Goal 1, PT) bed mobility activities, all  -DW     [place] Level/Cues Needed (Bed Mobility Goal 1, PT) independent  -DW     Time Frame (Bed Mobility Goal 1, PT) long term goal (LTG);10 days  -DW       Row Name 07/28/24 1402          Transfer Goal 1 (PT)    Activity/Assistive Device (Transfer Goal 1, PT) transfers, all  -DW     [place] Level/Cues Needed (Transfer Goal 1, PT) independent  -DW     Time Frame (Transfer Goal 1, PT) long term goal (LTG);10 days  -DW       Row Name 07/28/24 1402          Gait Training Goal 1 (PT)    Activity/Assistive Device (Gait Training Goal 1, PT) gait (walking locomotion)  -DW     [place] Level (Gait Training Goal 1, PT) independent  -DW     Distance (Gait Training Goal 1, PT) 300  -DW     Time Frame (Gait Training Goal 1, PT) long term goal (LTG);10 days  -DW               User Key  (r) = Recorded By, (t) = Taken By, (c) = Cosigned By      Initials Name Provider Type    Wale Raygoza, PT Physical Therapist                    Physical Therapy Education       Title: PT OT SLP Therapies (Done)       Topic: Physical Therapy (Done)       Point: Mobility training (Done)       Learning Progress Summary             Patient Acceptance, E,TB, VU by KAPIL at 7/28/2024 1415                         Point: Home exercise program (Done)       Learning Progress Summary             Patient Acceptance, E,TB, VU by KAPIL at 7/28/2024 1415                         Point: Body mechanics (Done)       Learning Progress Summary             Patient Acceptance, E,TB, VU by KAPIL at 7/28/2024 1415                         Point: Precautions (Done)       Learning Progress Summary             Patient Acceptance, E,TB, VU by KAPIL at 7/28/2024 1415                                         User Key       Initials Effective Dates Name Provider Type Discipline     04/25/21 -  Wale Cardoza  PT Physical Therapist PT                  PT Recommendation and Plan  Anticipated Discharge Disposition (PT): home with home health, home with assist  Planned Therapy Interventions (PT): balance training, gait training, strengthening, transfer training  Therapy Frequency (PT): daily  Plan of Care Reviewed With: patient  Progress: improving  Outcome Evaluation: Pt presents with decresaed functional mobility secondary to current hospitalization. Skilled PT intervention is needed to address noted deficits in order to restore to PLOF.   Outcome Measures       Row Name 07/28/24 1414             How much help from another person do you currently need...    Turning from your back to your side while in flat bed without using bedrails? 4  -DW      Moving from lying on back to sitting on the side of a flat bed without bedrails? 4  -DW      Moving to and from a bed to a chair (including a wheelchair)? 4  -DW      Standing up from a chair using your arms (e.g., wheelchair, bedside chair)? 3  -DW      Climbing 3-5 steps with a railing? 3  -DW      To walk in hospital room? 3  -DW      AM-PAC 6 Clicks Score (PT) 21  -DW      Highest Level of Mobility Goal 6 --> Walk 10 steps or more  -DW         Functional Assessment    Outcome Measure Options AM-PAC 6 Clicks Basic Mobility (PT)  -DW                User Key  (r) = Recorded By, (t) = Taken By, (c) = Cosigned By      Initials Name Provider Type    Wale Raygoza, PT Physical Therapist                     Time Calculation:    PT Charges       Row Name 07/28/24 1417             Time Calculation    PT Received On 07/28/24  -DW      PT Goal Re-Cert Due Date 08/06/24  -DW         Timed Charges    91359 - Gait Training Minutes  15  -DW         Untimed Charges    PT Eval/Re-eval Minutes 8  -DW         Total Minutes    Timed Charges Total Minutes 15  -DW      Untimed Charges Total Minutes 8  -DW       Total Minutes 23  -DW                User Key  (r) = Recorded By, (t) = Taken By, (c)  = Cosigned By      Initials Name Provider Type    DW Wale Cardoza, PT Physical Therapist                  Therapy Charges for Today       Code Description Service Date Service Provider Modifiers Qty    44469596763 HC GAIT TRAINING EA 15 MIN 7/28/2024 Wale Cardoza, PT GP 1    29607012540 HC PT EVAL LOW COMPLEXITY 2 7/28/2024 Wale Cardoza, PT GP 1            PT G-Codes  Outcome Measure Options: AM-PAC 6 Clicks Basic Mobility (PT)  AM-PAC 6 Clicks Score (PT): 21    Wale Cardoza, PT  7/28/2024

## 2024-07-29 ENCOUNTER — READMISSION MANAGEMENT (OUTPATIENT)
Dept: CALL CENTER | Facility: HOSPITAL | Age: 89
End: 2024-07-29
Payer: MEDICARE

## 2024-07-29 VITALS
DIASTOLIC BLOOD PRESSURE: 72 MMHG | OXYGEN SATURATION: 92 % | BODY MASS INDEX: 34.88 KG/M2 | HEART RATE: 67 BPM | SYSTOLIC BLOOD PRESSURE: 138 MMHG | WEIGHT: 230.16 LBS | HEIGHT: 68 IN | TEMPERATURE: 98.1 F | RESPIRATION RATE: 18 BRPM

## 2024-07-29 LAB
ANION GAP SERPL CALCULATED.3IONS-SCNC: 10 MMOL/L (ref 5–15)
BASOPHILS # BLD AUTO: 0.08 10*3/MM3 (ref 0–0.2)
BASOPHILS NFR BLD AUTO: 1.1 % (ref 0–1.5)
BUN SERPL-MCNC: 25 MG/DL (ref 8–23)
BUN/CREAT SERPL: 16.3 (ref 7–25)
CALCIUM SPEC-SCNC: 8.9 MG/DL (ref 8.6–10.5)
CHLORIDE SERPL-SCNC: 108 MMOL/L (ref 98–107)
CO2 SERPL-SCNC: 23 MMOL/L (ref 22–29)
CREAT SERPL-MCNC: 1.53 MG/DL (ref 0.76–1.27)
DEPRECATED RDW RBC AUTO: 47 FL (ref 37–54)
EGFRCR SERPLBLD CKD-EPI 2021: 43.2 ML/MIN/1.73
EOSINOPHIL # BLD AUTO: 0.24 10*3/MM3 (ref 0–0.4)
EOSINOPHIL NFR BLD AUTO: 3.4 % (ref 0.3–6.2)
ERYTHROCYTE [DISTWIDTH] IN BLOOD BY AUTOMATED COUNT: 13.4 % (ref 12.3–15.4)
GLUCOSE SERPL-MCNC: 140 MG/DL (ref 65–99)
HCT VFR BLD AUTO: 48.9 % (ref 37.5–51)
HGB BLD-MCNC: 16 G/DL (ref 13–17.7)
IMM GRANULOCYTES # BLD AUTO: 0.03 10*3/MM3 (ref 0–0.05)
IMM GRANULOCYTES NFR BLD AUTO: 0.4 % (ref 0–0.5)
LYMPHOCYTES # BLD AUTO: 2.54 10*3/MM3 (ref 0.7–3.1)
LYMPHOCYTES NFR BLD AUTO: 35.6 % (ref 19.6–45.3)
MAGNESIUM SERPL-MCNC: 1.7 MG/DL (ref 1.6–2.4)
MCH RBC QN AUTO: 31.1 PG (ref 26.6–33)
MCHC RBC AUTO-ENTMCNC: 32.7 G/DL (ref 31.5–35.7)
MCV RBC AUTO: 95 FL (ref 79–97)
MONOCYTES # BLD AUTO: 0.36 10*3/MM3 (ref 0.1–0.9)
MONOCYTES NFR BLD AUTO: 5 % (ref 5–12)
NEUTROPHILS NFR BLD AUTO: 3.88 10*3/MM3 (ref 1.7–7)
NEUTROPHILS NFR BLD AUTO: 54.5 % (ref 42.7–76)
NRBC BLD AUTO-RTO: 0 /100 WBC (ref 0–0.2)
PLATELET # BLD AUTO: 175 10*3/MM3 (ref 140–450)
PMV BLD AUTO: 10.9 FL (ref 6–12)
POTASSIUM SERPL-SCNC: 4.3 MMOL/L (ref 3.5–5.2)
RBC # BLD AUTO: 5.15 10*6/MM3 (ref 4.14–5.8)
SODIUM SERPL-SCNC: 141 MMOL/L (ref 136–145)
WBC NRBC COR # BLD AUTO: 7.13 10*3/MM3 (ref 3.4–10.8)

## 2024-07-29 PROCEDURE — 83735 ASSAY OF MAGNESIUM: CPT | Performed by: STUDENT IN AN ORGANIZED HEALTH CARE EDUCATION/TRAINING PROGRAM

## 2024-07-29 PROCEDURE — 25010000002 HEPARIN (PORCINE) PER 1000 UNITS: Performed by: STUDENT IN AN ORGANIZED HEALTH CARE EDUCATION/TRAINING PROGRAM

## 2024-07-29 PROCEDURE — G0378 HOSPITAL OBSERVATION PER HR: HCPCS

## 2024-07-29 PROCEDURE — 99239 HOSP IP/OBS DSCHRG MGMT >30: CPT | Performed by: INTERNAL MEDICINE

## 2024-07-29 PROCEDURE — 94799 UNLISTED PULMONARY SVC/PX: CPT

## 2024-07-29 PROCEDURE — 97110 THERAPEUTIC EXERCISES: CPT

## 2024-07-29 PROCEDURE — 85025 COMPLETE CBC W/AUTO DIFF WBC: CPT | Performed by: STUDENT IN AN ORGANIZED HEALTH CARE EDUCATION/TRAINING PROGRAM

## 2024-07-29 PROCEDURE — 80048 BASIC METABOLIC PNL TOTAL CA: CPT | Performed by: STUDENT IN AN ORGANIZED HEALTH CARE EDUCATION/TRAINING PROGRAM

## 2024-07-29 PROCEDURE — 97165 OT EVAL LOW COMPLEX 30 MIN: CPT

## 2024-07-29 PROCEDURE — 96372 THER/PROPH/DIAG INJ SC/IM: CPT

## 2024-07-29 PROCEDURE — 94664 DEMO&/EVAL PT USE INHALER: CPT

## 2024-07-29 PROCEDURE — 97116 GAIT TRAINING THERAPY: CPT

## 2024-07-29 RX ORDER — PROPRANOLOL HYDROCHLORIDE 10 MG/1
5 TABLET ORAL 2 TIMES DAILY
Start: 2024-07-29

## 2024-07-29 RX ADMIN — HEPARIN SODIUM 5000 UNITS: 5000 INJECTION INTRAVENOUS; SUBCUTANEOUS at 08:28

## 2024-07-29 RX ADMIN — Medication 10 ML: at 08:29

## 2024-07-29 RX ADMIN — SENNOSIDES AND DOCUSATE SODIUM 2 TABLET: 50; 8.6 TABLET ORAL at 08:28

## 2024-07-29 RX ADMIN — ALLOPURINOL 150 MG: 300 TABLET ORAL at 08:28

## 2024-07-29 RX ADMIN — CETIRIZINE HYDROCHLORIDE 10 MG: 10 TABLET, FILM COATED ORAL at 08:28

## 2024-07-29 RX ADMIN — OXYBUTYNIN CHLORIDE 10 MG: 5 TABLET, EXTENDED RELEASE ORAL at 08:28

## 2024-07-29 RX ADMIN — BUPROPION HYDROCHLORIDE 150 MG: 150 TABLET, EXTENDED RELEASE ORAL at 06:05

## 2024-07-29 RX ADMIN — BUDESONIDE AND FORMOTEROL FUMARATE DIHYDRATE 2 PUFF: 160; 4.5 AEROSOL RESPIRATORY (INHALATION) at 09:57

## 2024-07-29 RX ADMIN — CLOPIDOGREL BISULFATE 75 MG: 75 TABLET ORAL at 08:28

## 2024-07-29 RX ADMIN — PROPRANOLOL HYDROCHLORIDE 5 MG: 10 TABLET ORAL at 08:28

## 2024-07-29 RX ADMIN — ATORVASTATIN CALCIUM 20 MG: 20 TABLET, FILM COATED ORAL at 08:28

## 2024-07-29 NOTE — DISCHARGE INSTR - APPOINTMENTS
Patient needs to follow up with Agustina Gutierrez(PCP) office will call with appointment. [phone]

## 2024-07-29 NOTE — OUTREACH NOTE
Prep Survey      Flowsheet Row Responses   [facility] facility patient discharged from? [facility]   Is LACE score < 7 ? No   Eligibility [facility]   Date of Admission 07/27/24   Date of Discharge 07/29/24   Discharge diagnosis Dysarthria   Does the patient have one of the following disease processes/diagnoses(primary or secondary)? Other   Does the patient have Home health ordered? Yes   What is the Home health agency?  [facility]   Prep survey completed? Yes            Miriam ZARAGOZA - Registered Nurse

## 2024-07-29 NOTE — THERAPY EVALUATION
Patient Name: Reddy Castellano  : 1935    MRN: [id number]                              Today's Date: 2024       Admit Date: 2024    Visit Dx:     ICD-10-CM ICD-9-CM   1. Slurred speech  R47.81 784.59   2. Lethargic  R53.83 780.79   3. Dizziness  R42 780.4   4. Medication side effect  T88.7XXA 995.20   5. Difficulty in walking  R26.2 719.7   6. Decreased activities of daily living (ADL)  Z78.9 V49.89     Patient Active Problem List   Diagnosis    Gout    Type 2 diabetes mellitus without complication, without long-term current use of insulin    Essential hypertension    Other hyperlipidemia    Major depressive disorder, recurrent, moderate    Scrotal pain    Urge incontinence of urine    Urgency of urination    Balanitis    Candida rash of groin    Abnormal weight loss    Anxiety    Rheumatoid arthritis    Chronic obstructive pulmonary disease    Constipation    Depressive disorder    Disease    Disorder of prostate    Disorder of vision    Essential tremor    Excess skin of abdominal wall    Heart failure    Mechanical complication of genitourinary device    Morbid obesity    Myocardial infarction    Numbness    Pneumonia    Seasonal allergies    Sleep apnea    Spondylosis of lumbar spine    Tingling of skin    Ventral hernia    Lymphadenopathy of head and neck    Swallowed foreign body    Hyperkalemia    Benign prostatic hyperplasia with urinary frequency    Renal mass    Allergic rhinitis    Mild cognitive impairment    Wound of right leg    Scalp itch    Cellulitis of left forearm    Dysarthria     Past Medical History:   Diagnosis Date    Allergies     Anxiety     Arthritis     Asthma     BPH (benign prostatic hyperplasia)     Carpal tunnel syndrome     Colon polyp     Congestive heart failure (CHF)     COPD (chronic obstructive pulmonary disease)     Dementia     Depression     Diabetes mellitus type 2, noninsulin dependent     Diverticulitis     Erectile dysfunction of organic origin     Essential  hypertension 11/19/2019    Excess skin     Fatigue 11/09/2019    GERD (gastroesophageal reflux disease)     Gout     Heart attack     Heart disease     High cholesterol     Hyperlipidemia     Hypertension     Hypogonadism in male     Insomnia     Leg swelling     Memory change 08/02/2018    currently, pt is taking namenda. i will pursue a mocha at his follow up     Myocardial infarction     Osteoarthritis     Parkinson's disease     Prostate disorder     Rectal bleeding     Renal cyst     Seasonal allergies     Sleep apnea     Stroke     TIA (transient ischemic attack) 08/02/2018    reportedly, the pt has a history of TIA. This can be discussed further at his follow up. i was asked that he cont. aspirin.    Tremor 08/02/2018    pt was a 3 mo history of a high frequency, low amplitude tremor of both hands that worsens some what with activity. tremor does not interfere with his activities of daily living. he has no evidence of bradykinesia on exam. pakinsons seems unlikely at this point. potential etiologies would include essential tremor and physiologic tremor. he does note that he started theophylline approx. 3 months ag    Vitamin D deficiency      Past Surgical History:   Procedure Laterality Date    APPENDECTOMY  2015    CARDIAC CATHETERIZATION      CARDIAC SURGERY  2014    CARPAL TUNNEL RELEASE      CATARACT EXTRACTION      CIRCUMCISION  2000    COLONOSCOPY  2014 2017    CYSTOSCOPY      ENDOSCOPY  2010 2017    ENDOSCOPY N/A 11/23/2021    Procedure: ESOPHAGOGASTRODUODENOSCOPY;  Surgeon: Anika Cummins MD;  Location: [facility] MAIN OR;  Service: Gastroenterology;  Laterality: N/A;  gastritis    EYE SURGERY      eye implant    HERNIA REPAIR  2000    LAPAROSCOPIC GASTRIC BANDING      ORCHIECTOMY Right 2010    OTHER SURGICAL HISTORY      metal implants;     PENILE PROSTHESIS IMPLANT      TOE SURGERY      TOENAIL EXCISION      surgical removal of toenail and nail matrix of both feet    TOTAL KNEE ARTHROPLASTY  Bilateral 2009 2010      General Information       Row Name 07/29/24 1053          OT Time and Intention    Document Type evaluation  -ES     Mode of Treatment individual therapy;occupational therapy  -ES       Row Name 07/29/24 1053          General Information    Patient Profile Reviewed yes  -ES     Prior Level of Function independent:;ADL's;all household mobility;community mobility  Patient independent with B and IADLs at baseline. No device for functional mobility. Tub/shower with grab bars, standing shower completion. Huddy in stance. No home O2 use. Denies recent falls.  -ES     Existing Precautions/Restrictions fall  -ES     Barriers to Rehab none identified  -ES       Row Name 07/29/24 1053          Occupational Profile    Reason for Services/Referral (Occupational Profile) Patient is 89 yr old male admitted to [facility] on 7/27/2024 with reports of slurred speech. OT evaluation and treatment ordered d/t recent decline in ADLs/transfer ability and discharge planning recommendations. No previous OT services for current condition.  -ES       Row Name 07/29/24 1053          Living Environment    People in Home child(david), adult  adult son  -ES       Row Name 07/29/24 1053          Home Main Entrance    Number of Stairs, Main Entrance none  -ES       Row Name 07/29/24 1053          Stairs Within Home, Primary    Number of Stairs, Within Home, Primary none  -ES       Row Name 07/29/24 1053          Cognition    Orientation Status (Cognition) oriented x 3  Patient pleasant and cooperative, agreeable to therapy evaluation  -ES               User Key  (r) = Recorded By, (t) = Taken By, (c) = Cosigned By      Initials Name Provider Type    ES Lupe Teixeira, OTR/L, CSRS Occupational Therapist                     Mobility/ADL's       Row Name 07/29/24 1058          Bed Mobility    Bed Mobility bed mobility (all) activities  -ES     All Activities, [place] (Bed Mobility) not tested  -ES     Comment,  (Bed Mobility) not assessed. Patient met seated in recliner at therapy arrival to room.  -ES       Row Name 07/29/24 1058          Transfers    Transfers sit-stand transfer;stand-sit transfer  -ES       Row Name 07/29/24 1058          Sit-Stand Transfer    Sit-Stand [place] (Transfers) standby assist  -ES     Assistive Device (Sit-Stand Transfers) other (see comments)  no AD  -ES       Row Name 07/29/24 1058          Stand-Sit Transfer    Stand-Sit [place] (Transfers) standby assist  -ES     Assistive Device (Stand-Sit Transfers) other (see comments)  no AD  -ES       Row Name 07/29/24 1058          Functional Mobility    Functional Mobility- Ind. Level standby assist  -ES     Functional Mobility- Device other (see comments)  no AD  -ES       Row Name 07/29/24 1058          Activities of Daily Living    BADL Assessment/Intervention bathing;upper body dressing;lower body dressing;grooming;feeding;toileting  -ES       Row Name 07/29/24 1058          Bathing Assessment/Intervention    [place] Level (Bathing) bathing skills;independent  -ES       Row Name 07/29/24 1058          Upper Body Dressing Assessment/Training    [place] Level (Upper Body Dressing) upper body dressing skills;independent  -ES       Row Name 07/29/24 1058          Lower Body Dressing Assessment/Training    [place] Level (Lower Body Dressing) lower body dressing skills;independent  -ES       Row Name 07/29/24 1058          Grooming Assessment/Training    [place] Level (Grooming) grooming skills;independent  -ES       Row Name 07/29/24 1058          Self-Feeding Assessment/Training    [place] Level (Feeding) feeding skills;independent  -ES       Row Name 07/29/24 1058          Toileting Assessment/Training    [place] Level (Toileting) toileting skills;independent  -ES               User Key  (r) = Recorded By, (t) = Taken By, (c) = Cosigned By      Initials Name Provider Type    Lupe Mishra, OTR/L, CSRS  "Occupational Therapist                   Obj/Interventions       Row Name 07/29/24 1102          Sensory Assessment (Somatosensory)    Sensory Assessment (Somatosensory) sensation intact  -ES     Sensory Assessment bilateral upper extremity sensation intact to light and heavy touch  -ES       Row Name 07/29/24 1102          Vision Assessment/Intervention    Visual Impairment/Limitations WFL  -ES     Vision Assessment Comment patient does endorse feelings of \"sand\" in right eye. Quick vision screen intact to all four visual quadrants with tracking and scanning.  -ES       Row Name 07/29/24 1102          Range of Motion Comprehensive    General Range of Motion bilateral upper extremity ROM WNL  -ES       Row Name 07/29/24 1102          Strength Comprehensive (MMT)    General Manual Muscle Testing (MMT) Assessment no strength deficits identified  -ES     Comment, General Manual Muscle Testing (MMT) Assessment BUEs 4+/5 with symmetrical strength  -ES       [facility] Name 07/29/24 1102          Motor Skills    Motor Skills functional endurance  -ES     Functional Endurance good  -ES       [facility] Name 07/29/24 1102          Balance    Balance Assessment sitting dynamic balance;standing dynamic balance  -ES     Dynamic Sitting Balance independent  -ES     Position, Sitting Balance unsupported;sitting in chair  -ES     Dynamic Standing Balance standby assist  -ES     Position/Device Used, Standing Balance unsupported  -ES               User Key  (r) = Recorded By, (t) = Taken By, (c) = Cosigned By      Initials Name Provider Type    Lupe Mishra, OTR/L, CSRS Occupational Therapist                   Goals/Plan    No documentation.                  Clinical Impression       [facility] Name 07/29/24 1110          Pain Assessment    Pretreatment Pain Rating 0/10 - no pain  -ES     Posttreatment Pain Rating 0/10 - no pain  -ES       [facility] Name 07/29/24 1110          Plan of Care Review    Plan of Care Reviewed With patient  -ES     Outcome " Evaluation Patient presents at or near baseline functional status at time of evaluation with no identified functional deficits that impede patient independence with activities of daily living.  No indicated need for skilled occupational therapy intervention in the acute care setting.  Occupational therapy will sign off at this time.  -ES       Row Name 07/29/24 1110          Therapy Assessment/Plan (OT)    Criteria for Skilled Therapeutic Interventions Met (OT) no problems identified which require skilled intervention  -ES     Therapy Frequency (OT) evaluation only  -ES       Row Name 07/29/24 1110          Therapy Plan Review/Discharge Plan (OT)    Anticipated Discharge Disposition (OT) home  -ES       Row Name 07/29/24 1110          Positioning and Restraints    Pre-Treatment Position sitting in chair/recliner  -ES     Post Treatment Position chair  -ES     In Chair reclined;call light within reach;encouraged to call for assist;exit alarm on  -ES               User Key  (r) = Recorded By, (t) = Taken By, (c) = Cosigned By      Initials Name Provider Type    Lupe Mishra, OTR/L, CSRS Occupational Therapist                   Outcome Measures       Row Name 07/29/24 1110          How much help from another is currently needed...    Putting on and taking off regular lower body clothing? 4  -ES     Bathing (including washing, rinsing, and drying) 4  -ES     Toileting (which includes using toilet bed pan or urinal) 4  -ES     Putting on and taking off regular upper body clothing 4  -ES     Taking care of personal grooming (such as brushing teeth) 4  -ES     Eating meals 4  -ES     AM-PAC 6 Clicks Score (OT) 24  -ES       Row Name 07/29/24 0716          How much help from another person do you currently need...    Turning from your back to your side while in flat bed without using bedrails? 4  -MD     Moving from lying on back to sitting on the side of a flat bed without bedrails? 4  -MD     Moving to and from a bed  to a chair (including a wheelchair)? 4  -MD     Standing up from a chair using your arms (e.g., wheelchair, bedside chair)? 3  -MD     Climbing 3-5 steps with a railing? 3  -MD     To walk in hospital room? 3  -MD     AM-PAC 6 Clicks Score (PT) 21  -MD     Highest Level of Mobility Goal 6 --> Walk 10 steps or more  -MD       Row Name 07/29/24 1110          Functional Assessment    Outcome Measure Options AM-PAC 6 Clicks Daily Activity (OT)  -ES               User Key  (r) = Recorded By, (t) = Taken By, (c) = Cosigned By      Initials Name Provider Type    Kim Chow, RN Registered Nurse    Lupe Mishra, OTR/L, CSRS Occupational Therapist                      OT Recommendation and Plan  Therapy Frequency (OT): evaluation only  Plan of Care Review  Plan of Care Reviewed With: patient  Outcome Evaluation: Patient presents at or near baseline functional status at time of evaluation with no identified functional deficits that impede patient independence with activities of daily living.  No indicated need for skilled occupational therapy intervention in the acute care setting.  Occupational therapy will sign off at this time.     Time Calculation:   Evaluation Complexity (OT)  Review Occupational Profile/Medical/Therapy History Complexity: brief/low complexity  Assessment, Occupational Performance/Identification of Deficit Complexity: 1-3 performance deficits  Clinical Decision Making Complexity (OT): problem focused assessment/low complexity  Overall Complexity of Evaluation (OT): low complexity     Time Calculation- OT       Row Name 07/29/24 1112             Time Calculation- OT    OT Received On 07/29/24  -ES         Untimed Charges    OT Eval/Re-eval Minutes 21  -ES         Total Minutes    Untimed Charges Total Minutes 21  -ES       Total Minutes 21  -ES                User Key  (r) = Recorded By, (t) = Taken By, (c) = Cosigned By      Initials Name Provider Type    Lupe Mishra, DALIAR/L, CSRS  Occupational Therapist                  Therapy Charges for Today       Code Description Service Date Service Provider Modifiers Qty    [id number] HC OT EVAL LOW COMPLEXITY 2 7/29/2024 Lupe Teixeira, OTR/L, CSRS GO 1                 MATY Vanessa/L, CSRS  7/29/2024

## 2024-07-29 NOTE — SIGNIFICANT NOTE
07/29/24 1022   Plan   Final Discharge Disposition Code 06 - home with home health care   Final Note [facility].

## 2024-07-29 NOTE — THERAPY TREATMENT NOTE
Acute Care - Physical Therapy Treatment Note  [facility]     Patient Name: Reddy Castellano  : 1935  MRN: [id number]  Today's Date: 2024   Onset of Illness/Injury or Date of Surgery: 24  Visit Dx:     ICD-10-CM ICD-9-CM   1. Slurred speech  R47.81 784.59   2. Lethargic  R53.83 780.79   3. Dizziness  R42 780.4   4. Medication side effect  T88.7XXA 995.20   5. Difficulty in walking  R26.2 719.7   6. Decreased activities of daily living (ADL)  Z78.9 V49.89     Patient Active Problem List   Diagnosis    Gout    Type 2 diabetes mellitus without complication, without long-term current use of insulin    Essential hypertension    Other hyperlipidemia    Major depressive disorder, recurrent, moderate    Scrotal pain    Urge incontinence of urine    Urgency of urination    Balanitis    Candida rash of groin    Abnormal weight loss    Anxiety    Rheumatoid arthritis    Chronic obstructive pulmonary disease    Constipation    Depressive disorder    Disease    Disorder of prostate    Disorder of vision    Essential tremor    Excess skin of abdominal wall    Heart failure    Mechanical complication of genitourinary device    Morbid obesity    Myocardial infarction    Numbness    Pneumonia    Seasonal allergies    Sleep apnea    Spondylosis of lumbar spine    Tingling of skin    Ventral hernia    Lymphadenopathy of head and neck    Swallowed foreign body    Hyperkalemia    Benign prostatic hyperplasia with urinary frequency    Renal mass    Allergic rhinitis    Mild cognitive impairment    Wound of right leg    Scalp itch    Cellulitis of left forearm    Dysarthria     Past Medical History:   Diagnosis Date    Allergies     Anxiety     Arthritis     Asthma     BPH (benign prostatic hyperplasia)     Carpal tunnel syndrome     Colon polyp     Congestive heart failure (CHF)     COPD (chronic obstructive pulmonary disease)     Dementia     Depression     Diabetes mellitus type 2, noninsulin dependent     Diverticulitis      Erectile dysfunction of organic origin     Essential hypertension 11/19/2019    Excess skin     Fatigue 11/09/2019    GERD (gastroesophageal reflux disease)     Gout     Heart attack     Heart disease     High cholesterol     Hyperlipidemia     Hypertension     Hypogonadism in male     Insomnia     Leg swelling     Memory change 08/02/2018    currently, pt is taking namenda. i will pursue a mocha at his follow up     Myocardial infarction     Osteoarthritis     Parkinson's disease     Prostate disorder     Rectal bleeding     Renal cyst     Seasonal allergies     Sleep apnea     Stroke     TIA (transient ischemic attack) 08/02/2018    reportedly, the pt has a history of TIA. This can be discussed further at his follow up. i was asked that he cont. aspirin.    Tremor 08/02/2018    pt was a 3 mo history of a high frequency, low amplitude tremor of both hands that worsens some what with activity. tremor does not interfere with his activities of daily living. he has no evidence of bradykinesia on exam. pakinsons seems unlikely at this point. potential etiologies would include essential tremor and physiologic tremor. he does note that he started theophylline approx. 3 months ag    Vitamin D deficiency      Past Surgical History:   Procedure Laterality Date    APPENDECTOMY  2015    CARDIAC CATHETERIZATION      CARDIAC SURGERY  2014    CARPAL TUNNEL RELEASE      CATARACT EXTRACTION      CIRCUMCISION  2000    COLONOSCOPY  2014 2017    CYSTOSCOPY      ENDOSCOPY  2010 2017    ENDOSCOPY N/A 11/23/2021    Procedure: ESOPHAGOGASTRODUODENOSCOPY;  Surgeon: Anika Cummins MD;  Location: [facility] MAIN OR;  Service: Gastroenterology;  Laterality: N/A;  gastritis    EYE SURGERY      eye implant    HERNIA REPAIR  2000    LAPAROSCOPIC GASTRIC BANDING      ORCHIECTOMY Right 2010    OTHER SURGICAL HISTORY      metal implants;     PENILE PROSTHESIS IMPLANT      TOE SURGERY      TOENAIL EXCISION      surgical removal of toenail  and nail matrix of both feet    TOTAL KNEE ARTHROPLASTY Bilateral 2009 2010     PT Assessment (Last 12 Hours)       PT Evaluation and Treatment       Row Name 07/29/24 0939          Physical Therapy Time and Intention    Subjective Information no complaints  -VK     Document Type therapy note (daily note)  -VK     Mode of Treatment individual therapy;physical therapy  -VK     Patient Effort good  -VK       Row Name 07/29/24 0939          General Information    Patient Profile Reviewed yes  -VK     Existing Precautions/Restrictions fall  -VK       Row Name 07/29/24 0939          Pain    Pretreatment Pain Rating 0/10 - no pain  -VK     Posttreatment Pain Rating 0/10 - no pain  -VK       Row Name 07/29/24 0939          Cognition    Affect/Mental Status (Cognition) WFL  -VK     Orientation Status (Cognition) oriented to;person;situation  -VK     Personal Safety Interventions nonskid shoes/slippers when out of bed;gait belt;fall prevention program maintained  -VK       Row Name 07/29/24 0939          Sit-Stand Transfer    Sit-Stand [place] (Transfers) contact guard;verbal cues  -VK     Assistive Device (Sit-Stand Transfers) --  no AD  -VK       Row Name 07/29/24 0939          Stand-Sit Transfer    Stand-Sit [place] (Transfers) contact guard;verbal cues  -VK     Assistive Device (Stand-Sit Transfers) --  no AD  -VK       Row Name 07/29/24 0939          Gait/Stairs (Locomotion)    [place] Level (Gait) contact guard  -VK     Assistive Device (Gait) --  no AD  -VK     Patient was able to Ambulate yes  -VK     Distance in Feet (Gait) 225  -VK     Pattern (Gait) step-through  -VK     Deviations/Abnormal Patterns (Gait) base of support, wide;gait speed decreased;stride length decreased  -VK     Gait Assessment/Intervention Pt appears to be somewhat unsteady, pt reports this is his baseline gait.  -VK       Row Name 07/29/24 0939          Safety Issues, Functional Mobility    Impairments Affecting Function  (Mobility) balance;endurance/activity tolerance;strength  -       Row Name 07/29/24 0939          Balance    Balance Assessment sit to stand dynamic balance;standing static balance;standing dynamic balance  -VK     Sit to Stand Dynamic Balance contact guard  -VK     Static Standing Balance standby assist;contact guard  -VK     Dynamic Standing Balance contact guard  -VK     Balance Interventions sit to stand  -VK     Comment, Balance Pt performed STS 5x2  -       Row Name 07/29/24 0939          Motor Skills    Therapeutic Exercise hip;knee;ankle  -       Row Name 07/29/24 0939          Hip (Therapeutic Exercise)    Hip (Therapeutic Exercise) isometric exercises;strengthening exercise  -VK     Hip Isometrics (Therapeutic Exercise) aDduction;10 repetitions;3 sets  -VK     Hip Strengthening (Therapeutic Exercise) marching while seated;20 repititions  -       Row Name 07/29/24 0939          Knee (Therapeutic Exercise)    Knee (Therapeutic Exercise) AROM (active range of motion)  -     Knee AROM (Therapeutic Exercise) bilateral;LAQ (long arc quad);10 repetitions;2 sets  -       Row Name 07/29/24 0939          Ankle (Therapeutic Exercise)    Ankle (Therapeutic Exercise) AROM (active range of motion)  -     Ankle AROM (Therapeutic Exercise) bilateral;dorsiflexion;30 repititions  -       Row Name 07/29/24 0939          Positioning and Restraints    Pre-Treatment Position sitting in chair/recliner  -VK     Post Treatment Position chair  -VK     In Chair reclined;call light within reach;encouraged to call for assist;exit alarm on  -       Row Name 07/29/24 0939          Progress Summary (PT)    Progress Toward Functional Goals (PT) progress toward functional goals is good  -               User Key  (r) = Recorded By, (t) = Taken By, (c) = Cosigned By      Initials Name Provider Type    VK Paola Gonzalez PTA Physical Therapist Assistant                    Physical Therapy Education       Title: PT OT SLP  Therapies (Resolved)       Topic: Physical Therapy (Resolved)       Point: Mobility training (Resolved)       Learning Progress Summary             Patient Acceptance, E,TB, VU by DW at 7/28/2024 1415                         Point: Home exercise program (Resolved)       Learning Progress Summary             Patient Acceptance, E,TB, VU by DW at 7/28/2024 1415                         Point: Body mechanics (Resolved)       Learning Progress Summary             Patient Acceptance, E,TB, VU by DW at 7/28/2024 1415                         Point: Precautions (Resolved)       Learning Progress Summary             Patient Acceptance, E,TB, VU by DW at 7/28/2024 1415                                         User Key       Initials Effective Dates Name Provider Type Discipline    DW 04/25/21 -  Wale Cardoza, PT Physical Therapist PT                  PT Recommendation and Plan     Progress Summary (PT)  Progress Toward Functional Goals (PT): progress toward functional goals is good   Outcome Measures       Row Name 07/29/24 1110 07/28/24 1414          How much help from another person do you currently need...    Turning from your back to your side while in flat bed without using bedrails? 4  -VK 4  -DW     Moving from lying on back to sitting on the side of a flat bed without bedrails? 3  -VK 4  -DW     Moving to and from a bed to a chair (including a wheelchair)? 3  -VK 4  -DW     Standing up from a chair using your arms (e.g., wheelchair, bedside chair)? 4  -VK 3  -DW     Climbing 3-5 steps with a railing? 3  -VK 3  -DW     To walk in hospital room? 3  -VK 3  -DW     AM-PAC 6 Clicks Score (PT) 20  -VK 21  -DW     Highest Level of Mobility Goal 6 --> Walk 10 steps or more  -VK 6 --> Walk 10 steps or more  -DW        Functional Assessment    Outcome Measure Options -- AM-PAC 6 Clicks Basic Mobility (PT)  -DW               User Key  (r) = Recorded By, (t) = Taken By, (c) = Cosigned By      Initials Name Provider Type    DW  Wale Cardoza, PT Physical Therapist    Paola Franco PTA Physical Therapist Assistant                     Time Calculation:    PT Charges       Row Name 07/29/24 1154             Time Calculation    PT Received On 07/29/24  -VK         Timed Charges    47551 - PT Therapeutic Exercise Minutes 8  -VK      66830 - Gait Training Minutes  8  -VK      61926 - PT Therapeutic Activity Minutes 8  -VK         Total Minutes    Timed Charges Total Minutes 24  -VK       Total Minutes 24  -VK                User Key  (r) = Recorded By, (t) = Taken By, (c) = Cosigned By      Initials Name Provider Type    Paola Franco PTA Physical Therapist Assistant                  Therapy Charges for Today       Code Description Service Date Service Provider Modifiers Qty    46797720709 HC PT THER PROC EA 15 MIN 7/29/2024 Paola Gonzalez PTA GP 1    57940776015 HC GAIT TRAINING EA 15 MIN 7/29/2024 Paola Gonzalez PTA GP 1            PT G-Codes  Outcome Measure Options: AM-PAC 6 Clicks Daily Activity (OT)  AM-PAC 6 Clicks Score (PT): 20  AM-PAC 6 Clicks Score (OT): 24    Paola Gonzalez PTA  7/29/2024

## 2024-07-30 ENCOUNTER — TRANSITIONAL CARE MANAGEMENT TELEPHONE ENCOUNTER (OUTPATIENT)
Dept: CALL CENTER | Facility: HOSPITAL | Age: 89
End: 2024-07-30
Payer: MEDICARE

## 2024-07-30 LAB
BH CV ECHO MEAS - AO MAX PG: 5.3 MMHG
BH CV ECHO MEAS - AO MEAN PG: 3 MMHG
BH CV ECHO MEAS - AO ROOT DIAM: 4.3 CM
BH CV ECHO MEAS - AO V2 MAX: 115 CM/SEC
BH CV ECHO MEAS - AO V2 VTI: 21.3 CM
BH CV ECHO MEAS - AVA(I,D): 2.24 CM2
BH CV ECHO MEAS - EDV(CUBED): 125 ML
BH CV ECHO MEAS - EDV(MOD-SP2): 62.7 ML
BH CV ECHO MEAS - EDV(MOD-SP4): 77.7 ML
BH CV ECHO MEAS - EF(MOD-BP): 59.9 %
BH CV ECHO MEAS - EF(MOD-SP2): 59.5 %
BH CV ECHO MEAS - EF(MOD-SP4): 62.7 %
BH CV ECHO MEAS - ESV(CUBED): 46.7 ML
BH CV ECHO MEAS - ESV(MOD-SP2): 25.4 ML
BH CV ECHO MEAS - ESV(MOD-SP4): 29 ML
BH CV ECHO MEAS - FS: 28 %
BH CV ECHO MEAS - IVS/LVPW: 1.08 CM
BH CV ECHO MEAS - IVSD: 1.3 CM
BH CV ECHO MEAS - LA DIMENSION: 5.2 CM
BH CV ECHO MEAS - LAT PEAK E' VEL: 6.5 CM/SEC
BH CV ECHO MEAS - LV DIASTOLIC VOL/BSA (35-75): 35.8 CM2
BH CV ECHO MEAS - LV MASS(C)D: 247.6 GRAMS
BH CV ECHO MEAS - LV MAX PG: 2.47 MMHG
BH CV ECHO MEAS - LV MEAN PG: 1 MMHG
BH CV ECHO MEAS - LV SYSTOLIC VOL/BSA (12-30): 13.4 CM2
BH CV ECHO MEAS - LV V1 MAX: 78.6 CM/SEC
BH CV ECHO MEAS - LV V1 VTI: 15.2 CM
BH CV ECHO MEAS - LVIDD: 5 CM
BH CV ECHO MEAS - LVIDS: 3.6 CM
BH CV ECHO MEAS - LVOT AREA: 3.1 CM2
BH CV ECHO MEAS - LVOT DIAM: 2 CM
BH CV ECHO MEAS - LVPWD: 1.2 CM
BH CV ECHO MEAS - MED PEAK E' VEL: 5.9 CM/SEC
BH CV ECHO MEAS - MV A MAX VEL: 143 CM/SEC
BH CV ECHO MEAS - MV DEC SLOPE: 439 CM/SEC2
BH CV ECHO MEAS - MV DEC TIME: 0.18 SEC
BH CV ECHO MEAS - MV E MAX VEL: 77.6 CM/SEC
BH CV ECHO MEAS - MV E/A: 0.54
BH CV ECHO MEAS - MV MEAN PG: 3 MMHG
BH CV ECHO MEAS - MV V2 VTI: 27.9 CM
BH CV ECHO MEAS - MVA(VTI): 1.71 CM2
BH CV ECHO MEAS - RVDD: 3.6 CM
BH CV ECHO MEAS - SV(LVOT): 47.8 ML
BH CV ECHO MEAS - SV(MOD-SP2): 37.3 ML
BH CV ECHO MEAS - SV(MOD-SP4): 48.7 ML
BH CV ECHO MEAS - SVI(LVOT): 22 ML/M2
BH CV ECHO MEAS - SVI(MOD-SP2): 17.2 ML/M2
BH CV ECHO MEAS - SVI(MOD-SP4): 22.5 ML/M2
BH CV ECHO MEAS - TR MAX PG: 42.8 MMHG
BH CV ECHO MEAS - TR MAX VEL: 327 CM/SEC
BH CV ECHO MEASUREMENTS AVERAGE E/E' RATIO: 12.52
IVRT: 88 MS
LEFT ATRIUM VOLUME INDEX: 31.2 ML/M2

## 2024-07-30 NOTE — OUTREACH NOTE
Call Center TCM Note      Flowsheet Row Responses   [facility] patient discharged from? [facility]   Does the patient have one of the following disease processes/diagnoses(primary or secondary)? Other   TCM attempt successful? No   Unsuccessful attempts Attempt 1  [Reached son but he requested a callback later today]            Yara Collins RN    7/30/2024, 11:22 EDT

## 2024-07-30 NOTE — OUTREACH NOTE
Call Center TCM Note      Flowsheet Row Responses   [facility] facility patient discharged from? [facility]   Does the patient have one of the following disease processes/diagnoses(primary or secondary)? Other   TCM attempt successful? No   Unsuccessful attempts Attempt 2  [Attempted to reach patient as son requested, no answer.]            Yara Collins RN    7/30/2024, 15:09 EDT

## 2024-07-31 ENCOUNTER — TRANSITIONAL CARE MANAGEMENT TELEPHONE ENCOUNTER (OUTPATIENT)
Dept: CALL CENTER | Facility: HOSPITAL | Age: 89
End: 2024-07-31
Payer: MEDICARE

## 2024-07-31 ENCOUNTER — OUTSIDE FACILITY SERVICE (OUTPATIENT)
Dept: INTERNAL MEDICINE | Facility: CLINIC | Age: 89
End: 2024-07-31
Payer: MEDICARE

## 2024-07-31 ENCOUNTER — PATIENT OUTREACH (OUTPATIENT)
Dept: CASE MANAGEMENT | Facility: OTHER | Age: 89
End: 2024-07-31
Payer: MEDICARE

## 2024-07-31 DIAGNOSIS — I10 ESSENTIAL HYPERTENSION: ICD-10-CM

## 2024-07-31 DIAGNOSIS — E11.9 TYPE 2 DIABETES MELLITUS WITHOUT COMPLICATION, WITHOUT LONG-TERM CURRENT USE OF INSULIN: Primary | ICD-10-CM

## 2024-07-31 NOTE — OUTREACH NOTE
Call Center TCM Note      Flowsheet Row Responses   [facility] facility patient discharged from? [facility]   Does the patient have one of the following disease processes/diagnoses(primary or secondary)? Other   TCM attempt successful? No  [Vr for son but noted that son defers call to pt]   Unsuccessful attempts Attempt 3            Iris Khan RN    7/31/2024, 08:32 EDT

## 2024-07-31 NOTE — OUTREACH NOTE
[facility] End of Month Documentation    This Chronic Medical Management Care Plan for Reddy Castellano, 89 y.o. male, has been monitored and managed; reviewed; revised and a new plan of care implemented for the month of July.  A cumulative time of 191  minutes was spent on this patient record this month, including phone call with patient; face to face visit with patient; electronic communication with primary care provider; chart review.    Regarding the patient's problems: has Gout; Type 2 diabetes mellitus without complication, without long-term current use of insulin; Essential hypertension; Other hyperlipidemia; Major depressive disorder, recurrent, moderate; Scrotal pain; Urge incontinence of urine; Urgency of urination; Balanitis; Candida rash of groin; Abnormal weight loss; Anxiety; Rheumatoid arthritis; Chronic obstructive pulmonary disease; Constipation; Depressive disorder; Disease; Disorder of prostate; Disorder of vision; Essential tremor; Excess skin of abdominal wall; Heart failure; Mechanical complication of genitourinary device; Morbid obesity; Myocardial infarction; Numbness; Pneumonia; Seasonal allergies; Sleep apnea; Spondylosis of lumbar spine; Tingling of skin; Ventral hernia; Lymphadenopathy of head and neck; Swallowed foreign body; Hyperkalemia; Benign prostatic hyperplasia with urinary frequency; Renal mass; Allergic rhinitis; Mild cognitive impairment; Wound of right leg; Scalp itch; Cellulitis of left forearm; and Dysarthria on their problem list., the following items were addressed: medical records; medications and any changes can be found within the plan section of the note.  A detailed listing of time spent for chronic care management is tracked within each outreach encounter.  Current medications include:  has a current medication list which includes the following prescription(s): allopurinol, atorvastatin, bupropion xl, clopidogrel, fluticasone-salmeterol, melatonin, memantine, propranolol,  venlafaxine xr, and vibegron. and the patient is reported to be patient is noncompliant with medication protocol,  Medications are reported to be non-effective in controlling symptoms and changes have been made to the medication protocol.  Regarding these diagnoses, referrals were made to the following provider(s):  Home Health, Physical Therapy, Occupational Therapy, Speech Therapy.  All notes on chart for PCP to review.    The patient was monitored remotely for blood pressure; medications; blood glucose; mood & behavior.    The patient's physical needs include:  help taking medications as prescribed; medication education; physical healthcare; eye care, Needs diabetic eye exam, Hgb A1C.     The patient's mental support needs include:  continued support, seeing behavioral health    The patient's cognitive support needs include:  medication; coordination of community providers; health care; increased support, ACM to assist with organizing medications in office    The patient's psychosocial support needs include:  medication management or adherence; need for increased support; coordination of community providers    The patient's functional needs include: physical healthcare; medication education    The patient's environmental needs include:  not applicable    Care Plan overall comments:  Ongoing    Refer to previous outreach notes for more information on the areas listed above.    Monthly Billing Diagnoses  (E11.9) Type 2 diabetes mellitus without complication, without long-term current use of insulin    (I10) Essential hypertension    Medications   Medications have been reconciled    Care Plan progress this month:      Recently Modified Care Plans Updates made since 6/30/2024 12:00 AM      No recently modified care plans.            Current Specialty Plan of Care Status signed by both patient and provider    Instructions   Patient was provided an electronic copy of care plan  CCM services were explained and offered  and patient has accepted these services.  Patient has given their written consent to receive CCM services and understands that this includes the authorization of electronic communication of medical information with the other treating providers.  Patient understands that they may stop CCM services at any time and these changes will be effective at the end of the calendar month and will effectively revocate the agreement of CCM services.  Patient understands that only one practitioner can furnish and be paid for CCM services during one calendar month.  Patient also understands that there may be co-payment or deductible fees in association with CCM services.  Patient will continue with at least monthly follow-up calls with the Ambulatory .    Megha CARTAGENA  Ambulatory Case Management    7/31/2024, 12:51 EDT

## 2024-08-01 ENCOUNTER — TELEPHONE (OUTPATIENT)
Dept: INTERNAL MEDICINE | Facility: CLINIC | Age: 89
End: 2024-08-01
Payer: MEDICARE

## 2024-08-01 NOTE — TELEPHONE ENCOUNTER
Elkin with [facility] called to get some verbal orders for this pt. Wanting to add skilled nursing 1x or 3 wks with the OT.

## 2024-08-01 NOTE — TELEPHONE ENCOUNTER
Davide with [facility] called office stating she is going over patients medication after discharge and she wanted pcp to be aware patient was taken off multiple BP meds ,amlodipine, lisinopril and lasix, and they also cut his propranolol in half, and she states pt is hypertensive today BP was 168/92, 3 + pitting edema on LT leg and 2+ on the RT leg, scheduled pt for Monday with [facility]. She will also see pt for the rest of this week for the rest of this episode she stated she will seeing for     Best number to contact her is [phone]    Also give verbal order for for skilled nursing and OT.

## 2024-08-04 LAB
QT INTERVAL: 464 MS
QTC INTERVAL: 465 MS

## 2024-08-06 ENCOUNTER — OFFICE VISIT (OUTPATIENT)
Dept: INTERNAL MEDICINE | Facility: CLINIC | Age: 89
End: 2024-08-06
Payer: MEDICARE

## 2024-08-06 VITALS
HEIGHT: 68 IN | SYSTOLIC BLOOD PRESSURE: 120 MMHG | HEART RATE: 72 BPM | TEMPERATURE: 97.3 F | WEIGHT: 227.2 LBS | OXYGEN SATURATION: 99 % | BODY MASS INDEX: 34.43 KG/M2 | DIASTOLIC BLOOD PRESSURE: 82 MMHG

## 2024-08-06 DIAGNOSIS — K59.01 SLOW TRANSIT CONSTIPATION: Primary | ICD-10-CM

## 2024-08-06 PROCEDURE — 1159F MED LIST DOCD IN RCRD: CPT | Performed by: PHYSICIAN ASSISTANT

## 2024-08-06 PROCEDURE — 99213 OFFICE O/P EST LOW 20 MIN: CPT | Performed by: PHYSICIAN ASSISTANT

## 2024-08-06 PROCEDURE — 1160F RVW MEDS BY RX/DR IN RCRD: CPT | Performed by: PHYSICIAN ASSISTANT

## 2024-08-06 PROCEDURE — 1125F AMNT PAIN NOTED PAIN PRSNT: CPT | Performed by: PHYSICIAN ASSISTANT

## 2024-08-06 RX ORDER — POLYETHYLENE GLYCOL 3350 17 G/17G
17 POWDER, FOR SOLUTION ORAL DAILY
Qty: 72 PACKET | Refills: 0 | Status: SHIPPED | OUTPATIENT
Start: 2024-08-06 | End: 2024-09-05

## 2024-08-06 NOTE — ASSESSMENT & PLAN NOTE
Uncertain etiology for recent acute change in bowel habits, could be related to recent hospitalization and change in diet/medications.  Bowel cleanout handout given to patient and would like for him to perform Miralax cleanout at home.  If symptoms persist or worsen patient needs to return.

## 2024-08-06 NOTE — PROGRESS NOTES
"Chief Complaint  Constipation (Ongoing for about 1 week. Last bowl moment was today. )    Subjective          Reddy Castellano presents to [facility]    Constipation- patient statest that he has been constipated for the past week.  This morning he did have a bowel movement but it was hard.  No hematochezia. He has had some lower pelvic pain.  No dysuria.      Objective   Vital Signs:   /82 (BP Location: Right arm, Patient Position: Sitting, Cuff Size: Adult)   Pulse 72   Temp 97.3 °F (36.3 °C) (Temporal)   Ht 172.7 cm (68\")   Wt 103 kg (227 lb 3.2 oz)   SpO2 99%   BMI 34.55 kg/m²     Physical Exam  Vitals reviewed.   Constitutional:       Appearance: Normal appearance. He is well-developed.   HENT:      Head: Normocephalic and atraumatic.   Eyes:      Conjunctiva/sclera: Conjunctivae normal.      Pupils: Pupils are equal, round, and reactive to light.   Cardiovascular:      Rate and Rhythm: Normal rate and regular rhythm.      Heart sounds: No murmur heard.     No friction rub. No gallop.   Pulmonary:      Effort: Pulmonary effort is normal.      Breath sounds: Normal breath sounds. No wheezing or rhonchi.   Abdominal:      Palpations: Abdomen is soft. There is no mass.      Tenderness: There is no abdominal tenderness. There is no guarding.      Comments: Hyperactive bowel sounds   Skin:     General: Skin is warm and dry.   Neurological:      Mental Status: He is alert and oriented to person, place, and time.      Cranial Nerves: No cranial nerve deficit.   Psychiatric:         Mood and Affect: Mood and affect normal.         Behavior: Behavior normal.         Thought Content: Thought content normal.         Judgment: Judgment normal.        Result Review :          Procedures      Assessment and Plan    Diagnoses and all orders for this visit:    1. Slow transit constipation (Primary)  Assessment & Plan:  Uncertain etiology for recent acute change in bowel " habits, could be related to recent hospitalization and change in diet/medications.  Bowel cleanout handout given to patient and would like for him to perform Miralax cleanout at home.  If symptoms persist or worsen patient needs to return.       Other orders  -     polyethylene glycol (MIRALAX) 17 g packet; Take 17 g by mouth Daily for 30 days.  Dispense: 72 packet; Refill: 0              Follow Up   No follow-ups on file.  Patient was given instructions and counseling regarding his condition or for health maintenance advice. Please see specific information pulled into the AVS if appropriate.

## 2024-08-07 ENCOUNTER — TELEPHONE (OUTPATIENT)
Dept: INTERNAL MEDICINE | Facility: CLINIC | Age: 89
End: 2024-08-07
Payer: MEDICARE

## 2024-08-07 ENCOUNTER — PATIENT OUTREACH (OUTPATIENT)
Dept: CASE MANAGEMENT | Facility: OTHER | Age: 89
End: 2024-08-07
Payer: MEDICARE

## 2024-08-07 DIAGNOSIS — I10 ESSENTIAL HYPERTENSION: Primary | ICD-10-CM

## 2024-08-07 NOTE — OUTREACH NOTE
AMBULATORY CASE MANAGEMENT NOTE    Names and Relationships of Patient/Support Persons: Contact: Reddy Castellano; Relationship: Self -     CCM Interim Update    I spoke with patient on the phone to discuss his recent hospitalization, office visit and medication changes.    Patient states he drove himself to the hospital when he noticed he was havinf slurred speech and wasn't feeling well. He was admitted for a TIA but all symptoms are resolved now. Patient is speaking clear over the phone.    Patient was in the PCP office yesterday with c/o constipation. He is going to the pharmacy today to  the miralax prescribed.    Patient's propanalol was changed to half a tablet twice a day on 7/29. Patient is unsure if he has this medication and if he does, he is unsure how he is taking it. Patient has written the name of the medication down and is going to ask the pharmacy if he has picked up this medication or not.    Patient weighed himself while I was on the phone. He states his weight today is 220lbs. His weight in the office yesterday was 227lbs. I would assume there is a variance between his home scale and the office scale.    Patient is not checking blood pressure at home. His last bps charted were 120/82, 138/72, 131/86, 150/80. The most recent being 120/82.    Care Coordination    I attempted to call Elkin with [facility] to clarify the propanolol dosing but had to leave a voicemail.    I will follow up with patient later today regarding this medication.    Megha CARTAGENA  Ambulatory Case Management    8/7/2024, 10:27 EDT

## 2024-08-07 NOTE — TELEPHONE ENCOUNTER
"Received transferred call from [facility]; patient looking to return a call to a Dr. Alford that he thought was at this office.  Confirmed with patient where he had called and he verbalized understanding.  Checked patient's medical records and did not see any provider with that name that had attempted to reach patient.  Patient AAO x 3 on the phone but thought we had a doctor / provider here by that name.  Patient stated \" I've had a mini-stroke in the past and I think it caused me to be a little confused.\"  Inquired if there was anything I could help him with and he stated no.  Patient denied any SOB, no numbness or tingling to report.  Offered further assistance as needed.  "

## 2024-08-08 ENCOUNTER — TELEPHONE (OUTPATIENT)
Dept: INTERNAL MEDICINE | Facility: CLINIC | Age: 89
End: 2024-08-08
Payer: MEDICARE

## 2024-08-08 NOTE — TELEPHONE ENCOUNTER
Home health nurse Matilda called into office to report elevated blood pressure on pt, but pt has not taken his medication yet, Matilda was also wondering if we could get a UA on pt as he is experiencing more confusion.

## 2024-08-09 NOTE — TELEPHONE ENCOUNTER
Spoke with Matilda give verbal order for UA.    Matilda stated pt has been  calling over and over the nurses, and she ask the patient he stated he can't remember, she also mention that when she ask the patient if he had any trouble urinating he says he always does.

## 2024-08-10 ENCOUNTER — HOSPITAL ENCOUNTER (EMERGENCY)
Facility: HOSPITAL | Age: 89
Discharge: HOME | End: 2024-08-10
Attending: EMERGENCY MEDICINE
Payer: MEDICARE

## 2024-08-10 VITALS
SYSTOLIC BLOOD PRESSURE: 177 MMHG | TEMPERATURE: 98.6 F | HEIGHT: 68 IN | WEIGHT: 227.07 LBS | RESPIRATION RATE: 18 BRPM | OXYGEN SATURATION: 97 % | BODY MASS INDEX: 34.41 KG/M2 | HEART RATE: 66 BPM | DIASTOLIC BLOOD PRESSURE: 98 MMHG

## 2024-08-10 DIAGNOSIS — K59.00 CONSTIPATION, UNSPECIFIED CONSTIPATION TYPE: Primary | ICD-10-CM

## 2024-08-10 LAB
ALBUMIN SERPL-MCNC: 4.1 G/DL (ref 3.5–5.2)
ALBUMIN/GLOB SERPL: 1.6 G/DL
ALP SERPL-CCNC: 89 U/L (ref 39–117)
ALT SERPL W P-5'-P-CCNC: 11 U/L (ref 1–41)
ANION GAP SERPL CALCULATED.3IONS-SCNC: 11.9 MMOL/L (ref 5–15)
AST SERPL-CCNC: 17 U/L (ref 1–40)
BASOPHILS # BLD AUTO: 0.13 10*3/MM3 (ref 0–0.2)
BASOPHILS NFR BLD AUTO: 1.4 % (ref 0–1.5)
BILIRUB SERPL-MCNC: 1 MG/DL (ref 0–1.2)
BUN SERPL-MCNC: 24 MG/DL (ref 8–23)
BUN/CREAT SERPL: 14.5 (ref 7–25)
CALCIUM SPEC-SCNC: 9.5 MG/DL (ref 8.6–10.5)
CHLORIDE SERPL-SCNC: 106 MMOL/L (ref 98–107)
CO2 SERPL-SCNC: 25.1 MMOL/L (ref 22–29)
CREAT SERPL-MCNC: 1.65 MG/DL (ref 0.76–1.27)
D-LACTATE SERPL-SCNC: 1.6 MMOL/L (ref 0.5–2)
DEPRECATED RDW RBC AUTO: 47.8 FL (ref 37–54)
EGFRCR SERPLBLD CKD-EPI 2021: 39.4 ML/MIN/1.73
EOSINOPHIL # BLD AUTO: 0.27 10*3/MM3 (ref 0–0.4)
EOSINOPHIL NFR BLD AUTO: 3 % (ref 0.3–6.2)
ERYTHROCYTE [DISTWIDTH] IN BLOOD BY AUTOMATED COUNT: 13.6 % (ref 12.3–15.4)
GLOBULIN UR ELPH-MCNC: 2.6 GM/DL
GLUCOSE SERPL-MCNC: 103 MG/DL (ref 65–99)
HCT VFR BLD AUTO: 53.8 % (ref 37.5–51)
HGB BLD-MCNC: 17.6 G/DL (ref 13–17.7)
HOLD SPECIMEN: NORMAL
HOLD SPECIMEN: NORMAL
IMM GRANULOCYTES # BLD AUTO: 0.05 10*3/MM3 (ref 0–0.05)
IMM GRANULOCYTES NFR BLD AUTO: 0.6 % (ref 0–0.5)
LIPASE SERPL-CCNC: 31 U/L (ref 13–60)
LYMPHOCYTES # BLD AUTO: 2.52 10*3/MM3 (ref 0.7–3.1)
LYMPHOCYTES NFR BLD AUTO: 27.9 % (ref 19.6–45.3)
MCH RBC QN AUTO: 31.3 PG (ref 26.6–33)
MCHC RBC AUTO-ENTMCNC: 32.7 G/DL (ref 31.5–35.7)
MCV RBC AUTO: 95.7 FL (ref 79–97)
MONOCYTES # BLD AUTO: 0.62 10*3/MM3 (ref 0.1–0.9)
MONOCYTES NFR BLD AUTO: 6.9 % (ref 5–12)
NEUTROPHILS NFR BLD AUTO: 5.44 10*3/MM3 (ref 1.7–7)
NEUTROPHILS NFR BLD AUTO: 60.2 % (ref 42.7–76)
NRBC BLD AUTO-RTO: 0 /100 WBC (ref 0–0.2)
PLATELET # BLD AUTO: 162 10*3/MM3 (ref 140–450)
PMV BLD AUTO: 10.8 FL (ref 6–12)
POTASSIUM SERPL-SCNC: 4.4 MMOL/L (ref 3.5–5.2)
PROT SERPL-MCNC: 6.7 G/DL (ref 6–8.5)
RBC # BLD AUTO: 5.62 10*6/MM3 (ref 4.14–5.8)
SODIUM SERPL-SCNC: 143 MMOL/L (ref 136–145)
WBC NRBC COR # BLD AUTO: 9.03 10*3/MM3 (ref 3.4–10.8)
WHOLE BLOOD HOLD COAG: NORMAL
WHOLE BLOOD HOLD SPECIMEN: NORMAL

## 2024-08-10 PROCEDURE — 80053 COMPREHEN METABOLIC PANEL: CPT | Performed by: EMERGENCY MEDICINE

## 2024-08-10 PROCEDURE — 99284 EMERGENCY DEPT VISIT MOD MDM: CPT

## 2024-08-10 PROCEDURE — 85025 COMPLETE CBC W/AUTO DIFF WBC: CPT | Performed by: EMERGENCY MEDICINE

## 2024-08-10 PROCEDURE — 83605 ASSAY OF LACTIC ACID: CPT | Performed by: EMERGENCY MEDICINE

## 2024-08-10 PROCEDURE — 83690 ASSAY OF LIPASE: CPT | Performed by: EMERGENCY MEDICINE

## 2024-08-10 RX ORDER — SODIUM CHLORIDE 0.9 % (FLUSH) 0.9 %
10 SYRINGE (ML) INJECTION AS NEEDED
Status: DISCONTINUED | OUTPATIENT
Start: 2024-08-10 | End: 2024-08-10 | Stop reason: HOSPADM

## 2024-08-10 NOTE — ED NOTES
Pt has successful bowel movement  on bedside after enema. MD notified     Discharge planning issues Hypertension

## 2024-08-10 NOTE — ED PROVIDER NOTES
Time: 9:52 AM EDT  Date of encounter:  8/10/2024  Independent Historian/Clinical History and Information was obtained by:   Patient    History is limited by: N/A    Chief Complaint: Constipation      History of Present Illness:  Patient is a 89 y.o. year old male who presents to the emergency department for evaluation of constipation.  Patient has a history of CHF, COPD, dementia, diabetes who presents with complaints of constipation.  Reports this been ongoing for at least a week now.  States he was seen by his doctor and put on MiraLAX.  States that MiraLAX does not really help.  Patient denies any abdominal pain.  Does report that he has been passing gas.  No other complaints this time.      Patient Care Team  Primary Care Provider: Agustina Gutierrez MD    Past Medical History:     No Known Allergies  Past Medical History:   Diagnosis Date    Allergies     Anxiety     Arthritis     Asthma     BPH (benign prostatic hyperplasia)     Carpal tunnel syndrome     Colon polyp     Congestive heart failure (CHF)     COPD (chronic obstructive pulmonary disease)     Dementia     Depression     Diabetes mellitus type 2, noninsulin dependent     Diverticulitis     Erectile dysfunction of organic origin     Essential hypertension 11/19/2019    Excess skin     Fatigue 11/09/2019    GERD (gastroesophageal reflux disease)     Gout     Heart attack     Heart disease     High cholesterol     Hyperlipidemia     Hypertension     Hypogonadism in male     Insomnia     Leg swelling     Memory change 08/02/2018    currently, pt is taking namenda. i will pursue a mocha at his follow up     Myocardial infarction     Osteoarthritis     Parkinson's disease     Prostate disorder     Rectal bleeding     Renal cyst     Seasonal allergies     Sleep apnea     Stroke     TIA (transient ischemic attack) 08/02/2018    reportedly, the pt has a history of TIA. This can be discussed further at his follow up. i was asked that he cont. aspirin.     Tremor 08/02/2018    pt was a 3 mo history of a high frequency, low amplitude tremor of both hands that worsens some what with activity. tremor does not interfere with his activities of daily living. he has no evidence of bradykinesia on exam. pakinsons seems unlikely at this point. potential etiologies would include essential tremor and physiologic tremor. he does note that he started theophylline approx. 3 months ag    Vitamin D deficiency      Past Surgical History:   Procedure Laterality Date    APPENDECTOMY  2015    CARDIAC CATHETERIZATION      CARDIAC SURGERY  2014    CARPAL TUNNEL RELEASE      CATARACT EXTRACTION      CIRCUMCISION  2000    COLONOSCOPY  2014 2017    CYSTOSCOPY      ENDOSCOPY  2010 2017    ENDOSCOPY N/A 11/23/2021    Procedure: ESOPHAGOGASTRODUODENOSCOPY;  Surgeon: Anika Cummins MD;  Location: [facility] MAIN OR;  Service: Gastroenterology;  Laterality: N/A;  gastritis    EYE SURGERY      eye implant    HERNIA REPAIR  2000    LAPAROSCOPIC GASTRIC BANDING      ORCHIECTOMY Right 2010    OTHER SURGICAL HISTORY      metal implants;     PENILE PROSTHESIS IMPLANT      TOE SURGERY      TOENAIL EXCISION      surgical removal of toenail and nail matrix of both feet    TOTAL KNEE ARTHROPLASTY Bilateral 2009 2010     Family History   Problem Relation Age of Onset    Diabetes Mother     Heart attack Father     Diabetes Brother     Rectal cancer Other         grandfather- rectal and colon    Prostate cancer Other     Diabetes Son        Home Medications:  Prior to Admission medications    Medication Sig Start Date End Date Taking? Authorizing Provider   allopurinol (ZYLOPRIM) 300 MG tablet Take 0.5 tablets by mouth Daily. 7/15/24   Agustina Gutierrez MD   atorvastatin (LIPITOR) 20 MG tablet Take 1 tablet by mouth Daily. 4/11/24   Agustina Gutierrez MD   buPROPion XL (WELLBUTRIN XL) 150 MG 24 hr tablet Take 1 tablet by mouth Every Morning. 7/15/24   Agustina Gutierrez MD  "  clopidogrel (PLAVIX) 75 MG tablet Take 1 tablet by mouth Daily. 7/15/24   Agustnia Gutierrez MD   fluticasone-salmeterol (ADVAIR HFA) 230-21 MCG/ACT inhaler Inhale 2 puffs Every 6 (Six) Hours. 3/7/24   Agustina Gutierrez MD   melatonin 1 MG tablet Take 6 tablets by mouth At Night As Needed for Sleep. 7/15/24   Agustina Gutierrez MD   memantine (NAMENDA) 10 MG tablet Take 1 tablet twice a day 7/15/24   Agustina Gutierrez MD   polyethylene glycol (MIRALAX) 17 g packet Take 17 g by mouth Daily for 30 days. 8/6/24 9/5/24  Tamanna Dias PA-C   propranolol (INDERAL) 10 MG tablet Take 0.5 tablets by mouth 2 (Two) Times a Day. 7/29/24   Gallo Winters PA   venlafaxine XR (EFFEXOR-XR) 75 MG 24 hr capsule Take 1 capsule by mouth Daily. 7/15/24   Agustina Gutierrez MD   Vibegron 75 MG tablet Take 1 tablet by mouth Daily. 7/15/24   Agustina Gutierrez MD        Social History:   Social History     Tobacco Use    Smoking status: Never     Passive exposure: Never    Smokeless tobacco: Never   Vaping Use    Vaping status: Never Used   Substance Use Topics    Alcohol use: Yes     Comment: occasional     Drug use: Never         Review of Systems:  Review of Systems   Gastrointestinal:  Positive for constipation.        Physical Exam:  /98 (BP Location: Right arm, Patient Position: Sitting)   Pulse 66   Temp 98.6 °F (37 °C) (Oral)   Resp 18   Ht 172.7 cm (68\")   Wt 103 kg (227 lb 1.2 oz)   SpO2 97%   BMI 34.53 kg/m²     Physical Exam  Vitals and nursing note reviewed.   Constitutional:       Appearance: Normal appearance.   HENT:      Head: Normocephalic and atraumatic.   Eyes:      General: No scleral icterus.  Cardiovascular:      Rate and Rhythm: Normal rate and regular rhythm.      Heart sounds: Normal heart sounds.   Pulmonary:      Effort: Pulmonary effort is normal.      Breath sounds: Normal breath sounds.   Abdominal:      Palpations: Abdomen is soft.      " Tenderness: There is no abdominal tenderness.   Genitourinary:     Comments: Rectal exam with no significant hard stool noted  Musculoskeletal:         General: Normal range of motion.      Cervical back: Normal range of motion.   Skin:     Findings: No rash.   Neurological:      General: No focal deficit present.      Mental Status: He is alert.                  Procedures:  Procedures      Medical Decision Making:      Comorbidities that affect care:    Congestive Heart Failure, Diabetes, Hypertension    External Notes reviewed:    Reviewed PCP note from 8/6/2024      The following orders were placed and all results were independently analyzed by me:  Orders Placed This Encounter   Procedures    [place] Draw    Comprehensive Metabolic Panel    Lipase    Urinalysis With Microscopic If Indicated (No Culture) - Urine, Clean Catch    Lactic Acid, Plasma    CBC Auto Differential    NPO Diet NPO Type: Strict NPO    Undress & Gown    Soap suds enema    Insert Peripheral IV    CBC & Differential    Green Top (Gel)    Lavender Top    Gold Top - SST    Light Blue Top       Medications Given in the Emergency Department:  Medications   sodium chloride 0.9 % flush 10 mL (has no administration in time range)        ED Course:         Labs:    Lab Results (last 24 hours)       Procedure Component Value Units Date/Time    CBC & Differential [[id number]]  (Abnormal) Collected: 08/10/24 0945    Specimen: Blood Updated: 08/10/24 1005    Narrative:      The following orders were created for panel order CBC & Differential.  Procedure                               Abnormality         Status                     ---------                               -----------         ------                     CBC Auto Differential[[id number]]        Abnormal            Final result                 Please view results for these tests on the individual orders.    Comprehensive Metabolic Panel [[id number]]  (Abnormal) Collected: 08/10/24 0945    Specimen:  Blood Updated: 08/10/24 1022     Glucose 103 mg/dL      BUN 24 mg/dL      Creatinine 1.65 mg/dL      Sodium 143 mmol/L      Potassium 4.4 mmol/L      Chloride 106 mmol/L      CO2 25.1 mmol/L      Calcium 9.5 mg/dL      Total Protein 6.7 g/dL      Albumin 4.1 g/dL      ALT (SGPT) 11 U/L      AST (SGOT) 17 U/L      Alkaline Phosphatase 89 U/L      Total Bilirubin 1.0 mg/dL      Globulin 2.6 gm/dL      A/G Ratio 1.6 g/dL      BUN/Creatinine Ratio 14.5     Anion Gap 11.9 mmol/L      eGFR 39.4 mL/min/1.73     Narrative:      GFR Normal >60  Chronic Kidney Disease <60  Kidney Failure <15    The GFR formula is only valid for adults with stable renal function between ages 18 and 70.    Lipase [[id number]]  (Normal) Collected: 08/10/24 0945    Specimen: Blood Updated: 08/10/24 1022     Lipase 31 U/L     Lactic Acid, Plasma [[id number]]  (Normal) Collected: 08/10/24 0945    Specimen: Blood Updated: 08/10/24 1020     Lactate 1.6 mmol/L     CBC Auto Differential [[id number]]  (Abnormal) Collected: 08/10/24 0945    Specimen: Blood Updated: 08/10/24 1005     WBC 9.03 10*3/mm3      RBC 5.62 10*6/mm3      Hemoglobin 17.6 g/dL      Hematocrit 53.8 %      MCV 95.7 fL      MCH 31.3 pg      MCHC 32.7 g/dL      RDW 13.6 %      RDW-SD 47.8 fl      MPV 10.8 fL      Platelets 162 10*3/mm3      Neutrophil % 60.2 %      Lymphocyte % 27.9 %      Monocyte % 6.9 %      Eosinophil % 3.0 %      Basophil % 1.4 %      Immature Grans % 0.6 %      Neutrophils, Absolute 5.44 10*3/mm3      Lymphocytes, Absolute 2.52 10*3/mm3      Monocytes, Absolute 0.62 10*3/mm3      Eosinophils, Absolute 0.27 10*3/mm3      Basophils, Absolute 0.13 10*3/mm3      Immature Grans, Absolute 0.05 10*3/mm3      nRBC 0.0 /100 WBC              Imaging:    No Radiology Exams Resulted Within Past 24 Hours      Differential Diagnosis and Discussion:    Abdominal Pain: Based on the patient's signs and symptoms, I considered abdominal aortic aneurysm, small bowel obstruction,  pancreatitis, acute cholecystitis, acute appendecitis, peptic ulcer disease, gastritis, colitis, endocrine disorders, irritable bowel syndrome and other differential diagnosis an etiology of the patient's abdominal pain.    All labs were reviewed and interpreted by me.    MDM     Amount and/or Complexity of Data Reviewed  Clinical lab tests: reviewed  Decide to obtain previous medical records or to obtain history from someone other than the patient: yes       Patient is a 89-year-old gentleman who presents with complaints of constipation.  States been ongoing for several days.  Has been taking MiraLAX without any relief.  Was given an enema here with relief of his constipation and had a significant bowel movement.  There was no significant hard stool noted on my rectal exam.  Recommend continue MiraLAX daily at this time.  Recommend outpatient follow-up with primary care doctor.  Denies any abdominal pain.  Will DC and have patient followed up as an outpatient.          Patient Care Considerations:          Consultants/Shared Management Plan:    None    Social Determinants of Health:    Patient has presented with family members who are responsible, reliable and will ensure follow up care.      Disposition and Care Coordination:    Discharged: The patient is suitable and stable for discharge with no need for consideration of admission.    I have explained the patient´s condition, diagnoses and treatment plan based on the information available to me at this time. I have answered questions and addressed any concerns. The patient has a good  understanding of the patient´s diagnosis, condition, and treatment plan as can be expected at this point. The vital signs have been stable. The patient´s condition is stable and appropriate for discharge from the emergency department.      The patient will pursue further outpatient evaluation with the primary care physician or other designated or consulting physician as outlined in  the discharge instructions. They are agreeable to this plan of care and follow-up instructions have been explained in detail. The patient has received these instructions in written format and have expressed an understanding of the discharge instructions. The patient is aware that any significant change in condition or worsening of symptoms should prompt an immediate return to this or the closest emergency department or call to 911.      Final diagnoses:   Constipation, unspecified constipation type        ED Disposition       ED Disposition   Discharge    Condition   Stable    Comment   --               This medical record created using voice recognition software.             Castro Meeks MD  08/10/24 [id number]

## 2024-08-11 ENCOUNTER — APPOINTMENT (OUTPATIENT)
Dept: CT IMAGING | Facility: HOSPITAL | Age: 89
End: 2024-08-11
Payer: MEDICARE

## 2024-08-11 ENCOUNTER — HOSPITAL ENCOUNTER (EMERGENCY)
Facility: HOSPITAL | Age: 89
Discharge: HOME | End: 2024-08-11
Attending: EMERGENCY MEDICINE | Admitting: EMERGENCY MEDICINE
Payer: MEDICARE

## 2024-08-11 VITALS
HEART RATE: 68 BPM | OXYGEN SATURATION: 95 % | TEMPERATURE: 98.1 F | DIASTOLIC BLOOD PRESSURE: 95 MMHG | BODY MASS INDEX: 33.65 KG/M2 | WEIGHT: 222 LBS | RESPIRATION RATE: 18 BRPM | SYSTOLIC BLOOD PRESSURE: 157 MMHG | HEIGHT: 68 IN

## 2024-08-11 DIAGNOSIS — K59.00 CONSTIPATION, UNSPECIFIED CONSTIPATION TYPE: Primary | ICD-10-CM

## 2024-08-11 LAB
ALBUMIN SERPL-MCNC: 3.7 G/DL (ref 3.5–5.2)
ALBUMIN/GLOB SERPL: 1.3 G/DL
ALP SERPL-CCNC: 88 U/L (ref 39–117)
ALT SERPL W P-5'-P-CCNC: 11 U/L (ref 1–41)
ANION GAP SERPL CALCULATED.3IONS-SCNC: 10.7 MMOL/L (ref 5–15)
AST SERPL-CCNC: 16 U/L (ref 1–40)
BASOPHILS # BLD AUTO: 0.07 10*3/MM3 (ref 0–0.2)
BASOPHILS NFR BLD AUTO: 0.9 % (ref 0–1.5)
BILIRUB SERPL-MCNC: 0.8 MG/DL (ref 0–1.2)
BUN SERPL-MCNC: 22 MG/DL (ref 8–23)
BUN/CREAT SERPL: 14.6 (ref 7–25)
CALCIUM SPEC-SCNC: 9 MG/DL (ref 8.6–10.5)
CHLORIDE SERPL-SCNC: 105 MMOL/L (ref 98–107)
CO2 SERPL-SCNC: 26.3 MMOL/L (ref 22–29)
CREAT SERPL-MCNC: 1.51 MG/DL (ref 0.76–1.27)
DEPRECATED RDW RBC AUTO: 46.3 FL (ref 37–54)
EGFRCR SERPLBLD CKD-EPI 2021: 43.9 ML/MIN/1.73
EOSINOPHIL # BLD AUTO: 0.17 10*3/MM3 (ref 0–0.4)
EOSINOPHIL NFR BLD AUTO: 2.1 % (ref 0.3–6.2)
ERYTHROCYTE [DISTWIDTH] IN BLOOD BY AUTOMATED COUNT: 13.5 % (ref 12.3–15.4)
GLOBULIN UR ELPH-MCNC: 2.9 GM/DL
GLUCOSE SERPL-MCNC: 81 MG/DL (ref 65–99)
HCT VFR BLD AUTO: 50.6 % (ref 37.5–51)
HGB BLD-MCNC: 16.8 G/DL (ref 13–17.7)
HOLD SPECIMEN: NORMAL
IMM GRANULOCYTES # BLD AUTO: 0.12 10*3/MM3 (ref 0–0.05)
IMM GRANULOCYTES NFR BLD AUTO: 1.5 % (ref 0–0.5)
LYMPHOCYTES # BLD AUTO: 2 10*3/MM3 (ref 0.7–3.1)
LYMPHOCYTES NFR BLD AUTO: 25.3 % (ref 19.6–45.3)
MCH RBC QN AUTO: 31.2 PG (ref 26.6–33)
MCHC RBC AUTO-ENTMCNC: 33.2 G/DL (ref 31.5–35.7)
MCV RBC AUTO: 94.1 FL (ref 79–97)
MONOCYTES # BLD AUTO: 0.5 10*3/MM3 (ref 0.1–0.9)
MONOCYTES NFR BLD AUTO: 6.3 % (ref 5–12)
NEUTROPHILS NFR BLD AUTO: 5.06 10*3/MM3 (ref 1.7–7)
NEUTROPHILS NFR BLD AUTO: 63.9 % (ref 42.7–76)
NRBC BLD AUTO-RTO: 0 /100 WBC (ref 0–0.2)
PLATELET # BLD AUTO: 154 10*3/MM3 (ref 140–450)
PMV BLD AUTO: 10.9 FL (ref 6–12)
POTASSIUM SERPL-SCNC: 4.5 MMOL/L (ref 3.5–5.2)
PROT SERPL-MCNC: 6.6 G/DL (ref 6–8.5)
RBC # BLD AUTO: 5.38 10*6/MM3 (ref 4.14–5.8)
SODIUM SERPL-SCNC: 142 MMOL/L (ref 136–145)
WBC NRBC COR # BLD AUTO: 7.92 10*3/MM3 (ref 3.4–10.8)
WHOLE BLOOD HOLD COAG: NORMAL

## 2024-08-11 PROCEDURE — 85025 COMPLETE CBC W/AUTO DIFF WBC: CPT | Performed by: EMERGENCY MEDICINE

## 2024-08-11 PROCEDURE — 99285 EMERGENCY DEPT VISIT HI MDM: CPT

## 2024-08-11 PROCEDURE — 74177 CT ABD & PELVIS W/CONTRAST: CPT

## 2024-08-11 PROCEDURE — 80053 COMPREHEN METABOLIC PANEL: CPT | Performed by: EMERGENCY MEDICINE

## 2024-08-11 PROCEDURE — 25510000001 IOPAMIDOL PER 1 ML: Performed by: EMERGENCY MEDICINE

## 2024-08-11 RX ADMIN — IOPAMIDOL 100 ML: 755 INJECTION, SOLUTION INTRAVENOUS at 17:40

## 2024-08-11 NOTE — ED PROVIDER NOTES
Time: 3:54 PM EDT  Date of encounter:  8/11/2024  Independent Historian/Clinical History and Information was obtained by:   Patient    History is limited by: N/A    Chief Complaint: Constipation      History of Present Illness:  Patient is a 89 y.o. year old male who presents to the emergency department for evaluation of constipation.  Patient has a history of CHF, COPD, diabetes who presents with complaints of constipation.  States been ongoing for about a week.  Was just seen and evaluated here yesterday for the same.  Was given an enema with good results but states that he ate last night and felt like he had to go again this morning but did not have any bowel movements.  States he has been compliant with his MiraLAX.  Denies any abdominal pain.  No other complaints this time.      Patient Care Team  Primary Care Provider: Agustina Gutierrez MD    Past Medical History:     No Known Allergies  Past Medical History:   Diagnosis Date    Allergies     Anxiety     Arthritis     Asthma     BPH (benign prostatic hyperplasia)     Carpal tunnel syndrome     Colon polyp     Congestive heart failure (CHF)     COPD (chronic obstructive pulmonary disease)     Dementia     Depression     Diabetes mellitus type 2, noninsulin dependent     Diverticulitis     Erectile dysfunction of organic origin     Essential hypertension 11/19/2019    Excess skin     Fatigue 11/09/2019    GERD (gastroesophageal reflux disease)     Gout     Heart attack     Heart disease     High cholesterol     Hyperlipidemia     Hypertension     Hypogonadism in male     Insomnia     Leg swelling     Memory change 08/02/2018    currently, pt is taking namenda. i will pursue a mocha at his follow up     Myocardial infarction     Osteoarthritis     Parkinson's disease     Prostate disorder     Rectal bleeding     Renal cyst     Seasonal allergies     Sleep apnea     Stroke     TIA (transient ischemic attack) 08/02/2018    reportedly, the pt has a history  of TIA. This can be discussed further at his follow up. i was asked that he cont. aspirin.    Tremor 08/02/2018    pt was a 3 mo history of a high frequency, low amplitude tremor of both hands that worsens some what with activity. tremor does not interfere with his activities of daily living. he has no evidence of bradykinesia on exam. pakinsons seems unlikely at this point. potential etiologies would include essential tremor and physiologic tremor. he does note that he started theophylline approx. 3 months ag    Vitamin D deficiency      Past Surgical History:   Procedure Laterality Date    APPENDECTOMY  2015    CARDIAC CATHETERIZATION      CARDIAC SURGERY  2014    CARPAL TUNNEL RELEASE      CATARACT EXTRACTION      CIRCUMCISION  2000    COLONOSCOPY  2014 2017    CYSTOSCOPY      ENDOSCOPY  2010 2017    ENDOSCOPY N/A 11/23/2021    Procedure: ESOPHAGOGASTRODUODENOSCOPY;  Surgeon: Anika Cummins MD;  Location: [facility] MAIN OR;  Service: Gastroenterology;  Laterality: N/A;  gastritis    EYE SURGERY      eye implant    HERNIA REPAIR  2000    LAPAROSCOPIC GASTRIC BANDING      ORCHIECTOMY Right 2010    OTHER SURGICAL HISTORY      metal implants;     PENILE PROSTHESIS IMPLANT      TOE SURGERY      TOENAIL EXCISION      surgical removal of toenail and nail matrix of both feet    TOTAL KNEE ARTHROPLASTY Bilateral 2009 2010     Family History   Problem Relation Age of Onset    Diabetes Mother     Heart attack Father     Diabetes Brother     Rectal cancer Other         grandfather- rectal and colon    Prostate cancer Other     Diabetes Son        Home Medications:  Prior to Admission medications    Medication Sig Start Date End Date Taking? Authorizing Provider   allopurinol (ZYLOPRIM) 300 MG tablet Take 0.5 tablets by mouth Daily. 7/15/24   Agustina Gutierrez MD   atorvastatin (LIPITOR) 20 MG tablet Take 1 tablet by mouth Daily. 4/11/24   Agustina Gutierrez MD   buPROPion XL (WELLBUTRIN XL) 150 MG 24  "hr tablet Take 1 tablet by mouth Every Morning. 7/15/24   Agustina Gutierrez MD   clopidogrel (PLAVIX) 75 MG tablet Take 1 tablet by mouth Daily. 7/15/24   Agustina Gutierrez MD   fluticasone-salmeterol (ADVAIR HFA) 230-21 MCG/ACT inhaler Inhale 2 puffs Every 6 (Six) Hours. 3/7/24   Agustina Gutierrez MD   melatonin 1 MG tablet Take 6 tablets by mouth At Night As Needed for Sleep. 7/15/24   Agustina Gutierrez MD   memantine (NAMENDA) 10 MG tablet Take 1 tablet twice a day 7/15/24   Agustina Gutierrez MD   polyethylene glycol (MIRALAX) 17 g packet Take 17 g by mouth Daily for 30 days. 8/6/24 9/5/24  Tamanna Dias PA-C   propranolol (INDERAL) 10 MG tablet Take 0.5 tablets by mouth 2 (Two) Times a Day. 7/29/24   Gallo Winters PA   venlafaxine XR (EFFEXOR-XR) 75 MG 24 hr capsule Take 1 capsule by mouth Daily. 7/15/24   Agustina Gutierrez MD   Vibegron 75 MG tablet Take 1 tablet by mouth Daily. 7/15/24   Agustina Gutierrez MD        Social History:   Social History     Tobacco Use    Smoking status: Never     Passive exposure: Never    Smokeless tobacco: Never   Vaping Use    Vaping status: Never Used   Substance Use Topics    Alcohol use: Yes     Comment: occasional     Drug use: Never         Review of Systems:  Review of Systems   Gastrointestinal:  Positive for constipation.        Physical Exam:  /95   Pulse 68   Temp 98.1 °F (36.7 °C)   Resp 18   Ht 172.7 cm (68\")   Wt 101 kg (222 lb 0.1 oz)   SpO2 95%   BMI 33.76 kg/m²     Physical Exam  Vitals and nursing note reviewed.   Constitutional:       Appearance: Normal appearance.   HENT:      Head: Normocephalic and atraumatic.   Eyes:      General: No scleral icterus.  Cardiovascular:      Rate and Rhythm: Normal rate and regular rhythm.      Heart sounds: Normal heart sounds.   Pulmonary:      Effort: Pulmonary effort is normal.      Breath sounds: Normal breath sounds.   Abdominal:      " Palpations: Abdomen is soft.      Tenderness: There is no abdominal tenderness.   Musculoskeletal:         General: Normal range of motion.      Cervical back: Normal range of motion.   Skin:     Findings: No rash.   Neurological:      General: No focal deficit present.      Mental Status: He is alert.                  Procedures:  Procedures      Medical Decision Making:      Comorbidities that affect care:    Congestive Heart Failure, COPD, Hypertension    External Notes reviewed:    Reviewed note from yesterday      The following orders were placed and all results were independently analyzed by me:  Orders Placed This Encounter   Procedures    CT Abdomen Pelvis With Contrast    Comprehensive Metabolic Panel    CBC Auto Differential    CBC & Differential    Extra Tubes    Gold Top - SST    Light Blue Top       Medications Given in the Emergency Department:  Medications   iopamidol (ISOVUE-370) 76 % injection 100 mL (100 mL Intravenous Given 8/11/24 1740)        ED Course:         Labs:    Lab Results (last 24 hours)       Procedure Component Value Units Date/Time    CBC & Differential [[id number]]  (Abnormal) Collected: 08/11/24 1642    Specimen: Blood from Arm, Right Updated: 08/11/24 1651    Narrative:      The following orders were created for panel order CBC & Differential.  Procedure                               Abnormality         Status                     ---------                               -----------         ------                     CBC Auto Differential[[id number]]        Abnormal            Final result                 Please view results for these tests on the individual orders.    Comprehensive Metabolic Panel [[id number]]  (Abnormal) Collected: 08/11/24 1642    Specimen: Blood from Arm, Right Updated: 08/11/24 1713     Glucose 81 mg/dL      BUN 22 mg/dL      Creatinine 1.51 mg/dL      Sodium 142 mmol/L      Potassium 4.5 mmol/L      Chloride 105 mmol/L      CO2 26.3 mmol/L      Calcium 9.0 mg/dL       Total Protein 6.6 g/dL      Albumin 3.7 g/dL      ALT (SGPT) 11 U/L      AST (SGOT) 16 U/L      Alkaline Phosphatase 88 U/L      Total Bilirubin 0.8 mg/dL      Globulin 2.9 gm/dL      A/G Ratio 1.3 g/dL      BUN/Creatinine Ratio 14.6     Anion Gap 10.7 mmol/L      eGFR 43.9 mL/min/1.73     Narrative:      GFR Normal >60  Chronic Kidney Disease <60  Kidney Failure <15    The GFR formula is only valid for adults with stable renal function between ages 18 and 70.    CBC Auto Differential [[id number]]  (Abnormal) Collected: 08/11/24 1642    Specimen: Blood from Arm, Right Updated: 08/11/24 1651     WBC 7.92 10*3/mm3      RBC 5.38 10*6/mm3      Hemoglobin 16.8 g/dL      Hematocrit 50.6 %      MCV 94.1 fL      MCH 31.2 pg      MCHC 33.2 g/dL      RDW 13.5 %      RDW-SD 46.3 fl      MPV 10.9 fL      Platelets 154 10*3/mm3      Neutrophil % 63.9 %      Lymphocyte % 25.3 %      Monocyte % 6.3 %      Eosinophil % 2.1 %      Basophil % 0.9 %      Immature Grans % 1.5 %      Neutrophils, Absolute 5.06 10*3/mm3      Lymphocytes, Absolute 2.00 10*3/mm3      Monocytes, Absolute 0.50 10*3/mm3      Eosinophils, Absolute 0.17 10*3/mm3      Basophils, Absolute 0.07 10*3/mm3      Immature Grans, Absolute 0.12 10*3/mm3      nRBC 0.0 /100 WBC              Imaging:    CT Abdomen Pelvis With Contrast    Result Date: 8/11/2024  CT ABDOMEN PELVIS W CONTRAST Date of Exam: 8/11/2024 5:30 PM EDT Indication: constipation. Comparison: MRI of the abdomen 3/22/2024. CT scan of the abdomen 6/27/2022. Technique: Axial CT images were obtained of the abdomen and pelvis after the uneventful intravenous administration of iodinated contrast. Reconstructed coronal and sagittal images were also obtained. Automated exposure control and iterative construction methods were used. Findings: ABDOMEN: There is scarring in the lung bases. Coronary artery calcification is present. There is cardiomegaly. Questionable small stones or small amount of sludge in the  dependent gallbladder. There are no inflammatory changes around the gallbladder. Stable appearing cystic masses throughout the pancreas. There is a gastric lap band. Renal cysts are present. Stable 1.3 cm solid appearing neoplasm laterally in the upper pole of the right kidney. No evidence of adenopathy or ascites. PELVIS: Stable ventral hernia defect with diastases of the rectus muscles. There are loops of large and small bowel without evidence of obstruction. Colonic diverticulosis is present. No CT evidence of colitis or diverticulitis. The prostate gland is enlarged. Degenerative changes are present in the hips and lumbar spine. The abdominal aorta is of normal caliber. Atherosclerotic calcification is present.     Impression: 1. No evidence of bowel obstruction, perforation or abscess. There does not appear to be an abnormally increased amount of stool in the colon. 2. Stable cystic masses throughout the pancreas. Stable renal cysts and 1.3 cm solid appearing neoplasm laterally in the upper pole of the right kidney. Electronically Signed: Davide Rees MD  8/11/2024 5:55 PM EDT  Workstation ID: [id number]       Differential Diagnosis and Discussion:    Abdominal Pain: Based on the patient's signs and symptoms, I considered abdominal aortic aneurysm, small bowel obstruction, pancreatitis, acute cholecystitis, acute appendecitis, peptic ulcer disease, gastritis, colitis, endocrine disorders, irritable bowel syndrome and other differential diagnosis an etiology of the patient's abdominal pain.    All labs were reviewed and interpreted by me.  CT scan radiology impression was interpreted by me.    MDM     Amount and/or Complexity of Data Reviewed  Clinical lab tests: reviewed  Tests in the radiology section of CPT®: reviewed       Patient is a 89-year-old gentleman who presents with complaints of constipation.  This is secondary to wrote the patient has been evaluated here in the emergency room.  Yesterday he  received an enema with good results.  Today we did CT to confirm there was no blockage or any significant backup.  CT does not show any significant backup at this time and there is no blockage.  His belly is soft and he has no tenderness.  I recommend that he continue the MiraLAX at this time.  I also recommend that he follow-up with his primary care doctor.  He is well-appearing at this time and okay for outpatient follow-up.          Patient Care Considerations:          Consultants/Shared Management Plan:    None    Social Determinants of Health:    Patient has presented with family members who are responsible, reliable and will ensure follow up care.      Disposition and Care Coordination:    Discharged: The patient is suitable and stable for discharge with no need for consideration of admission.    I have explained the patient´s condition, diagnoses and treatment plan based on the information available to me at this time. I have answered questions and addressed any concerns. The patient has a good  understanding of the patient´s diagnosis, condition, and treatment plan as can be expected at this point. The vital signs have been stable. The patient´s condition is stable and appropriate for discharge from the emergency department.      The patient will pursue further outpatient evaluation with the primary care physician or other designated or consulting physician as outlined in the discharge instructions. They are agreeable to this plan of care and follow-up instructions have been explained in detail. The patient has received these instructions in written format and have expressed an understanding of the discharge instructions. The patient is aware that any significant change in condition or worsening of symptoms should prompt an immediate return to this or the closest emergency department or call to 911.      Final diagnoses:   Constipation, unspecified constipation type        ED Disposition       ED  Disposition   Discharge    Condition   Stable    Comment   --               This medical record created using voice recognition software.             Castro Meeks MD  08/11/24 [id number]

## 2024-08-13 ENCOUNTER — TELEPHONE (OUTPATIENT)
Dept: INTERNAL MEDICINE | Facility: CLINIC | Age: 89
End: 2024-08-13
Payer: MEDICARE

## 2024-08-13 ENCOUNTER — PATIENT OUTREACH (OUTPATIENT)
Dept: CASE MANAGEMENT | Facility: OTHER | Age: 89
End: 2024-08-13
Payer: MEDICARE

## 2024-08-13 DIAGNOSIS — I10 ESSENTIAL HYPERTENSION: Primary | ICD-10-CM

## 2024-08-13 DIAGNOSIS — E11.9 TYPE 2 DIABETES MELLITUS WITHOUT COMPLICATION, WITHOUT LONG-TERM CURRENT USE OF INSULIN: ICD-10-CM

## 2024-08-13 NOTE — TELEPHONE ENCOUNTER
Returned phone call from nurse Prescott (vs Oliva? Sp?) from .     Nursing staff typically fills his medi-planner, however states when they got to his house today the medi-planner was empty.   Nursing staff on the phone states he is not her typical patient and the nurse who typically sees him is out on vacation.   [facility] patient was sitting calmly on the couch during her visit. [facility] bp during her visit was 192/102. Pt was asymptomatic.   [facility] patient was not sure of when he last took his medications. Nurse Genie was able to refill his pill box and repeat bp was 170/95.     [facility] her nurse manager called her to check in on pt after receiving a call from OT informing staff that pt reported taking all of his medications (medi-planner was filled on 8/8/24) and pt was out 3d early. Nursing staff states pt's memory seems to come and go. He could tell her about time, date, and year, however thought he was in the hospital on Tuesday (he was in the ER this past Saturday and Sunday).     Nurse on the phone states she went ahead and filled his medi-planner today.   [facility] however was told that they do not accept his medications.     Nurse also mentions needing weight parameters for his heart failure.   [facility] she noted pitting edema today.       Upon chart review it appears that pt's bp meds were discontinued during his last hospitalization on 7/27/24. He was previously on amlodipine 10mg, furosemide 10mg and lisinopril 10mg. All 3 were discontinued and pt his propanolol was decreased to 5mg bid.     Called pt to check in on him. States he was doing ok. Provider asked pt to check bp while on phone which he had a difficult time doing by himself. Prompted pt to ask his son for help. Bp read of 173/106. Pt asymptomatic.     Spoke with son who agrees to bring pt in tomorrow for bp check on nurse schedule at 9:15. Family advised to bring  all of his medication bottles and his home bp machine with him.

## 2024-08-13 NOTE — TELEPHONE ENCOUNTER
Caller: MILAGROS    Relationship: Home Health    Best call back number: [phone]    What is the best time to reach you: ANYTIME    Who are you requesting to speak with (clinical staff, provider,  specific staff member):      What was the call regarding: HOME HEALTH IS NEEDING TO TALK TO PATIENT'S .

## 2024-08-13 NOTE — OUTREACH NOTE
AMBULATORY CASE MANAGEMENT NOTE    Names and Relationships of Patient/Support Persons:  -     CCM Interim  Call received from MINAL Baptiste. Patient has taken all medications are empty, no one is sure when they have been taken. Patient BP is 190/102.     Oliva stated she has discussed getting [facility] to prefill medications and have son give patient meds at assigned times to ensure patient is receiving meds at right time.     Care Coordination  RN attempted to call Dr Gutierrez, unable to reach x3. Called [facility] and requested assistance to reach office or Dr Matt DOMINGUEZ.     Education Documentation  No documentation found.        Kerri MCHUGH  Ambulatory Case Management    8/13/2024, 14:51 EDT

## 2024-08-14 ENCOUNTER — HOSPITAL ENCOUNTER (EMERGENCY)
Facility: HOSPITAL | Age: 89
Discharge: HOME | End: 2024-08-14
Attending: EMERGENCY MEDICINE
Payer: MEDICARE

## 2024-08-14 ENCOUNTER — APPOINTMENT (OUTPATIENT)
Dept: GENERAL RADIOLOGY | Facility: HOSPITAL | Age: 89
End: 2024-08-14
Payer: MEDICARE

## 2024-08-14 ENCOUNTER — APPOINTMENT (OUTPATIENT)
Dept: CT IMAGING | Facility: HOSPITAL | Age: 89
End: 2024-08-14
Payer: MEDICARE

## 2024-08-14 ENCOUNTER — CLINICAL SUPPORT (OUTPATIENT)
Dept: INTERNAL MEDICINE | Facility: CLINIC | Age: 89
End: 2024-08-14
Payer: MEDICARE

## 2024-08-14 VITALS
BODY MASS INDEX: 34.05 KG/M2 | SYSTOLIC BLOOD PRESSURE: 160 MMHG | RESPIRATION RATE: 18 BRPM | OXYGEN SATURATION: 94 % | HEART RATE: 68 BPM | WEIGHT: 224.65 LBS | DIASTOLIC BLOOD PRESSURE: 101 MMHG | TEMPERATURE: 98 F | HEIGHT: 68 IN

## 2024-08-14 DIAGNOSIS — S80.211A ABRASION OF RIGHT KNEE, INITIAL ENCOUNTER: ICD-10-CM
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
## 2024-08-14 DIAGNOSIS — I10 ESSENTIAL HYPERTENSION: Primary | ICD-10-CM

## 2024-08-14 LAB
ALBUMIN SERPL-MCNC: 3.6 G/DL (ref 3.5–5.2)
ALBUMIN/GLOB SERPL: 1.3 G/DL
ALP SERPL-CCNC: 81 U/L (ref 39–117)
ALT SERPL W P-5'-P-CCNC: 10 U/L (ref 1–41)
ANION GAP SERPL CALCULATED.3IONS-SCNC: 10 MMOL/L (ref 5–15)
AST SERPL-CCNC: 17 U/L (ref 1–40)
BASOPHILS # BLD AUTO: 0.09 10*3/MM3 (ref 0–0.2)
BASOPHILS NFR BLD AUTO: 0.9 % (ref 0–1.5)
BILIRUB SERPL-MCNC: 0.6 MG/DL (ref 0–1.2)
BILIRUB UR QL STRIP: NEGATIVE
BUN SERPL-MCNC: 21 MG/DL (ref 8–23)
BUN/CREAT SERPL: 13.5 (ref 7–25)
CALCIUM SPEC-SCNC: 9 MG/DL (ref 8.6–10.5)
CHLORIDE SERPL-SCNC: 109 MMOL/L (ref 98–107)
CLARITY UR: CLEAR
CO2 SERPL-SCNC: 26 MMOL/L (ref 22–29)
COLOR UR: YELLOW
CREAT SERPL-MCNC: 1.55 MG/DL (ref 0.76–1.27)
DEPRECATED RDW RBC AUTO: 47.1 FL (ref 37–54)
EGFRCR SERPLBLD CKD-EPI 2021: 42.5 ML/MIN/1.73
EOSINOPHIL # BLD AUTO: 0.24 10*3/MM3 (ref 0–0.4)
EOSINOPHIL NFR BLD AUTO: 2.3 % (ref 0.3–6.2)
ERYTHROCYTE [DISTWIDTH] IN BLOOD BY AUTOMATED COUNT: 13.6 % (ref 12.3–15.4)
GLOBULIN UR ELPH-MCNC: 2.7 GM/DL
GLUCOSE SERPL-MCNC: 90 MG/DL (ref 65–99)
GLUCOSE UR STRIP-MCNC: NEGATIVE MG/DL
HCT VFR BLD AUTO: 49 % (ref 37.5–51)
HGB BLD-MCNC: 16.2 G/DL (ref 13–17.7)
HGB UR QL STRIP.AUTO: NEGATIVE
IMM GRANULOCYTES # BLD AUTO: 0.05 10*3/MM3 (ref 0–0.05)
IMM GRANULOCYTES NFR BLD AUTO: 0.5 % (ref 0–0.5)
KETONES UR QL STRIP: ABNORMAL
LEUKOCYTE ESTERASE UR QL STRIP.AUTO: NEGATIVE
LYMPHOCYTES # BLD AUTO: 2.34 10*3/MM3 (ref 0.7–3.1)
LYMPHOCYTES NFR BLD AUTO: 22.6 % (ref 19.6–45.3)
MCH RBC QN AUTO: 31.2 PG (ref 26.6–33)
MCHC RBC AUTO-ENTMCNC: 33.1 G/DL (ref 31.5–35.7)
MCV RBC AUTO: 94.4 FL (ref 79–97)
MONOCYTES # BLD AUTO: 0.81 10*3/MM3 (ref 0.1–0.9)
MONOCYTES NFR BLD AUTO: 7.8 % (ref 5–12)
NEUTROPHILS NFR BLD AUTO: 6.82 10*3/MM3 (ref 1.7–7)
NEUTROPHILS NFR BLD AUTO: 65.9 % (ref 42.7–76)
NITRITE UR QL STRIP: NEGATIVE
NRBC BLD AUTO-RTO: 0 /100 WBC (ref 0–0.2)
PH UR STRIP.AUTO: 5.5 [PH] (ref 5–8)
PLATELET # BLD AUTO: 158 10*3/MM3 (ref 140–450)
PMV BLD AUTO: 10.9 FL (ref 6–12)
POTASSIUM SERPL-SCNC: 4.4 MMOL/L (ref 3.5–5.2)
PROT SERPL-MCNC: 6.3 G/DL (ref 6–8.5)
PROT UR QL STRIP: ABNORMAL
RBC # BLD AUTO: 5.19 10*6/MM3 (ref 4.14–5.8)
SODIUM SERPL-SCNC: 145 MMOL/L (ref 136–145)
SP GR UR STRIP: 1.02 (ref 1–1.03)
UROBILINOGEN UR QL STRIP: ABNORMAL
WBC NRBC COR # BLD AUTO: 10.35 10*3/MM3 (ref 3.4–10.8)

## 2024-08-14 PROCEDURE — 71045 X-RAY EXAM CHEST 1 VIEW: CPT

## 2024-08-14 PROCEDURE — 99211 OFF/OP EST MAY X REQ PHY/QHP: CPT | Performed by: INTERNAL MEDICINE

## 2024-08-14 PROCEDURE — 81003 URINALYSIS AUTO W/O SCOPE: CPT | Performed by: EMERGENCY MEDICINE

## 2024-08-14 PROCEDURE — 85025 COMPLETE CBC W/AUTO DIFF WBC: CPT | Performed by: EMERGENCY MEDICINE

## 2024-08-14 PROCEDURE — 80053 COMPREHEN METABOLIC PANEL: CPT | Performed by: EMERGENCY MEDICINE

## 2024-08-14 PROCEDURE — 70450 CT HEAD/BRAIN W/O DYE: CPT

## 2024-08-14 PROCEDURE — 36415 COLL VENOUS BLD VENIPUNCTURE: CPT

## 2024-08-14 PROCEDURE — 99284 EMERGENCY DEPT VISIT MOD MDM: CPT

## 2024-08-14 NOTE — PROGRESS NOTES
"  Blood Pressure Check     Reddy Castellano, 1935, presents to the clinic for a blood pressure check    Reason for walk in check: Provider Ordered    Patient presented to office for BP check this afternoon.  Patient had recent fall and was seen early this morning at [facility] ED/ER department.  Patient reported hitting his head when he fell.  Patient drove self to appointment today and confirmed that he called to reschedule his morning appointment with the nursing office because he didn't have anyone available to give him a ride home from hospital when discharged from ED/ER.  Patient did not have medications with him; patient did not have blood pressure machine with him.  Patient AAO x 3; was not sure why he was here for an appointment today.  Patient confirmed \"I took all my medications today\" but then couldn't confirm his blood pressure medications.  Inquired if he had been taken his blood pressure at home and patient confirmed \"you need me to start doing that?\"        Current Outpatient Medications:     allopurinol (ZYLOPRIM) 300 MG tablet, Take 0.5 tablets by mouth Daily., Disp: 45 tablet, Rfl: 1    atorvastatin (LIPITOR) 20 MG tablet, Take 1 tablet by mouth Daily., Disp: 90 tablet, Rfl: 1    buPROPion XL (WELLBUTRIN XL) 150 MG 24 hr tablet, Take 1 tablet by mouth Every Morning., Disp: 30 tablet, Rfl: 3    clopidogrel (PLAVIX) 75 MG tablet, Take 1 tablet by mouth Daily., Disp: 30 tablet, Rfl: 3    fluticasone-salmeterol (ADVAIR HFA) 230-21 MCG/ACT inhaler, Inhale 2 puffs Every 6 (Six) Hours., Disp: 12 g, Rfl: 6    melatonin 1 MG tablet, Take 6 tablets by mouth At Night As Needed for Sleep., Disp: 30 tablet, Rfl: 3    memantine (NAMENDA) 10 MG tablet, Take 1 tablet twice a day, Disp: 180 tablet, Rfl: 3    polyethylene glycol (MIRALAX) 17 g packet, Take 17 g by mouth Daily for 30 days., Disp: 72 packet, Rfl: 0    propranolol (INDERAL) 10 MG tablet, Take 0.5 tablets by mouth 2 (Two) Times a Day., Disp: , Rfl:     " venlafaxine XR (EFFEXOR-XR) 75 MG 24 hr capsule, Take 1 capsule by mouth Daily., Disp: 30 capsule, Rfl: 3    Vibegron 75 MG tablet, Take 1 tablet by mouth Daily., Disp: 30 tablet, Rfl: 11      Blood Pressure Reading Today: Left Arm; 126/76; sitting; manual       Right Arm; 120/74; sitting; manual       HR; 71       R; 16       O2 @ 92% on room air    Symptoms: None    Previous Readings:   BP Readings from Last 3 Encounters:   08/14/24 (!) 160/101   08/11/24 157/95   08/10/24 177/98       Provider Consulted: Dr. Gutierrez    Follow-Up Plan: RTC in 2 weeks for BP and f/u on recent fall; appointment scheduled with Dr. Gutierrez for 8/26/2024 @ 2:30pm.  Appointment card given to patient.  Attempted to reach patient's son, Ariel Castellano, and had to LVM to return call to office so could speak with him about home medications, checking blood pressure at home, and overall well-being.  Provider confirmed the patient's son was supposed to be with patient when he comes for appointments, however patient drove himself today.        Edilia Meehan RN  08/14/24, 14:56 EDT

## 2024-08-14 NOTE — ED PROVIDER NOTES
Time: 5:45 AM EDT  Date of encounter:  8/14/2024  Independent Historian/Clinical History and Information was obtained by:   Patient    History is limited by: N/A    Chief Complaint: fall      History of Present Illness:  Patient is a 89 y.o. year old male who presents to the emergency department for evaluation of Fall.  Patient states he went outside to get ice cream.  When he came in he tripped as he was walking through the front door.  He fell forward and hit his head.  Denies loss of consciousness.  He also scraped his knee.  Patient had difficulty getting up and his son had to help him get up.  No other complaints at this time.      Patient Care Team  Primary Care Provider: Agustina Gutierrez MD    Past Medical History:     No Known Allergies  Past Medical History:   Diagnosis Date    Allergies     Anxiety     Arthritis     Asthma     BPH (benign prostatic hyperplasia)     Carpal tunnel syndrome     Colon polyp     Congestive heart failure (CHF)     COPD (chronic obstructive pulmonary disease)     Dementia     Depression     Diabetes mellitus type 2, noninsulin dependent     Diverticulitis     Erectile dysfunction of organic origin     Essential hypertension 11/19/2019    Excess skin     Fatigue 11/09/2019    GERD (gastroesophageal reflux disease)     Gout     Heart attack     Heart disease     High cholesterol     Hyperlipidemia     Hypertension     Hypogonadism in male     Insomnia     Leg swelling     Memory change 08/02/2018    currently, pt is taking namenda. i will pursue a mocha at his follow up     Myocardial infarction     Osteoarthritis     Parkinson's disease     Prostate disorder     Rectal bleeding     Renal cyst     Seasonal allergies     Sleep apnea     Stroke     TIA (transient ischemic attack) 08/02/2018    reportedly, the pt has a history of TIA. This can be discussed further at his follow up. i was asked that he cont. aspirin.    Tremor 08/02/2018    pt was a 3 mo history of a high  frequency, low amplitude tremor of both hands that worsens some what with activity. tremor does not interfere with his activities of daily living. he has no evidence of bradykinesia on exam. pakinsons seems unlikely at this point. potential etiologies would include essential tremor and physiologic tremor. he does note that he started theophylline approx. 3 months ag    Vitamin D deficiency      Past Surgical History:   Procedure Laterality Date    APPENDECTOMY  2015    CARDIAC CATHETERIZATION      CARDIAC SURGERY  2014    CARPAL TUNNEL RELEASE      CATARACT EXTRACTION      CIRCUMCISION  2000    COLONOSCOPY  2014 2017    CYSTOSCOPY      ENDOSCOPY  2010 2017    ENDOSCOPY N/A 11/23/2021    Procedure: ESOPHAGOGASTRODUODENOSCOPY;  Surgeon: Anika Cummins MD;  Location: [facility] MAIN OR;  Service: Gastroenterology;  Laterality: N/A;  gastritis    EYE SURGERY      eye implant    HERNIA REPAIR  2000    LAPAROSCOPIC GASTRIC BANDING      ORCHIECTOMY Right 2010    OTHER SURGICAL HISTORY      metal implants;     PENILE PROSTHESIS IMPLANT      TOE SURGERY      TOENAIL EXCISION      surgical removal of toenail and nail matrix of both feet    TOTAL KNEE ARTHROPLASTY Bilateral 2009 2010     Family History   Problem Relation Age of Onset    Diabetes Mother     Heart attack Father     Diabetes Brother     Rectal cancer Other         grandfather- rectal and colon    Prostate cancer Other     Diabetes Son        Home Medications:  Prior to Admission medications    Medication Sig Start Date End Date Taking? Authorizing Provider   allopurinol (ZYLOPRIM) 300 MG tablet Take 0.5 tablets by mouth Daily. 7/15/24   Agustina Gutierrez MD   atorvastatin (LIPITOR) 20 MG tablet Take 1 tablet by mouth Daily. 4/11/24   Agustina Gutierrez MD   buPROPion XL (WELLBUTRIN XL) 150 MG 24 hr tablet Take 1 tablet by mouth Every Morning. 7/15/24   Agustina Gutierrez MD   clopidogrel (PLAVIX) 75 MG tablet Take 1 tablet by mouth  "Daily. 7/15/24   Agustina Gutierrez MD   fluticasone-salmeterol (ADVAIR HFA) 230-21 MCG/ACT inhaler Inhale 2 puffs Every 6 (Six) Hours. 3/7/24   Agustina Gutierrez MD   melatonin 1 MG tablet Take 6 tablets by mouth At Night As Needed for Sleep. 7/15/24   Agustina Gutierrez MD   memantine (NAMENDA) 10 MG tablet Take 1 tablet twice a day 7/15/24   Agustina Gutierrez MD   polyethylene glycol (MIRALAX) 17 g packet Take 17 g by mouth Daily for 30 days. 8/6/24 9/5/24  Tamanna Dias PA-C   propranolol (INDERAL) 10 MG tablet Take 0.5 tablets by mouth 2 (Two) Times a Day. 7/29/24   Gallo Winters PA   venlafaxine XR (EFFEXOR-XR) 75 MG 24 hr capsule Take 1 capsule by mouth Daily. 7/15/24   Agustina Gutierrez MD   Vibegron 75 MG tablet Take 1 tablet by mouth Daily. 7/15/24   Agustina Gutierrez MD        Social History:   Social History     Tobacco Use    Smoking status: Never     Passive exposure: Never    Smokeless tobacco: Never   Vaping Use    Vaping status: Never Used   Substance Use Topics    Alcohol use: Yes     Comment: occasional     Drug use: Never         Review of Systems:  Review of Systems   Constitutional:  Negative for chills and fever.   HENT:  Negative for congestion, ear pain and sore throat.    Eyes:  Negative for pain.   Respiratory:  Negative for cough, chest tightness and shortness of breath.    Cardiovascular:  Negative for chest pain.   Gastrointestinal:  Negative for abdominal pain, diarrhea, nausea and vomiting.   Genitourinary:  Negative for flank pain and hematuria.   Musculoskeletal:  Negative for joint swelling.   Skin:  Positive for wound. Negative for pallor.   Neurological:  Negative for seizures and headaches.   All other systems reviewed and are negative.       Physical Exam:  /89   Pulse 67   Temp 98.7 °F (37.1 °C) (Oral)   Resp 16   Ht 172.7 cm (68\")   Wt 102 kg (224 lb 10.4 oz)   SpO2 93%   BMI 34.16 kg/m²     Physical " Exam  Constitutional:       Appearance: Normal appearance.   HENT:      Head: Normocephalic and atraumatic.      Nose: Nose normal.      Mouth/Throat:      Mouth: Mucous membranes are moist.   Eyes:      Extraocular Movements: Extraocular movements intact.      Conjunctiva/sclera: Conjunctivae normal.      Pupils: Pupils are equal, round, and reactive to light.   Cardiovascular:      Rate and Rhythm: Normal rate and regular rhythm.      Pulses: Normal pulses.      Heart sounds: Normal heart sounds.   Pulmonary:      Effort: Pulmonary effort is normal.      Breath sounds: Normal breath sounds.   Abdominal:      General: There is no distension.      Palpations: Abdomen is soft.      Tenderness: There is no abdominal tenderness.   Musculoskeletal:         General: Normal range of motion.      Cervical back: Normal range of motion.      Comments: Full range of motion of all extremities without pain.  Superficial abrasions to right knee.   Skin:     General: Skin is warm and dry.      Capillary Refill: Capillary refill takes less than 2 seconds.   Neurological:      General: No focal deficit present.      Mental Status: He is alert and oriented to person, place, and time. Mental status is at baseline.   Psychiatric:         Mood and Affect: Mood normal.         Behavior: Behavior normal.                  Procedures:  Procedures      Medical Decision Making:      Comorbidities that affect care:    Parkinsons, Congestive Heart Failure, COPD, Hypertension    External Notes reviewed:    Previous Clinic Note: Patient seen by his PCP on 8/6/2024 for slow transit constipation.      The following orders were placed and all results were independently analyzed by me:  Orders Placed This Encounter   Procedures    CT Head Without Contrast    XR Chest 1 View    Comprehensive Metabolic Panel    Urinalysis With Culture If Indicated - Urine, Clean Catch    CBC Auto Differential    CBC & Differential       Medications Given in the  Emergency Department:  Medications - No data to display     ED Course:         Labs:    Lab Results (last 24 hours)       Procedure Component Value Units Date/Time    Urinalysis With Culture If Indicated - Urine, Clean Catch [[id number]]  (Abnormal) Collected: 08/14/24 0440    Specimen: Urine, Clean Catch Updated: 08/14/24 0501     Color, UA Yellow     Appearance, UA Clear     pH, UA 5.5     Specific Gravity, UA 1.021     Glucose, UA Negative     Ketones, UA 15 mg/dL (1+)     Bilirubin, UA Negative     Blood, UA Negative     Protein, UA Trace     Leuk Esterase, UA Negative     Nitrite, UA Negative     Urobilinogen, UA 1.0 E.U./dL    Narrative:      In absence of clinical symptoms, the presence of pyuria, bacteria, and/or nitrites on the urinalysis result does not correlate with infection.  Urine microscopic not indicated.    CBC & Differential [[id number]]  (Normal) Collected: 08/14/24 0447    Specimen: Blood Updated: 08/14/24 0500    Narrative:      The following orders were created for panel order CBC & Differential.  Procedure                               Abnormality         Status                     ---------                               -----------         ------                     CBC Auto Differential[[id number]]        Normal              Final result                 Please view results for these tests on the individual orders.    Comprehensive Metabolic Panel [[id number]]  (Abnormal) Collected: 08/14/24 0447    Specimen: Blood Updated: 08/14/24 0518     Glucose 90 mg/dL      BUN 21 mg/dL      Creatinine 1.55 mg/dL      Sodium 145 mmol/L      Potassium 4.4 mmol/L      Chloride 109 mmol/L      CO2 26.0 mmol/L      Calcium 9.0 mg/dL      Total Protein 6.3 g/dL      Albumin 3.6 g/dL      ALT (SGPT) 10 U/L      AST (SGOT) 17 U/L      Alkaline Phosphatase 81 U/L      Total Bilirubin 0.6 mg/dL      Globulin 2.7 gm/dL      A/G Ratio 1.3 g/dL      BUN/Creatinine Ratio 13.5     Anion Gap 10.0 mmol/L      eGFR 42.5  mL/min/1.73     Narrative:      GFR Normal >60  Chronic Kidney Disease <60  Kidney Failure <15    The GFR formula is only valid for adults with stable renal function between ages 18 and 70.    CBC Auto Differential [[id number]]  (Normal) Collected: 08/14/24 0447    Specimen: Blood Updated: 08/14/24 0500     WBC 10.35 10*3/mm3      RBC 5.19 10*6/mm3      Hemoglobin 16.2 g/dL      Hematocrit 49.0 %      MCV 94.4 fL      MCH 31.2 pg      MCHC 33.1 g/dL      RDW 13.6 %      RDW-SD 47.1 fl      MPV 10.9 fL      Platelets 158 10*3/mm3      Neutrophil % 65.9 %      Lymphocyte % 22.6 %      Monocyte % 7.8 %      Eosinophil % 2.3 %      Basophil % 0.9 %      Immature Grans % 0.5 %      Neutrophils, Absolute 6.82 10*3/mm3      Lymphocytes, Absolute 2.34 10*3/mm3      Monocytes, Absolute 0.81 10*3/mm3      Eosinophils, Absolute 0.24 10*3/mm3      Basophils, Absolute 0.09 10*3/mm3      Immature Grans, Absolute 0.05 10*3/mm3      nRBC 0.0 /100 WBC              Imaging:    XR Chest 1 View    Result Date: 8/14/2024  XR CHEST 1 VW-  Date of exam: 8/14/2024, 4:55 A.M.  Indication: Cough, persistent.  Comparison: 7/27/2024.  FINDINGS: A single AP upright portable chest radiograph is provided for review. Atelectasis and/or fibrosis is (are) suggested in the lung bases with acute infiltrate thought to be less likely. There is pulmonary hypoinflation. There may be borderline cardiac enlargement. The thoracic aorta is atherosclerotic. Prominent pericardial fat is thought to account for the blunted appearance of the left lateral costophrenic angle. There are degenerative changes of the spine and shoulders. No pneumothorax is seen. Probably no pleural effusion. There may be some degree of improved lung expansion since the 7/27/2024 study.       Probably no acute infiltrate is present.   Portions of this note were completed with a voice recognition program.     Electronically Signed By-Arnold Parsons MD On:8/14/2024 5:10 AM      CT Head  Without Contrast    Result Date: 8/14/2024  CT HEAD WO CONTRAST-  Date of exam: 8/14/2024, 4:09 A.M.  Indications: fall; head trauma/injury; initial encounter; the pt. fell & hit is left head  Comparisons: 7/28/2024; 7/27/2024.  TECHNIQUE: Axial CT images were obtained of the head without contrast administration. Reconstructed 2D coronal and sagittal images were also obtained. Automated exposure control and iterative construction methods were used.  FINDINGS: A routine nonenhanced head CT was performed. No acute brain abnormality is seen. No acute infarct. No acute intracranial hemorrhage. No midline shift or acute intracranial mass effect is seen. The ventricular system and the extra-axial spaces are prominent, suggesting central atrophy. There is suspected mild chronic small vessel ischemic disease. Arterial calcifications are seen. No acute skull fracture is identified. Streak artifact related to dental amalgam obscures detail. The patient has undergone bilateral cataract extractions with intra-ocular lens implants. Degenerative changes involve the partially imaged cervical spine. There is chronic leftward nasal septal deviation with an associated nasal spur.       No acute brain abnormality is appreciated. No acute intracranial hemorrhage. No acute skull fracture.    Portions of this note were completed with a voice recognition program.   Electronically Signed By-Arnold Parsons MD On:8/14/2024 4:21 AM         Differential Diagnosis and Discussion:    Trauma:  Differential diagnosis considered but not limited to were subarachnoid hemorrhage, intracranial bleeding, pneumothorax, cardiac contusion, lung contusion, intra-abdominal bleeding, and compartment syndrome of any extremity or other significant traumatic pathology    All labs were reviewed and interpreted by me.  All X-rays impressions were independently interpreted by me.  CT scan radiology impression was interpreted by me.    MDM  Number of Diagnoses or  Management Options  Diagnosis management comments: Patient presents the emergency department after ground-level fall.  Labs showed no significant abnormality.  CT of head showed no acute finding.  Chest x-ray showed no acute finding.  Patient is able to ambulate without difficulty.  Patient be discharged back home.  Recommend follow-up with his primary care provider.  Discussed return precautions, discharge instructions and answered all his questions.       Amount and/or Complexity of Data Reviewed  Clinical lab tests: reviewed  Tests in the radiology section of CPT®: reviewed  Review and summarize past medical records: yes  Independent visualization of images, tracings, or specimens: yes    Risk of Complications, Morbidity, and/or Mortality  Presenting problems: moderate  Management options: moderate                 Patient Care Considerations:    SEPSIS was considered but is NOT present in the emergency department as SIRS criteria is not present.      Consultants/Shared Management Plan:    None    Social Determinants of Health:    Patient has presented with family members who are responsible, reliable and will ensure follow up care.      Disposition and Care Coordination:    Discharged: The patient is suitable and stable for discharge with no need for consideration of admission.    I have explained the patient´s condition, diagnoses and treatment plan based on the information available to me at this time. I have answered questions and addressed any concerns. The patient has a good  understanding of the patient´s diagnosis, condition, and treatment plan as can be expected at this point. The vital signs have been stable. The patient´s condition is stable and appropriate for discharge from the emergency department.      The patient will pursue further outpatient evaluation with the primary care physician or other designated or consulting physician as outlined in the discharge instructions. They are agreeable to this  plan of care and follow-up instructions have been explained in detail. The patient has received these instructions in written format and has expressed an understanding of the discharge instructions. The patient is aware that any significant change in condition or worsening of symptoms should prompt an immediate return to this or the closest emergency department or call to 911.  I have explained discharge medications and the need for follow up with the patient/caretakers. This was also printed in the discharge instructions. Patient was discharged with the following medications and follow up:      Medication List      No changes were made to your prescriptions during this visit.      Agustina Gutierrez MD  [address] [address]  [phone]    In 2 days         Final diagnoses:   Fall, initial encounter   Abrasion of right knee, initial encounter        ED Disposition       ED Disposition   Discharge    Condition   Stable    Comment   --               This medical record created using voice recognition software.             Joyce Farmer MD  08/14/24 0567

## 2024-08-14 NOTE — ED NOTES
Pt reports he has had constipation, and is c/o right knee pain after fall. Pt also hit his head above his left eye. Pt is on Plavix.

## 2024-08-15 ENCOUNTER — TELEPHONE (OUTPATIENT)
Dept: CASE MANAGEMENT | Facility: OTHER | Age: 89
End: 2024-08-15
Payer: MEDICARE

## 2024-08-15 VITALS
HEART RATE: 71 BPM | OXYGEN SATURATION: 92 % | RESPIRATION RATE: 16 BRPM | SYSTOLIC BLOOD PRESSURE: 120 MMHG | DIASTOLIC BLOOD PRESSURE: 74 MMHG

## 2024-08-15 NOTE — TELEPHONE ENCOUNTER
"Dr. Gutierrez,  Would you please review documentation from Kerri Marcano and Dr. Clark on 8/13/24?    Home health is concerned for cognitive deficit and patient is taking medications incorrectly. Home health is concerned that son cannot effectively take over medication management either. They are concerned this is a \"safety issue\". I am unfamiliar with patient case, but I did speak to home health. They have not completed an APS referral.    What direction would you like to give me in Mollie's absence? Is this normal for the patient? Or should he come in for an appointment? Would you like an APS referral placed?    Thank you,  MINAL Valverde  "

## 2024-08-15 NOTE — TELEPHONE ENCOUNTER
This is not entirely abnormal for the patient. He has come in for blood pressure checks and we are having him come back next week as well. He does not necessarily need a provider appointment. He may benefit from APS involvement. He has been confused about his medications for sometime and multiple attempts by myself and Megha (and her predecessors) have been made to help him with this and to coordinate with his son.

## 2024-08-16 ENCOUNTER — TELEPHONE (OUTPATIENT)
Dept: INTERNAL MEDICINE | Facility: CLINIC | Age: 89
End: 2024-08-16
Payer: MEDICARE

## 2024-08-19 ENCOUNTER — OFFICE VISIT (OUTPATIENT)
Dept: NEUROLOGY | Facility: CLINIC | Age: 89
End: 2024-08-19
Payer: MEDICARE

## 2024-08-19 ENCOUNTER — TELEPHONE (OUTPATIENT)
Dept: CASE MANAGEMENT | Facility: OTHER | Age: 89
End: 2024-08-19
Payer: MEDICARE

## 2024-08-19 ENCOUNTER — PATIENT OUTREACH (OUTPATIENT)
Dept: CASE MANAGEMENT | Facility: OTHER | Age: 89
End: 2024-08-19
Payer: MEDICARE

## 2024-08-19 VITALS
BODY MASS INDEX: 33.49 KG/M2 | HEART RATE: 66 BPM | DIASTOLIC BLOOD PRESSURE: 85 MMHG | WEIGHT: 221 LBS | HEIGHT: 68 IN | SYSTOLIC BLOOD PRESSURE: 153 MMHG

## 2024-08-19 DIAGNOSIS — I10 ESSENTIAL HYPERTENSION: Primary | ICD-10-CM

## 2024-08-19 DIAGNOSIS — G31.84 MILD COGNITIVE IMPAIRMENT: Primary | ICD-10-CM

## 2024-08-19 PROCEDURE — 99213 OFFICE O/P EST LOW 20 MIN: CPT | Performed by: PSYCHIATRY & NEUROLOGY

## 2024-08-19 RX ORDER — MEMANTINE HYDROCHLORIDE 10 MG/1
TABLET ORAL
Qty: 180 TABLET | Refills: 3 | Status: SHIPPED | OUTPATIENT
Start: 2024-08-19

## 2024-08-19 RX ORDER — ALLOPURINOL 300 MG/1
150 TABLET ORAL DAILY
Qty: 45 TABLET | Refills: 1 | Status: SHIPPED | OUTPATIENT
Start: 2024-08-19

## 2024-08-19 RX ORDER — POLYETHYLENE GLYCOL 3350 17 G/17G
17 POWDER, FOR SOLUTION ORAL DAILY
Qty: 72 PACKET | Refills: 0 | Status: SHIPPED | OUTPATIENT
Start: 2024-08-19 | End: 2024-09-18

## 2024-08-19 NOTE — OUTREACH NOTE
AMBULATORY CASE MANAGEMENT NOTE    Names and Relationships of Patient/Support Persons: Contact: Reddy Castellano; Relationship: Self  Contact: Abhilash-Elle Whipple; Relationship: Other  Contact: Reddy Castellano; Relationship: Self  Contact: Reddy Castellano; Relationship: Self  Contact: SIOBHAN YODIT GUTIÉRREZ; Relationship: Emergency Contact -     [facility] Interim Update    I have spoken to many people on this patient's behalf today, all concerned about his ability to care for himself regarding medication adherence. Patient is agreeable to assistance and to taking his medications. His memory loss is a barrier to this at times. While we were on the phone, the patient has assured me that he has taken his morning medications today.    I spoke with MINAL Valverde regarding concerns from last week when I was on vacation regarding medication administration and patient blood pressure reported by  nurse.    I spoke with Elle Whipple with [facility] to explain the situation with this patient surrounding medication adherence. I am awaiting a call from the  nurse who cares for this patient.    I spoke with the patient's son, Shekhar, about my concerns about this patient driving himself. I have asked Shekhar to drive the patient to this appointments and for errands. He is agreeable to this. While speaking with him and the patient on speaker phone, I reminded them that the patient will have a medication to  from [place] later today or tomorrow. We also discussed his next PCP appointment and PT appointment. We discussed rescheduling the PT appointment because of the conflict in times on the same day as the PCP appointment.      Care Coordination    I spoke with Jaciel marcial [facility] ([phone]) regarding an in home caregiver for the patient through his [facility] benefits. I was told I would need to go through the [facility] [place] because that is where their orders come from.    I called the [facility] [place] ([phone]) and left a message  for Za Elizalde, nurse .    Education Documentation  Medication Management, taught by Megha Bruno, RN at 8/19/2024  1:48 PM.  Learner: Patient  Readiness: Acceptance  Method: Explanation  Response: Verbalizes Understanding, Needs Reinforcement    medication management, taught by Megha Bruno, RN at 8/19/2024  1:48 PM.  Learner: Patient  Readiness: Acceptance  Method: Explanation  Response: Verbalizes Understanding, Needs Reinforcement          Megha CARTAGENA  Ambulatory Case Management    8/19/2024, 13:48 EDT

## 2024-08-19 NOTE — PROGRESS NOTES
"Chief Complaint  Follow-up (1 year follow up. )    Subjective          Reddy Castellano is a 89 y.o. male who presents to [facility] NEUROLOGY & NEUROSURGERY  History of Present Illness  89-year-old man here for follow-up of his memory loss.  He is taking memantine 10 mg twice a day.  He is living with his son.  States that his son has depression.  I discussed with him that my notes from last year was wrong because of dictation error and I ask him if he remembers telling me about buying a car for his son.  He states that he does not remember that and his son has a lot of money that he does not need his money.  He is independent with all activities of daily living.    Objective   Vital Signs:   /85   Pulse 66   Ht 172.7 cm (67.99\")   Wt 100 kg (221 lb)   BMI 33.61 kg/m²     Physical Exam   Alert, fluent, phasic, follows commands well.  Mini-Mental Status score initially was 27 out of 30.  He he missed 3 out of 3 recent objects.  On second attempt he scored a 29 out of 30.  Clock drawing is intact.  Spacing and putting the hands of a clock are normal.  Pentagons are normal.  He tells me that the  is [place].  I asked him who the real president is and he started saying Alannah what is her name.  I told him that she is the  that he said So is the president.  He can tell me vague recent events but it took him a long time to figure it out.  Patient gets unremarkable.        Assessment and Plan  Diagnoses and all orders for this visit:    1. Mild cognitive impairment (Primary)  Assessment & Plan:  There is no change in his mild cognitive impairment deficits.  He is to go to taking memantine 10 mg twice a day.  I will see him again in 1 years time for follow-up.  Thank you for let me participate in his care.      Other orders  -     memantine (NAMENDA) 10 MG tablet; Take 1 tablet twice a day  Dispense: 180 tablet; Refill: 3         Total time spent with " the patient and coordinating patient care was 25 minutes.    Follow Up  No follow-ups on file.  Patient was given instructions and counseling regarding his condition or for health maintenance advice. Please see specific information pulled into the AVS if appropriate.

## 2024-08-19 NOTE — TELEPHONE ENCOUNTER
Called and spoke to [facility]. meds were verified and scripts sent in for the Allopurinal and Miralax per request.

## 2024-08-19 NOTE — ASSESSMENT & PLAN NOTE
There is no change in his mild cognitive impairment deficits.  He is to go to taking memantine 10 mg twice a day.  I will see him again in 1 years time for follow-up.  Thank you for let me participate in his care.

## 2024-08-20 ENCOUNTER — TELEPHONE (OUTPATIENT)
Dept: CASE MANAGEMENT | Facility: OTHER | Age: 89
End: 2024-08-20
Payer: MEDICARE

## 2024-08-20 ENCOUNTER — PATIENT OUTREACH (OUTPATIENT)
Dept: CASE MANAGEMENT | Facility: OTHER | Age: 89
End: 2024-08-20
Payer: MEDICARE

## 2024-08-20 DIAGNOSIS — E11.9 TYPE 2 DIABETES MELLITUS WITHOUT COMPLICATION, WITHOUT LONG-TERM CURRENT USE OF INSULIN: Primary | ICD-10-CM

## 2024-08-20 NOTE — OUTREACH NOTE
AMBULATORY CASE MANAGEMENT NOTE    Names and Relationships of Patient/Support Persons: Contact: Montse Roger; Relationship: Other -     Care Coordination    I spoke with Montse Roger, clinical  at [facility]. [phone] ext [phone] We discussed [facility]. She stated that the patient needs to make an appointment with Cecilia Carranza(PCP on [facility]) because he has not seen her in a year. Call [phone] to make an appointment.    Montse states that patient has had the benefit of getting Depends paid for and mailed to the house previously, he can get transportation to [facility] appointments, there may be some benefits for the [facility] home health program through [facility], he could get his medications mailed to the house. Patient needs appointment with PCP first to discuss these things.    I will plan to speak with patient and his son tomorrow regarding this.    Megha CARTAGENA  Ambulatory Case Management    8/20/2024, 16:32 EDT

## 2024-08-21 ENCOUNTER — PATIENT OUTREACH (OUTPATIENT)
Dept: CASE MANAGEMENT | Facility: OTHER | Age: 89
End: 2024-08-21
Payer: MEDICARE

## 2024-08-21 DIAGNOSIS — I10 ESSENTIAL HYPERTENSION: Primary | ICD-10-CM

## 2024-08-21 NOTE — OUTREACH NOTE
AMBULATORY CASE MANAGEMENT NOTE    Names and Relationships of Patient/Support Persons: Contact: Zoie Roger-[facility] Cortes ; Relationship: Other -     Care Coordination    I received a phone call from Kenny Banuelos . She spoke with patient on the phone today. She reports to me that they made him an appointment to see his PCP at [facility] on 8/27 at 1:00. She is also going to work on getting the patient a housing improvement denzel to redo his bathroom to make it safer for him, as well as, add grab bars for the shower.    I will continue to work with her to assist this patient.    Megha CARTAGENA  Ambulatory Case Management    8/21/2024, 12:57 EDT

## 2024-08-22 ENCOUNTER — TELEPHONE (OUTPATIENT)
Dept: INTERNAL MEDICINE | Facility: CLINIC | Age: 89
End: 2024-08-22
Payer: MEDICARE

## 2024-08-22 RX ORDER — LISINOPRIL 5 MG/1
5 TABLET ORAL DAILY
Qty: 30 TABLET | Refills: 0 | Status: SHIPPED | OUTPATIENT
Start: 2024-08-22

## 2024-08-22 NOTE — TELEPHONE ENCOUNTER
Caller: Reddy Castellano    Relationship: Self    Best call back number: [phone]     Requested Prescriptions:   Requested Prescriptions     Pending Prescriptions Disp Refills    buPROPion XL (WELLBUTRIN XL) 150 MG 24 hr tablet 30 tablet 3     Sig: Take 1 tablet by mouth Every Morning.    clopidogrel (PLAVIX) 75 MG tablet 30 tablet 3     Sig: Take 1 tablet by mouth Daily.        Pharmacy where request should be sent: [facility] [phone] [place], [place] [phone] FX     Last office visit with prescribing clinician: 3/7/2024   Last telemedicine visit with prescribing clinician: Visit date not found   Next office visit with prescribing clinician: 8/26/2024       Does the patient have less than a 3 day supply:  [x] Yes  [] No    Would you like a call back once the refill request has been completed: [] Yes [x] No    If the office needs to give you a call back, can they leave a voicemail: [] Yes [x] No    Rebekah Villasenor, PCT   08/22/24 14:40 EDT

## 2024-08-22 NOTE — TELEPHONE ENCOUNTER
Received call from MINAL Mckeon with [facility].  She confirms patient's BP reading this morning as 150/100 and is asymptomatic.  She confirmed both Lisinopril 5mg and Amlodipine 10mg were stopped when patient was in the hospital recently (7/28/2024) and have not been started back.  She asked that we call her back with what provider wants to do at this time.  Cell# [phone]    Edilia Meehan RN  [facility], [facility]

## 2024-08-23 RX ORDER — BUPROPION HYDROCHLORIDE 150 MG/1
150 TABLET ORAL EVERY MORNING
Qty: 30 TABLET | Refills: 3 | Status: SHIPPED | OUTPATIENT
Start: 2024-08-23

## 2024-08-23 RX ORDER — CLOPIDOGREL BISULFATE 75 MG/1
75 TABLET ORAL DAILY
Qty: 30 TABLET | Refills: 3 | Status: SHIPPED | OUTPATIENT
Start: 2024-08-23

## 2024-08-25 ENCOUNTER — APPOINTMENT (OUTPATIENT)
Dept: GENERAL RADIOLOGY | Facility: HOSPITAL | Age: 89
End: 2024-08-25
Payer: MEDICARE

## 2024-08-25 ENCOUNTER — HOSPITAL ENCOUNTER (EMERGENCY)
Facility: HOSPITAL | Age: 89
Discharge: HOME | End: 2024-08-25
Attending: EMERGENCY MEDICINE | Admitting: EMERGENCY MEDICINE
Payer: MEDICARE

## 2024-08-25 VITALS
DIASTOLIC BLOOD PRESSURE: 87 MMHG | RESPIRATION RATE: 18 BRPM | BODY MASS INDEX: 33.55 KG/M2 | OXYGEN SATURATION: 95 % | WEIGHT: 221.34 LBS | HEIGHT: 68 IN | SYSTOLIC BLOOD PRESSURE: 153 MMHG | TEMPERATURE: 97.7 F | HEART RATE: 69 BPM

## 2024-08-25 DIAGNOSIS — K59.00 CONSTIPATION, UNSPECIFIED CONSTIPATION TYPE: Primary | ICD-10-CM

## 2024-08-25 LAB
ALBUMIN SERPL-MCNC: 3.3 G/DL (ref 3.5–5.2)
ALBUMIN/GLOB SERPL: 1.6 G/DL
ALP SERPL-CCNC: 74 U/L (ref 39–117)
ALT SERPL W P-5'-P-CCNC: 10 U/L (ref 1–41)
ANION GAP SERPL CALCULATED.3IONS-SCNC: 10.7 MMOL/L (ref 5–15)
AST SERPL-CCNC: 13 U/L (ref 1–40)
BASOPHILS # BLD AUTO: 0.07 10*3/MM3 (ref 0–0.2)
BASOPHILS NFR BLD AUTO: 0.8 % (ref 0–1.5)
BILIRUB SERPL-MCNC: 0.6 MG/DL (ref 0–1.2)
BUN SERPL-MCNC: 19 MG/DL (ref 8–23)
BUN/CREAT SERPL: 16.8 (ref 7–25)
CALCIUM SPEC-SCNC: 8.5 MG/DL (ref 8.6–10.5)
CHLORIDE SERPL-SCNC: 107 MMOL/L (ref 98–107)
CO2 SERPL-SCNC: 23.3 MMOL/L (ref 22–29)
CREAT SERPL-MCNC: 1.13 MG/DL (ref 0.76–1.27)
D-LACTATE SERPL-SCNC: 1.4 MMOL/L (ref 0.5–2)
DEPRECATED RDW RBC AUTO: 48 FL (ref 37–54)
EGFRCR SERPLBLD CKD-EPI 2021: 62.1 ML/MIN/1.73
EOSINOPHIL # BLD AUTO: 0.28 10*3/MM3 (ref 0–0.4)
EOSINOPHIL NFR BLD AUTO: 3.4 % (ref 0.3–6.2)
ERYTHROCYTE [DISTWIDTH] IN BLOOD BY AUTOMATED COUNT: 13.8 % (ref 12.3–15.4)
GLOBULIN UR ELPH-MCNC: 2.1 GM/DL
GLUCOSE SERPL-MCNC: 95 MG/DL (ref 65–99)
HCT VFR BLD AUTO: 46.2 % (ref 37.5–51)
HGB BLD-MCNC: 15.2 G/DL (ref 13–17.7)
HOLD SPECIMEN: NORMAL
HOLD SPECIMEN: NORMAL
IMM GRANULOCYTES # BLD AUTO: 0.04 10*3/MM3 (ref 0–0.05)
IMM GRANULOCYTES NFR BLD AUTO: 0.5 % (ref 0–0.5)
LIPASE SERPL-CCNC: 29 U/L (ref 13–60)
LYMPHOCYTES # BLD AUTO: 2.78 10*3/MM3 (ref 0.7–3.1)
LYMPHOCYTES NFR BLD AUTO: 33.4 % (ref 19.6–45.3)
MCH RBC QN AUTO: 31.3 PG (ref 26.6–33)
MCHC RBC AUTO-ENTMCNC: 32.9 G/DL (ref 31.5–35.7)
MCV RBC AUTO: 95.3 FL (ref 79–97)
MONOCYTES # BLD AUTO: 0.48 10*3/MM3 (ref 0.1–0.9)
MONOCYTES NFR BLD AUTO: 5.8 % (ref 5–12)
NEUTROPHILS NFR BLD AUTO: 4.68 10*3/MM3 (ref 1.7–7)
NEUTROPHILS NFR BLD AUTO: 56.1 % (ref 42.7–76)
NRBC BLD AUTO-RTO: 0 /100 WBC (ref 0–0.2)
PLATELET # BLD AUTO: 127 10*3/MM3 (ref 140–450)
PMV BLD AUTO: 10.8 FL (ref 6–12)
POTASSIUM SERPL-SCNC: 4.1 MMOL/L (ref 3.5–5.2)
PROT SERPL-MCNC: 5.4 G/DL (ref 6–8.5)
RBC # BLD AUTO: 4.85 10*6/MM3 (ref 4.14–5.8)
SODIUM SERPL-SCNC: 141 MMOL/L (ref 136–145)
WBC NRBC COR # BLD AUTO: 8.33 10*3/MM3 (ref 3.4–10.8)
WHOLE BLOOD HOLD COAG: NORMAL
WHOLE BLOOD HOLD SPECIMEN: NORMAL

## 2024-08-25 PROCEDURE — 99283 EMERGENCY DEPT VISIT LOW MDM: CPT

## 2024-08-25 PROCEDURE — 85025 COMPLETE CBC W/AUTO DIFF WBC: CPT

## 2024-08-25 PROCEDURE — 80053 COMPREHEN METABOLIC PANEL: CPT

## 2024-08-25 PROCEDURE — 83690 ASSAY OF LIPASE: CPT

## 2024-08-25 PROCEDURE — 74018 RADEX ABDOMEN 1 VIEW: CPT

## 2024-08-25 PROCEDURE — 36415 COLL VENOUS BLD VENIPUNCTURE: CPT

## 2024-08-25 PROCEDURE — 83605 ASSAY OF LACTIC ACID: CPT

## 2024-08-25 RX ORDER — SODIUM CHLORIDE 0.9 % (FLUSH) 0.9 %
10 SYRINGE (ML) INJECTION AS NEEDED
Status: DISCONTINUED | OUTPATIENT
Start: 2024-08-25 | End: 2024-08-25 | Stop reason: HOSPADM

## 2024-08-25 NOTE — DISCHARGE INSTRUCTIONS
Your blood work looked okay today and your abdominal x-rays did not show any significant constipation or backed up stool today.    Eat a diet that is high in fiber and plenty of liquids to drink this week, and take MiraLAX every day along with over-the-counter Colace stool softener twice a day this week.    If this does not help you have a bowel movement in a day or 2 you can purchase over-the-counter medium citrate and drink half the bottle and wait 4 hours to see if you have a bowel movement otherwise drink the other half.    Follow-up with your primary care doctor if you are not having any results.

## 2024-08-25 NOTE — ED PROVIDER NOTES
Time: 6:20 AM EDT  Date of encounter:  8/25/2024  Independent Historian/Clinical History and Information was obtained by:   Patient and Family    History is limited by: N/A    Chief Complaint: Constipation for 2 days      History of Present Illness:  Patient is a 89 y.o. year old diabetic male who presents to the emergency department for evaluation of constipation, and states he has not had a bowel movement in a couple of days.    It sounds like he has had some chronic constipation issues and is on daily MiraLAX and he has been taking it with no results.    He denies any abdominal pain or bloating or any vomiting.    No new medications and no pain meds taken recently.      Patient Care Team  Primary Care Provider: Agustina Gutierrez MD    Past Medical History:     No Known Allergies  Past Medical History:   Diagnosis Date    Allergies     Anxiety     Arthritis     Asthma     BPH (benign prostatic hyperplasia)     Carpal tunnel syndrome     Colon polyp     Congestive heart failure (CHF)     COPD (chronic obstructive pulmonary disease)     Dementia     Depression     Diabetes mellitus type 2, noninsulin dependent     Diverticulitis     Erectile dysfunction of organic origin     Essential hypertension 11/19/2019    Excess skin     Fatigue 11/09/2019    GERD (gastroesophageal reflux disease)     Gout     Heart attack     Heart disease     High cholesterol     Hyperlipidemia     Hypertension     Hypogonadism in male     Insomnia     Leg swelling     Memory change 08/02/2018    currently, pt is taking namenda. i will pursue a mocha at his follow up     Myocardial infarction     Osteoarthritis     Parkinson's disease     Prostate disorder     Rectal bleeding     Renal cyst     Seasonal allergies     Sleep apnea     Stroke     TIA (transient ischemic attack) 08/02/2018    reportedly, the pt has a history of TIA. This can be discussed further at his follow up. i was asked that he cont. aspirin.    Tremor 08/02/2018     pt was a 3 mo history of a high frequency, low amplitude tremor of both hands that worsens some what with activity. tremor does not interfere with his activities of daily living. he has no evidence of bradykinesia on exam. pakinsons seems unlikely at this point. potential etiologies would include essential tremor and physiologic tremor. he does note that he started theophylline approx. 3 months ag    Vitamin D deficiency      Past Surgical History:   Procedure Laterality Date    APPENDECTOMY  2015    CARDIAC CATHETERIZATION      CARDIAC SURGERY  2014    CARPAL TUNNEL RELEASE      CATARACT EXTRACTION      CIRCUMCISION  2000    COLONOSCOPY  2014 2017    CYSTOSCOPY      ENDOSCOPY  2010 2017    ENDOSCOPY N/A 11/23/2021    Procedure: ESOPHAGOGASTRODUODENOSCOPY;  Surgeon: Anika Cummins MD;  Location: [facility] MAIN OR;  Service: Gastroenterology;  Laterality: N/A;  gastritis    EYE SURGERY      eye implant    HERNIA REPAIR  2000    LAPAROSCOPIC GASTRIC BANDING      ORCHIECTOMY Right 2010    OTHER SURGICAL HISTORY      metal implants;     PENILE PROSTHESIS IMPLANT      TOE SURGERY      TOENAIL EXCISION      surgical removal of toenail and nail matrix of both feet    TOTAL KNEE ARTHROPLASTY Bilateral 2009 2010     Family History   Problem Relation Age of Onset    Diabetes Mother     Heart attack Father     Diabetes Brother     Rectal cancer Other         grandfather- rectal and colon    Prostate cancer Other     Diabetes Son        Home Medications:  Prior to Admission medications    Medication Sig Start Date End Date Taking? Authorizing Provider   allopurinol (ZYLOPRIM) 300 MG tablet Take 0.5 tablets by mouth Daily. 8/19/24   Agustina Gutierrez MD   atorvastatin (LIPITOR) 20 MG tablet Take 1 tablet by mouth Daily. 4/11/24   Agustina Gutierrez MD   buPROPion XL (WELLBUTRIN XL) 150 MG 24 hr tablet Take 1 tablet by mouth Every Morning. 8/23/24   Agustina Gutierrez MD   clopidogrel (PLAVIX) 75  "MG tablet Take 1 tablet by mouth Daily. 8/23/24   Agustina Gutierrez MD   fluticasone-salmeterol (ADVAIR HFA) 230-21 MCG/ACT inhaler Inhale 2 puffs Every 6 (Six) Hours. 3/7/24   Agustina Gutierrez MD   lisinopril (PRINIVIL,ZESTRIL) 5 MG tablet Take 1 tablet by mouth Daily. 8/22/24   Amber Molina MD   melatonin 1 MG tablet Take 6 tablets by mouth At Night As Needed for Sleep. 7/15/24   Agustina Gutierrez MD   memantine (NAMENDA) 10 MG tablet Take 1 tablet twice a day 8/19/24   OropillaArnav MD   polyethylene glycol (MIRALAX) 17 g packet Take 17 g by mouth Daily for 30 days. 8/19/24 9/18/24  Agustina Gutierrez MD   propranolol (INDERAL) 10 MG tablet Take 0.5 tablets by mouth 2 (Two) Times a Day. 7/29/24   Gallo Winters PA   venlafaxine XR (EFFEXOR-XR) 75 MG 24 hr capsule Take 1 capsule by mouth Daily. 7/15/24   Agustina Gutierrez MD   Vibegron 75 MG tablet Take 1 tablet by mouth Daily. 7/15/24   Agustina Gutierrez MD        Social History:   Social History     Tobacco Use    Smoking status: Never     Passive exposure: Never    Smokeless tobacco: Never   Vaping Use    Vaping status: Never Used   Substance Use Topics    Alcohol use: Yes     Comment: occasional     Drug use: Never         Review of Systems:  Review of Systems   I performed a 10 point review of systems which was all negative, except for the positives found in the HPI above.  Physical Exam:  /90 (BP Location: Right arm, Patient Position: Sitting)   Pulse 66   Temp 98.2 °F (36.8 °C) (Oral)   Resp 18   Ht 172.7 cm (68\")   Wt 100 kg (221 lb 5.5 oz)   SpO2 97%   BMI 33.65 kg/m²     Physical Exam   General: Awake alert and in no obvious distress    HEENT: Head normocephalic atraumatic, eyes PERRLA EOMI, nose normal, oropharynx normal.    Neck: Supple full range of motion, no meningismus, no lymphadenopathy    Heart: Regular rate and rhythm, no murmurs or rubs, 2+ radial pulses " bilaterally    Lungs: Clear to auscultation bilaterally without wheezes or crackles, no respiratory distress    Abdomen: Soft, nontender, nondistended, no rebound or guarding    Skin: Warm, dry, no rash    Musculoskeletal: Normal range of motion, no lower extremity edema    Neurologic: Oriented x3, no motor deficits no sensory deficits    Psychiatric: Mood appears stable, no psychosis          Procedures:  Procedures      Medical Decision Making:      Comorbidities that affect care:    Constipation    External Notes reviewed:    None      The following orders were placed and all results were independently analyzed by me:  Orders Placed This Encounter   Procedures    XR Abdomen KUB    [place] Draw    Comprehensive Metabolic Panel    Lipase    Urinalysis With Microscopic If Indicated (No Culture) - Urine, Clean Catch    Lactic Acid, Plasma    CBC Auto Differential    NPO Diet NPO Type: Strict NPO    Undress & Gown    Insert Peripheral IV    CBC & Differential    Green Top (Gel)    Lavender Top    Gold Top - SST    Light Blue Top       Medications Given in the Emergency Department:  Medications   sodium chloride 0.9 % flush 10 mL (has no administration in time range)        ED Course:         Labs:    Lab Results (last 24 hours)       Procedure Component Value Units Date/Time    Lactic Acid, Plasma [[id number]]  (Normal) Collected: 08/25/24 0412    Specimen: Blood Updated: 08/25/24 0442     Lactate 1.4 mmol/L     CBC & Differential [[id number]]  (Abnormal) Collected: 08/25/24 0430    Specimen: Blood Updated: 08/25/24 0504    Narrative:      The following orders were created for panel order CBC & Differential.  Procedure                               Abnormality         Status                     ---------                               -----------         ------                     CBC Auto Differential[[id number]]        Abnormal            Final result               Scan Slide[[id number]]                                                                     Please view results for these tests on the individual orders.    Comprehensive Metabolic Panel [[id number]]  (Abnormal) Collected: 08/25/24 0430    Specimen: Blood Updated: 08/25/24 0504     Glucose 95 mg/dL      BUN 19 mg/dL      Creatinine 1.13 mg/dL      Sodium 141 mmol/L      Potassium 4.1 mmol/L      Comment: Slight hemolysis detected by analyzer. Result may be falsely elevated.        Chloride 107 mmol/L      CO2 23.3 mmol/L      Calcium 8.5 mg/dL      Total Protein 5.4 g/dL      Albumin 3.3 g/dL      ALT (SGPT) 10 U/L      AST (SGOT) 13 U/L      Alkaline Phosphatase 74 U/L      Total Bilirubin 0.6 mg/dL      Globulin 2.1 gm/dL      A/G Ratio 1.6 g/dL      BUN/Creatinine Ratio 16.8     Anion Gap 10.7 mmol/L      eGFR 62.1 mL/min/1.73     Narrative:      GFR Normal >60  Chronic Kidney Disease <60  Kidney Failure <15    The GFR formula is only valid for adults with stable renal function between ages 18 and 70.    Lipase [[id number]]  (Normal) Collected: 08/25/24 0430    Specimen: Blood Updated: 08/25/24 0504     Lipase 29 U/L     CBC Auto Differential [[id number]]  (Abnormal) Collected: 08/25/24 0430    Specimen: Blood Updated: 08/25/24 0504     WBC 8.33 10*3/mm3      RBC 4.85 10*6/mm3      Hemoglobin 15.2 g/dL      Hematocrit 46.2 %      MCV 95.3 fL      MCH 31.3 pg      MCHC 32.9 g/dL      RDW 13.8 %      RDW-SD 48.0 fl      MPV 10.8 fL      Platelets 127 10*3/mm3      Neutrophil % 56.1 %      Lymphocyte % 33.4 %      Monocyte % 5.8 %      Eosinophil % 3.4 %      Basophil % 0.8 %      Immature Grans % 0.5 %      Neutrophils, Absolute 4.68 10*3/mm3      Lymphocytes, Absolute 2.78 10*3/mm3      Monocytes, Absolute 0.48 10*3/mm3      Eosinophils, Absolute 0.28 10*3/mm3      Basophils, Absolute 0.07 10*3/mm3      Immature Grans, Absolute 0.04 10*3/mm3      nRBC 0.0 /100 WBC              Imaging:    XR Abdomen KUB    Result Date: 8/25/2024  XR ABDOMEN KUB-  Date of exam: 8/25/2024 1:12  AM.  Indication: constipation  Comparisons: 8/14/2024; 8/11/2024.  FINDINGS: Four (4) AP supine views of the abdomen and pelvis were obtained. No mechanical bowel obstruction. No significant stool burden is seen. A generalized adynamic ileus is possible. Degenerative changes involve the imaged spine, bilateral sacroiliac (SI) joints, bilateral hip joints, and pubic symphysis. Pelvic vascular calcifications are present. A gastric band is in place. There is asymmetric elevation of the right diaphragm, which is thought to be chronic in nature. Borderline cardiomegaly is possible. Similar findings were seen on the prior 8/11/2024 CT study.       The bowel gas pattern is nonobstructive.   Portions of this note were completed with a voice recognition program.     Electronically Signed By-Arnold Parsons MD On:8/25/2024 1:39 AM         Differential Diagnosis and Discussion:    Abdominal Pain: Based on the patient's signs and symptoms, I considered abdominal aortic aneurysm, small bowel obstruction, pancreatitis, acute cholecystitis, acute appendecitis, peptic ulcer disease, gastritis, colitis, endocrine disorders, irritable bowel syndrome and other differential diagnosis an etiology of the patient's abdominal pain.    All labs were reviewed and interpreted by me.  All X-rays impressions were independently interpreted by me.    MDM     Amount and/or Complexity of Data Reviewed  Clinical lab tests: reviewed  Tests in the radiology section of CPT®: reviewed             This patient is a pleasant 89-year-old male with some chronic constipation issues on daily MiraLAX who presents with no bowel movement for couple days.    He has no other symptoms including no abdominal pain or distention or vomiting.    He has a benign abdominal exam.    All of his blood work and vital signs look reassuring and no signs of sepsis or infection or blood loss noted.    I reviewed the images of his abdominal plain films revealing possible mild  ileus but no significant retained stool and no signs of bowel obstruction.        I am starting him on high-fiber diet and plenty of p.o. liquids this week and oral bowel regimen including daily MiraLAX, Colace stool softener, as needed mag citrate, follow-up with PCP.                Patient Care Considerations:          Consultants/Shared Management Plan:        Social Determinants of Health:    Patient has presented with family members who are responsible, reliable and will ensure follow up care.      Disposition and Care Coordination:    Discharged: The patient is suitable and stable for discharge with no need for consideration of admission.    I have explained the patient´s condition, diagnoses and treatment plan based on the information available to me at this time. I have answered questions and addressed any concerns. The patient has a good  understanding of the patient´s diagnosis, condition, and treatment plan as can be expected at this point. The vital signs have been stable. The patient´s condition is stable and appropriate for discharge from the emergency department.      The patient will pursue further outpatient evaluation with the primary care physician or other designated or consulting physician as outlined in the discharge instructions. They are agreeable to this plan of care and follow-up instructions have been explained in detail. The patient has received these instructions in written format and has expressed an understanding of the discharge instructions. The patient is aware that any significant change in condition or worsening of symptoms should prompt an immediate return to this or the closest emergency department or call to 911.  I have explained discharge medications and the need for follow up with the patient/caretakers. This was also printed in the discharge instructions. Patient was discharged with the following medications and follow up:      Medication List      No changes were made to  your prescriptions during this visit.      No follow-up provider specified.     Final diagnoses:   Constipation, unspecified constipation type        ED Disposition       ED Disposition   Discharge    Condition   Stable    Comment   --               This medical record created using voice recognition software.             Aníbal Bunch MD  08/25/24 0603

## 2024-08-26 ENCOUNTER — PATIENT OUTREACH (OUTPATIENT)
Dept: CASE MANAGEMENT | Facility: OTHER | Age: 89
End: 2024-08-26
Payer: MEDICARE

## 2024-08-26 ENCOUNTER — OFFICE VISIT (OUTPATIENT)
Dept: INTERNAL MEDICINE | Facility: CLINIC | Age: 89
End: 2024-08-26
Payer: MEDICARE

## 2024-08-26 VITALS
OXYGEN SATURATION: 96 % | HEART RATE: 82 BPM | WEIGHT: 223.31 LBS | HEIGHT: 68 IN | DIASTOLIC BLOOD PRESSURE: 84 MMHG | RESPIRATION RATE: 18 BRPM | TEMPERATURE: 98 F | SYSTOLIC BLOOD PRESSURE: 138 MMHG | BODY MASS INDEX: 33.84 KG/M2

## 2024-08-26 DIAGNOSIS — Z91.148 MEDICATION NON-COMPLIANCE DUE TO EXCESSIVE PILL BURDEN: ICD-10-CM

## 2024-08-26 DIAGNOSIS — I10 ESSENTIAL HYPERTENSION: Primary | ICD-10-CM

## 2024-08-26 DIAGNOSIS — E78.49 OTHER HYPERLIPIDEMIA: ICD-10-CM

## 2024-08-26 DIAGNOSIS — E11.9 TYPE 2 DIABETES MELLITUS WITHOUT COMPLICATION, WITHOUT LONG-TERM CURRENT USE OF INSULIN: ICD-10-CM

## 2024-08-26 PROBLEM — B37.89 CANDIDA RASH OF GROIN: Status: RESOLVED | Noted: 2021-08-31 | Resolved: 2024-08-26

## 2024-08-26 PROBLEM — N42.9 DISORDER OF PROSTATE: Status: RESOLVED | Noted: 2018-06-01 | Resolved: 2024-08-26

## 2024-08-26 PROBLEM — J30.2 SEASONAL ALLERGIES: Status: RESOLVED | Noted: 2018-06-01 | Resolved: 2024-08-26

## 2024-08-26 PROBLEM — R39.15 URGENCY OF URINATION: Status: RESOLVED | Noted: 2021-08-31 | Resolved: 2024-08-26

## 2024-08-26 PROBLEM — N48.1 BALANITIS: Status: RESOLVED | Noted: 2021-08-31 | Resolved: 2024-08-26

## 2024-08-26 PROBLEM — L03.114 CELLULITIS OF LEFT FOREARM: Status: RESOLVED | Noted: 2023-11-22 | Resolved: 2024-08-26

## 2024-08-26 PROCEDURE — 99214 OFFICE O/P EST MOD 30 MIN: CPT | Performed by: INTERNAL MEDICINE

## 2024-08-26 PROCEDURE — 1125F AMNT PAIN NOTED PAIN PRSNT: CPT | Performed by: INTERNAL MEDICINE

## 2024-08-26 NOTE — OUTREACH NOTE
AMBULATORY CASE MANAGEMENT NOTE    Names and Relationships of Patient/Support Persons: Contact: Reddy Castellano; Relationship: Self -     CCM Interim Update    I spoke with patient face to face while he was in the office today. I gave him my business card with his Kenny Cortes appointment information on it for tomorrow. I explained to him why this appointment is important and that the goal is to get [facility] [facility] into his home. He states he used to have [employer] [facility] come in but he paid for it. I explained that we are [facility] get [facility] to [facility] time. He is very agreeable to this.    Patient is alone at this appointment. Son did not come with him as agreed previously.    Megha CARTAGENA  Ambulatory Case Management    8/26/2024, 14:31 EDT

## 2024-08-26 NOTE — PROGRESS NOTES
"Chief Complaint  Follow-up (ED follow up for fall on 8/14/24 )    Subjective      Reddy Castellano is a 89 y.o. male who presents to [facility]     Presenting for follow up from ED visit in 8/14 for fall. Blood pressure is better controlled today. Home health has been helping with setting up his medication box, so he has been taking prescribed medication more regularly.     Case management is working to get him set up with [facility] benefits and [employer] for the home. He has an appointment with [facility] PCP tomorrow.     Objective   Vital Signs:   Vitals:    08/26/24 1421   BP: 138/84   BP Location: Left arm   Patient Position: Sitting   Cuff Size: Adult   Pulse: 82   Resp: 18   Temp: 98 °F (36.7 °C)   TempSrc: Temporal   SpO2: 96%   Weight: 101 kg (223 lb 5 oz)   Height: 172.7 cm (68\")     Body mass index is 33.95 kg/m².    Wt Readings from Last 3 Encounters:   08/26/24 101 kg (223 lb 5 oz)   08/25/24 100 kg (221 lb 5.5 oz)   08/19/24 100 kg (221 lb)     BP Readings from Last 3 Encounters:   08/26/24 138/84   08/25/24 153/87   08/19/24 153/85       Health Maintenance   Topic Date Due    RSV Vaccine - Adults (1 - 1-dose 60+ series) Never done    ZOSTER VACCINE (2 of 3) 02/26/2019    DIABETIC EYE EXAM  08/23/2022    COVID-19 Vaccine (9 - 2023-24 season) 09/01/2023    INFLUENZA VACCINE  08/01/2024    ANNUAL WELLNESS VISIT  12/21/2024    HEMOGLOBIN A1C  01/10/2025    URINE MICROALBUMIN  03/07/2025    LIPID PANEL  07/10/2025    BMI FOLLOWUP  07/10/2025    TDAP/TD VACCINES (3 - Td or Tdap) 10/20/2033    Pneumococcal Vaccine 65+  Completed       Physical Exam  Vitals reviewed.   Constitutional:       Appearance: Normal appearance. He is well-developed.   HENT:      Head: Normocephalic and atraumatic.      Mouth/Throat:      Pharynx: No oropharyngeal exudate.   Eyes:      Conjunctiva/sclera: Conjunctivae normal.      Pupils: Pupils are equal, round, and reactive to light.   Neck:      " Thyroid: No thyromegaly or thyroid tenderness.   Cardiovascular:      Rate and Rhythm: Normal rate and regular rhythm.      Heart sounds: No murmur heard.     No friction rub. No gallop.   Pulmonary:      Effort: Pulmonary effort is normal.      Breath sounds: Normal breath sounds. No wheezing or rhonchi.   Lymphadenopathy:      Cervical: No cervical adenopathy.   Skin:     General: Skin is warm and dry.   Neurological:      Mental Status: He is alert and oriented to person, place, and time.   Psychiatric:         Mood and Affect: Affect normal.          Result Review :  The following data was reviewed by: Agustina Gutierrez MD on 08/26/2024:  CMP          8/11/2024    16:42 8/14/2024    04:47 8/25/2024    04:30   CMP   Glucose 81  90  95    BUN 22  21  19    Creatinine 1.51  1.55  1.13    EGFR 43.9  42.5  62.1    Sodium 142  145  141    Potassium 4.5  4.4  4.1    Chloride 105  109  107    Calcium 9.0  9.0  8.5    Total Protein 6.6  6.3  5.4    Albumin 3.7  3.6  3.3    Globulin 2.9  2.7  2.1    Total Bilirubin 0.8  0.6  0.6    Alkaline Phosphatase 88  81  74    AST (SGOT) 16  17  13    ALT (SGPT) 11  10  10    Albumin/Globulin Ratio 1.3  1.3  1.6    BUN/Creatinine Ratio 14.6  13.5  16.8    Anion Gap 10.7  10.0  10.7      CBC          8/11/2024    16:42 8/14/2024    04:47 8/25/2024    04:30   CBC   WBC 7.92  10.35  8.33    RBC 5.38  5.19  4.85    Hemoglobin 16.8  16.2  15.2    Hematocrit 50.6  49.0  46.2    MCV 94.1  94.4  95.3    MCH 31.2  31.2  31.3    MCHC 33.2  33.1  32.9    RDW 13.5  13.6  13.8    Platelets 154  158  127      Lipid Panel          3/7/2024    10:48 7/10/2024    15:21   Lipid Panel   Total Cholesterol 251  265    Triglycerides 163  229    HDL Cholesterol 37  41    VLDL Cholesterol 30  44    LDL Cholesterol  184  180    LDL/HDL Ratio 4.90  4.35      TSH          3/7/2024    10:48 7/10/2024    15:21 7/27/2024    15:31   TSH   TSH 1.880  1.790  1.700      A1C Last 3 Results          3/7/2024     10:48 7/10/2024    15:21   HGBA1C Last 3 Results   Hemoglobin A1C 5.70  5.50      Microalbumin          3/7/2024    10:48   Microalbumin   Microalbumin, Urine 11.5           Procedures          Assessment & Plan  Essential hypertension  Well controlled in clinic today  Continue current management  Other hyperlipidemia   On statin, tolerating well                       FOLLOW UP  Return for Keep previously scheduled follow up appointment.  Patient was given instructions and counseling regarding his condition or for health maintenance advice. Please see specific information pulled into the AVS if appropriate.       Agustina Gutierrez MD  08/26/24  14:54 EDT    CURRENT & DISCONTINUED MEDICATIONS  Current Outpatient Medications   Medication Instructions    allopurinol (ZYLOPRIM) 150 mg, Oral, Daily    atorvastatin (LIPITOR) 20 mg, Oral, Daily    buPROPion XL (WELLBUTRIN XL) 150 mg, Oral, Every Morning    clopidogrel (PLAVIX) 75 mg, Oral, Daily    fluticasone-salmeterol (ADVAIR HFA) 230-21 MCG/ACT inhaler 2 puffs, Inhalation, Every 6 Hours - RT    lisinopril (PRINIVIL,ZESTRIL) 5 mg, Oral, Daily    melatonin 6 mg, Oral, Nightly PRN    memantine (NAMENDA) 10 MG tablet Take 1 tablet twice a day    polyethylene glycol (MIRALAX) 17 g, Oral, Daily    propranolol (INDERAL) 5 mg, Oral, 2 Times Daily    venlafaxine XR (EFFEXOR-XR) 75 mg, Oral, Daily    Vibegron 75 mg, Oral, Daily       There are no discontinued medications.

## 2024-08-27 NOTE — TELEPHONE ENCOUNTER
Radha with [facility] called office with patient BP LT arm 159/101 and on Rt arm was 155/99, she stated pt did not get his Lisinopril, she is also stating is he needing to be added on amlodipine she stated she does not think the lisinopril alone would work for him, she stated [facility] uses [facility].

## 2024-08-28 RX ORDER — LISINOPRIL 5 MG/1
5 TABLET ORAL DAILY
Qty: 30 TABLET | Refills: 0 | Status: SHIPPED | OUTPATIENT
Start: 2024-08-28

## 2024-08-29 ENCOUNTER — TELEPHONE (OUTPATIENT)
Dept: INTERNAL MEDICINE | Facility: CLINIC | Age: 89
End: 2024-08-29
Payer: MEDICARE

## 2024-08-29 ENCOUNTER — PATIENT OUTREACH (OUTPATIENT)
Dept: CASE MANAGEMENT | Facility: OTHER | Age: 89
End: 2024-08-29
Payer: MEDICARE

## 2024-08-29 DIAGNOSIS — I10 ESSENTIAL HYPERTENSION: Primary | ICD-10-CM

## 2024-08-29 DIAGNOSIS — E11.9 TYPE 2 DIABETES MELLITUS WITHOUT COMPLICATION, WITHOUT LONG-TERM CURRENT USE OF INSULIN: ICD-10-CM

## 2024-08-29 NOTE — TELEPHONE ENCOUNTER
Lindsay with [facility] called and wanted to let Dr. Gutierrez know that pt weighed 219lbs yesterday 08-28-24 during their in home visit.

## 2024-08-29 NOTE — OUTREACH NOTE
[facility] End of Month Documentation    This Chronic Medical Management Care Plan for Reddy Castellano, 89 y.o. male, has been monitored and managed; reviewed; revised and a new plan of care implemented for the month of August.  A cumulative time of 131  minutes was spent on this patient record this month, including phone call with patient; chart review.    Regarding the patient's problems: has Gout; Type 2 diabetes mellitus without complication, without long-term current use of insulin; Essential hypertension; Other hyperlipidemia; Major depressive disorder, recurrent, moderate; Scrotal pain; Urge incontinence of urine; Anxiety; Rheumatoid arthritis; Chronic obstructive pulmonary disease; Constipation; Essential tremor; Excess skin of abdominal wall; Heart failure; Mechanical complication of genitourinary device; Morbid obesity; Myocardial infarction; Numbness; Sleep apnea; Spondylosis of lumbar spine; Tingling of skin; Ventral hernia; Lymphadenopathy of head and neck; Swallowed foreign body; Hyperkalemia; Benign prostatic hyperplasia with urinary frequency; Renal mass; Allergic rhinitis; Mild cognitive impairment; Wound of right leg; Scalp itch; and Dysarthria on their problem list., the following items were addressed: medical records; medications; changes to medical care and any changes can be found within the plan section of the note.  A detailed listing of time spent for chronic care management is tracked within each outreach encounter.  Current medications include:  has a current medication list which includes the following prescription(s): allopurinol, atorvastatin, bupropion xl, clopidogrel, fluticasone-salmeterol, lisinopril, melatonin, memantine, polyethylene glycol, propranolol, venlafaxine xr, and vibegron. and the patient is reported to be patient is noncompliant with medication protocol,  Medications are reported to be effective, BP within acceptable range.  Regarding these diagnoses, referrals were made to the  following provider([facility]):  [facility].  All notes on chart for PCP to review.    The patient was monitored remotely for weight; medications.    The patient's physical needs include:  help taking medications as prescribed; medication education; physical healthcare; eye care, Needs diabetic eye exam.     The patient's mental support needs include:  continued support    The patient's cognitive support needs include:  medication; coordination of community providers; health care; increased support, [facility] in home with nursing services and PT    The patient's psychosocial support needs include:  medication management or adherence; need for increased support; coordination of community providers, needs increased support for medication adherence. son lives with him but doesn't seem to be of much support    The patient's functional needs include: physical healthcare; medication education    The patient's environmental needs include:  not applicable    Care Plan overall comments:  Ongoing    Refer to previous outreach notes for more information on the areas listed above.    Monthly Billing Diagnoses  (I10) Essential hypertension    (E11.9) Type 2 diabetes mellitus without complication, without long-term current use of insulin    Medications   Medications have been reconciled    Care Plan progress this month:      Recently Modified Care Plans Updates made since 7/29/2024 12:00 AM      No recently modified care plans.            Current Specialty Plan of Care Status signed by both patient and provider    Instructions   Patient was provided an electronic copy of care plan  CCM services were explained and offered and patient has accepted these services.  Patient has given their written consent to receive CCM services and understands that this includes the authorization of electronic communication of medical information with the other treating providers.  Patient understands that they may stop CCM services at any time and these  changes will be effective at the end of the calendar month and will effectively revocate the agreement of CCM services.  Patient understands that only one practitioner can furnish and be paid for CCM services during one calendar month.  Patient also understands that there may be co-payment or deductible fees in association with CCM services.  Patient will continue with at least monthly follow-up calls with the Ambulatory .    Megha CARTAGENA  Ambulatory Case Management    8/29/2024, 10:19 EDT

## 2024-08-30 ENCOUNTER — TELEPHONE (OUTPATIENT)
Dept: INTERNAL MEDICINE | Facility: CLINIC | Age: 89
End: 2024-08-30
Payer: MEDICARE

## 2024-08-30 NOTE — TELEPHONE ENCOUNTER
Patient called office and wanted us to know that his miralax hasn't been working. He stated he started taking magnesium citrate. He took it once and it helped and wants to make sure this is okay for him to continue to take.

## 2024-09-05 ENCOUNTER — TELEPHONE (OUTPATIENT)
Dept: INTERNAL MEDICINE | Facility: CLINIC | Age: 89
End: 2024-09-05
Payer: MEDICARE

## 2024-09-05 ENCOUNTER — PATIENT OUTREACH (OUTPATIENT)
Dept: CASE MANAGEMENT | Facility: OTHER | Age: 89
End: 2024-09-05
Payer: MEDICARE

## 2024-09-05 DIAGNOSIS — E11.9 TYPE 2 DIABETES MELLITUS WITHOUT COMPLICATION, WITHOUT LONG-TERM CURRENT USE OF INSULIN: Primary | ICD-10-CM

## 2024-09-05 DIAGNOSIS — R13.10 DYSPHAGIA, UNSPECIFIED TYPE: Primary | ICD-10-CM

## 2024-09-05 NOTE — OUTREACH NOTE
"AMBULATORY CASE MANAGEMENT NOTE    Names and Relationships of Patient/Support Persons: Contact: Reddy Castellano; Relationship: Self -     CCM Interim Update    I spoke with patient on the phone to inquire if he went to his PCP appointment at [facility]. Patient states he doesn't think he did but he can't really remember. He asked me to call and find out for him. Patient states he is \"limited\" in what he can et currently due to difficulty swallowing. When I inquired further, patient states \"I'm dissolving those little cubes\". I asked him if he was talking about bullion cubes in water and he stated \"yes, that's it\"    I asked patient where his son is currently. Patient laughed and said sleeping.    PCP is wanting a [facility] bedside swallow eval. I have left a message for Elle with [facility] to see if this is something they can do. If not, we will see about ordering it at the hospital.    Care Coordination    I called [facility]. Patient cancelled his appointment on 8/27 but it has been rescheduled for 9/17 with Cecilia Carranza. [facility] is going to send him a reminder regarding this.      Megha CARTAGENA  Ambulatory Case Management    9/5/2024, 16:07 EDT  "

## 2024-09-05 NOTE — TELEPHONE ENCOUNTER
Caller: [facility]    Relationship: Home Health    Best call back number:         What was the call regarding:      GOSIA SAID SHE SEEN THE PATIENT TODAY AND HE IS SO CONFUSED. SHE SAID THE PATIENT IS ANSWERING THE COGNITIVE QUESTIONS CORRECTLY BUT IS NOT RETAINING WHAT HE HAS BEEN TAUGHT. GOSIA SAID THE  PATIENT TOLD HER THAT HE  IS HAVING A HARD TIME SWALLOWING. SHE SAID IT HAS BEEN GOING ON FOR A FEW WEEKS BUT IT HAS GOTTEN WORSE. SHE SAID SHE FEELS LIKE A SWALLOWING  STUDY SHOULD BE DONE.     GOSIA SAID THE PATIENT'S CAREGIVER  IS ABSENT. SHE SAID SHE FEELS LIKE THE CAREGIVER IS TAKING DRUGS AND HE IS ALWAYS DISHEVELED. SHE SAID THE CAREGIVER HAS BEEN ASKED TO BE INVOLVED BUT HE IS NOT HELPING THE PATIENT. GOSIA SAID SHE FILED AN  [facility] REPORT.      SHE SAID SHE WILL DO A 4 WEEK RECERT WITH THE PATIENT     SHE SAID TO CALL HER IF PCP IS WANTING TO DISCUSS

## 2024-09-11 ENCOUNTER — OUTSIDE FACILITY SERVICE (OUTPATIENT)
Dept: INTERNAL MEDICINE | Facility: CLINIC | Age: 89
End: 2024-09-11
Payer: MEDICARE

## 2024-09-13 RX ORDER — LISINOPRIL 5 MG/1
5 TABLET ORAL DAILY
Qty: 30 TABLET | Refills: 10 | Status: SHIPPED | OUTPATIENT
Start: 2024-09-13

## 2024-09-13 NOTE — TELEPHONE ENCOUNTER
Radha with [facility] called office stating they do not have a speech therapy that can do the swallow test, she states patient is not able to take his medication, patient states he is not able to swallow at all, she also mention patient has lost a substantial amount of weight and she is concern with this, she also states when she saw him yesterday he seen pretty agitated, please advise

## 2024-09-13 NOTE — TELEPHONE ENCOUNTER
"I called Radha from [facility] and left a detailed message.     Relay     \"I would suggest that you call the ambulance to take Mr Castellano to the hospital to be evaluated for these concerns. If you have any questions you can call the office.\"                 "

## 2024-09-13 NOTE — TELEPHONE ENCOUNTER
Spoke with Radha jeong, she stated when she saw patient yesterday she recommended ER for eval but patient was very confuse, agitated and confrontational, please advise

## 2024-09-13 NOTE — TELEPHONE ENCOUNTER
Video swallow study has been ordered and should be in the process of being scheduled. If his difficulty swallowing has progressed to the point that he cannot take his medications or eat, he should go to the ER for evaluation.

## 2024-09-16 ENCOUNTER — TELEPHONE (OUTPATIENT)
Dept: CASE MANAGEMENT | Facility: OTHER | Age: 89
End: 2024-09-16
Payer: MEDICARE

## 2024-09-24 ENCOUNTER — PATIENT OUTREACH (OUTPATIENT)
Dept: CASE MANAGEMENT | Facility: OTHER | Age: 89
End: 2024-09-24
Payer: MEDICARE

## 2024-09-24 ENCOUNTER — TELEPHONE (OUTPATIENT)
Dept: CASE MANAGEMENT | Facility: OTHER | Age: 89
End: 2024-09-24
Payer: MEDICARE

## 2024-09-24 DIAGNOSIS — E11.9 TYPE 2 DIABETES MELLITUS WITHOUT COMPLICATION, WITHOUT LONG-TERM CURRENT USE OF INSULIN: Primary | ICD-10-CM

## 2024-09-25 ENCOUNTER — TELEPHONE (OUTPATIENT)
Dept: CASE MANAGEMENT | Facility: OTHER | Age: 89
End: 2024-09-25
Payer: MEDICARE

## 2024-09-25 ENCOUNTER — PATIENT OUTREACH (OUTPATIENT)
Dept: CASE MANAGEMENT | Facility: OTHER | Age: 89
End: 2024-09-25
Payer: MEDICARE

## 2024-09-25 ENCOUNTER — HOSPITAL ENCOUNTER (OUTPATIENT)
Dept: GENERAL RADIOLOGY | Facility: HOSPITAL | Age: 89
Discharge: HOME | End: 2024-09-25
Admitting: INTERNAL MEDICINE
Payer: MEDICARE

## 2024-09-25 DIAGNOSIS — E11.9 TYPE 2 DIABETES MELLITUS WITHOUT COMPLICATION, WITHOUT LONG-TERM CURRENT USE OF INSULIN: Primary | ICD-10-CM

## 2024-09-25 DIAGNOSIS — R13.10 DYSPHAGIA, UNSPECIFIED TYPE: ICD-10-CM

## 2024-09-25 PROCEDURE — 74230 X-RAY XM SWLNG FUNCJ C+: CPT

## 2024-09-25 PROCEDURE — A9270 NON-COVERED ITEM OR SERVICE: HCPCS | Performed by: INTERNAL MEDICINE

## 2024-09-25 PROCEDURE — 63710000001 BARIUM SULFATE 40 % RECONSTITUTED SUSPENSION: Performed by: INTERNAL MEDICINE

## 2024-09-25 PROCEDURE — 92611 MOTION FLUOROSCOPY/SWALLOW: CPT

## 2024-09-25 RX ADMIN — BARIUM SULFATE 55 ML: 0.81 POWDER, FOR SUSPENSION ORAL at 09:57

## 2024-09-27 ENCOUNTER — PATIENT OUTREACH (OUTPATIENT)
Dept: CASE MANAGEMENT | Facility: OTHER | Age: 89
End: 2024-09-27
Payer: MEDICARE

## 2024-09-27 DIAGNOSIS — I10 ESSENTIAL HYPERTENSION: ICD-10-CM

## 2024-09-27 DIAGNOSIS — E11.9 TYPE 2 DIABETES MELLITUS WITHOUT COMPLICATION, WITHOUT LONG-TERM CURRENT USE OF INSULIN: Primary | ICD-10-CM

## 2024-10-10 ENCOUNTER — OFFICE VISIT (OUTPATIENT)
Dept: INTERNAL MEDICINE | Facility: CLINIC | Age: 89
End: 2024-10-10
Payer: MEDICARE

## 2024-10-10 VITALS
HEIGHT: 68 IN | RESPIRATION RATE: 18 BRPM | SYSTOLIC BLOOD PRESSURE: 118 MMHG | HEART RATE: 65 BPM | DIASTOLIC BLOOD PRESSURE: 68 MMHG | WEIGHT: 199.5 LBS | OXYGEN SATURATION: 96 % | TEMPERATURE: 97.7 F | BODY MASS INDEX: 30.23 KG/M2

## 2024-10-10 DIAGNOSIS — E78.49 OTHER HYPERLIPIDEMIA: ICD-10-CM

## 2024-10-10 DIAGNOSIS — I10 ESSENTIAL HYPERTENSION: Primary | ICD-10-CM

## 2024-10-10 PROCEDURE — 1125F AMNT PAIN NOTED PAIN PRSNT: CPT | Performed by: INTERNAL MEDICINE

## 2024-10-10 PROCEDURE — 99214 OFFICE O/P EST MOD 30 MIN: CPT | Performed by: INTERNAL MEDICINE

## 2024-10-10 PROCEDURE — 1159F MED LIST DOCD IN RCRD: CPT | Performed by: INTERNAL MEDICINE

## 2024-10-10 PROCEDURE — 1160F RVW MEDS BY RX/DR IN RCRD: CPT | Performed by: INTERNAL MEDICINE

## 2024-10-10 NOTE — PROGRESS NOTES
"Chief Complaint  Follow-up (3 month follow up for hypertension and hyperlipidemia /), Hypertension, and Hyperlipidemia    Subjective      Reddy Castellano is a 89 y.o. male who presents to [facility]     Presenting for follow up.     HTN:  well controlled today, doing well on medication, denies headache, chest pain, dizziness, vision changes    HLD: on statin, tolerating well, denies muscle pain/weakness    States that is is going to be [facility].     Objective   Vital Signs:   Vitals:    10/10/24 1506   BP: 118/68   BP Location: Left arm   Patient Position: Sitting   Cuff Size: Adult   Pulse: 65   Resp: 18   Temp: 97.7 °F (36.5 °C)   TempSrc: Temporal   SpO2: 96%   Weight: 90.5 kg (199 lb 8 oz)   Height: 172.7 cm (68\")     Body mass index is 30.33 kg/m².    Wt Readings from Last 3 Encounters:   10/10/24 90.5 kg (199 lb 8 oz)   08/26/24 101 kg (223 lb 5 oz)   08/25/24 100 kg (221 lb 5.5 oz)     BP Readings from Last 3 Encounters:   10/10/24 118/68   08/26/24 138/84   08/25/24 153/87       Health Maintenance   Topic Date Due    RSV Vaccine - Adults (1 - 1-dose 60+ series) Never done    ZOSTER VACCINE (2 of 3) 02/26/2019    DIABETIC EYE EXAM  08/23/2022    ANNUAL WELLNESS VISIT  12/21/2024    HEMOGLOBIN A1C  01/10/2025    COVID-19 Vaccine (10 - 2023-24 season) 01/25/2025    URINE MICROALBUMIN  03/07/2025    LIPID PANEL  07/10/2025    BMI FOLLOWUP  07/10/2025    TDAP/TD VACCINES (3 - Td or Tdap) 10/20/2033    INFLUENZA VACCINE  Completed    Pneumococcal Vaccine 65+  Completed       Physical Exam  Vitals reviewed.   Constitutional:       Appearance: Normal appearance. He is well-developed.   HENT:      Head: Normocephalic and atraumatic.      Mouth/Throat:      Pharynx: No oropharyngeal exudate.   Eyes:      Conjunctiva/sclera: Conjunctivae normal.      Pupils: Pupils are equal, round, and reactive to light.   Neck:      Thyroid: No thyromegaly or " thyroid tenderness.   Cardiovascular:      Rate and Rhythm: Normal rate and regular rhythm.      Heart sounds: No murmur heard.     No friction rub. No gallop.   Pulmonary:      Effort: Pulmonary effort is normal.      Breath sounds: Normal breath sounds. No wheezing or rhonchi.   Lymphadenopathy:      Cervical: No cervical adenopathy.   Skin:     General: Skin is warm and dry.   Neurological:      Mental Status: He is alert and oriented to person, place, and time.   Psychiatric:         Mood and Affect: Affect normal.          Result Review :  The following data was reviewed by: Agustina Gutierrez MD on 10/10/2024:         Procedures          Assessment & Plan  Essential hypertension  Well controlled in clinic today  Continue current management  Other hyperlipidemia  On statin, tolerating well             FOLLOW UP  Return in about 2 months (around 12/23/2024) for Medicare Wellness.  Patient was given instructions and counseling regarding his condition or for health maintenance advice. Please see specific information pulled into the AVS if appropriate.       Agustina Gutierrez MD  10/10/24  15:27 EDT    CURRENT & DISCONTINUED MEDICATIONS  Current Outpatient Medications   Medication Instructions    allopurinol (ZYLOPRIM) 150 mg, Oral, Daily    atorvastatin (LIPITOR) 20 mg, Oral, Daily    buPROPion XL (WELLBUTRIN XL) 150 mg, Oral, Every Morning    clopidogrel (PLAVIX) 75 mg, Oral, Daily    fluticasone-salmeterol (ADVAIR HFA) 230-21 MCG/ACT inhaler 2 puffs, Inhalation, Every 6 Hours - RT    lisinopril (PRINIVIL,ZESTRIL) 5 mg, Oral, Daily    melatonin 6 mg, Oral, Nightly PRN    memantine (NAMENDA) 10 MG tablet Take 1 tablet twice a day    propranolol (INDERAL) 5 mg, Oral, 2 Times Daily    venlafaxine XR (EFFEXOR-XR) 75 mg, Oral, Daily    Vibegron 75 mg, Oral, Daily       There are no discontinued medications.

## 2024-10-14 ENCOUNTER — TELEPHONE (OUTPATIENT)
Dept: CASE MANAGEMENT | Facility: OTHER | Age: 89
End: 2024-10-14
Payer: MEDICARE

## 2024-10-14 ENCOUNTER — PATIENT OUTREACH (OUTPATIENT)
Dept: CASE MANAGEMENT | Facility: OTHER | Age: 89
End: 2024-10-14
Payer: MEDICARE

## 2024-10-14 DIAGNOSIS — E11.9 TYPE 2 DIABETES MELLITUS WITHOUT COMPLICATION, WITHOUT LONG-TERM CURRENT USE OF INSULIN: Primary | ICD-10-CM

## 2024-10-14 DIAGNOSIS — Z91.148 MEDICATION NON-COMPLIANCE DUE TO EXCESSIVE PILL BURDEN: ICD-10-CM

## 2024-10-14 NOTE — OUTREACH NOTE
"AMBULATORY CASE MANAGEMENT NOTE     Names and Relationships of Patient/Support Persons:  -     [facility] Interim Update     I spoke with this patient for an extended period of time today. He states he plans to only see the providers with the [facility] at this point. He also informed Dr. Gutierrez of this at his last office visit.    Patient stated [employer] sends someone to his home on Monday, Wednesday and Friday for 4 hours a day. This person assists with meals, laundry, light housekeeping. He is very pleased with this.    Patient is lucid but at times very forgetful and repeats himself. He is unsure if [facility] is still seeing him. He could not remember the name of his  with the [facility] or find where he put the number for them.    As I review the chart, I see the patient has had a PCP visit on 10/10/2024. He has had a 24 lb weight loss since 08/26/2024. He had a swallow study on 9/25/2024. This concluded that he will need to eat liquids, thickened liquids, pureed foods and double swallow.     As I spoke to the patient today, he stated that he is unable to swallow his medications therefore he has not been taking them.    Patient currently has specialist appointments with Urology and Neurology. I have concerns due to patient's memory issues and his thought of \"doing everything only with the [facility]\" that some medical concerns will slip through the cracks. I plan to follow up with the clinical  at [facility] (Montse Roger) and, if possible, \"Mk\" the  for the [facility] for the patient before closing patient's program.    Care Coordination     I called [facility] to see if they are still providing services for this patient.    I communicated face to face with Dr. Gutierrez today regarding patient.    I left a message for Montse Roger with [facility].        Megha CARTAGENA - Registered Nurse  Ambulatory Case Management    10/14/2024, 14:37 EDT     "

## 2024-10-14 NOTE — TELEPHONE ENCOUNTER
Did records of the patient's swallow study get sent to [facility] as requested? The letter of request was scanned into media on 10/2/2024. This may have already been done but I am unsure of how to see it.    Thank you,  MINAL Cleveland

## 2024-10-16 ENCOUNTER — PATIENT OUTREACH (OUTPATIENT)
Dept: CASE MANAGEMENT | Facility: OTHER | Age: 89
End: 2024-10-16
Payer: MEDICARE

## 2024-10-16 DIAGNOSIS — E11.9 TYPE 2 DIABETES MELLITUS WITHOUT COMPLICATION, WITHOUT LONG-TERM CURRENT USE OF INSULIN: Primary | ICD-10-CM

## 2024-10-16 NOTE — OUTREACH NOTE
AMBULATORY CASE MANAGEMENT NOTE    Names and Relationships of Patient/Support Persons: Contact: Zoie Roger-[facility]; Relationship: Other -     Care Coordination    Zoie Roger, clinical SW at [facility], called to speak with me on the phone. We discussed this patient's case and his resources available to him through the [facility] versus the resources he has with this PCP. We agree that the best POC for the patient would for him to maintain relationships with both his [facility] provider, Cecilia Carranza and his [facility] provider, Dr. Gutierrez. We believe CCM services are vital to his well being because there is not a service similar to this through the [facility].    I plan to discuss this with PCP and with the patient in more detail.    Patient is not currently taking his medications due to his swallowing issues. I am trying to determine if ST needs to be involved and if we need to change the form of as many of his medications as possible.    Megha CARTAGENA  Ambulatory Case Management    10/16/2024, 08:45 EDT

## 2024-10-17 ENCOUNTER — PATIENT OUTREACH (OUTPATIENT)
Dept: CASE MANAGEMENT | Facility: OTHER | Age: 89
End: 2024-10-17
Payer: MEDICARE

## 2024-10-17 ENCOUNTER — TELEPHONE (OUTPATIENT)
Dept: INTERNAL MEDICINE | Facility: CLINIC | Age: 89
End: 2024-10-17
Payer: MEDICARE

## 2024-10-17 DIAGNOSIS — E11.9 TYPE 2 DIABETES MELLITUS WITHOUT COMPLICATION, WITHOUT LONG-TERM CURRENT USE OF INSULIN: Primary | ICD-10-CM

## 2024-10-17 NOTE — TELEPHONE ENCOUNTER
He can still be our patient if he wants to be. I would recommend that he continue seeing a PCP outside [facility], but that is ultimately his decision or that of a POA if he has one.
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
## 2024-10-18 ENCOUNTER — PATIENT OUTREACH (OUTPATIENT)
Dept: CASE MANAGEMENT | Facility: OTHER | Age: 89
End: 2024-10-18
Payer: MEDICARE

## 2024-10-18 DIAGNOSIS — Z91.148 MEDICATION NON-COMPLIANCE DUE TO EXCESSIVE PILL BURDEN: Primary | ICD-10-CM

## 2024-10-18 DIAGNOSIS — E11.9 TYPE 2 DIABETES MELLITUS WITHOUT COMPLICATION, WITHOUT LONG-TERM CURRENT USE OF INSULIN: Primary | ICD-10-CM

## 2024-10-18 DIAGNOSIS — Z91.148 MEDICATION NON-COMPLIANCE DUE TO EXCESSIVE PILL BURDEN: ICD-10-CM

## 2024-10-18 DIAGNOSIS — E11.9 TYPE 2 DIABETES MELLITUS WITHOUT COMPLICATION, WITHOUT LONG-TERM CURRENT USE OF INSULIN: ICD-10-CM

## 2024-10-18 NOTE — OUTREACH NOTE
AMBULATORY CASE MANAGEMENT NOTE    Names and Relationships of Patient/Support Persons: Contact: APS; Relationship: Other -     Care Coordination    I filed an APS report on this day. Web Referral ID [id number]    Megha CARTAGENA  Ambulatory Case Management    10/18/2024, 09:16 EDT

## 2024-10-21 ENCOUNTER — PATIENT OUTREACH (OUTPATIENT)
Dept: CASE MANAGEMENT | Facility: OTHER | Age: 89
End: 2024-10-21
Payer: MEDICARE

## 2024-10-21 DIAGNOSIS — R29.6 FALLS FREQUENTLY: ICD-10-CM

## 2024-10-21 DIAGNOSIS — Z91.148 MEDICATION NON-COMPLIANCE DUE TO EXCESSIVE PILL BURDEN: ICD-10-CM

## 2024-10-21 DIAGNOSIS — R13.10 DYSPHAGIA, UNSPECIFIED TYPE: ICD-10-CM

## 2024-10-21 DIAGNOSIS — E11.9 TYPE 2 DIABETES MELLITUS WITHOUT COMPLICATION, WITHOUT LONG-TERM CURRENT USE OF INSULIN: Primary | ICD-10-CM

## 2024-10-21 NOTE — OUTREACH NOTE
LATE ENTRY  AMBULATORY CASE MANAGEMENT NOTE    Names and Relationships of Patient/Support Persons: Contact: Sally Burden-REJI; Relationship: Other -     Care Coordination    I spoke for an extended period of time (40 min) on the phone with Sally Burden, REJI , for the patient.    Megha CARTAGENA  Ambulatory Case Management    10/21/2024, 08:40 EDT

## 2024-10-21 NOTE — OUTREACH NOTE
AMBULATORY CASE MANAGEMENT NOTE    Care Coordination    I spoke face to face with PCP regarding my conversation with [facility] and APS .    Megha CARTAGENA  Ambulatory Case Management    10/21/2024, 08:46 EDT

## 2024-10-22 ENCOUNTER — PATIENT OUTREACH (OUTPATIENT)
Dept: CASE MANAGEMENT | Facility: OTHER | Age: 89
End: 2024-10-22
Payer: MEDICARE

## 2024-10-22 DIAGNOSIS — Z91.148 MEDICATION NON-COMPLIANCE DUE TO EXCESSIVE PILL BURDEN: ICD-10-CM

## 2024-10-22 DIAGNOSIS — E11.9 TYPE 2 DIABETES MELLITUS WITHOUT COMPLICATION, WITHOUT LONG-TERM CURRENT USE OF INSULIN: Primary | ICD-10-CM

## 2024-10-22 NOTE — OUTREACH NOTE
AMBULATORY CASE MANAGEMENT NOTE    Names and Relationships of Patient/Support Persons: Contact: Sally Burden-APS; Relationship: Other  Contact: Sally Burden-APS; Relationship: Other -     Care Coordination    I spoke with Sally Burden, with APS, on the phone. We discussed POC for patient. Email address given so she can send me information for the PCP to fill out.    Megha CARTAGENA  Ambulatory Case Management    10/22/2024, 10:51 EDT

## 2024-10-23 ENCOUNTER — PATIENT OUTREACH (OUTPATIENT)
Dept: CASE MANAGEMENT | Facility: OTHER | Age: 89
End: 2024-10-23
Payer: MEDICARE

## 2024-10-23 DIAGNOSIS — Z91.148 MEDICATION NON-COMPLIANCE DUE TO EXCESSIVE PILL BURDEN: ICD-10-CM

## 2024-10-23 DIAGNOSIS — E11.9 TYPE 2 DIABETES MELLITUS WITHOUT COMPLICATION, WITHOUT LONG-TERM CURRENT USE OF INSULIN: Primary | ICD-10-CM

## 2024-10-23 PROBLEM — F03.B0 MODERATE DEMENTIA WITHOUT BEHAVIORAL DISTURBANCE, PSYCHOTIC DISTURBANCE, MOOD DISTURBANCE, OR ANXIETY: Status: ACTIVE | Noted: 2023-08-28

## 2024-10-23 NOTE — OUTREACH NOTE
AMBULATORY CASE MANAGEMENT NOTE    Names and Relationships of Patient/Support Persons: Contact: Sally Burden-REJI; Relationship: Other  Contact: Sally Burden-REJI; Relationship: Other -     Care Coordination    I received affidavit from APS for PCP to fill out and sign. I filled out the information I could and placed it in PCP's IN box.    Care Coordination    Affidavit completed and signed by PCP. I asked front office staff to scan into the patient's chart and fax to [phone]    Megha CARTAGENA  Ambulatory Case Management    10/23/2024, 08:29 EDT

## 2024-10-31 ENCOUNTER — PATIENT OUTREACH (OUTPATIENT)
Dept: CASE MANAGEMENT | Facility: OTHER | Age: 89
End: 2024-10-31
Payer: MEDICARE

## 2024-10-31 DIAGNOSIS — E11.9 TYPE 2 DIABETES MELLITUS WITHOUT COMPLICATION, WITHOUT LONG-TERM CURRENT USE OF INSULIN: Primary | ICD-10-CM

## 2024-10-31 DIAGNOSIS — I10 ESSENTIAL HYPERTENSION: ICD-10-CM

## 2024-10-31 NOTE — OUTREACH NOTE
[facility] End of Month Documentation    This Chronic Medical Management Care Plan for Reddy Castellano, 89 y.o. male, has been monitored and managed; reviewed; revised and a new plan of care implemented for the month of October.  A cumulative time of 214  minutes was spent on this patient record this month, including face to face with provider; phone call with patient; electronic communication with primary care provider; chart review; phone call with other providers, talking with patient, PCP, [facility]. chart review, charting.    Regarding the patient's problems: has Gout; Type 2 diabetes mellitus without complication, without long-term current use of insulin; Essential hypertension; Other hyperlipidemia; Major depressive disorder, recurrent, moderate; Scrotal pain; Urge incontinence of urine; Anxiety; Rheumatoid arthritis; Chronic obstructive pulmonary disease; Constipation; Essential tremor; Excess skin of abdominal wall; Heart failure; Mechanical complication of genitourinary device; Morbid obesity; Myocardial infarction; Numbness; Sleep apnea; Spondylosis of lumbar spine; Tingling of skin; Ventral hernia; Lymphadenopathy of head and neck; Swallowed foreign body; Hyperkalemia; Benign prostatic hyperplasia with urinary frequency; Renal mass; Allergic rhinitis; Moderate dementia without behavioral disturbance, psychotic disturbance, mood disturbance, or anxiety; Wound of right leg; Scalp itch; and Dysarthria on their problem list., the following items were addressed: medical records; medications; changes to medical care and any changes can be found within the plan section of the note.  A detailed listing of time spent for chronic care management is tracked within each outreach encounter.  Current medications include:  has a current medication list which includes the following prescription(s): allopurinol, atorvastatin, bupropion xl, clopidogrel, fluticasone-salmeterol, lisinopril, melatonin, memantine,  propranolol, venlafaxine xr, and vibegron. and the patient is reported to be patient is noncompliant with medication protocol,  Medications are reported to be non-effective in controlling symptoms and changes have been made to the medication protocol.  Regarding these diagnoses, referrals were made to the following provider(s):  Kenny MENDOZA.  All notes on chart for PCP to review.    The patient was monitored remotely for weight; medications.    The patient's physical needs include:  help taking medications as prescribed; medication education; physical healthcare; eye care, Needs diabetic eye exam.     The patient's mental support needs include:  continued support    The patient's cognitive support needs include:  medication; coordination of community providers; health care; increased support    The patient's psychosocial support needs include:  medication management or adherence; need for increased support; coordination of community providers, needs increased support for medication adherence. son lives with him but doesn't seem to be of much support    The patient's functional needs include: physical healthcare; medication education    The patient's environmental needs include:  not applicable    Care Plan overall comments:  Revised and Ongoing    Refer to previous outreach notes for more information on the areas listed above.    Monthly Billing Diagnoses  (E11.9) Type 2 diabetes mellitus without complication, without long-term current use of insulin    (I10) Essential hypertension    Medications   Medications have been reconciled    Care Plan progress this month:      Recently Modified Care Plans Updates made since 9/30/2024 12:00 AM      No recently modified care plans.            Current Specialty Plan of Care Status signed by both patient and provider    Instructions   Patient was provided an electronic copy of care plan  CCM services were explained and offered and patient has accepted these services.  Patient  has given their written consent to receive CCM services and understands that this includes the authorization of electronic communication of medical information with the other treating providers.  Patient understands that they may stop CCM services at any time and these changes will be effective at the end of the calendar month and will effectively revocate the agreement of CCM services.  Patient understands that only one practitioner can furnish and be paid for CCM services during one calendar month.  Patient also understands that there may be co-payment or deductible fees in association with CCM services.  Patient will continue with at least monthly follow-up calls with the Ambulatory .    Megha CARTAGENA  Ambulatory Case Management    10/31/2024, 08:56 EDT

## 2024-11-14 ENCOUNTER — PATIENT OUTREACH (OUTPATIENT)
Dept: CASE MANAGEMENT | Facility: OTHER | Age: 89
End: 2024-11-14
Payer: MEDICARE

## 2024-11-14 ENCOUNTER — TELEPHONE (OUTPATIENT)
Dept: CASE MANAGEMENT | Facility: OTHER | Age: 89
End: 2024-11-14
Payer: MEDICARE

## 2024-11-14 DIAGNOSIS — Z91.148 MEDICATION NON-COMPLIANCE DUE TO EXCESSIVE PILL BURDEN: ICD-10-CM

## 2024-11-14 DIAGNOSIS — I10 ESSENTIAL HYPERTENSION: ICD-10-CM

## 2024-11-14 DIAGNOSIS — F03.B0 MODERATE DEMENTIA WITHOUT BEHAVIORAL DISTURBANCE, PSYCHOTIC DISTURBANCE, MOOD DISTURBANCE, OR ANXIETY, UNSPECIFIED DEMENTIA TYPE: ICD-10-CM

## 2024-11-14 DIAGNOSIS — R13.10 DYSPHAGIA, UNSPECIFIED TYPE: Primary | ICD-10-CM

## 2024-11-14 NOTE — OUTREACH NOTE
AMBULATORY CASE MANAGEMENT NOTE    Names and Relationships of Patient/Support Persons: Contact: APS ; Relationship: Other -     Care Coordination    I spoke with APS  for an extended period of time today on the phone. We discussed POC for patient.    I attempted to call patient today on the phone. I left a voicemail for patient with number.    Megha CARTAGENA  Ambulatory Case Management    11/14/2024, 15:06 EST

## 2024-11-21 ENCOUNTER — TELEPHONE (OUTPATIENT)
Dept: CASE MANAGEMENT | Facility: OTHER | Age: 89
End: 2024-11-21
Payer: MEDICARE

## 2024-11-22 ENCOUNTER — PATIENT OUTREACH (OUTPATIENT)
Dept: CASE MANAGEMENT | Facility: OTHER | Age: 89
End: 2024-11-22
Payer: MEDICARE

## 2024-11-22 DIAGNOSIS — I10 ESSENTIAL HYPERTENSION: ICD-10-CM

## 2024-11-22 DIAGNOSIS — R13.10 DYSPHAGIA, UNSPECIFIED TYPE: Primary | ICD-10-CM

## 2024-11-22 NOTE — OUTREACH NOTE
AMBULATORY CASE MANAGEMENT NOTE    Names and Relationships of Patient/Support Persons: Contact: Sally Burden-APS; Relationship: Other -     Care Coordination    Sally Burden, the patient's APS  contacted me to let me know that they had court on 11/21 and the  ordered guardianship/conservatorship over the patient. His state appointed guardian is Marysol Lara.    I have attempted to call the patient twice this week with no answer and no return call. This is unusual for him.    I called [facility] and spoke to Jossue. He stated that they have had caregivers in the patient's home. He stated that there is actually one there right now.    I will plan to reach out to the patient again next week.    Megha CARTAGENA  Ambulatory Case Management    11/22/2024, 14:28 EST

## 2024-11-24 ENCOUNTER — HOSPITAL ENCOUNTER (EMERGENCY)
Facility: HOSPITAL | Age: 89
Discharge: HOME | End: 2024-11-24
Attending: EMERGENCY MEDICINE | Admitting: EMERGENCY MEDICINE
Payer: MEDICARE

## 2024-11-24 ENCOUNTER — APPOINTMENT (OUTPATIENT)
Dept: GENERAL RADIOLOGY | Facility: HOSPITAL | Age: 89
End: 2024-11-24
Payer: MEDICARE

## 2024-11-24 VITALS
BODY MASS INDEX: 32.08 KG/M2 | HEART RATE: 84 BPM | RESPIRATION RATE: 20 BRPM | HEIGHT: 68 IN | DIASTOLIC BLOOD PRESSURE: 87 MMHG | WEIGHT: 211.64 LBS | TEMPERATURE: 98 F | OXYGEN SATURATION: 97 % | SYSTOLIC BLOOD PRESSURE: 142 MMHG

## 2024-11-24 DIAGNOSIS — R60.0 BILATERAL LOWER EXTREMITY EDEMA: ICD-10-CM

## 2024-11-24 DIAGNOSIS — I50.9 ACUTE ON CHRONIC CONGESTIVE HEART FAILURE, UNSPECIFIED HEART FAILURE TYPE: ICD-10-CM

## 2024-11-24 DIAGNOSIS — J22 ACUTE RESPIRATORY INFECTION: Primary | ICD-10-CM

## 2024-11-24 LAB
ALBUMIN SERPL-MCNC: 3.7 G/DL (ref 3.5–5.2)
ALBUMIN/GLOB SERPL: 1.3 G/DL
ALP SERPL-CCNC: 67 U/L (ref 39–117)
ALT SERPL W P-5'-P-CCNC: 10 U/L (ref 1–41)
ANION GAP SERPL CALCULATED.3IONS-SCNC: 9.3 MMOL/L (ref 5–15)
AST SERPL-CCNC: 13 U/L (ref 1–40)
BASOPHILS # BLD AUTO: 0.09 10*3/MM3 (ref 0–0.2)
BASOPHILS NFR BLD AUTO: 1.1 % (ref 0–1.5)
BILIRUB SERPL-MCNC: 0.5 MG/DL (ref 0–1.2)
BUN SERPL-MCNC: 21 MG/DL (ref 8–23)
BUN/CREAT SERPL: 17.2 (ref 7–25)
CALCIUM SPEC-SCNC: 9 MG/DL (ref 8.6–10.5)
CHLORIDE SERPL-SCNC: 111 MMOL/L (ref 98–107)
CO2 SERPL-SCNC: 24.7 MMOL/L (ref 22–29)
CREAT SERPL-MCNC: 1.22 MG/DL (ref 0.76–1.27)
DEPRECATED RDW RBC AUTO: 55.8 FL (ref 37–54)
EGFRCR SERPLBLD CKD-EPI 2021: 56.7 ML/MIN/1.73
EOSINOPHIL # BLD AUTO: 0.22 10*3/MM3 (ref 0–0.4)
EOSINOPHIL NFR BLD AUTO: 2.7 % (ref 0.3–6.2)
ERYTHROCYTE [DISTWIDTH] IN BLOOD BY AUTOMATED COUNT: 15 % (ref 12.3–15.4)
GLOBULIN UR ELPH-MCNC: 2.8 GM/DL
GLUCOSE SERPL-MCNC: 96 MG/DL (ref 65–99)
HCT VFR BLD AUTO: 45.9 % (ref 37.5–51)
HGB BLD-MCNC: 14.7 G/DL (ref 13–17.7)
HOLD SPECIMEN: NORMAL
HOLD SPECIMEN: NORMAL
IMM GRANULOCYTES # BLD AUTO: 0.03 10*3/MM3 (ref 0–0.05)
IMM GRANULOCYTES NFR BLD AUTO: 0.4 % (ref 0–0.5)
LYMPHOCYTES # BLD AUTO: 1.75 10*3/MM3 (ref 0.7–3.1)
LYMPHOCYTES NFR BLD AUTO: 21.8 % (ref 19.6–45.3)
MCH RBC QN AUTO: 32 PG (ref 26.6–33)
MCHC RBC AUTO-ENTMCNC: 32 G/DL (ref 31.5–35.7)
MCV RBC AUTO: 99.8 FL (ref 79–97)
MONOCYTES # BLD AUTO: 0.59 10*3/MM3 (ref 0.1–0.9)
MONOCYTES NFR BLD AUTO: 7.4 % (ref 5–12)
NEUTROPHILS NFR BLD AUTO: 5.34 10*3/MM3 (ref 1.7–7)
NEUTROPHILS NFR BLD AUTO: 66.6 % (ref 42.7–76)
NRBC BLD AUTO-RTO: 0 /100 WBC (ref 0–0.2)
NT-PROBNP SERPL-MCNC: 1988 PG/ML (ref 0–1800)
PLATELET # BLD AUTO: 167 10*3/MM3 (ref 140–450)
PMV BLD AUTO: 10.5 FL (ref 6–12)
POTASSIUM SERPL-SCNC: 4.7 MMOL/L (ref 3.5–5.2)
PROT SERPL-MCNC: 6.5 G/DL (ref 6–8.5)
RBC # BLD AUTO: 4.6 10*6/MM3 (ref 4.14–5.8)
SODIUM SERPL-SCNC: 145 MMOL/L (ref 136–145)
TROPONIN T SERPL HS-MCNC: 40 NG/L
WBC NRBC COR # BLD AUTO: 8.02 10*3/MM3 (ref 3.4–10.8)
WHOLE BLOOD HOLD COAG: NORMAL
WHOLE BLOOD HOLD SPECIMEN: NORMAL

## 2024-11-24 PROCEDURE — 84484 ASSAY OF TROPONIN QUANT: CPT

## 2024-11-24 PROCEDURE — 85025 COMPLETE CBC W/AUTO DIFF WBC: CPT

## 2024-11-24 PROCEDURE — 36415 COLL VENOUS BLD VENIPUNCTURE: CPT

## 2024-11-24 PROCEDURE — 71045 X-RAY EXAM CHEST 1 VIEW: CPT

## 2024-11-24 PROCEDURE — 93005 ELECTROCARDIOGRAM TRACING: CPT

## 2024-11-24 PROCEDURE — 83880 ASSAY OF NATRIURETIC PEPTIDE: CPT

## 2024-11-24 PROCEDURE — 99284 EMERGENCY DEPT VISIT MOD MDM: CPT

## 2024-11-24 PROCEDURE — 93005 ELECTROCARDIOGRAM TRACING: CPT | Performed by: EMERGENCY MEDICINE

## 2024-11-24 PROCEDURE — 80053 COMPREHEN METABOLIC PANEL: CPT

## 2024-11-24 RX ORDER — SODIUM CHLORIDE 0.9 % (FLUSH) 0.9 %
10 SYRINGE (ML) INJECTION AS NEEDED
Status: DISCONTINUED | OUTPATIENT
Start: 2024-11-24 | End: 2024-11-24 | Stop reason: HOSPADM

## 2024-11-24 RX ORDER — FUROSEMIDE 40 MG/1
40 TABLET ORAL ONCE
Status: COMPLETED | OUTPATIENT
Start: 2024-11-24 | End: 2024-11-24

## 2024-11-24 RX ORDER — DOXYCYCLINE 100 MG/1
100 CAPSULE ORAL 2 TIMES DAILY
Qty: 14 CAPSULE | Refills: 0 | Status: SHIPPED | OUTPATIENT
Start: 2024-11-24

## 2024-11-24 RX ORDER — FUROSEMIDE 20 MG/1
20 TABLET ORAL 2 TIMES DAILY
Qty: 14 TABLET | Refills: 0 | Status: SHIPPED | OUTPATIENT
Start: 2024-11-24

## 2024-11-24 RX ORDER — DOXYCYCLINE 100 MG/1
100 CAPSULE ORAL ONCE
Status: COMPLETED | OUTPATIENT
Start: 2024-11-24 | End: 2024-11-24

## 2024-11-24 RX ADMIN — DOXYCYCLINE 100 MG: 100 CAPSULE ORAL at 20:22

## 2024-11-24 RX ADMIN — FUROSEMIDE 40 MG: 40 TABLET ORAL at 20:22

## 2024-11-25 NOTE — ED PROVIDER NOTES
Time: 7:57 PM EST  Date of encounter:  11/24/2024  Independent Historian/Clinical History and Information was obtained by:   Patient and Nursing Staff    History is limited by: N/A    Chief Complaint: Short of breath      History of Present Illness:  Patient is a 89 y.o. year old diabetic male with history of COPD and CHF who presents to the emergency department for evaluation of presents with acute shortness of breath and some cough with clear sputum for the past few days.    He states his granddaughter was recently sick and he has been around her.    He denies any fevers or chills or chest pain.    He does have significant swelling in both lower extremities and feet and ankles.    He used to be on Lasix but states he stopped taking it or ran out.    No chest pain reported.  No wheezing.      Patient Care Team  Primary Care Provider: Agustina Gutierrez MD    Past Medical History:     No Known Allergies  Past Medical History:   Diagnosis Date    Allergies     Anxiety     Arthritis     Asthma     BPH (benign prostatic hyperplasia)     Carpal tunnel syndrome     Colon polyp     Congestive heart failure (CHF)     COPD (chronic obstructive pulmonary disease)     Dementia     Depression     Diabetes mellitus type 2, noninsulin dependent     Diverticulitis     Erectile dysfunction of organic origin     Essential hypertension 11/19/2019    Excess skin     Fatigue 11/09/2019    GERD (gastroesophageal reflux disease)     Gout     Heart attack     Heart disease     High cholesterol     Hyperlipidemia     Hypertension     Hypogonadism in male     Insomnia     Leg swelling     Memory change 08/02/2018    currently, pt is taking namenda. i will pursue a mocha at his follow up     Myocardial infarction     Osteoarthritis     Parkinson's disease     Prostate disorder     Rectal bleeding     Renal cyst     Seasonal allergies     Sleep apnea     Stroke     TIA (transient ischemic attack) 08/02/2018    reportedly, the pt has  a history of TIA. This can be discussed further at his follow up. i was asked that he cont. aspirin.    Tremor 08/02/2018    pt was a 3 mo history of a high frequency, low amplitude tremor of both hands that worsens some what with activity. tremor does not interfere with his activities of daily living. he has no evidence of bradykinesia on exam. pakinsons seems unlikely at this point. potential etiologies would include essential tremor and physiologic tremor. he does note that he started theophylline approx. 3 months ag    Vitamin D deficiency      Past Surgical History:   Procedure Laterality Date    APPENDECTOMY  2015    CARDIAC CATHETERIZATION      CARDIAC SURGERY  2014    CARPAL TUNNEL RELEASE      CATARACT EXTRACTION      CIRCUMCISION  2000    COLONOSCOPY  2014 2017    CYSTOSCOPY      ENDOSCOPY  2010 2017    ENDOSCOPY N/A 11/23/2021    Procedure: ESOPHAGOGASTRODUODENOSCOPY;  Surgeon: Anika Cummins MD;  Location: [facility] MAIN OR;  Service: Gastroenterology;  Laterality: N/A;  gastritis    EYE SURGERY      eye implant    HERNIA REPAIR  2000    LAPAROSCOPIC GASTRIC BANDING      ORCHIECTOMY Right 2010    OTHER SURGICAL HISTORY      metal implants;     PENILE PROSTHESIS IMPLANT      TOE SURGERY      TOENAIL EXCISION      surgical removal of toenail and nail matrix of both feet    TOTAL KNEE ARTHROPLASTY Bilateral 2009 2010     Family History   Problem Relation Age of Onset    Diabetes Mother     Heart attack Father     Diabetes Brother     Rectal cancer Other         grandfather- rectal and colon    Prostate cancer Other     Diabetes Son        Home Medications:  Prior to Admission medications    Medication Sig Start Date End Date Taking? Authorizing Provider   allopurinol (ZYLOPRIM) 300 MG tablet Take 0.5 tablets by mouth Daily.  Patient not taking: Reported on 10/10/2024 8/19/24   Agustina Gutierrez MD   atorvastatin (LIPITOR) 20 MG tablet Take 1 tablet by mouth Daily.  Patient not taking:  Reported on 10/10/2024 4/11/24   Agustnia Gutierrez MD   buPROPion XL (WELLBUTRIN XL) 150 MG 24 hr tablet Take 1 tablet by mouth Every Morning.  Patient not taking: Reported on 10/10/2024 8/23/24   Agustina Gutierrez MD   clopidogrel (PLAVIX) 75 MG tablet Take 1 tablet by mouth Daily.  Patient not taking: Reported on 10/10/2024 8/23/24   Agustina Gutierrez MD   fluticasone-salmeterol (ADVAIR HFA) 230-21 MCG/ACT inhaler Inhale 2 puffs Every 6 (Six) Hours.  Patient not taking: Reported on 10/10/2024 3/7/24   Agustina Gutierrez MD   lisinopril (PRINIVIL,ZESTRIL) 5 MG tablet TAKE 1 TABLET BY MOUTH DAILY  Patient not taking: Reported on 10/10/2024 9/13/24   Agustina Gutierrez MD   melatonin 1 MG tablet Take 6 tablets by mouth At Night As Needed for Sleep.  Patient not taking: Reported on 10/10/2024 7/15/24   Agustina Gutierrez MD   memantine (NAMENDA) 10 MG tablet Take 1 tablet twice a day  Patient not taking: Reported on 10/10/2024 8/19/24   Arnav Borja MD   propranolol (INDERAL) 10 MG tablet Take 0.5 tablets by mouth 2 (Two) Times a Day.  Patient not taking: Reported on 10/10/2024 7/29/24   Gallo Winters PA   venlafaxine XR (EFFEXOR-XR) 75 MG 24 hr capsule Take 1 capsule by mouth Daily.  Patient not taking: Reported on 10/10/2024 7/15/24   Agustina Gutierrez MD   Vibegron 75 MG tablet Take 1 tablet by mouth Daily.  Patient not taking: Reported on 10/10/2024 7/15/24   Agustina Gutierrez MD        Social History:   Social History     Tobacco Use    Smoking status: Never     Passive exposure: Never    Smokeless tobacco: Never   Vaping Use    Vaping status: Never Used   Substance Use Topics    Alcohol use: Yes     Comment: occasional     Drug use: Never         Review of Systems:  Review of Systems   I performed a 10 point review of systems which was all negative, except for the positives found in the HPI above.  Physical Exam:  /89 (BP  "Location: Left arm, Patient Position: Lying)   Pulse 82   Temp 98.9 °F (37.2 °C)   Resp 18   Ht 172.7 cm (68\")   Wt 96 kg (211 lb 10.3 oz)   SpO2 96%   BMI 32.18 kg/m²     Physical Exam   General: Awake alert and in no obvious distress    HEENT: Head normocephalic atraumatic, eyes PERRLA EOMI, nose normal, oropharynx normal.    Neck: Supple full range of motion, no meningismus, no lymphadenopathy    Heart: Regular rate and rhythm, no murmurs or rubs, 2+ radial pulses bilaterally    Lungs: Minimal crackles at lung bases posteriorly, but otherwise mostly clear to auscultation bilaterally without wheezes or crackles, no respiratory distress or retractions    Abdomen: Soft, nontender, nondistended, no rebound or guarding    Skin: Warm, dry, no rash    Musculoskeletal: Normal range of motion, 2+ pitting bilateral lower extremity edema without calf pain or tenderness    Neurologic: Oriented x3, no motor deficits no sensory deficits    Psychiatric: Mood appears stable, no psychosis          Procedures:  Procedures      Medical Decision Making:      Comorbidities that affect care:    Congestive Heart Failure, COPD    External Notes reviewed:    Previous Labs: I compared his blood work today with previous lab results and it looks like his old proBNP of 837 appears to be his baseline and today's was more elevated measuring 1988.      The following orders were placed and all results were independently analyzed by me:  Orders Placed This Encounter   Procedures    XR Chest 1 View    [place] Draw    Comprehensive Metabolic Panel    BNP    Single High Sensitivity Troponin T    CBC Auto Differential    Ambulatory Referral to Heart Failure Clinic    NPO Diet NPO Type: Strict NPO    Undress & Gown    Continuous Pulse Oximetry    Vital Signs    Oxygen Therapy- Nasal Cannula; Titrate 1-6 LPM Per SpO2; 90 - 95%    ECG 12 Lead ED Triage Standing Order; SOA    Insert Peripheral IV    CBC & Differential    Green Top (Gel)    " Lavender Top    Gold Top - SST    Light Blue Top       Medications Given in the Emergency Department:  Medications   sodium chloride 0.9 % flush 10 mL (has no administration in time range)   doxycycline (MONODOX) capsule 100 mg (has no administration in time range)   furosemide (LASIX) tablet 40 mg (has no administration in time range)        ED Course:    ED Course as of 11/24/24 2007   Sun Nov 24, 2024   1934 EKG: I interpreted his twelve-lead EKG as sinus rhythm at 80 bpm, normal P waves, PVCs present, QRS shows right bundle branch block, normal ST segments, T wave inversions in the anteroseptal leads. [VS]   1934 Unchanged from old EKG in July of this year. [VS]      ED Course User Index  [VS] Aníbal Bunch MD       Labs:    Lab Results (last 24 hours)       Procedure Component Value Units Date/Time    CBC & Differential [[id number]]  (Abnormal) Collected: 11/24/24 1820    Specimen: Blood from Arm, Right Updated: 11/24/24 1831    Narrative:      The following orders were created for panel order CBC & Differential.  Procedure                               Abnormality         Status                     ---------                               -----------         ------                     CBC Auto Differential[[id number]]        Abnormal            Final result                 Please view results for these tests on the individual orders.    Comprehensive Metabolic Panel [[id number]]  (Abnormal) Collected: 11/24/24 1820    Specimen: Blood from Arm, Right Updated: 11/24/24 1853     Glucose 96 mg/dL      BUN 21 mg/dL      Creatinine 1.22 mg/dL      Sodium 145 mmol/L      Potassium 4.7 mmol/L      Chloride 111 mmol/L      CO2 24.7 mmol/L      Calcium 9.0 mg/dL      Total Protein 6.5 g/dL      Albumin 3.7 g/dL      ALT (SGPT) 10 U/L      AST (SGOT) 13 U/L      Alkaline Phosphatase 67 U/L      Total Bilirubin 0.5 mg/dL      Globulin 2.8 gm/dL      A/G Ratio 1.3 g/dL      BUN/Creatinine Ratio 17.2     Anion Gap 9.3 mmol/L       eGFR 56.7 mL/min/1.73     Narrative:      GFR Normal >60  Chronic Kidney Disease <60  Kidney Failure <15    The GFR formula is only valid for adults with stable renal function between ages 18 and 70.    BNP [[id number]]  (Abnormal) Collected: 11/24/24 1820    Specimen: Blood from Arm, Right Updated: 11/24/24 1849     proBNP 1,988.0 pg/mL     Narrative:      This assay is used as an aid in the diagnosis of individuals suspected of having heart failure. It can be used as an aid in the diagnosis of acute decompensated heart failure (ADHF) in patients presenting with signs and symptoms of ADHF to the emergency department (ED). In addition, NT-proBNP of <300 pg/mL indicates ADHF is not likely.    Age Range Result Interpretation  NT-proBNP Concentration (pg/mL:      <50             Positive            >450                   Gray                 300-450                    Negative             <300    50-75           Positive            >900                  Gray                300-900                  Negative            <300      >75             Positive            >1800                  Gray                300-1800                  Negative            <300    Single High Sensitivity Troponin T [[id number]]  (Abnormal) Collected: 11/24/24 1820    Specimen: Blood from Arm, Right Updated: 11/24/24 1853     HS Troponin T 40 ng/L     Narrative:      High Sensitive Troponin T Reference Range:  <14.0 ng/L- Negative Female for AMI  <22.0 ng/L- Negative Male for AMI  >=14 - Abnormal Female indicating possible myocardial injury.  >=22 - Abnormal Male indicating possible myocardial injury.   Clinicians would have to utilize clinical acumen, EKG, Troponin, and serial changes to determine if it is an Acute Myocardial Infarction or myocardial injury due to an underlying chronic condition.         CBC Auto Differential [[id number]]  (Abnormal) Collected: 11/24/24 1820    Specimen: Blood from Arm, Right Updated: 11/24/24 1831     WBC  8.02 10*3/mm3      RBC 4.60 10*6/mm3      Hemoglobin 14.7 g/dL      Hematocrit 45.9 %      MCV 99.8 fL      MCH 32.0 pg      MCHC 32.0 g/dL      RDW 15.0 %      RDW-SD 55.8 fl      MPV 10.5 fL      Platelets 167 10*3/mm3      Neutrophil % 66.6 %      Lymphocyte % 21.8 %      Monocyte % 7.4 %      Eosinophil % 2.7 %      Basophil % 1.1 %      Immature Grans % 0.4 %      Neutrophils, Absolute 5.34 10*3/mm3      Lymphocytes, Absolute 1.75 10*3/mm3      Monocytes, Absolute 0.59 10*3/mm3      Eosinophils, Absolute 0.22 10*3/mm3      Basophils, Absolute 0.09 10*3/mm3      Immature Grans, Absolute 0.03 10*3/mm3      nRBC 0.0 /100 WBC              Imaging:    XR Chest 1 View    Result Date: 11/24/2024  XR CHEST 1 VW Date of Exam: 11/24/2024 6:43 PM EST Indication: SOA Triage Protocol Comparison: 8/14/2024 Findings: Heart size is top normal. Pulmonary vascularity is normal. There is some mild chronic scarring/atelectasis in the lung bases. The lungs are otherwise clear. No pneumothorax. There is chronic elevation of the right hemidiaphragm.     No acute cardiopulmonary findings. Electronically Signed: Darryl Retana MD  11/24/2024 7:04 PM EST  Workstation ID: [id number]       Differential Diagnosis and Discussion:    Cough: Differential diagnosis includes but is not limited to pneumonia, acute bronchitis, upper respiratory infection, ACE inhibitor use, allergic reaction, epiglottitis, seasonal allergies, chemical irritants, exercise-induced asthma, viral syndrome.  Dyspnea: Differential diagnosis includes but is not limited to metabolic acidosis, neurological disorders, psychogenic, asthma, pneumothorax, upper airway obstruction, COPD, pneumonia, noncardiogenic pulmonary edema, interstitial lung disease, anemia, congestive heart failure, and pulmonary embolism  Edema: Based on the patient's history, signs, and symptoms, the differential diagnosis includes but is not limited to heart failure, kidney disease, liver disease,  venous insufficiency, lymphedema, pregnancy, medications, allergic reactions.    All labs were reviewed and interpreted by me.  All X-rays impressions were independently interpreted by me.  EKG was interpreted by me.    MDM             This patient is a pleasant 89-year-old male with COPD and CHF now presenting with shortness of breath and cough with clear sputum for the past few days.    Physical exam notable for 2+ bilateral symmetric lower extremity edema and he has very minimal crackles on lung exam on auscultation.    Otherwise moving good air and not in respiratory distress at rest and oxygenating 96% on room air on my exam.    Chest x-ray showed no obvious infiltrates or pneumonia or edema or effusion.    Lab work looks reassuring including normal white blood cell count and he is afebrile here.    Troponin is at his baseline.    However he has an elevated proBNP of 1988 and I consider possibility of a mild CHF exacerbation and I will start him on Lasix this week, low-salt diet, follow-up with his PCP or heart doctor.    I do not think needs to be admitted as he is not dyspneic or hypoxic here.    In addition his granddaughter was recently sick and we had some concerns about him getting sick contact from her and I will start him on a course of antibiotics to cover for any possible bacterial infection.  He declined COVID and flu swab          He will be discharged home on a course of Lasix and doxycycline and ambulatory referral to heart failure clinic/follow-up with PCP.  Return precautions given.                Patient Care Considerations:          Consultants/Shared Management Plan:        Social Determinants of Health:    Patient is independent, reliable, and has access to care.       Disposition and Care Coordination:    Discharged: I considered escalation of care by admitting this patient to the hospital, however he looked good at rest and no difficulty breathing and oxygenating 96% on room air and I  think he be managed as an outpatient.    I have explained the patient´s condition, diagnoses and treatment plan based on the information available to me at this time. I have answered questions and addressed any concerns. The patient has a good  understanding of the patient´s diagnosis, condition, and treatment plan as can be expected at this point. The vital signs have been stable. The patient´s condition is stable and appropriate for discharge from the emergency department.      The patient will pursue further outpatient evaluation with the primary care physician or other designated or consulting physician as outlined in the discharge instructions. They are agreeable to this plan of care and follow-up instructions have been explained in detail. The patient has received these instructions in written format and has expressed an understanding of the discharge instructions. The patient is aware that any significant change in condition or worsening of symptoms should prompt an immediate return to this or the closest emergency department or call to 911.  I have explained discharge medications and the need for follow up with the patient/caretakers. This was also printed in the discharge instructions. Patient was discharged with the following medications and follow up:      Medication List        New Prescriptions      doxycycline 100 MG capsule  Commonly known as: MONODOX  Take 1 capsule by mouth 2 (Two) Times a Day.     furosemide 20 MG tablet  Commonly known as: LASIX  Take 1 tablet by mouth 2 (Two) Times a Day.               Where to Get Your Medications        These medications were sent to [id number] - [place] - [address] AT [facility] OF [address] & [place] - [phone] [facility] [phone] [address]  [address] [place] [place] [address]      Phone: [phone]   doxycycline 100 MG capsule  furosemide 20 MG tablet      [facility]  [address]  [place] [address]  [phone]           Final diagnoses:   Acute respiratory infection   Acute on chronic congestive heart failure, unspecified heart failure type   Bilateral lower extremity edema        ED Disposition       ED Disposition   Discharge    Condition   Stable    Comment   --               This medical record created using voice recognition software.             Aníbal Bunch MD  11/24/24 2007

## 2024-11-26 ENCOUNTER — PATIENT OUTREACH (OUTPATIENT)
Dept: CASE MANAGEMENT | Facility: OTHER | Age: 89
End: 2024-11-26
Payer: MEDICARE

## 2024-11-26 DIAGNOSIS — F03.B0 MODERATE DEMENTIA WITHOUT BEHAVIORAL DISTURBANCE, PSYCHOTIC DISTURBANCE, MOOD DISTURBANCE, OR ANXIETY, UNSPECIFIED DEMENTIA TYPE: ICD-10-CM

## 2024-11-26 DIAGNOSIS — R13.10 DYSPHAGIA, UNSPECIFIED TYPE: Primary | ICD-10-CM

## 2024-11-26 DIAGNOSIS — E11.9 TYPE 2 DIABETES MELLITUS WITHOUT COMPLICATION, WITHOUT LONG-TERM CURRENT USE OF INSULIN: ICD-10-CM

## 2024-11-26 NOTE — OUTREACH NOTE
"AMBULATORY CASE MANAGEMENT NOTE    Names and Relationships of Patient/Support Persons: Contact: Sally Burden; Relationship: Other  Contact: Reddy Castellano; Relationship: Self -     CCM Interim Update    I spoke with the patient on the phone for a very extended time today. He was seen in the ER on 11/24 and diagnosed with pneumonia and swelling in his legs. He had not been taking his lasix. The ER provider started the patient on lasix and gave him an antibiotic. The patient states he is taking these two medications because he can swallow them with applesauce.    Patient states he is not taking any of his other medications because he is not able to swallow them. He also does not have anyone in the house that is organizing them for him.    Patient is still having a lot of difficulty swallowing. His foods must be liquid or pureed. He eats a lot of applesauce.    We discussed the need for the lasix. Patient stated to me that it is to \"pull the water off \"of him.Patient states he has been elevating his legs when he is sitting.    Patient has an appointment with the heart failure clinic on 12/4.    Patient has [employer] coming in to his home and he states this has been going very well.    Patient stated he has court papers and he is going to court in February. He was not specific as to what this was about. He continued to say that he has an  that he can call anytime and he is allowed to make his own decisions.    Care Coordination    I scheduled a, ER/pneumonia follow up with PCP for patient on 12/6. Patient stated he wrote the date and time down.    I also discussed his appointment on 12/4 to the heart failure clinic. Patient again wrote the information down. When I asked him to repeat it back to me, he was not able to. I then asked to speak to his son, Ariel. His son got on the phone and I gave him the information for both appointments.    I called the heart failure clinic to confirm their location and " gave that address to the patient and the son.        Education Documentation  Provider Follow-Up, taught by Megha Bruno, RN at 11/26/2024  4:12 PM.  Learner: Patient  Readiness: Acceptance  Method: Explanation  Response: Verbalizes Understanding, Needs Reinforcement    Medication Management, taught by Megha Bruno, RN at 11/26/2024  4:12 PM.  Learner: Patient  Readiness: Acceptance  Method: Explanation  Response: Verbalizes Understanding, Needs Reinforcement    Provider Follow-Up, taught by Megha Bruno, RN at 11/26/2024  4:12 PM.  Learner: Patient  Readiness: Acceptance  Method: Explanation  Response: Verbalizes Understanding, Needs Reinforcement    Medication Management, taught by Megha Bruno, RN at 11/26/2024  4:12 PM.  Learner: Patient  Readiness: Acceptance  Method: Explanation  Response: Verbalizes Understanding, Needs Reinforcement          Megha CARTAGENA  Ambulatory Case Management    11/26/2024, 16:13 EST

## 2024-11-27 ENCOUNTER — PATIENT OUTREACH (OUTPATIENT)
Dept: CASE MANAGEMENT | Facility: OTHER | Age: 89
End: 2024-11-27
Payer: MEDICARE

## 2024-11-27 DIAGNOSIS — I10 ESSENTIAL HYPERTENSION: ICD-10-CM

## 2024-11-27 DIAGNOSIS — E11.9 TYPE 2 DIABETES MELLITUS WITHOUT COMPLICATION, WITHOUT LONG-TERM CURRENT USE OF INSULIN: Primary | ICD-10-CM

## 2024-11-27 LAB
QT INTERVAL: 430 MS
QTC INTERVAL: 497 MS

## 2024-11-27 NOTE — OUTREACH NOTE
[facility] End of Month Documentation    This Chronic Medical Management Care Plan for Reddy Castellano, 89 y.o. male, has been monitored and managed and a new plan of care implemented for the month of November.  A cumulative time of 127  minutes was spent on this patient record this month, including face to face with provider; phone call with other providers; chart review, talking with APS, PCP, chart review, charting.    Regarding the patient's problems: has Gout; Type 2 diabetes mellitus without complication, without long-term current use of insulin; Essential hypertension; Other hyperlipidemia; Major depressive disorder, recurrent, moderate; Scrotal pain; Urge incontinence of urine; Anxiety; Rheumatoid arthritis; Chronic obstructive pulmonary disease; Constipation; Essential tremor; Excess skin of abdominal wall; Heart failure; Mechanical complication of genitourinary device; Morbid obesity; Myocardial infarction; Numbness; Sleep apnea; Spondylosis of lumbar spine; Tingling of skin; Ventral hernia; Lymphadenopathy of head and neck; Swallowed foreign body; Hyperkalemia; Benign prostatic hyperplasia with urinary frequency; Renal mass; Allergic rhinitis; Moderate dementia without behavioral disturbance, psychotic disturbance, mood disturbance, or anxiety; Wound of right leg; Scalp itch; and Dysarthria on their problem list., the following items were addressed: medical records; medications; changes to medical care and any changes can be found within the plan section of the note.  A detailed listing of time spent for chronic care management is tracked within each outreach encounter.  Current medications include:  has a current medication list which includes the following prescription(s): allopurinol, atorvastatin, bupropion xl, clopidogrel, doxycycline, fluticasone-salmeterol, furosemide, lisinopril, melatonin, memantine, propranolol, venlafaxine xr, and vibegron. and the patient is reported to be patient is noncompliant with  medication protocol,  Medications are reported to be non-effective in controlling symptoms and changes have been made to the medication protocol.  Regarding these diagnoses, referrals were made to the following provider(s):  N/A.  All notes on chart for PCP to review.    The patient was monitored remotely for weight; medications.    The patient's physical needs include:  help taking medications as prescribed; medication education; physical healthcare; eye care; needs assistance with ADLs, Needs diabetic eye exam.     The patient's mental support needs include:  coordination of community providers; increased support, No longer seeing BH    The patient's cognitive support needs include:  medication; coordination of community providers; health care; increased support; needs assistance with ADLs; caregiver, [employer] is in home throughout the week    The patient's psychosocial support needs include:  medication management or adherence; need for increased support; coordination of community providers, needs increased support for medication adherence. son lives with him but doesn't seem to be of much support    The patient's functional needs include: No data recorded    The patient's environmental needs include:  not applicable    Care Plan overall comments:  Revised and Ongoing    Refer to previous outreach notes for more information on the areas listed above.    Monthly Billing Diagnoses  (E11.9) Type 2 diabetes mellitus without complication, without long-term current use of insulin    (I10) Essential hypertension    Medications   Medications have been reconciled    Care Plan progress this month:      Recently Modified Care Plans Updates made since 10/27/2024 12:00 AM      No recently modified care plans.            Current Specialty Plan of Care Status signed by both patient and provider    Instructions   Patient was provided an electronic copy of care plan  CCM services were explained and offered and patient has  accepted these services.  Patient has given their written consent to receive CCM services and understands that this includes the authorization of electronic communication of medical information with the other treating providers.  Patient understands that they may stop CCM services at any time and these changes will be effective at the end of the calendar month and will effectively revocate the agreement of CCM services.  Patient understands that only one practitioner can furnish and be paid for CCM services during one calendar month.  Patient also understands that there may be co-payment or deductible fees in association with CCM services.  Patient will continue with at least monthly follow-up calls with the Ambulatory .    Megha CARTAGENA  Ambulatory Case Management    11/27/2024, 07:36 EST

## 2024-12-06 ENCOUNTER — TELEPHONE (OUTPATIENT)
Dept: INTERNAL MEDICINE | Facility: CLINIC | Age: 89
End: 2024-12-06

## 2024-12-06 ENCOUNTER — TELEPHONE (OUTPATIENT)
Dept: INTERNAL MEDICINE | Facility: CLINIC | Age: 89
End: 2024-12-06
Payer: MEDICARE

## 2024-12-06 NOTE — TELEPHONE ENCOUNTER
Hub staff attempted to follow warm transfer process and was unsuccessful     Caller: Reddy Castellano    Relationship to patient: Self    Best call back number: [phone]    Patient is needing: PATIENT WAS REQUESTED TO RESCHEDULE HIS APPOINTMENT PER PROVIDER REQUEST. SCHEDULED FIRST AVAILABLE FOR HOSPITAL FOLLOW UP AND ADDED PATIENT TO WAIT LIST SINCE FIRST AVAILABLE WAS 01/06/2024. PLEASE CALL PATIENT BACK TO SCHEDULE A HOSPITAL FOLLOW UP OR ER FOLLOW UP.

## 2024-12-10 ENCOUNTER — PATIENT OUTREACH (OUTPATIENT)
Dept: CASE MANAGEMENT | Facility: OTHER | Age: 89
End: 2024-12-10
Payer: MEDICARE

## 2024-12-10 ENCOUNTER — TELEPHONE (OUTPATIENT)
Dept: CARDIOLOGY | Facility: CLINIC | Age: 89
End: 2024-12-10

## 2024-12-10 DIAGNOSIS — E11.9 TYPE 2 DIABETES MELLITUS WITHOUT COMPLICATION, WITHOUT LONG-TERM CURRENT USE OF INSULIN: Primary | ICD-10-CM

## 2024-12-10 NOTE — OUTREACH NOTE
AMBULATORY CASE MANAGEMENT NOTE    Names and Relationships of Patient/Support Persons: Contact: Albino Lara; Relationship: POA/Surrogate/Guardian -     Care Coordination    Albino Lara, the patient's state appointed guardian, called to get a report on the patient and to request demographics, med list and office visit notes faxed to her at [phone].    Report given to MsTriston Marco and records faxed.    Megha CARTAGENA  Ambulatory Case Management    12/10/2024, 16:24 EST

## 2024-12-10 NOTE — TELEPHONE ENCOUNTER
Lvm on patient's phone, called poa phone and spoke with son and patient rescheduled appointment for 12/16/2024 @ 2:30.  Informed to bring all medications they are taking.

## 2024-12-26 PROBLEM — I50.33 ACUTE ON CHRONIC HEART FAILURE WITH PRESERVED EJECTION FRACTION (HFPEF): Status: ACTIVE | Noted: 2024-12-26

## 2024-12-26 PROBLEM — N17.9 AKI (ACUTE KIDNEY INJURY): Status: ACTIVE | Noted: 2024-12-26

## 2024-12-31 ENCOUNTER — PATIENT OUTREACH (OUTPATIENT)
Dept: CASE MANAGEMENT | Facility: OTHER | Age: 89
End: 2024-12-31
Payer: MEDICARE

## 2024-12-31 DIAGNOSIS — I10 ESSENTIAL HYPERTENSION: ICD-10-CM

## 2024-12-31 DIAGNOSIS — E11.9 TYPE 2 DIABETES MELLITUS WITHOUT COMPLICATION, WITHOUT LONG-TERM CURRENT USE OF INSULIN: Primary | ICD-10-CM

## 2024-12-31 NOTE — OUTREACH NOTE
AMBULATORY CASE MANAGEMENT NOTE    Names and Relationships of Patient/Support Persons:  -     [facility] End of Month Documentation    This Chronic Medical Management Care Plan for Reddy Castellano, 89 y.o. male, has been monitored and managed and a new plan of care implemented for the month of December.  A cumulative time of 38  minutes was spent on this patient record this month, including phone call with care giver; chart review; phone call with patient; electronic communication with other providers, talked to state appointed guardian, chart review, medication review.    Regarding the patient's problems: has Gout; Type 2 diabetes mellitus without complication, without long-term current use of insulin; Essential hypertension; Other hyperlipidemia; Major depressive disorder, recurrent, moderate; Scrotal pain; Urge incontinence of urine; Anxiety; Rheumatoid arthritis; Chronic obstructive pulmonary disease; Constipation; Essential tremor; Excess skin of abdominal wall; Mechanical complication of genitourinary device; Morbid obesity; Myocardial infarction; Numbness; Sleep apnea; Spondylosis of lumbar spine; Tingling of skin; Ventral hernia; Lymphadenopathy of head and neck; Swallowed foreign body; Hyperkalemia; Benign prostatic hyperplasia with urinary frequency; Renal mass; Allergic rhinitis; Moderate dementia without behavioral disturbance, psychotic disturbance, mood disturbance, or anxiety; Wound of right leg; Scalp itch; Dysarthria; Acute on chronic heart failure with preserved ejection fraction (HFpEF); and DAISY (acute kidney injury) on their problem list., the following items were addressed: medical records; medications; changes to medical care and any changes can be found within the plan section of the note.  A detailed listing of time spent for chronic care management is tracked within each outreach encounter.  Current medications include:  has a current medication list which includes the following prescription(s):  allopurinol, atorvastatin, bupropion xl, clopidogrel, doxycycline, fluticasone-salmeterol, furosemide, lisinopril, melatonin, memantine, propranolol, venlafaxine xr, and vibegron. and the patient is reported to be patient is noncompliant with medication protocol,  Medications are reported to be non-effective in controlling symptoms and changes have been made to the medication protocol.  Regarding these diagnoses, referrals were made to the following provider(s):  N/A.  All notes on chart for PCP to review.    The patient was monitored remotely for weight; medications.    The patient's physical needs include:  help taking medications as prescribed; medication education; physical healthcare; eye care; needs assistance with ADLs, Needs diabetic eye exam.     The patient's mental support needs include:  coordination of community providers; increased support, No longer seeing BH    The patient's cognitive support needs include:  medication; coordination of community providers; health care; increased support; needs assistance with ADLs; caregiver, [employer] is in home throughout the week    The patient's psychosocial support needs include:  medication management or adherence; need for increased support; coordination of community providers, needs increased support for medication adherence. son lives with him but doesn't seem to be of much support    The patient's functional needs include: physical healthcare; medication education; needs assistance for ADLs; eye care    The patient's environmental needs include:  not applicable    Care Plan overall comments:  Ongoing    Refer to previous outreach notes for more information on the areas listed above.    Monthly Billing Diagnoses  (E11.9) Type 2 diabetes mellitus without complication, without long-term current use of insulin    (I10) Essential hypertension    Medications   Medications have been reconciled    Care Plan progress this month:      Recently Modified Care Plans  Updates made since 11/30/2024 12:00 AM      No recently modified care plans.               Current Specialty Plan of Care Status signed by both patient and provider    Instructions   Patient was provided an electronic copy of care plan  CCM services were explained and offered and patient has accepted these services.  Patient has given their written consent to receive CCM services and understands that this includes the authorization of electronic communication of medical information with the other treating providers.  Patient understands that they may stop CCM services at any time and these changes will be effective at the end of the calendar month and will effectively revocate the agreement of CCM services.  Patient understands that only one practitioner can furnish and be paid for CCM services during one calendar month.  Patient also understands that there may be co-payment or deductible fees in association with CCM services.  Patient will continue with at least monthly follow-up calls with the Ambulatory .    Megha CARTAGENA  Ambulatory Case Management    12/31/2024, 07:57 EST    Education Documentation  No documentation found.        Megha CARTAGENA  Ambulatory Case Management    12/31/2024, 07:57 EST

## 2025-01-01 ENCOUNTER — APPOINTMENT (OUTPATIENT)
Dept: GENERAL RADIOLOGY | Facility: HOSPITAL | Age: OVER 89
End: 2025-01-01
Payer: COMMERCIAL

## 2025-01-01 ENCOUNTER — HOSPITAL ENCOUNTER (EMERGENCY)
Facility: HOSPITAL | Age: OVER 89
End: 2025-01-23
Attending: EMERGENCY MEDICINE
Payer: COMMERCIAL

## 2025-01-01 ENCOUNTER — APPOINTMENT (OUTPATIENT)
Dept: CT IMAGING | Facility: HOSPITAL | Age: OVER 89
End: 2025-01-01
Payer: COMMERCIAL

## 2025-01-01 VITALS
BODY MASS INDEX: 34.31 KG/M2 | OXYGEN SATURATION: 60 % | DIASTOLIC BLOOD PRESSURE: 31 MMHG | WEIGHT: 226.41 LBS | RESPIRATION RATE: 40 BRPM | SYSTOLIC BLOOD PRESSURE: 55 MMHG | HEART RATE: 20 BPM | HEIGHT: 68 IN

## 2025-01-01 DIAGNOSIS — S27.321A CONTUSION OF RIGHT LUNG, INITIAL ENCOUNTER: ICD-10-CM

## 2025-01-01 DIAGNOSIS — S06.6XAA TRAUMATIC SUBARACHNOID HEMORRHAGE WITH UNKNOWN LOSS OF CONSCIOUSNESS STATUS, INITIAL ENCOUNTER: Primary | ICD-10-CM

## 2025-01-01 DIAGNOSIS — J94.2 HEMOPNEUMOTHORAX ON RIGHT: ICD-10-CM

## 2025-01-01 DIAGNOSIS — S36.899A TRAUMATIC HEMOPERITONEUM, INITIAL ENCOUNTER: ICD-10-CM

## 2025-01-01 DIAGNOSIS — S22.5XXA CLOSED FRACTURE OF MULTIPLE RIBS WITH FLAIL CHEST, INITIAL ENCOUNTER: ICD-10-CM

## 2025-01-01 DIAGNOSIS — S82.892C TYPE III OPEN FRACTURE DISLOCATION OF LEFT ANKLE, INITIAL ENCOUNTER: ICD-10-CM

## 2025-01-01 DIAGNOSIS — S37.009A INJURY OF KIDNEY, UNSPECIFIED LATERALITY, INITIAL ENCOUNTER: ICD-10-CM

## 2025-01-01 DIAGNOSIS — S24.109A: ICD-10-CM

## 2025-01-01 DIAGNOSIS — I46.8 CARDIAC ARREST DUE TO TRAUMA: ICD-10-CM

## 2025-01-01 DIAGNOSIS — Z04.1 ENCOUNTER FOR EXAMINATION FOLLOWING MOTOR VEHICLE COLLISION (MVC): ICD-10-CM

## 2025-01-01 LAB
ABO GROUP BLD: NORMAL
ALBUMIN SERPL-MCNC: 2.6 G/DL (ref 3.5–5.2)
ALBUMIN/GLOB SERPL: 1.4 G/DL
ALP SERPL-CCNC: 49 U/L (ref 39–117)
ALT SERPL W P-5'-P-CCNC: 159 U/L (ref 1–41)
ANION GAP SERPL CALCULATED.3IONS-SCNC: 11.4 MMOL/L (ref 5–15)
ARTERIAL PATENCY WRIST A: ABNORMAL
AST SERPL-CCNC: 265 U/L (ref 1–40)
ATMOSPHERIC PRESS: 749.7 MMHG
BASE EXCESS BLDA CALC-SCNC: -6.5 MMOL/L (ref -2–2)
BASOPHILS # BLD AUTO: 0.12 10*3/MM3 (ref 0–0.2)
BASOPHILS NFR BLD AUTO: 0.6 % (ref 0–1.5)
BDY SITE: ABNORMAL
BILIRUB SERPL-MCNC: 0.4 MG/DL (ref 0–1.2)
BUN SERPL-MCNC: 24 MG/DL (ref 8–23)
BUN/CREAT SERPL: 15.8 (ref 7–25)
CA-I BLDA-SCNC: 1.22 MMOL/L (ref 1.13–1.32)
CALCIUM SPEC-SCNC: 7.9 MG/DL (ref 8.6–10.5)
CHLORIDE BLDA-SCNC: 110 MMOL/L (ref 98–107)
CHLORIDE SERPL-SCNC: 111 MMOL/L (ref 98–107)
CO2 SERPL-SCNC: 19.6 MMOL/L (ref 22–29)
CREAT SERPL-MCNC: 1.52 MG/DL (ref 0.76–1.27)
D-LACTATE SERPL-SCNC: 2.5 MMOL/L
D-LACTATE SERPL-SCNC: 3.2 MMOL/L (ref 0.5–2)
DEPRECATED RDW RBC AUTO: 49.9 FL (ref 37–54)
EGFRCR SERPLBLD CKD-EPI 2021: 43.5 ML/MIN/1.73
EOSINOPHIL # BLD AUTO: 0.15 10*3/MM3 (ref 0–0.4)
EOSINOPHIL NFR BLD AUTO: 0.7 % (ref 0.3–6.2)
ERYTHROCYTE [DISTWIDTH] IN BLOOD BY AUTOMATED COUNT: 13.7 % (ref 12.3–15.4)
GAS FLOW AIRWAY: 15 LPM
GLOBULIN UR ELPH-MCNC: 1.9 GM/DL
GLUCOSE BLDC GLUCOMTR-MCNC: 168 MG/DL (ref 70–99)
GLUCOSE SERPL-MCNC: 152 MG/DL (ref 65–99)
HCO3 BLDA-SCNC: 21.9 MMOL/L (ref 22–26)
HCT VFR BLD AUTO: 35.7 % (ref 37.5–51)
HCT VFR BLD CALC: 33 % (ref 38–51)
HEMODILUTION: NO
HGB BLD-MCNC: 11.3 G/DL (ref 13–17.7)
HGB BLDA-MCNC: 11.1 G/DL (ref 12–18)
HOLD SPECIMEN: NORMAL
HOLD SPECIMEN: NORMAL
IMM GRANULOCYTES # BLD AUTO: 0.37 10*3/MM3 (ref 0–0.05)
IMM GRANULOCYTES NFR BLD AUTO: 1.7 % (ref 0–0.5)
INHALED O2 CONCENTRATION: 100 %
LIPASE SERPL-CCNC: 50 U/L (ref 13–60)
LYMPHOCYTES # BLD AUTO: 6.9 10*3/MM3 (ref 0.7–3.1)
LYMPHOCYTES NFR BLD AUTO: 32.1 % (ref 19.6–45.3)
Lab: ABNORMAL
MCH RBC QN AUTO: 31.6 PG (ref 26.6–33)
MCHC RBC AUTO-ENTMCNC: 31.7 G/DL (ref 31.5–35.7)
MCV RBC AUTO: 99.7 FL (ref 79–97)
MODALITY: ABNORMAL
MONOCYTES # BLD AUTO: 0.51 10*3/MM3 (ref 0.1–0.9)
MONOCYTES NFR BLD AUTO: 2.4 % (ref 5–12)
NEUTROPHILS NFR BLD AUTO: 13.43 10*3/MM3 (ref 1.7–7)
NEUTROPHILS NFR BLD AUTO: 62.5 % (ref 42.7–76)
NOTIFIED WHO: ABNORMAL
NRBC BLD AUTO-RTO: 0.1 /100 WBC (ref 0–0.2)
NT-PROBNP SERPL-MCNC: 1651 PG/ML (ref 0–1800)
PCO2 BLDA: 56.2 MM HG (ref 35–45)
PH BLDA: 7.2 PH UNITS (ref 7.35–7.45)
PLAT MORPH BLD: NORMAL
PLATELET # BLD AUTO: 185 10*3/MM3 (ref 140–450)
PMV BLD AUTO: 10.6 FL (ref 6–12)
PO2 BLD: 65 MM[HG] (ref 0–500)
PO2 BLDA: 64.5 MM HG (ref 80–100)
POTASSIUM BLDA-SCNC: 4.3 MMOL/L (ref 3.5–5)
POTASSIUM SERPL-SCNC: 4.6 MMOL/L (ref 3.5–5.2)
PROT SERPL-MCNC: 4.5 G/DL (ref 6–8.5)
RBC # BLD AUTO: 3.58 10*6/MM3 (ref 4.14–5.8)
RBC MORPH BLD: NORMAL
READ BACK: YES
RH BLD: POSITIVE
SAO2 % BLDCOA: 86.4 % (ref 95–99)
SODIUM BLD-SCNC: 143 MMOL/L (ref 131–143)
SODIUM SERPL-SCNC: 142 MMOL/L (ref 136–145)
TROPONIN T SERPL HS-MCNC: 43 NG/L
WBC MORPH BLD: NORMAL
WBC NRBC COR # BLD AUTO: 21.48 10*3/MM3 (ref 3.4–10.8)
WHOLE BLOOD HOLD COAG: NORMAL
WHOLE BLOOD HOLD SPECIMEN: NORMAL

## 2025-01-01 PROCEDURE — 86900 BLOOD TYPING SEROLOGIC ABO: CPT

## 2025-01-01 PROCEDURE — 96367 TX/PROPH/DG ADDL SEQ IV INF: CPT

## 2025-01-01 PROCEDURE — 25010000002 HYDROMORPHONE 1 MG/ML SOLUTION: Performed by: EMERGENCY MEDICINE

## 2025-01-01 PROCEDURE — 74177 CT ABD & PELVIS W/CONTRAST: CPT

## 2025-01-01 PROCEDURE — 96375 TX/PRO/DX INJ NEW DRUG ADDON: CPT

## 2025-01-01 PROCEDURE — 25010000002 EPINEPHRINE 1 MG/10ML SOLUTION PREFILLED SYRINGE: Performed by: EMERGENCY MEDICINE

## 2025-01-01 PROCEDURE — 96365 THER/PROPH/DIAG IV INF INIT: CPT

## 2025-01-01 PROCEDURE — 80051 ELECTROLYTE PANEL: CPT

## 2025-01-01 PROCEDURE — 83690 ASSAY OF LIPASE: CPT | Performed by: EMERGENCY MEDICINE

## 2025-01-01 PROCEDURE — 86901 BLOOD TYPING SEROLOGIC RH(D): CPT

## 2025-01-01 PROCEDURE — P9016 RBC LEUKOCYTES REDUCED: HCPCS

## 2025-01-01 PROCEDURE — 85025 COMPLETE CBC W/AUTO DIFF WBC: CPT | Performed by: EMERGENCY MEDICINE

## 2025-01-01 PROCEDURE — 83880 ASSAY OF NATRIURETIC PEPTIDE: CPT | Performed by: EMERGENCY MEDICINE

## 2025-01-01 PROCEDURE — 25810000003 SODIUM CHLORIDE 0.9 % SOLUTION: Performed by: EMERGENCY MEDICINE

## 2025-01-01 PROCEDURE — 36600 WITHDRAWAL OF ARTERIAL BLOOD: CPT

## 2025-01-01 PROCEDURE — 25010000002 LIDOCAINE (CARDIAC): Performed by: EMERGENCY MEDICINE

## 2025-01-01 PROCEDURE — 94799 UNLISTED PULMONARY SVC/PX: CPT

## 2025-01-01 PROCEDURE — 83605 ASSAY OF LACTIC ACID: CPT

## 2025-01-01 PROCEDURE — 73610 X-RAY EXAM OF ANKLE: CPT

## 2025-01-01 PROCEDURE — 25010000002 LORAZEPAM PER 2 MG

## 2025-01-01 PROCEDURE — 36430 TRANSFUSION BLD/BLD COMPNT: CPT

## 2025-01-01 PROCEDURE — 71045 X-RAY EXAM CHEST 1 VIEW: CPT

## 2025-01-01 PROCEDURE — 25010000002 TETANUS-DIPHTH-ACELL PERTUSSIS 5-2.5-18.5 LF-MCG/0.5 SUSPENSION PREFILLED SYRINGE: Performed by: EMERGENCY MEDICINE

## 2025-01-01 PROCEDURE — 71260 CT THORAX DX C+: CPT

## 2025-01-01 PROCEDURE — 90715 TDAP VACCINE 7 YRS/> IM: CPT | Performed by: EMERGENCY MEDICINE

## 2025-01-01 PROCEDURE — 93005 ELECTROCARDIOGRAM TRACING: CPT | Performed by: EMERGENCY MEDICINE

## 2025-01-01 PROCEDURE — 84484 ASSAY OF TROPONIN QUANT: CPT | Performed by: EMERGENCY MEDICINE

## 2025-01-01 PROCEDURE — 82803 BLOOD GASES ANY COMBINATION: CPT

## 2025-01-01 PROCEDURE — 90471 IMMUNIZATION ADMIN: CPT | Performed by: EMERGENCY MEDICINE

## 2025-01-01 PROCEDURE — 31500 INSERT EMERGENCY AIRWAY: CPT

## 2025-01-01 PROCEDURE — 99285 EMERGENCY DEPT VISIT HI MDM: CPT

## 2025-01-01 PROCEDURE — 82948 REAGENT STRIP/BLOOD GLUCOSE: CPT

## 2025-01-01 PROCEDURE — 82330 ASSAY OF CALCIUM: CPT

## 2025-01-01 PROCEDURE — 73600 X-RAY EXAM OF ANKLE: CPT

## 2025-01-01 PROCEDURE — 92950 HEART/LUNG RESUSCITATION CPR: CPT

## 2025-01-01 PROCEDURE — 93010 ELECTROCARDIOGRAM REPORT: CPT | Performed by: INTERNAL MEDICINE

## 2025-01-01 PROCEDURE — 80053 COMPREHEN METABOLIC PANEL: CPT | Performed by: EMERGENCY MEDICINE

## 2025-01-01 PROCEDURE — 25510000001 IOPAMIDOL PER 1 ML: Performed by: EMERGENCY MEDICINE

## 2025-01-01 PROCEDURE — 70450 CT HEAD/BRAIN W/O DYE: CPT

## 2025-01-01 PROCEDURE — 72125 CT NECK SPINE W/O DYE: CPT

## 2025-01-01 PROCEDURE — 85007 BL SMEAR W/DIFF WBC COUNT: CPT | Performed by: EMERGENCY MEDICINE

## 2025-01-01 PROCEDURE — 25010000002 CEFAZOLIN PER 500 MG: Performed by: EMERGENCY MEDICINE

## 2025-01-01 RX ORDER — NOREPINEPHRINE BITARTRATE 0.03 MG/ML
INJECTION, SOLUTION INTRAVENOUS
Status: COMPLETED
Start: 2025-01-01 | End: 2025-01-01

## 2025-01-01 RX ORDER — NOREPINEPHRINE BITARTRATE 0.03 MG/ML
.02-.3 INJECTION, SOLUTION INTRAVENOUS
Status: DISCONTINUED | OUTPATIENT
Start: 2025-01-01 | End: 2025-01-01 | Stop reason: HOSPADM

## 2025-01-01 RX ORDER — CALCIUM CHLORIDE 100 MG/ML
1 INJECTION INTRAVENOUS; INTRAVENTRICULAR ONCE
Status: COMPLETED | OUTPATIENT
Start: 2025-01-01 | End: 2025-01-01

## 2025-01-01 RX ORDER — LIDOCAINE HYDROCHLORIDE 10 MG/ML
INJECTION, SOLUTION INFILTRATION; PERINEURAL
Status: DISCONTINUED
Start: 2025-01-01 | End: 2025-01-01 | Stop reason: WASHOUT

## 2025-01-01 RX ORDER — LORAZEPAM 2 MG/ML
1 INJECTION INTRAMUSCULAR ONCE
Status: COMPLETED | OUTPATIENT
Start: 2025-01-01 | End: 2025-01-01

## 2025-01-01 RX ORDER — CALCIUM CHLORIDE 100 MG/ML
INJECTION INTRAVENOUS; INTRAVENTRICULAR
Status: COMPLETED | OUTPATIENT
Start: 2025-01-01 | End: 2025-01-01

## 2025-01-01 RX ORDER — IOPAMIDOL 755 MG/ML
100 INJECTION, SOLUTION INTRAVASCULAR
Status: COMPLETED | OUTPATIENT
Start: 2025-01-01 | End: 2025-01-01

## 2025-01-01 RX ORDER — SODIUM CHLORIDE 0.9 % (FLUSH) 0.9 %
10 SYRINGE (ML) INJECTION AS NEEDED
Status: DISCONTINUED | OUTPATIENT
Start: 2025-01-01 | End: 2025-01-01 | Stop reason: HOSPADM

## 2025-01-01 RX ORDER — KETAMINE HYDROCHLORIDE 10 MG/ML
INJECTION, SOLUTION INTRAMUSCULAR; INTRAVENOUS
Status: DISCONTINUED
Start: 2025-01-01 | End: 2025-01-01 | Stop reason: WASHOUT

## 2025-01-01 RX ORDER — LORAZEPAM 2 MG/ML
INJECTION INTRAMUSCULAR
Status: COMPLETED
Start: 2025-01-01 | End: 2025-01-01

## 2025-01-01 RX ADMIN — EPINEPHRINE 1 MG: 0.1 INJECTION INTRAVENOUS at 18:23

## 2025-01-01 RX ADMIN — LIDOCAINE HYDROCHLORIDE 100 MG: 20 INJECTION, SOLUTION INTRAVENOUS at 18:11

## 2025-01-01 RX ADMIN — CALCIUM CHLORIDE 1 G: 100 INJECTION INTRAVENOUS; INTRAVENTRICULAR at 18:00

## 2025-01-01 RX ADMIN — SODIUM CHLORIDE 1000 ML: 9 INJECTION, SOLUTION INTRAVENOUS at 17:07

## 2025-01-01 RX ADMIN — NOREPINEPHRINE BITARTRATE 0.05 MCG/KG/MIN: 0.03 INJECTION, SOLUTION INTRAVENOUS at 17:55

## 2025-01-01 RX ADMIN — EPINEPHRINE 1 MG: 0.1 INJECTION INTRAVENOUS at 18:17

## 2025-01-01 RX ADMIN — EPINEPHRINE 1 MG: 0.1 INJECTION INTRAVENOUS at 18:26

## 2025-01-01 RX ADMIN — SODIUM BICARBONATE 50 MEQ: 84 INJECTION INTRAVENOUS at 18:27

## 2025-01-01 RX ADMIN — IOPAMIDOL 100 ML: 755 INJECTION, SOLUTION INTRAVENOUS at 17:19

## 2025-01-01 RX ADMIN — ROCURONIUM BROMIDE 100 MG: 10 INJECTION, SOLUTION INTRAVENOUS at 18:12

## 2025-01-01 RX ADMIN — EPINEPHRINE 1 MG: 0.1 INJECTION INTRAVENOUS at 18:29

## 2025-01-01 RX ADMIN — KETAMINE HYDROCHLORIDE 100 MG: 100 INJECTION, SOLUTION, CONCENTRATE INTRAMUSCULAR; INTRAVENOUS at 18:12

## 2025-01-01 RX ADMIN — LORAZEPAM 1 MG: 2 INJECTION INTRAMUSCULAR; INTRAVENOUS at 17:29

## 2025-01-01 RX ADMIN — HYDROMORPHONE HYDROCHLORIDE 0.5 MG: 1 INJECTION, SOLUTION INTRAMUSCULAR; INTRAVENOUS; SUBCUTANEOUS at 17:20

## 2025-01-01 RX ADMIN — EPINEPHRINE 1 MG: 0.1 INJECTION INTRAVENOUS at 18:20

## 2025-01-01 RX ADMIN — CALCIUM CHLORIDE INJECTION 1 G: 100 INJECTION, SOLUTION INTRAVENOUS at 18:30

## 2025-01-01 RX ADMIN — SODIUM CHLORIDE 2000 MG: 9 INJECTION, SOLUTION INTRAVENOUS at 17:34

## 2025-01-01 RX ADMIN — EPINEPHRINE 1 MG: 0.1 INJECTION INTRAVENOUS at 18:32

## 2025-01-01 RX ADMIN — TETANUS TOXOID, REDUCED DIPHTHERIA TOXOID AND ACELLULAR PERTUSSIS VACCINE, ADSORBED 0.5 ML: 5; 2.5; 8; 8; 2.5 SUSPENSION INTRAMUSCULAR at 17:36

## 2025-01-01 RX ADMIN — SODIUM BICARBONATE 50 MEQ: 84 INJECTION INTRAVENOUS at 18:19

## 2025-01-01 RX ADMIN — LORAZEPAM 1 MG: 2 INJECTION INTRAMUSCULAR at 17:29

## 2025-01-01 RX ADMIN — LIDOCAINE HYDROCHLORIDE 100 MG: 20 INJECTION, SOLUTION INTRAVENOUS at 18:30

## 2025-01-10 ENCOUNTER — PATIENT OUTREACH (OUTPATIENT)
Dept: CASE MANAGEMENT | Facility: OTHER | Age: OVER 89
End: 2025-01-10
Payer: MEDICARE

## 2025-01-10 DIAGNOSIS — E11.9 TYPE 2 DIABETES MELLITUS WITHOUT COMPLICATION, WITHOUT LONG-TERM CURRENT USE OF INSULIN: Primary | ICD-10-CM

## 2025-01-10 NOTE — OUTREACH NOTE
AMBULATORY CASE MANAGEMENT NOTE    Names and Relationships of Patient/Support Persons: Contact: Marysol Lara-State guardian; Relationship: POA/Surrogate/Guardian -     Care Coordination    I spoke with the patient's state guardian for an extended period of time today on the phone. Patient is still living at home. His son lives there but is not participating in the patient's care as far as we can see. [employer] non-medical caregivers are coming in to the patient's home several days a week. We discussed ways to assist the patient with being complaint with his medications and attending his doctor's appointments.    Patient recently No Showed for his AWV at his PCP office and a follow up cardiology appointment. While on the phone, I made a new appointment for the patient with his PCP. State Guardian plans to set up transportation and meet with patient to make sure he attends the appointment.    Patient still needs appointments scheduled with cardiology, eye doctor. I will make those for the patient and relay that information to the state guardian.    We also discussed moving his prescriptions to [facility] so they can be pre-packaged and delivered to his home.    We also discussed the options of the patient going to an assisted living facility.      Megha CARTAGENA  Ambulatory Case Management    1/10/2025, 12:09 EST

## 2025-01-13 ENCOUNTER — PATIENT OUTREACH (OUTPATIENT)
Dept: CASE MANAGEMENT | Facility: OTHER | Age: OVER 89
End: 2025-01-13
Payer: MEDICARE

## 2025-01-13 DIAGNOSIS — F03.B0 MODERATE DEMENTIA WITHOUT BEHAVIORAL DISTURBANCE, PSYCHOTIC DISTURBANCE, MOOD DISTURBANCE, OR ANXIETY, UNSPECIFIED DEMENTIA TYPE: ICD-10-CM

## 2025-01-13 DIAGNOSIS — I10 ESSENTIAL HYPERTENSION: Primary | ICD-10-CM

## 2025-01-13 NOTE — OUTREACH NOTE
AMBULATORY CASE MANAGEMENT NOTE    Names and Relationships of Patient/Support Persons: Contact: Reddy Castellano; Relationship: Self  Contact: [facility] cardiology; Relationship: Other  Contact: Agatha-Sommer; Relationship: Other -     [facility] Interim Update    I spoke with the patient on the phone today in an attempt to find out which provider does his eye exams. After I name a couple of places, the patient is able to tell me [facility]. During our conversation, patient repeated himself several times but was able to converse with me.    Patient tells me that he no longer has difficulty swallowing when he eats. He does say that he is not able to swallow his pills. I told him we are working on a solution for this.    Care Coordination    I spoke face to face with the PCP this morning regarding what services the patient is currently receiving in home.    I spoke with [facility] to inquire about getting the patient's medications pillpacked and delivered to the patient. She stated that I would need to discuss this with Push[facility] and she will be back in the pharmacy next week.    I called cardiology and made an appointment for the patient on 2/3 at 10 am with DUARTE Ray.    I called [facility] and made an appointment for the patient on 1/29/2025 at 9am.    I called and left a message for the patient's state guardian regarding the above appointments.      Megha CARTAGENA  Ambulatory Case Management    1/13/2025, 15:30 EST

## 2025-01-24 ENCOUNTER — PATIENT OUTREACH (OUTPATIENT)
Dept: CASE MANAGEMENT | Facility: OTHER | Age: OVER 89
End: 2025-01-24
Payer: MEDICARE

## 2025-01-24 DIAGNOSIS — E11.9 TYPE 2 DIABETES MELLITUS WITHOUT COMPLICATION, WITHOUT LONG-TERM CURRENT USE OF INSULIN: ICD-10-CM

## 2025-01-24 DIAGNOSIS — F03.B0 MODERATE DEMENTIA WITHOUT BEHAVIORAL DISTURBANCE, PSYCHOTIC DISTURBANCE, MOOD DISTURBANCE, OR ANXIETY, UNSPECIFIED DEMENTIA TYPE: ICD-10-CM

## 2025-01-24 DIAGNOSIS — I10 ESSENTIAL HYPERTENSION: Primary | ICD-10-CM

## 2025-01-24 RX ORDER — ROCURONIUM BROMIDE 10 MG/ML
INJECTION, SOLUTION INTRAVENOUS
Status: COMPLETED | OUTPATIENT
Start: 2025-01-01 | End: 2025-01-01

## 2025-01-24 RX ORDER — KETAMINE HYDROCHLORIDE 100 MG/ML
INJECTION, SOLUTION INTRAMUSCULAR; INTRAVENOUS
Status: COMPLETED | OUTPATIENT
Start: 2025-01-01 | End: 2025-01-01

## 2025-01-24 NOTE — OUTREACH NOTE
[facility] End of Month Documentation    This Chronic Medical Management Care Plan for Reddy Castellano, 89 y.o. male, has been No data recorded and a new plan of care implemented for the month of No data recorded.  A cumulative time of 84  minutes was spent on this patient record this month, including No data recorded.    Regarding the patient's problems: has Gout; Type 2 diabetes mellitus without complication, without long-term current use of insulin; Essential hypertension; Other hyperlipidemia; Major depressive disorder, recurrent, moderate; Scrotal pain; Urge incontinence of urine; Anxiety; Rheumatoid arthritis; Chronic obstructive pulmonary disease; Constipation; Essential tremor; Excess skin of abdominal wall; Mechanical complication of genitourinary device; Morbid obesity; Myocardial infarction; Numbness; Sleep apnea; Spondylosis of lumbar spine; Tingling of skin; Ventral hernia; Lymphadenopathy of head and neck; Swallowed foreign body; Hyperkalemia; Benign prostatic hyperplasia with urinary frequency; Renal mass; Allergic rhinitis; Moderate dementia without behavioral disturbance, psychotic disturbance, mood disturbance, or anxiety; Wound of right leg; Scalp itch; Dysarthria; Acute on chronic heart failure with preserved ejection fraction (HFpEF); and DAISY (acute kidney injury) on their problem list., the following items were addressed: No data recorded and any changes can be found within the plan section of the note.  A detailed listing of time spent for chronic care management is tracked within each outreach encounter.  Current medications include:  has a current medication list which includes the following prescription(s): allopurinol, atorvastatin, bupropion xl, clopidogrel, doxycycline, fluticasone-salmeterol, furosemide, lisinopril, melatonin, memantine, propranolol, venlafaxine xr, and vibegron. and the patient is reported to be No data recorded,  Medications are reported to be No data recorded.  Regarding  these diagnoses, referrals were made to the following provider(s):  N/A.  All notes on chart for PCP to review.    The patient was monitored remotely for No data recorded.    The patient's physical needs include:  No data recorded.     The patient's mental support needs include:  No data recorded    The patient's cognitive support needs include:  No data recorded    The patient's psychosocial support needs include:  No data recorded    The patient's functional needs include: No data recorded    The patient's environmental needs include:  No data recorded    Care Plan overall comments:  No data recorded    Refer to previous outreach notes for more information on the areas listed above.    Monthly Billing Diagnoses  (I10) Essential hypertension    (E11.9) Type 2 diabetes mellitus without complication, without long-term current use of insulin    (F03.B0) Moderate dementia without behavioral disturbance, psychotic disturbance, mood disturbance, or anxiety, unspecified dementia type    Medications   Medications have been reconciled    Care Plan progress this month:      Recently Modified Care Plans Updates made since 12/24/2024 12:00 AM      No recently modified care plans.               Current Specialty Plan of Care Status signed by both patient and provider    Instructions   Patient was provided an electronic copy of care plan  CCM services were explained and offered and patient has accepted these services.  Patient has given their written consent to receive CCM services and understands that this includes the authorization of electronic communication of medical information with the other treating providers.  Patient understands that they may stop CCM services at any time and these changes will be effective at the end of the calendar month and will effectively revocate the agreement of CCM services.  Patient understands that only one practitioner can furnish and be paid for CCM services during one calendar month.   Patient also understands that there may be co-payment or deductible fees in association with CCM services.  Patient will continue with at least monthly follow-up calls with the Ambulatory .    Megha CARTAGENA  Ambulatory Case Management    1/24/2025, 14:31 EST
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
## 2025-01-25 LAB
QT INTERVAL: 348 MS
QTC INTERVAL: 492 MS

## (undated) DEVICE — SOL IRRG H2O PL/BG 1000ML STRL

## (undated) DEVICE — Device: Brand: DEFENDO AIR/WATER/SUCTION AND BIOPSY VALVE

## (undated) DEVICE — EGD OR ERCP KIT: Brand: MEDLINE INDUSTRIES, INC.